# Patient Record
Sex: FEMALE | Race: WHITE | NOT HISPANIC OR LATINO
[De-identification: names, ages, dates, MRNs, and addresses within clinical notes are randomized per-mention and may not be internally consistent; named-entity substitution may affect disease eponyms.]

---

## 2017-01-19 ENCOUNTER — RECORD ABSTRACTING (OUTPATIENT)
Age: 72
End: 2017-01-19

## 2017-01-19 PROBLEM — Z00.00 ENCOUNTER FOR PREVENTIVE HEALTH EXAMINATION: Status: ACTIVE | Noted: 2017-01-19

## 2019-11-15 ENCOUNTER — APPOINTMENT (OUTPATIENT)
Dept: CARDIOLOGY | Facility: CLINIC | Age: 74
End: 2019-11-15
Payer: MEDICARE

## 2019-11-15 VITALS
DIASTOLIC BLOOD PRESSURE: 84 MMHG | HEART RATE: 90 BPM | WEIGHT: 175 LBS | HEIGHT: 66 IN | BODY MASS INDEX: 28.12 KG/M2 | SYSTOLIC BLOOD PRESSURE: 140 MMHG

## 2019-11-15 PROCEDURE — 99204 OFFICE O/P NEW MOD 45 MIN: CPT

## 2019-11-15 PROCEDURE — 93000 ELECTROCARDIOGRAM COMPLETE: CPT

## 2019-12-05 NOTE — HISTORY OF PRESENT ILLNESS
[FreeTextEntry1] : Patient with history of HTN, DM, s/p AF ablation 2015 and was ding great. Now patient c/o tirness and palpitations and increase heart rate. Patient came for further evaluation. No bleeding \par \par \par EKG AF 90 bpm

## 2019-12-05 NOTE — PHYSICAL EXAM
[Normal Oral Mucosa] : normal oral mucosa [No Oral Cyanosis] : no oral cyanosis [No Oral Pallor] : no oral pallor [Normal Jugular Venous A Waves Present] : normal jugular venous A waves present [Normal Jugular Venous V Waves Present] : normal jugular venous V waves present [No Jugular Venous Marcus A Waves] : no jugular venous marcus A waves [Heart Rate And Rhythm] : heart rate and rhythm were normal [Heart Sounds] : normal S1 and S2 [Murmurs] : no murmurs present [Exaggerated Use Of Accessory Muscles For Inspiration] : no accessory muscle use [Respiration, Rhythm And Depth] : normal respiratory rhythm and effort [Auscultation Breath Sounds / Voice Sounds] : lungs were clear to auscultation bilaterally [Abdomen Soft] : soft [Abdomen Tenderness] : non-tender [Abdomen Mass (___ Cm)] : no abdominal mass palpated [Nail Clubbing] : no clubbing of the fingernails [Cyanosis, Localized] : no localized cyanosis [Petechial Hemorrhages (___cm)] : no petechial hemorrhages [] : no ischemic changes

## 2019-12-11 ENCOUNTER — OUTPATIENT (OUTPATIENT)
Dept: OUTPATIENT SERVICES | Facility: HOSPITAL | Age: 74
LOS: 1 days | Discharge: HOME | End: 2019-12-11

## 2019-12-11 DIAGNOSIS — I48.91 UNSPECIFIED ATRIAL FIBRILLATION: ICD-10-CM

## 2019-12-11 DIAGNOSIS — Z95.5 PRESENCE OF CORONARY ANGIOPLASTY IMPLANT AND GRAFT: Chronic | ICD-10-CM

## 2019-12-11 DIAGNOSIS — Z98.890 OTHER SPECIFIED POSTPROCEDURAL STATES: Chronic | ICD-10-CM

## 2019-12-11 NOTE — CHART NOTE - NSCHARTNOTEFT_GEN_A_CORE
PACU ANESTHESIA ADMISSION NOTE      Procedure:   Post op diagnosis:      ____  Intubated  TV:______       Rate: ______      FiO2: ______    _x___  Patent Airway    __x__  Full return of protective reflexes    __x__  Full recovery from anesthesia / back to baseline     Vitals:   T:           R:14                  BP: 151/74                 Sat:96                   P: 91      Mental Status:  _x___ Awake x  _____ Alert   _____ Drowsy   _____ Sedated    Nausea/Vomiting:  ____ NO  ______Yes,   See Post - Op Orders          Pain Scale (0-10):  _____    Treatment: ____ None    ____ See Post - Op/PCA Orders    Post - Operative Fluids:   ____ Oral   ____ See Post - Op Orders    Plan: Discharge: x  ____Home       _____Floor     _____Critical Care    _____  Other:_________________    Comments:

## 2019-12-11 NOTE — CHART NOTE - NSCHARTNOTEFT_GEN_A_CORE
POST OPERATIVE PROCEDURAL DOCUMENTATION  PRE-OP DIAGNOSIS: Afib    POST-OP DIAGNOSIS: SR    PROCEDURE: Transesophageal echocardiogram and CV    Primary Physician: JASON Bhagat MD  Fellow: LAURA Merrill MD    ANESTHESIA TYPE  [ x ] Conscious Sedation  [ x ] Local/Regional    CONDITION  [ x ] Good    SPECIMENS REMOVED (IF APPLICABLE): NA    IMPLANTS (IF APPLICABLE): None    ESTIMATED BLOOD LOSS: None    COMPLICATIONS: None    TRANSESOPHAGEAL ECHOCARDIOGRAM     After risks and benefits of procedures were explained, informed consent was obtained and placed in chart.   The patient received topical anesthestic to the oropharynx with viscous lidocaine and benzocaine spray.  Refer to Anesthesia note for sedation details.  The INESSA probe was passed into the esophagus without difficulty.  Transesophageal and transgastric images were obtained.  The INESSA probe was removed without difficulty and examined.  There was no evidence for bleeding.  The patient tolerated the procedure well without any immediate INESSA-related complications.      Preliminary Findings:  LV normal systolic function  RV Normal  Mild MR  Mod TR  Mild AI  Dialted LA  Mildly dilated RA  No thrombus in SILVERIO    Patient succesfully converted to sinus rhythm with synchronized  200 J of direct current cardioversion.    Final report to follow.

## 2019-12-20 PROBLEM — I48.91 UNSPECIFIED ATRIAL FIBRILLATION: Chronic | Status: ACTIVE | Noted: 2019-12-11

## 2019-12-27 ENCOUNTER — APPOINTMENT (OUTPATIENT)
Dept: CARDIOLOGY | Facility: CLINIC | Age: 74
End: 2019-12-27
Payer: MEDICARE

## 2019-12-27 VITALS
WEIGHT: 184 LBS | SYSTOLIC BLOOD PRESSURE: 160 MMHG | DIASTOLIC BLOOD PRESSURE: 81 MMHG | HEART RATE: 69 BPM | BODY MASS INDEX: 30.66 KG/M2 | HEIGHT: 65 IN

## 2019-12-27 DIAGNOSIS — I10 ESSENTIAL (PRIMARY) HYPERTENSION: ICD-10-CM

## 2019-12-27 PROCEDURE — 93000 ELECTROCARDIOGRAM COMPLETE: CPT

## 2019-12-27 PROCEDURE — 99215 OFFICE O/P EST HI 40 MIN: CPT

## 2019-12-27 NOTE — ASSESSMENT
[FreeTextEntry1] : AF ablation\par \par I have discussed different treatment options with KENA LOZADA including anti-arrhythmic medications or ablation.  After a long discussion, the patient would like to proceed with an ablation.  I have explained the risks and benefits of the procedure to the patient.  There is approximately 1-2% chance of any major cardiovascular complication to occur. Complications include, but are not limited to infection, bleeding, damage to the vessels, hole in the heart, stroke, death and heart attack. There is also 1% risk of phrenic nerve injury.  The patient understands the risk and would like to proceed with the procedure. Patient had opportunity to ask questions. Patient indicated that all of his questions were answered to his satisfaction and verbalized understanding.\par \par

## 2019-12-27 NOTE — HISTORY OF PRESENT ILLNESS
[FreeTextEntry1] : Patient with history of HTN, DM, s/p AF ablation 2015 and was ding great. Now patient c/o tirness and palpitations and increase heart rate. Patient came for further evaluation. No bleeding \par \par Patient returns for further evaluation for AF. Pt fees much better once she is in NSR and see the difference\par \par \par EKG SR 60 bpm\par \par INESSA - Summary:\par  1. Left ventricular ejection fraction, by visual estimation, is 45 to 50%.\par  2. Mildly decreased global left ventricular systolic function.\par  3. Moderately enlarged right atrium.\par  4. Successful synchronised direct current cardioversion at 200 J.\par  5. No left atrial appendage thrombus and normal left atrial appendage velocities.\par

## 2019-12-27 NOTE — PHYSICAL EXAM
[Normal Oral Mucosa] : normal oral mucosa [No Oral Pallor] : no oral pallor [No Oral Cyanosis] : no oral cyanosis [Normal Jugular Venous V Waves Present] : normal jugular venous V waves present [Normal Jugular Venous A Waves Present] : normal jugular venous A waves present [No Jugular Venous Marcus A Waves] : no jugular venous marcus A waves [Respiration, Rhythm And Depth] : normal respiratory rhythm and effort [Exaggerated Use Of Accessory Muscles For Inspiration] : no accessory muscle use [Auscultation Breath Sounds / Voice Sounds] : lungs were clear to auscultation bilaterally [Heart Rate And Rhythm] : heart rate and rhythm were normal [Heart Sounds] : normal S1 and S2 [Murmurs] : no murmurs present [Abdomen Tenderness] : non-tender [Abdomen Soft] : soft [Abdomen Mass (___ Cm)] : no abdominal mass palpated [Nail Clubbing] : no clubbing of the fingernails [Cyanosis, Localized] : no localized cyanosis [Petechial Hemorrhages (___cm)] : no petechial hemorrhages [] : no ischemic changes

## 2020-02-10 ENCOUNTER — FORM ENCOUNTER (OUTPATIENT)
Age: 75
End: 2020-02-10

## 2020-02-11 ENCOUNTER — OUTPATIENT (OUTPATIENT)
Dept: OUTPATIENT SERVICES | Facility: HOSPITAL | Age: 75
LOS: 1 days | Discharge: HOME | End: 2020-02-11
Payer: MEDICARE

## 2020-02-11 VITALS
OXYGEN SATURATION: 98 % | HEIGHT: 65 IN | SYSTOLIC BLOOD PRESSURE: 146 MMHG | RESPIRATION RATE: 16 BRPM | DIASTOLIC BLOOD PRESSURE: 78 MMHG | HEART RATE: 68 BPM | TEMPERATURE: 98 F | WEIGHT: 174.17 LBS

## 2020-02-11 DIAGNOSIS — Z01.818 ENCOUNTER FOR OTHER PREPROCEDURAL EXAMINATION: ICD-10-CM

## 2020-02-11 DIAGNOSIS — I48.0 PAROXYSMAL ATRIAL FIBRILLATION: ICD-10-CM

## 2020-02-11 DIAGNOSIS — Z95.5 PRESENCE OF CORONARY ANGIOPLASTY IMPLANT AND GRAFT: Chronic | ICD-10-CM

## 2020-02-11 DIAGNOSIS — Z98.890 OTHER SPECIFIED POSTPROCEDURAL STATES: Chronic | ICD-10-CM

## 2020-02-11 LAB
ALBUMIN SERPL ELPH-MCNC: 5.1 G/DL — SIGNIFICANT CHANGE UP (ref 3.5–5.2)
ALP SERPL-CCNC: 76 U/L — SIGNIFICANT CHANGE UP (ref 30–115)
ALT FLD-CCNC: 23 U/L — SIGNIFICANT CHANGE UP (ref 0–41)
ANION GAP SERPL CALC-SCNC: 14 MMOL/L — SIGNIFICANT CHANGE UP (ref 7–14)
APTT BLD: 43.5 SEC — HIGH (ref 27–39.2)
AST SERPL-CCNC: 21 U/L — SIGNIFICANT CHANGE UP (ref 0–41)
BASOPHILS # BLD AUTO: 0.04 K/UL — SIGNIFICANT CHANGE UP (ref 0–0.2)
BASOPHILS NFR BLD AUTO: 0.6 % — SIGNIFICANT CHANGE UP (ref 0–1)
BILIRUB SERPL-MCNC: 0.2 MG/DL — SIGNIFICANT CHANGE UP (ref 0.2–1.2)
BUN SERPL-MCNC: 16 MG/DL — SIGNIFICANT CHANGE UP (ref 10–20)
CALCIUM SERPL-MCNC: 10.1 MG/DL — SIGNIFICANT CHANGE UP (ref 8.5–10.1)
CHLORIDE SERPL-SCNC: 93 MMOL/L — LOW (ref 98–110)
CO2 SERPL-SCNC: 24 MMOL/L — SIGNIFICANT CHANGE UP (ref 17–32)
CREAT SERPL-MCNC: 0.9 MG/DL — SIGNIFICANT CHANGE UP (ref 0.7–1.5)
EOSINOPHIL # BLD AUTO: 0.12 K/UL — SIGNIFICANT CHANGE UP (ref 0–0.7)
EOSINOPHIL NFR BLD AUTO: 1.9 % — SIGNIFICANT CHANGE UP (ref 0–8)
ESTIMATED AVERAGE GLUCOSE: 174 MG/DL — HIGH (ref 68–114)
GLUCOSE SERPL-MCNC: 182 MG/DL — HIGH (ref 70–99)
HBA1C BLD-MCNC: 7.7 % — HIGH (ref 4–5.6)
HCT VFR BLD CALC: 35.8 % — LOW (ref 37–47)
HGB BLD-MCNC: 12.3 G/DL — SIGNIFICANT CHANGE UP (ref 12–16)
IMM GRANULOCYTES NFR BLD AUTO: 0.2 % — SIGNIFICANT CHANGE UP (ref 0.1–0.3)
INR BLD: 1.34 RATIO — HIGH (ref 0.65–1.3)
LYMPHOCYTES # BLD AUTO: 1.77 K/UL — SIGNIFICANT CHANGE UP (ref 1.2–3.4)
LYMPHOCYTES # BLD AUTO: 27.7 % — SIGNIFICANT CHANGE UP (ref 20.5–51.1)
MCHC RBC-ENTMCNC: 30.8 PG — SIGNIFICANT CHANGE UP (ref 27–31)
MCHC RBC-ENTMCNC: 34.4 G/DL — SIGNIFICANT CHANGE UP (ref 32–37)
MCV RBC AUTO: 89.7 FL — SIGNIFICANT CHANGE UP (ref 81–99)
MONOCYTES # BLD AUTO: 0.71 K/UL — HIGH (ref 0.1–0.6)
MONOCYTES NFR BLD AUTO: 11.1 % — HIGH (ref 1.7–9.3)
NEUTROPHILS # BLD AUTO: 3.74 K/UL — SIGNIFICANT CHANGE UP (ref 1.4–6.5)
NEUTROPHILS NFR BLD AUTO: 58.5 % — SIGNIFICANT CHANGE UP (ref 42.2–75.2)
NRBC # BLD: 0 /100 WBCS — SIGNIFICANT CHANGE UP (ref 0–0)
PLATELET # BLD AUTO: 338 K/UL — SIGNIFICANT CHANGE UP (ref 130–400)
POTASSIUM SERPL-MCNC: 5.5 MMOL/L — HIGH (ref 3.5–5)
POTASSIUM SERPL-SCNC: 5.5 MMOL/L — HIGH (ref 3.5–5)
PROT SERPL-MCNC: 7.5 G/DL — SIGNIFICANT CHANGE UP (ref 6–8)
PROTHROM AB SERPL-ACNC: 15.4 SEC — HIGH (ref 9.95–12.87)
RBC # BLD: 3.99 M/UL — LOW (ref 4.2–5.4)
RBC # FLD: 13.4 % — SIGNIFICANT CHANGE UP (ref 11.5–14.5)
SODIUM SERPL-SCNC: 131 MMOL/L — LOW (ref 135–146)
WBC # BLD: 6.39 K/UL — SIGNIFICANT CHANGE UP (ref 4.8–10.8)
WBC # FLD AUTO: 6.39 K/UL — SIGNIFICANT CHANGE UP (ref 4.8–10.8)

## 2020-02-11 PROCEDURE — 93010 ELECTROCARDIOGRAM REPORT: CPT

## 2020-02-11 PROCEDURE — 71046 X-RAY EXAM CHEST 2 VIEWS: CPT | Mod: 26

## 2020-02-11 NOTE — H&P PST ADULT - REASON FOR ADMISSION
74 year old Nauruan speaking female presenting for preop testing for A-fib ablation, transesophageal, & mapping. 2/25/2020. Pt c/o of slight MONTES when walking up flight of steps. Denies any current SOB or chest pain. No recent sickness. Able to go up one flight of steps with some SOB but no chest pain. YAHAIRA reviewed with patient.

## 2020-02-11 NOTE — H&P PST ADULT - NSANTHOSAYNRD_GEN_A_CORE
No. YAHAIRA screening performed.  STOP BANG Legend: 0-2 = LOW Risk; 3-4 = INTERMEDIATE Risk; 5-8 = HIGH Risk

## 2020-02-11 NOTE — H&P PST ADULT - NSICDXPASTMEDICALHX_GEN_ALL_CORE_FT
PAST MEDICAL HISTORY:  Atrial fibrillation, unspecified type     High cholesterol     HTN (hypertension)     Type 1 diabetes mellitus with hyperglycemia

## 2020-02-12 ENCOUNTER — OUTPATIENT (OUTPATIENT)
Dept: OUTPATIENT SERVICES | Facility: HOSPITAL | Age: 75
LOS: 1 days | Discharge: HOME | End: 2020-02-12

## 2020-02-12 DIAGNOSIS — Z98.890 OTHER SPECIFIED POSTPROCEDURAL STATES: Chronic | ICD-10-CM

## 2020-02-12 DIAGNOSIS — Z95.5 PRESENCE OF CORONARY ANGIOPLASTY IMPLANT AND GRAFT: Chronic | ICD-10-CM

## 2020-02-12 PROBLEM — E78.00 PURE HYPERCHOLESTEROLEMIA, UNSPECIFIED: Chronic | Status: ACTIVE | Noted: 2020-02-11

## 2020-02-12 PROBLEM — I10 ESSENTIAL (PRIMARY) HYPERTENSION: Chronic | Status: ACTIVE | Noted: 2020-02-11

## 2020-02-12 LAB
ANION GAP SERPL CALC-SCNC: 12 MMOL/L — SIGNIFICANT CHANGE UP (ref 7–14)
BUN SERPL-MCNC: 14 MG/DL — SIGNIFICANT CHANGE UP (ref 10–20)
CALCIUM SERPL-MCNC: 9.8 MG/DL — SIGNIFICANT CHANGE UP (ref 8.5–10.1)
CHLORIDE SERPL-SCNC: 94 MMOL/L — LOW (ref 98–110)
CO2 SERPL-SCNC: 23 MMOL/L — SIGNIFICANT CHANGE UP (ref 17–32)
CREAT SERPL-MCNC: 0.9 MG/DL — SIGNIFICANT CHANGE UP (ref 0.7–1.5)
GLUCOSE SERPL-MCNC: 194 MG/DL — HIGH (ref 70–99)
POTASSIUM SERPL-MCNC: 5.6 MMOL/L — HIGH (ref 3.5–5)
POTASSIUM SERPL-SCNC: 5.6 MMOL/L — HIGH (ref 3.5–5)
SODIUM SERPL-SCNC: 129 MMOL/L — LOW (ref 135–146)

## 2020-02-25 ENCOUNTER — INPATIENT (INPATIENT)
Facility: HOSPITAL | Age: 75
LOS: 0 days | Discharge: HOME | End: 2020-02-26
Attending: INTERNAL MEDICINE | Admitting: INTERNAL MEDICINE
Payer: MEDICARE

## 2020-02-25 ENCOUNTER — OUTPATIENT (OUTPATIENT)
Dept: OUTPATIENT SERVICES | Facility: HOSPITAL | Age: 75
LOS: 1 days | Discharge: HOME | End: 2020-02-25

## 2020-02-25 VITALS
SYSTOLIC BLOOD PRESSURE: 180 MMHG | HEART RATE: 79 BPM | RESPIRATION RATE: 18 BRPM | TEMPERATURE: 96 F | DIASTOLIC BLOOD PRESSURE: 79 MMHG

## 2020-02-25 DIAGNOSIS — Z95.5 PRESENCE OF CORONARY ANGIOPLASTY IMPLANT AND GRAFT: Chronic | ICD-10-CM

## 2020-02-25 DIAGNOSIS — Z82.49 FAMILY HISTORY OF ISCHEMIC HEART DISEASE AND OTHER DISEASES OF THE CIRCULATORY SYSTEM: ICD-10-CM

## 2020-02-25 DIAGNOSIS — Z79.4 LONG TERM (CURRENT) USE OF INSULIN: ICD-10-CM

## 2020-02-25 DIAGNOSIS — E11.65 TYPE 2 DIABETES MELLITUS WITH HYPERGLYCEMIA: ICD-10-CM

## 2020-02-25 DIAGNOSIS — Z91.040 LATEX ALLERGY STATUS: ICD-10-CM

## 2020-02-25 DIAGNOSIS — I25.10 ATHEROSCLEROTIC HEART DISEASE OF NATIVE CORONARY ARTERY WITHOUT ANGINA PECTORIS: ICD-10-CM

## 2020-02-25 DIAGNOSIS — Z98.890 OTHER SPECIFIED POSTPROCEDURAL STATES: Chronic | ICD-10-CM

## 2020-02-25 DIAGNOSIS — Z83.3 FAMILY HISTORY OF DIABETES MELLITUS: ICD-10-CM

## 2020-02-25 DIAGNOSIS — Z79.01 LONG TERM (CURRENT) USE OF ANTICOAGULANTS: ICD-10-CM

## 2020-02-25 DIAGNOSIS — Z95.5 PRESENCE OF CORONARY ANGIOPLASTY IMPLANT AND GRAFT: ICD-10-CM

## 2020-02-25 DIAGNOSIS — I48.91 UNSPECIFIED ATRIAL FIBRILLATION: ICD-10-CM

## 2020-02-25 DIAGNOSIS — E78.00 PURE HYPERCHOLESTEROLEMIA, UNSPECIFIED: ICD-10-CM

## 2020-02-25 DIAGNOSIS — I48.0 PAROXYSMAL ATRIAL FIBRILLATION: ICD-10-CM

## 2020-02-25 DIAGNOSIS — Z91.011 ALLERGY TO MILK PRODUCTS: ICD-10-CM

## 2020-02-25 DIAGNOSIS — Z80.1 FAMILY HISTORY OF MALIGNANT NEOPLASM OF TRACHEA, BRONCHUS AND LUNG: ICD-10-CM

## 2020-02-25 LAB
GLUCOSE BLDC GLUCOMTR-MCNC: 145 MG/DL — HIGH (ref 70–99)
GLUCOSE BLDC GLUCOMTR-MCNC: 194 MG/DL — HIGH (ref 70–99)
GLUCOSE BLDC GLUCOMTR-MCNC: 257 MG/DL — HIGH (ref 70–99)

## 2020-02-25 PROCEDURE — 93312 ECHO TRANSESOPHAGEAL: CPT | Mod: 26

## 2020-02-25 PROCEDURE — 93657 TX L/R ATRIAL FIB ADDL: CPT

## 2020-02-25 PROCEDURE — 93325 DOPPLER ECHO COLOR FLOW MAPG: CPT | Mod: 26

## 2020-02-25 PROCEDURE — 93320 DOPPLER ECHO COMPLETE: CPT | Mod: 26

## 2020-02-25 PROCEDURE — 93623 PRGRMD STIMJ&PACG IV RX NFS: CPT | Mod: 26

## 2020-02-25 PROCEDURE — 93662 INTRACARDIAC ECG (ICE): CPT | Mod: 26

## 2020-02-25 PROCEDURE — 93656 COMPRE EP EVAL ABLTJ ATR FIB: CPT

## 2020-02-25 PROCEDURE — 93318 ECHO TRANSESOPHAGEAL INTRAOP: CPT | Mod: 26,XU

## 2020-02-25 PROCEDURE — 93613 INTRACARDIAC EPHYS 3D MAPG: CPT | Mod: 26

## 2020-02-25 RX ORDER — SODIUM CHLORIDE 9 MG/ML
1000 INJECTION, SOLUTION INTRAVENOUS
Refills: 0 | Status: DISCONTINUED | OUTPATIENT
Start: 2020-02-25 | End: 2020-02-26

## 2020-02-25 RX ORDER — ATORVASTATIN CALCIUM 80 MG/1
40 TABLET, FILM COATED ORAL AT BEDTIME
Refills: 0 | Status: DISCONTINUED | OUTPATIENT
Start: 2020-02-25 | End: 2020-02-26

## 2020-02-25 RX ORDER — PANTOPRAZOLE SODIUM 20 MG/1
40 TABLET, DELAYED RELEASE ORAL ONCE
Refills: 0 | Status: COMPLETED | OUTPATIENT
Start: 2020-02-25 | End: 2020-02-25

## 2020-02-25 RX ORDER — DEXTROSE 50 % IN WATER 50 %
25 SYRINGE (ML) INTRAVENOUS ONCE
Refills: 0 | Status: DISCONTINUED | OUTPATIENT
Start: 2020-02-25 | End: 2020-02-26

## 2020-02-25 RX ORDER — PANTOPRAZOLE SODIUM 20 MG/1
40 TABLET, DELAYED RELEASE ORAL
Refills: 0 | Status: DISCONTINUED | OUTPATIENT
Start: 2020-02-26 | End: 2020-02-26

## 2020-02-25 RX ORDER — HYDROCORTISONE 1 %
1 OINTMENT (GRAM) TOPICAL THREE TIMES A DAY
Refills: 0 | Status: DISCONTINUED | OUTPATIENT
Start: 2020-02-25 | End: 2020-02-26

## 2020-02-25 RX ORDER — RIVAROXABAN 15 MG-20MG
20 KIT ORAL
Refills: 0 | Status: DISCONTINUED | OUTPATIENT
Start: 2020-02-25 | End: 2020-02-26

## 2020-02-25 RX ORDER — DEXTROSE 50 % IN WATER 50 %
15 SYRINGE (ML) INTRAVENOUS ONCE
Refills: 0 | Status: DISCONTINUED | OUTPATIENT
Start: 2020-02-25 | End: 2020-02-26

## 2020-02-25 RX ORDER — GLUCAGON INJECTION, SOLUTION 0.5 MG/.1ML
1 INJECTION, SOLUTION SUBCUTANEOUS ONCE
Refills: 0 | Status: DISCONTINUED | OUTPATIENT
Start: 2020-02-25 | End: 2020-02-26

## 2020-02-25 RX ORDER — AMIODARONE HYDROCHLORIDE 400 MG/1
200 TABLET ORAL DAILY
Refills: 0 | Status: DISCONTINUED | OUTPATIENT
Start: 2020-02-25 | End: 2020-02-26

## 2020-02-25 RX ORDER — HYDROMORPHONE HYDROCHLORIDE 2 MG/ML
0.5 INJECTION INTRAMUSCULAR; INTRAVENOUS; SUBCUTANEOUS
Refills: 0 | Status: DISCONTINUED | OUTPATIENT
Start: 2020-02-25 | End: 2020-02-26

## 2020-02-25 RX ORDER — HYDROMORPHONE HYDROCHLORIDE 2 MG/ML
1 INJECTION INTRAMUSCULAR; INTRAVENOUS; SUBCUTANEOUS
Refills: 0 | Status: DISCONTINUED | OUTPATIENT
Start: 2020-02-25 | End: 2020-02-26

## 2020-02-25 RX ORDER — ACETAMINOPHEN 500 MG
650 TABLET ORAL ONCE
Refills: 0 | Status: COMPLETED | OUTPATIENT
Start: 2020-02-25 | End: 2020-02-25

## 2020-02-25 RX ORDER — DEXTROSE 50 % IN WATER 50 %
12.5 SYRINGE (ML) INTRAVENOUS ONCE
Refills: 0 | Status: DISCONTINUED | OUTPATIENT
Start: 2020-02-25 | End: 2020-02-26

## 2020-02-25 RX ORDER — LISINOPRIL 2.5 MG/1
40 TABLET ORAL DAILY
Refills: 0 | Status: DISCONTINUED | OUTPATIENT
Start: 2020-02-25 | End: 2020-02-26

## 2020-02-25 RX ORDER — INSULIN LISPRO 100/ML
VIAL (ML) SUBCUTANEOUS
Refills: 0 | Status: DISCONTINUED | OUTPATIENT
Start: 2020-02-25 | End: 2020-02-26

## 2020-02-25 RX ORDER — INSULIN GLARGINE 100 [IU]/ML
20 INJECTION, SOLUTION SUBCUTANEOUS AT BEDTIME
Refills: 0 | Status: DISCONTINUED | OUTPATIENT
Start: 2020-02-25 | End: 2020-02-26

## 2020-02-25 RX ORDER — METOPROLOL TARTRATE 50 MG
50 TABLET ORAL DAILY
Refills: 0 | Status: DISCONTINUED | OUTPATIENT
Start: 2020-02-25 | End: 2020-02-26

## 2020-02-25 RX ORDER — AMLODIPINE BESYLATE 2.5 MG/1
5 TABLET ORAL DAILY
Refills: 0 | Status: DISCONTINUED | OUTPATIENT
Start: 2020-02-25 | End: 2020-02-26

## 2020-02-25 RX ORDER — METOCLOPRAMIDE HCL 10 MG
10 TABLET ORAL THREE TIMES A DAY
Refills: 0 | Status: DISCONTINUED | OUTPATIENT
Start: 2020-02-25 | End: 2020-02-26

## 2020-02-25 RX ADMIN — PANTOPRAZOLE SODIUM 40 MILLIGRAM(S): 20 TABLET, DELAYED RELEASE ORAL at 17:51

## 2020-02-25 RX ADMIN — HYDROMORPHONE HYDROCHLORIDE 1 MILLIGRAM(S): 2 INJECTION INTRAMUSCULAR; INTRAVENOUS; SUBCUTANEOUS at 12:10

## 2020-02-25 RX ADMIN — RIVAROXABAN 20 MILLIGRAM(S): KIT at 17:51

## 2020-02-25 RX ADMIN — Medication 1 APPLICATION(S): at 13:46

## 2020-02-25 RX ADMIN — Medication 10 MILLIGRAM(S): at 13:45

## 2020-02-25 RX ADMIN — HYDROMORPHONE HYDROCHLORIDE 1 MILLIGRAM(S): 2 INJECTION INTRAMUSCULAR; INTRAVENOUS; SUBCUTANEOUS at 12:41

## 2020-02-25 RX ADMIN — AMIODARONE HYDROCHLORIDE 200 MILLIGRAM(S): 400 TABLET ORAL at 13:42

## 2020-02-25 RX ADMIN — ATORVASTATIN CALCIUM 40 MILLIGRAM(S): 80 TABLET, FILM COATED ORAL at 22:05

## 2020-02-25 RX ADMIN — Medication 10 MILLIGRAM(S): at 22:05

## 2020-02-25 RX ADMIN — HYDROMORPHONE HYDROCHLORIDE 1 MILLIGRAM(S): 2 INJECTION INTRAMUSCULAR; INTRAVENOUS; SUBCUTANEOUS at 12:25

## 2020-02-25 RX ADMIN — LISINOPRIL 40 MILLIGRAM(S): 2.5 TABLET ORAL at 13:44

## 2020-02-25 RX ADMIN — Medication 6: at 17:51

## 2020-02-25 RX ADMIN — HYDROMORPHONE HYDROCHLORIDE 1 MILLIGRAM(S): 2 INJECTION INTRAMUSCULAR; INTRAVENOUS; SUBCUTANEOUS at 12:51

## 2020-02-25 RX ADMIN — Medication 1 APPLICATION(S): at 22:29

## 2020-02-25 RX ADMIN — AMLODIPINE BESYLATE 5 MILLIGRAM(S): 2.5 TABLET ORAL at 13:43

## 2020-02-25 RX ADMIN — Medication 50 MILLIGRAM(S): at 13:45

## 2020-02-25 RX ADMIN — INSULIN GLARGINE 20 UNIT(S): 100 INJECTION, SOLUTION SUBCUTANEOUS at 22:04

## 2020-02-25 NOTE — CHART NOTE - NSCHARTNOTEFT_GEN_A_CORE
Cardiac Electrophysiology Procedure Note      PROCEDURE:  Electrophysiological Study  Administration of Medicines  Intracardiac Ultrasound  Right and left atrial catheterization with dual transeptal approach  Radiofrequency ablation of pulmonary veins to treat Atrial Fibrillation  Additional liter intracardiac catheter ablation of left atrium after completion of pulmonary vein isolation  Atypical atrial flutter ablation  CS Cannulation  Computerized 3D mapping  Fluoroscopy      INDICATION: symptomatic AF      OPERATORS:    Attending	 Elias Carter MD    MATERIALS    Sheath	Insertion Site	Catheter	Location  8 Fr and SR-0	RFV	Daig 5 F quad and ablation	LA and RA  6 Fr	LFV	CDR-2 Quad	His  11 Fr	LFV	ICE	RA      ABLATION CATHETERS    Diameter	Size	Type	System  7 Fr	4 mm	Biosense Zabala Johan-Star	RFA      BASELINE FINDINGS    Rhythm	CL / Rate	WI	QRS	QT	AH	HV  SR	750	190	110	390	120	56      DESCRIPTION OF PROCEDURE    The patient was brought to the Procedure Room in a nonsedated and fasting state. Informed, written consent was obtained prior to the procedure. Anesthesia maintain comfort and analgesia throughout the procedure. Blood pressure, oxygenation and level of comfort were stable throughout. The right and left groin and right neck regions were cleaned and prepped with the applications of Chloraprep. Patient was then covered from head to toe with sterile drapes in the usual manner.     The left right inguinal areas and arterial pulse were defined; 30 cc of lidocaine solution were infiltrated into the skin overlying the area.     Next, using needle and guidewire, the right femoral vein was accessed once times using modified Seldinger technique. The guidewires were followed up the IVC, right of the spine, to confirm venous access. One introducer sheath was placed into the femoral vein. The dilators and guidewires were removed. Then, using needle and guidewire, the left femoral vein was accessed twice times using modified Seldinger technique. The guidewires were followed up the IVC, right of the spine, to confirm venous access. Two introducer sheaths were placed into the femoral vein. The dilators and guidewires were removed. Esophageal temperature was continuously monitored by esophageal temperature probe. Ablation was immediately terminated if the temperature exceeded 40°C.    Catheters were placed in the coronary sinus, at the high right atrial position, over the septal tricuspid valve area to obtain and confirm a proximal His signal, and at the RV apex. Diastolic thresholds were found to be adequate.  	  Baseline parameters were obtained and intervals were measured at these sites. Pacing at the HRA was performed to evaluate, AVN function and possible arrhythmia induction. Pacing performed at the RV apex to evaluate retrograde AVN function and evaluate for possible accessory pathways by evaluating retrograde atrial activation pattern and VH to A relationship.    A 10F AcuNav ICE ultrasound catheter was placed through the 11F sheath and positioned into the right atrium to allow visualization of the intra-atrial septum. one SL-1 sheath was exchanged for the three 8F introducer sheath previously inserted in the right femoral vein. Single transeptal puncture was performed with BRK1 needle and [place] 8 F and 11F 55 degree SL-1 sheaths via the right femoral vein under direct visualization with intracardiac echocardiography and  fluoroscopy guidance. In both instances, a J wire was followed to the left subclavian vein and the sheath and dilator combo inserted to that level. The wire was exchanged for the BRK1 needle and pulled back under fluoroscopic visualization until the interatrial septum was reached. On all occasions, the sheath needle combo was placed over the septum and into the left atrium with needle advancement. Left atrial location was confirmed for each transeptal puncture with pressure monitoring, micro-bubble confirmation on ICE, after withdrawal of bright red blood from the catheter and with advancement of a guide wire into the left pulmonary vein. Left atrial pressure was measured at 12 mmHg. Intravenous heparin was given and ACTs followed during the rest of the procedure to ensure an ACT greater than 300 secs.     Left atrium was mapped using HD grid catheter. All pulmonary veins were electrically isolated except for 10% of left inferior pulmonary vein.      Left inferior Pulmonary Vein - While in AFL, PV potentials were present in approximately 10%  of the vein. Multiple lesions were given in a WACA pattern around the PV os 50 W and temperature not exceeding 40 C for 7 sec. All PV potentials were eradicated.     Exit block was documented by pacing from the each pole of the lasso catheter and Entrance block was evident when pacing form the distal CS. The atrium was remapped and scar around each veins was present.     A roof line and floor line were performed by applying multiple RF lesions extending from the LSPV to the RSPV amd LIPV to the RIPV. The posterior wall was electrically isolated.     Ventricular pacing showed VA conduction at <300 ms CL. Atrial pacing showed AVN WBCL was 290 msec. The PV velocity by ICE Doppler was 0.5 m/sec for the LSPV and 0.5 m/sec for the LIPV at the end of the procedure. A limited transthorasic echocardiogram was performed and no evidence of significant pericardial effusion was seen.    The catheters were removed and the sheaths pulled after a figure-8 stitch was placed. A dry, sterile dressing was placed over this. The patient was returned to a hospital room in stable condition. Gauze and needle count were performed and found to be consistent at the end of the procedure      COMPLICATIONS:    The patient tolerated the procedure well. There were no immediate complications. No evidence of pulmonary vein stenosis.      CONCLUSIONS:    Successful ablation of the left inferior vein with ablation of PV potentials and entrance / exit block as endpoints.           _______________________________  Elias Carter MD  Cardiac Electrophysiology

## 2020-02-25 NOTE — PRE-ANESTHESIA EVALUATION ADULT - NSRADCARDRESULTSFT_GEN_ALL_CORE
INESSA  Summary:   1. Left ventricular ejection fraction, by visual estimation, is 45 to 50%.   2. Mildly decreased global left ventricular systolic function.   3. Moderately enlarged right atrium.   4. Successful synchronised direct current cardioversion at 200 J.   5. No left atrial appendage thrombus and normal left atrial appendage velocities.

## 2020-02-25 NOTE — PROGRESS NOTE ADULT - SUBJECTIVE AND OBJECTIVE BOX
Electrophysiology Brief Post-Op Note      I have personally seen and examined the patient.  I agree with the history and physical which I have reviewed and noted any changes below.  02-25-20 @ 07:08    PRE-OP DIAGNOSIS: AF    POST-OP DIAGNOSIS: AF    PROCEDURE: ablation    Physician: Raul  Assistant: None    ANESTHESIA TYPE:  [X  ]General Anesthesia  [  ] Sedation  [  ] Local/Regional    ESTIMATED BLOOD LOSS:  50     mL    CONDITION  [  ] Critical  [  ] Serious  [  ]Fair  [  X]Good      SPECIMENS REMOVED (IF APPLICABLE):  none    IMPLANTS (IF APPLICABLE)  none    FINDINGS  PLAN OF CARE  -	Start Xarelto 20 mg qd tonight at 10 pm  -	Start protonix 40 mg daily tonight  -	Bed rest till am  -	Admit to telemetry

## 2020-02-26 ENCOUNTER — TRANSCRIPTION ENCOUNTER (OUTPATIENT)
Age: 75
End: 2020-02-26

## 2020-02-26 VITALS
RESPIRATION RATE: 18 BRPM | HEART RATE: 77 BPM | SYSTOLIC BLOOD PRESSURE: 183 MMHG | WEIGHT: 171.96 LBS | TEMPERATURE: 98 F | DIASTOLIC BLOOD PRESSURE: 75 MMHG

## 2020-02-26 LAB
GLUCOSE BLDC GLUCOMTR-MCNC: 130 MG/DL — HIGH (ref 70–99)
GLUCOSE BLDC GLUCOMTR-MCNC: 139 MG/DL — HIGH (ref 70–99)
GLUCOSE BLDC GLUCOMTR-MCNC: 254 MG/DL — HIGH (ref 70–99)

## 2020-02-26 PROCEDURE — 93010 ELECTROCARDIOGRAM REPORT: CPT

## 2020-02-26 RX ORDER — PANTOPRAZOLE SODIUM 20 MG/1
1 TABLET, DELAYED RELEASE ORAL
Qty: 30 | Refills: 0
Start: 2020-02-26 | End: 2020-03-26

## 2020-02-26 RX ADMIN — LISINOPRIL 40 MILLIGRAM(S): 2.5 TABLET ORAL at 05:22

## 2020-02-26 RX ADMIN — AMIODARONE HYDROCHLORIDE 200 MILLIGRAM(S): 400 TABLET ORAL at 05:21

## 2020-02-26 RX ADMIN — Medication 10 MILLIGRAM(S): at 05:22

## 2020-02-26 RX ADMIN — Medication 6: at 11:23

## 2020-02-26 RX ADMIN — AMLODIPINE BESYLATE 5 MILLIGRAM(S): 2.5 TABLET ORAL at 05:20

## 2020-02-26 RX ADMIN — PANTOPRAZOLE SODIUM 40 MILLIGRAM(S): 20 TABLET, DELAYED RELEASE ORAL at 05:21

## 2020-02-26 RX ADMIN — Medication 1 APPLICATION(S): at 05:26

## 2020-02-26 RX ADMIN — Medication 50 MILLIGRAM(S): at 05:22

## 2020-02-26 NOTE — PROGRESS NOTE ADULT - SUBJECTIVE AND OBJECTIVE BOX
INTERVAL HPI/OVERNIGHT EVENTS: No acute events overnight    MEDICATIONS  (STANDING):  aMIOdarone    Tablet 200 milliGRAM(s) Oral daily  amLODIPine   Tablet 5 milliGRAM(s) Oral daily  atorvastatin 40 milliGRAM(s) Oral at bedtime  dextrose 5%. 1000 milliLiter(s) (50 mL/Hr) IV Continuous <Continuous>  dextrose 50% Injectable 12.5 Gram(s) IV Push once  dextrose 50% Injectable 25 Gram(s) IV Push once  dextrose 50% Injectable 25 Gram(s) IV Push once  hydrocortisone 1% Topical Cream - Peds 1 Application(s) Topical three times a day  insulin glargine Injectable (LANTUS) 20 Unit(s) SubCutaneous at bedtime  insulin lispro (HumaLOG) corrective regimen sliding scale   SubCutaneous three times a day before meals  lisinopril 40 milliGRAM(s) Oral daily  metoclopramide 10 milliGRAM(s) Oral three times a day  metoprolol succinate ER 50 milliGRAM(s) Oral daily  pantoprazole    Tablet 40 milliGRAM(s) Oral before breakfast  rivaroxaban 20 milliGRAM(s) Oral with dinner    MEDICATIONS  (PRN):  dextrose 40% Gel 15 Gram(s) Oral once PRN Blood Glucose LESS THAN 70 milliGRAM(s)/deciliter  glucagon  Injectable 1 milliGRAM(s) IntraMuscular once PRN Glucose LESS THAN 70 milligrams/deciliter  HYDROmorphone  Injectable 0.5 milliGRAM(s) IV Push every 10 minutes PRN Moderate Pain (4 - 6)  HYDROmorphone  Injectable 1 milliGRAM(s) IV Push every 10 minutes PRN Severe Pain (7 - 10)      Allergies  latex (Rash)  Milk (Diarrhea)  No Known Drug Allergies    REVIEW OF SYSTEMS: Denies CP, palpitations, dizziness or SOB    Vital Signs Last 24 Hrs  T(C): 36.5 (26 Feb 2020 04:38), Max: 36.5 (26 Feb 2020 04:38)  T(F): 97.7 (26 Feb 2020 04:38), Max: 97.7 (26 Feb 2020 04:38)  HR: 77 (26 Feb 2020 04:38) (72 - 79)  BP: 183/75 (26 Feb 2020 04:38) (138/66 - 183/75)  BP(mean): --  RR: 18 (26 Feb 2020 04:38) (18 - 18)  SpO2: 96% (25 Feb 2020 20:17) (96% - 96%)    02-25-20 @ 07:01  -  02-26-20 @ 07:00  --------------------------------------------------------  IN: 420 mL / OUT: 750 mL / NET: -330 mL        Physical Exam  GENERAL: In no apparent distress, well nourished, and hydrated.  HEAD:  Atraumatic, Normocephalic  EYES: EOMI, PERRLA, conjunctiva and sclera clear  NECK: Supple and normal thyroid.  No JVD  HEART: Regular rate and rhythm; No murmurs, rubs, or gallops.  PULMONARY: Clear to auscultation and perfusion.  No rales, wheezing, or rhonchi bilaterally.  ABDOMEN: Soft, Nontender, Nondistended; Bowel sounds present  EXTREMITIES:  2+ Peripheral Pulses, No clubbing, cyanosis, or edema  SKIN: Sutures removed from BL groins, no hematoma or bleeding.  NEUROLOGICAL: Grossly nonfocal    LABS:    TELE: NSR 74 bpm    RADIOLOGY & ADDITIONAL TESTS:   12 Lead ECG (02.11.20 @ 10:16)    Ventricular Rate 68 BPM    Atrial Rate 68 BPM    P-R Interval 150 ms    QRS Duration 72 ms    Q-T Interval 414 ms    QTC Calculation(Bezet) 440 ms    P Axis 77 degrees    R Axis 82 degrees    T Axis 70 degrees    Diagnosis Line Normal sinus rhythm  Normal ECG    Confirmed by Cal Cordova ([id number]) on 2/11/2020 6:53:51 PM

## 2020-02-26 NOTE — DISCHARGE NOTE NURSING/CASE MANAGEMENT/SOCIAL WORK - PATIENT PORTAL LINK FT
You can access the FollowMyHealth Patient Portal offered by [facility] by registering at the following website: [email or web address]. By joining [employer]’s FollowMyHealth portal, you will also be able to view your health information using other applications (apps) compatible with our system.

## 2020-02-26 NOTE — DISCHARGE NOTE PROVIDER - CARE PROVIDER_API CALL
Elias Carter; MEDARDO)  Cardiac Electrophysiology  [address] [place] [address]  Phone: [phone]  Fax: [phone]  Follow Up Time: 1 month

## 2020-02-26 NOTE — PROGRESS NOTE ADULT - ASSESSMENT
Assessment: 73 yo F with AF sp ablation. Procedure without complications and patient feeling well.    Plan:  AF sp Ablation  - Cont Xarelto 20 mg QD  - Protonix 40 mg daily x 1 month  - No heavy lifting or squatting x 1-2 weeks  - Cont all other meds  - Follow up with Dr Carter in 3-4 weeks

## 2020-02-26 NOTE — DISCHARGE NOTE PROVIDER - NSDCFUADDINST_GEN_ALL_CORE_FT
- Continue Xarelto 20 mg daily  - Take Protonix 40 mg daily for 1 month  - Continue all other meds  - No heavy lifting or straining for 1-2 weeks  - Follow up with Dr Carter in 3-4 weeks

## 2020-03-02 DIAGNOSIS — Z02.9 ENCOUNTER FOR ADMINISTRATIVE EXAMINATIONS, UNSPECIFIED: ICD-10-CM

## 2020-03-27 DIAGNOSIS — I10 ESSENTIAL (PRIMARY) HYPERTENSION: ICD-10-CM

## 2020-03-27 DIAGNOSIS — I48.91 UNSPECIFIED ATRIAL FIBRILLATION: ICD-10-CM

## 2020-03-27 DIAGNOSIS — I25.10 ATHEROSCLEROTIC HEART DISEASE OF NATIVE CORONARY ARTERY WITHOUT ANGINA PECTORIS: ICD-10-CM

## 2020-03-27 DIAGNOSIS — Z79.84 LONG TERM (CURRENT) USE OF ORAL HYPOGLYCEMIC DRUGS: ICD-10-CM

## 2020-03-27 DIAGNOSIS — E78.00 PURE HYPERCHOLESTEROLEMIA, UNSPECIFIED: ICD-10-CM

## 2020-03-27 DIAGNOSIS — Z91.040 LATEX ALLERGY STATUS: ICD-10-CM

## 2020-03-27 DIAGNOSIS — E11.9 TYPE 2 DIABETES MELLITUS WITHOUT COMPLICATIONS: ICD-10-CM

## 2020-03-27 DIAGNOSIS — Z95.5 PRESENCE OF CORONARY ANGIOPLASTY IMPLANT AND GRAFT: ICD-10-CM

## 2020-04-10 ENCOUNTER — APPOINTMENT (OUTPATIENT)
Dept: CARDIOLOGY | Facility: CLINIC | Age: 75
End: 2020-04-10

## 2020-06-11 ENCOUNTER — APPOINTMENT (OUTPATIENT)
Dept: CARDIOLOGY | Facility: CLINIC | Age: 75
End: 2020-06-11
Payer: MEDICARE

## 2020-06-11 VITALS
HEIGHT: 65 IN | BODY MASS INDEX: 30.16 KG/M2 | HEART RATE: 85 BPM | DIASTOLIC BLOOD PRESSURE: 80 MMHG | WEIGHT: 181 LBS | TEMPERATURE: 97.9 F | SYSTOLIC BLOOD PRESSURE: 153 MMHG

## 2020-06-11 PROCEDURE — 93000 ELECTROCARDIOGRAM COMPLETE: CPT

## 2020-06-11 PROCEDURE — 99214 OFFICE O/P EST MOD 30 MIN: CPT

## 2020-06-11 NOTE — PHYSICAL EXAM
[General Appearance - Well Developed] : well developed [Normal Appearance] : normal appearance [No Deformities] : no deformities [General Appearance - Well Nourished] : well nourished [Normal Conjunctiva] : the conjunctiva exhibited no abnormalities [JVD Elevated _____cm] : JVD elevated [unfilled] ~U cm above clavicle [Respiration, Rhythm And Depth] : normal respiratory rhythm and effort [Normal Jugular Venous V Waves Present] : normal jugular venous V waves present [Bowel Sounds] : normal bowel sounds [Auscultation Breath Sounds / Voice Sounds] : lungs were clear to auscultation bilaterally [Heart Sounds] : normal S1 and S2 [Abnormal Walk] : normal gait [Normal Oral Mucosa] : normal oral mucosa [No Oral Cyanosis] : no oral cyanosis [No Oral Pallor] : no oral pallor [No Jugular Venous Marcus A Waves] : no jugular venous marcus A waves [Exaggerated Use Of Accessory Muscles For Inspiration] : no accessory muscle use [Normal Jugular Venous A Waves Present] : normal jugular venous A waves present [Murmurs] : no murmurs present [Abdomen Tenderness] : non-tender [Abdomen Mass (___ Cm)] : no abdominal mass palpated [Abdomen Soft] : soft [Nail Clubbing] : no clubbing of the fingernails [Cyanosis, Localized] : no localized cyanosis [Skin Turgor] : normal skin turgor [Skin Color & Pigmentation] : normal skin color and pigmentation [Petechial Hemorrhages (___cm)] : no petechial hemorrhages [Oriented To Time, Place, And Person] : oriented to person, place, and time [] : no rash [Impaired Insight] : insight and judgment were intact [No Anxiety] : not feeling anxious

## 2020-06-11 NOTE — HISTORY OF PRESENT ILLNESS
[FreeTextEntry1] : 75 y/o female with history of HTN, DM, Symptomatic Afib, s/p AF ablation 2015 , had no recurrence till 11/2019 , s/p INESSA guided CV on 12/11/2019 . She underwent a repeat Afib ablation on 2/25/2020 \par Returns for follow up today : \par \par She speaks Slovenian , daughter translated . Reports feeling tired, weak and SOB with minimal exertions shortly after Afib Ablation . \par \par She denies CP/SOB. Reports dizziness and questionable  syncope a month ago. Daughter states, her mother was standing in the kitchen when suddenly she fell on the floor, did not sustain any injuries. \par She reports c/w meds . on Xarelto for stroke prevention.     \par \par ECG ( 6/11/2020) : Regular rhythm , no discrete p waves seen , except for lead V1 and V2 , small voltage p waves, appears to be Slow Atrial Flutter or AT , HR is 85 bpm, no AT/T wave changes.  \par \par prior cardiac tests: \par INESSA (2/25/2020) -  EF 55-60% , mild LV hypertrophy . \par

## 2020-06-11 NOTE — ASSESSMENT
[FreeTextEntry1] : Symptomatic  Afib AF ablation x 2 (2015 and 2/2020)  \par \par Patient reports not feeling well for several weeks now , low activity tolerance with MONTES with minimal exertion and fatigue. She had the same symptoms in the past ,  every time she was in Afib. \par ECG today : Highly suggestive of AFL or AT , no clear p waves seen except for leads V1-V2 .  \par Reports c/w meds , including Amiodarone 200 mg daily . \par \par Plan \par -Findings discussed with her cardiologist Dr. Bhagat \par -Will be scheduled for INESSA/ CV . \par -Continue current medication regimen \par -Obtain lab work today: CBC, CMP , TSH/T4 \par \par \par Patient and daughter verbalized full understanding and agreed with the plan  of care. \par

## 2020-06-18 ENCOUNTER — FORM ENCOUNTER (OUTPATIENT)
Age: 75
End: 2020-06-18

## 2020-06-19 ENCOUNTER — OUTPATIENT (OUTPATIENT)
Dept: OUTPATIENT SERVICES | Facility: HOSPITAL | Age: 75
LOS: 1 days | Discharge: HOME | End: 2020-06-19
Payer: MEDICARE

## 2020-06-19 DIAGNOSIS — Z95.5 PRESENCE OF CORONARY ANGIOPLASTY IMPLANT AND GRAFT: Chronic | ICD-10-CM

## 2020-06-19 DIAGNOSIS — I48.0 PAROXYSMAL ATRIAL FIBRILLATION: ICD-10-CM

## 2020-06-19 DIAGNOSIS — Z98.890 OTHER SPECIFIED POSTPROCEDURAL STATES: Chronic | ICD-10-CM

## 2020-06-19 LAB — GLUCOSE BLDC GLUCOMTR-MCNC: 123 MG/DL — HIGH (ref 70–99)

## 2020-06-19 PROCEDURE — 93010 ELECTROCARDIOGRAM REPORT: CPT

## 2020-06-19 NOTE — ASU PATIENT PROFILE, ADULT - PMH
Atrial fibrillation, unspecified type    High cholesterol    HTN (hypertension)    Type 1 diabetes mellitus with hyperglycemia

## 2020-06-19 NOTE — H&P CARDIOLOGY - NEGATIVE CARDIOVASCULAR SYMPTOMS
no paroxysmal nocturnal dyspnea/no orthopnea/no claudication/no peripheral edema/no chest pain/no dyspnea on exertion

## 2020-06-19 NOTE — H&P CARDIOLOGY - HISTORY OF PRESENT ILLNESS
75 yo F with PMH of  Afib on xarelto, DM, HTN, DLD presented for elective INESSA and cardioversion. Pt states compliance to xarelto.

## 2020-06-19 NOTE — CHART NOTE - NSCHARTNOTEFT_GEN_A_CORE
POST OPERATIVE PROCEDURAL DOCUMENTATION  PRE-OP DIAGNOSIS: Atrial fibrillation    POST-OP DIAGNOSIS: Atrial fibrillation with successful cardioversion to normal sinus rhythm    PROCEDURE: Transesophageal echocardiogram with Cardioversion    Primary Physician: Dr. Bhagat  Assistant: Mary Beth    ANESTHESIA TYPE  [  ] General Anesthesia  [ x ] Conscious Sedation  [  ] Local/Regional    CONDITION  [  ] Critical  [  ] Serious  [x] Fair  [  ] Good    SPECIMENS REMOVED (IF APPLICABLE): N/A    IMPLANTS (IF APPLICABLE): None    ESTIMATED BLOOD LOSS: None    COMPLICATIONS: None      FINDINGS:    After risks and benefits of procedures were explained, informed consent was obtained and placed in chart.   The patient received topical anesthestic to the oropharynx with viscous lidocaine and benzocaine spray.  Refer to Anesthesia note for sedation details.  The INESSA probe was passed into the esophagus without difficulty.  Transesophageal and transgastric images were obtained.  The INESSA probe was removed without difficulty and examined.  There was no evidence for bleeding.  The patient tolerated the procedure well without any immediate INESSA-related complications.      Preliminary Findings:  SILVERIO: Left atrial appendage was clear of clot and spontaneous echo contrast.  LV: LVEF was estimated at 55-60%  MV: Trace for MR, No evidence for MS. Spontaneous echo contrast seen in left atrium.   AV: No evidence for AI, no evidence for AS.   TV: Mild TR.   IAS: no PFO. NO R-> L shunt.   There was mild, non-mobile atheroma seen in the thoracic aorta.     Patient was successfully converted to sinus rhythm with synchronized  200 J of direct current cardioversion via AP pads.    DIAGNOSIS/IMPRESSION: Atrial fibrillation with successful cardioversion to normal sinus rhythm with     PLAN OF CARE:  [x] Discharge home when awake and stable.  [x] Continue with anticoagulation xarelto.    Results of procedure/ plan of care discussed with patient/  in detail. POST OPERATIVE PROCEDURAL DOCUMENTATION  PRE-OP DIAGNOSIS: Atrial fibrillation    POST-OP DIAGNOSIS: Atrial fibrillation with successful cardioversion to normal sinus rhythm    PROCEDURE: Transesophageal echocardiogram with Cardioversion    Primary Physician: Dr. Bhagat  Assistant: Mary Beth    ANESTHESIA TYPE  [  ] General Anesthesia  [ x ] Conscious Sedation  [  ] Local/Regional    CONDITION  [  ] Critical  [  ] Serious  [x] Fair  [  ] Good    SPECIMENS REMOVED (IF APPLICABLE): N/A    IMPLANTS (IF APPLICABLE): None    ESTIMATED BLOOD LOSS: None    COMPLICATIONS: None      FINDINGS:    After risks and benefits of procedures were explained, informed consent was obtained and placed in chart.   The patient received topical anesthestic to the oropharynx with viscous lidocaine and benzocaine spray.  Refer to Anesthesia note for sedation details.  The INESSA probe was passed into the esophagus without difficulty.  Transesophageal and transgastric images were obtained.  The INESSA probe was removed without difficulty and examined.  There was no evidence for bleeding.  The patient tolerated the procedure well without any immediate INESSA-related complications.      Preliminary Findings:  SILVERIO: Left atrial appendage was clear of clot and spontaneous echo contrast.  LV: LVEF was estimated at 50-55%  MV: Trace for MR, No evidence for MS. Spontaneous echo contrast seen in left atrium.   AV: No evidence for AI, no evidence for AS.   TV: Mild TR.   IAS: no PFO. NO R-> L shunt.   There was mild, non-mobile atheroma seen in the thoracic aorta.     Patient was successfully converted to sinus rhythm with synchronized  200 J of direct current cardioversion via AP pads.    DIAGNOSIS/IMPRESSION: Atrial fibrillation with successful cardioversion to normal sinus rhythm with     PLAN OF CARE:  [x] Discharge home when awake and stable.  [x] Continue with anticoagulation xarelto.    Results of procedure/ plan of care discussed with patient/  in detail. POST OPERATIVE PROCEDURAL DOCUMENTATION  PRE-OP DIAGNOSIS: Atrial fibrillation    POST-OP DIAGNOSIS: Atrial fibrillation with successful cardioversion to normal sinus rhythm    PROCEDURE: Transesophageal echocardiogram with Cardioversion    Primary Physician: Dr. Bhagat  Assistant: Mary Beth    ANESTHESIA TYPE  [  ] General Anesthesia  [ x ] Conscious Sedation  [  ] Local/Regional    CONDITION  [  ] Critical  [  ] Serious  [x] Fair  [  ] Good    SPECIMENS REMOVED (IF APPLICABLE): N/A    IMPLANTS (IF APPLICABLE): None    ESTIMATED BLOOD LOSS: None    COMPLICATIONS: None      FINDINGS:    After risks and benefits of procedures were explained, informed consent was obtained and placed in chart.   The patient received topical anesthestic to the oropharynx with viscous lidocaine and benzocaine spray.  Refer to Anesthesia note for sedation details.  The INESSA probe was passed into the esophagus without difficulty.  Transesophageal and transgastric images were obtained.  The INESSA probe was removed without difficulty and examined.  There was no evidence for bleeding.  The patient tolerated the procedure well without any immediate INESSA-related complications.      Preliminary Findings:  SILVERIO: Left atrial appendage was clear of clot and spontaneous echo contrast. Good velocities across SILVERIO.  LV: LVEF was estimated at 50-55%  MV: Trace for MR, No evidence for MS. Spontaneous echo contrast seen in left atrium.   AV: No evidence for AI, no evidence for AS.   TV: Mild TR.   IAS: no PFO. NO R-> L shunt.   There was mild, non-mobile atheroma seen in the thoracic aorta.     Patient was successfully converted to sinus rhythm with synchronized  200 J of direct current cardioversion via AP pads.    DIAGNOSIS/IMPRESSION: Atrial fibrillation with successful cardioversion to normal sinus rhythm with     PLAN OF CARE:  [x] Discharge home when awake and stable.  [x] Continue with anticoagulation xarelto.    Results of procedure/ plan of care discussed with patient/  in detail. POST OPERATIVE PROCEDURAL DOCUMENTATION  PRE-OP DIAGNOSIS: Atrial fibrillation    POST-OP DIAGNOSIS: Atrial fibrillation with successful cardioversion to normal sinus rhythm    PROCEDURE: Transesophageal echocardiogram with Cardioversion    Primary Physician: Dr. Bhagat  Assistant: Mary Beth    ANESTHESIA TYPE  [  ] General Anesthesia  [ x ] Conscious Sedation  [  ] Local/Regional    CONDITION  [  ] Critical  [  ] Serious  [x] Fair  [  ] Good    SPECIMENS REMOVED (IF APPLICABLE): N/A    IMPLANTS (IF APPLICABLE): None    ESTIMATED BLOOD LOSS: None    COMPLICATIONS: None      FINDINGS:    After risks and benefits of procedures were explained, informed consent was obtained and placed in chart.   The patient received topical anesthestic to the oropharynx with viscous lidocaine and benzocaine spray.  Refer to Anesthesia note for sedation details.  The INESSA probe was passed into the esophagus without difficulty.  Transesophageal and transgastric images were obtained.  The INESSA probe was removed without difficulty and examined.  There was no evidence for bleeding.  The patient tolerated the procedure well without any immediate INESSA-related complications.      Preliminary Findings:  SILVERIO: Left atrial appendage was clear of clot and spontaneous echo contrast. Good velocities across SILVERIO.  LA and RA: Enlarged. Spontaneous echo contrast seen in left atrium.   LV: LVEF was estimated at 50-55%  MV: Mild MR, No evidence for MS.   AV: No evidence for AI, no evidence for AS.   TV: Mild TR.   IAS: no PFO. NO R-> L shunt by color doppler.  There was mild, non-mobile atheroma seen in the thoracic aorta.     Patient was successfully converted to sinus rhythm with synchronized  200 J of direct current cardioversion via AP pads.    DIAGNOSIS/IMPRESSION: Atrial fibrillation with successful cardioversion to normal sinus rhythm with     PLAN OF CARE:  [x] Discharge home when awake and stable.  [x] Continue with anticoagulation xarelto.    Results of procedure/ plan of care discussed with patient/  in detail.

## 2020-06-19 NOTE — H&P CARDIOLOGY - FAMILY HISTORY
FH: hypertension     FH: lung cancer     Mother  Still living? Unknown  FH: diabetes mellitus, Age at diagnosis: Age Unknown

## 2020-07-30 ENCOUNTER — APPOINTMENT (OUTPATIENT)
Dept: CARDIOLOGY | Facility: CLINIC | Age: 75
End: 2020-07-30

## 2020-08-04 LAB
ALBUMIN SERPL ELPH-MCNC: 4.5 G/DL
ALP BLD-CCNC: 69 U/L
ALT SERPL-CCNC: 27 U/L
ANION GAP SERPL CALC-SCNC: 17 MMOL/L
AST SERPL-CCNC: 27 U/L
BASOPHILS # BLD AUTO: 0.04 K/UL
BASOPHILS NFR BLD AUTO: 0.6 %
BILIRUB SERPL-MCNC: 0.2 MG/DL
BUN SERPL-MCNC: 13 MG/DL
CALCIUM SERPL-MCNC: 9.6 MG/DL
CHLORIDE SERPL-SCNC: 92 MMOL/L
CO2 SERPL-SCNC: 21 MMOL/L
CREAT SERPL-MCNC: 1 MG/DL
EOSINOPHIL # BLD AUTO: 0.07 K/UL
EOSINOPHIL NFR BLD AUTO: 1.1 %
GLUCOSE SERPL-MCNC: 154 MG/DL
HCT VFR BLD CALC: 34.6 %
HGB BLD-MCNC: 11.4 G/DL
IMM GRANULOCYTES NFR BLD AUTO: 0.2 %
LYMPHOCYTES # BLD AUTO: 2.35 K/UL
LYMPHOCYTES NFR BLD AUTO: 36.3 %
MAN DIFF?: NORMAL
MCHC RBC-ENTMCNC: 29.8 PG
MCHC RBC-ENTMCNC: 32.9 G/DL
MCV RBC AUTO: 90.3 FL
MONOCYTES # BLD AUTO: 0.78 K/UL
MONOCYTES NFR BLD AUTO: 12.1 %
NEUTROPHILS # BLD AUTO: 3.22 K/UL
NEUTROPHILS NFR BLD AUTO: 49.7 %
PLATELET # BLD AUTO: 349 K/UL
POTASSIUM SERPL-SCNC: 5 MMOL/L
PROT SERPL-MCNC: 7.5 G/DL
RBC # BLD: 3.83 M/UL
RBC # FLD: 14 %
SODIUM SERPL-SCNC: 130 MMOL/L
T4 FREE SERPL-MCNC: 1.7 NG/DL
TSH SERPL-ACNC: 2.02 UIU/ML
WBC # FLD AUTO: 6.47 K/UL

## 2020-08-25 ENCOUNTER — APPOINTMENT (OUTPATIENT)
Dept: HEART AND VASCULAR | Facility: CLINIC | Age: 75
End: 2020-08-25
Payer: MEDICARE

## 2020-08-25 PROCEDURE — 99214 OFFICE O/P EST MOD 30 MIN: CPT

## 2020-08-25 PROCEDURE — 93000 ELECTROCARDIOGRAM COMPLETE: CPT

## 2020-09-02 NOTE — ASU PATIENT PROFILE, ADULT - PAIN, CHRONIC: FACTORS THAT AGGRAVATE, PROFILE
activity Modified Advancement Flap Text: The defect edges were debeveled with a #15 scalpel blade.  Given the location of the defect, shape of the defect and the proximity to free margins a modified advancement flap was deemed most appropriate.  Using a sterile surgical marker, an appropriate advancement flap was drawn incorporating the defect and placing the expected incisions within the relaxed skin tension lines where possible.    The area thus outlined was incised deep to adipose tissue with a #15 scalpel blade.  The skin margins were undermined to an appropriate distance in all directions utilizing iris scissors.

## 2020-09-09 ENCOUNTER — INPATIENT (INPATIENT)
Facility: HOSPITAL | Age: 75
LOS: 1 days | Discharge: HOME HEALTH | End: 2020-09-11
Attending: INTERNAL MEDICINE | Admitting: INTERNAL MEDICINE
Payer: MEDICARE

## 2020-09-09 VITALS
HEART RATE: 77 BPM | HEIGHT: 65 IN | SYSTOLIC BLOOD PRESSURE: 159 MMHG | RESPIRATION RATE: 18 BRPM | WEIGHT: 177.69 LBS | DIASTOLIC BLOOD PRESSURE: 70 MMHG | OXYGEN SATURATION: 99 % | TEMPERATURE: 97 F

## 2020-09-09 DIAGNOSIS — Z98.890 OTHER SPECIFIED POSTPROCEDURAL STATES: Chronic | ICD-10-CM

## 2020-09-09 DIAGNOSIS — Z95.5 PRESENCE OF CORONARY ANGIOPLASTY IMPLANT AND GRAFT: Chronic | ICD-10-CM

## 2020-09-09 LAB
ALBUMIN SERPL ELPH-MCNC: 3.8 G/DL — SIGNIFICANT CHANGE UP (ref 3.5–5.2)
ALP SERPL-CCNC: 58 U/L — SIGNIFICANT CHANGE UP (ref 30–115)
ALT FLD-CCNC: 109 U/L — HIGH (ref 0–41)
ANION GAP SERPL CALC-SCNC: 13 MMOL/L — SIGNIFICANT CHANGE UP (ref 7–14)
AST SERPL-CCNC: 103 U/L — HIGH (ref 0–41)
BASOPHILS # BLD AUTO: 0.01 K/UL — SIGNIFICANT CHANGE UP (ref 0–0.2)
BASOPHILS NFR BLD AUTO: 0.1 % — SIGNIFICANT CHANGE UP (ref 0–1)
BILIRUB SERPL-MCNC: 0.5 MG/DL — SIGNIFICANT CHANGE UP (ref 0.2–1.2)
BUN SERPL-MCNC: 17 MG/DL — SIGNIFICANT CHANGE UP (ref 10–20)
CALCIUM SERPL-MCNC: 9.5 MG/DL — SIGNIFICANT CHANGE UP (ref 8.5–10.1)
CHLORIDE SERPL-SCNC: 99 MMOL/L — SIGNIFICANT CHANGE UP (ref 98–110)
CO2 SERPL-SCNC: 20 MMOL/L — SIGNIFICANT CHANGE UP (ref 17–32)
CREAT SERPL-MCNC: 0.8 MG/DL — SIGNIFICANT CHANGE UP (ref 0.7–1.5)
EOSINOPHIL # BLD AUTO: 0.01 K/UL — SIGNIFICANT CHANGE UP (ref 0–0.7)
EOSINOPHIL NFR BLD AUTO: 0.1 % — SIGNIFICANT CHANGE UP (ref 0–8)
GLUCOSE BLDC GLUCOMTR-MCNC: 154 MG/DL — HIGH (ref 70–99)
GLUCOSE BLDC GLUCOMTR-MCNC: 174 MG/DL — HIGH (ref 70–99)
GLUCOSE BLDC GLUCOMTR-MCNC: 182 MG/DL — HIGH (ref 70–99)
GLUCOSE BLDC GLUCOMTR-MCNC: 264 MG/DL — HIGH (ref 70–99)
GLUCOSE BLDC GLUCOMTR-MCNC: 266 MG/DL — HIGH (ref 70–99)
GLUCOSE SERPL-MCNC: 164 MG/DL — HIGH (ref 70–99)
HCT VFR BLD CALC: 33.3 % — LOW (ref 37–47)
HGB BLD-MCNC: 11.1 G/DL — LOW (ref 12–16)
IMM GRANULOCYTES NFR BLD AUTO: 0.3 % — SIGNIFICANT CHANGE UP (ref 0.1–0.3)
LYMPHOCYTES # BLD AUTO: 1.18 K/UL — LOW (ref 1.2–3.4)
LYMPHOCYTES # BLD AUTO: 9.9 % — LOW (ref 20.5–51.1)
MAGNESIUM SERPL-MCNC: 1.6 MG/DL — LOW (ref 1.8–2.4)
MCHC RBC-ENTMCNC: 28.8 PG — SIGNIFICANT CHANGE UP (ref 27–31)
MCHC RBC-ENTMCNC: 33.3 G/DL — SIGNIFICANT CHANGE UP (ref 32–37)
MCV RBC AUTO: 86.5 FL — SIGNIFICANT CHANGE UP (ref 81–99)
MONOCYTES # BLD AUTO: 1.15 K/UL — HIGH (ref 0.1–0.6)
MONOCYTES NFR BLD AUTO: 9.7 % — HIGH (ref 1.7–9.3)
NEUTROPHILS # BLD AUTO: 9.49 K/UL — HIGH (ref 1.4–6.5)
NEUTROPHILS NFR BLD AUTO: 79.9 % — HIGH (ref 42.2–75.2)
NRBC # BLD: 0 /100 WBCS — SIGNIFICANT CHANGE UP (ref 0–0)
PLATELET # BLD AUTO: 304 K/UL — SIGNIFICANT CHANGE UP (ref 130–400)
POTASSIUM SERPL-MCNC: 4.2 MMOL/L — SIGNIFICANT CHANGE UP (ref 3.5–5)
POTASSIUM SERPL-SCNC: 4.2 MMOL/L — SIGNIFICANT CHANGE UP (ref 3.5–5)
PROT SERPL-MCNC: 6.6 G/DL — SIGNIFICANT CHANGE UP (ref 6–8)
RBC # BLD: 3.85 M/UL — LOW (ref 4.2–5.4)
RBC # FLD: 13.1 % — SIGNIFICANT CHANGE UP (ref 11.5–14.5)
SODIUM SERPL-SCNC: 132 MMOL/L — LOW (ref 135–146)
TROPONIN T SERPL-MCNC: <0.01 NG/ML — SIGNIFICANT CHANGE UP
WBC # BLD: 11.88 K/UL — HIGH (ref 4.8–10.8)
WBC # FLD AUTO: 11.88 K/UL — HIGH (ref 4.8–10.8)

## 2020-09-09 PROCEDURE — 93010 ELECTROCARDIOGRAM REPORT: CPT

## 2020-09-09 PROCEDURE — 71045 X-RAY EXAM CHEST 1 VIEW: CPT | Mod: 26

## 2020-09-09 RX ORDER — DEXTROSE 50 % IN WATER 50 %
25 SYRINGE (ML) INTRAVENOUS ONCE
Refills: 0 | Status: DISCONTINUED | OUTPATIENT
Start: 2020-09-09 | End: 2020-09-11

## 2020-09-09 RX ORDER — DEXTROSE 50 % IN WATER 50 %
12.5 SYRINGE (ML) INTRAVENOUS ONCE
Refills: 0 | Status: DISCONTINUED | OUTPATIENT
Start: 2020-09-09 | End: 2020-09-11

## 2020-09-09 RX ORDER — CEFTRIAXONE 500 MG/1
1000 INJECTION, POWDER, FOR SOLUTION INTRAMUSCULAR; INTRAVENOUS EVERY 24 HOURS
Refills: 0 | Status: DISCONTINUED | OUTPATIENT
Start: 2020-09-09 | End: 2020-09-11

## 2020-09-09 RX ORDER — GLUCAGON INJECTION, SOLUTION 0.5 MG/.1ML
1 INJECTION, SOLUTION SUBCUTANEOUS ONCE
Refills: 0 | Status: DISCONTINUED | OUTPATIENT
Start: 2020-09-09 | End: 2020-09-11

## 2020-09-09 RX ORDER — ATORVASTATIN CALCIUM 80 MG/1
40 TABLET, FILM COATED ORAL AT BEDTIME
Refills: 0 | Status: DISCONTINUED | OUTPATIENT
Start: 2020-09-09 | End: 2020-09-11

## 2020-09-09 RX ORDER — INSULIN GLARGINE 100 [IU]/ML
40 INJECTION, SOLUTION SUBCUTANEOUS AT BEDTIME
Refills: 0 | Status: DISCONTINUED | OUTPATIENT
Start: 2020-09-09 | End: 2020-09-09

## 2020-09-09 RX ORDER — SODIUM CHLORIDE 9 MG/ML
1000 INJECTION, SOLUTION INTRAVENOUS
Refills: 0 | Status: DISCONTINUED | OUTPATIENT
Start: 2020-09-09 | End: 2020-09-11

## 2020-09-09 RX ORDER — INSULIN LISPRO 100/ML
5 VIAL (ML) SUBCUTANEOUS
Refills: 0 | Status: DISCONTINUED | OUTPATIENT
Start: 2020-09-09 | End: 2020-09-11

## 2020-09-09 RX ORDER — DEXTROSE 50 % IN WATER 50 %
15 SYRINGE (ML) INTRAVENOUS ONCE
Refills: 0 | Status: DISCONTINUED | OUTPATIENT
Start: 2020-09-09 | End: 2020-09-11

## 2020-09-09 RX ORDER — MAGNESIUM SULFATE 500 MG/ML
2 VIAL (ML) INJECTION ONCE
Refills: 0 | Status: COMPLETED | OUTPATIENT
Start: 2020-09-09 | End: 2020-09-09

## 2020-09-09 RX ORDER — INSULIN GLARGINE 100 [IU]/ML
15 INJECTION, SOLUTION SUBCUTANEOUS AT BEDTIME
Refills: 0 | Status: DISCONTINUED | OUTPATIENT
Start: 2020-09-09 | End: 2020-09-11

## 2020-09-09 RX ORDER — ERGOCALCIFEROL 1.25 MG/1
50000 CAPSULE ORAL
Refills: 0 | Status: DISCONTINUED | OUTPATIENT
Start: 2020-09-09 | End: 2020-09-11

## 2020-09-09 RX ORDER — ACETAMINOPHEN 500 MG
650 TABLET ORAL EVERY 6 HOURS
Refills: 0 | Status: DISCONTINUED | OUTPATIENT
Start: 2020-09-09 | End: 2020-09-11

## 2020-09-09 RX ORDER — INSULIN LISPRO 100/ML
VIAL (ML) SUBCUTANEOUS
Refills: 0 | Status: DISCONTINUED | OUTPATIENT
Start: 2020-09-09 | End: 2020-09-11

## 2020-09-09 RX ORDER — METOPROLOL TARTRATE 50 MG
100 TABLET ORAL DAILY
Refills: 0 | Status: DISCONTINUED | OUTPATIENT
Start: 2020-09-09 | End: 2020-09-11

## 2020-09-09 RX ORDER — RIVAROXABAN 15 MG-20MG
20 KIT ORAL DAILY
Refills: 0 | Status: DISCONTINUED | OUTPATIENT
Start: 2020-09-09 | End: 2020-09-11

## 2020-09-09 RX ORDER — LISINOPRIL 2.5 MG/1
40 TABLET ORAL DAILY
Refills: 0 | Status: DISCONTINUED | OUTPATIENT
Start: 2020-09-09 | End: 2020-09-11

## 2020-09-09 RX ORDER — METOCLOPRAMIDE HCL 10 MG
10 TABLET ORAL
Refills: 0 | Status: DISCONTINUED | OUTPATIENT
Start: 2020-09-09 | End: 2020-09-11

## 2020-09-09 RX ORDER — AMIODARONE HYDROCHLORIDE 400 MG/1
1 TABLET ORAL
Qty: 0 | Refills: 0 | DISCHARGE

## 2020-09-09 RX ORDER — AMLODIPINE BESYLATE 2.5 MG/1
5 TABLET ORAL DAILY
Refills: 0 | Status: DISCONTINUED | OUTPATIENT
Start: 2020-09-09 | End: 2020-09-11

## 2020-09-09 RX ORDER — AZITHROMYCIN 500 MG/1
250 TABLET, FILM COATED ORAL DAILY
Refills: 0 | Status: DISCONTINUED | OUTPATIENT
Start: 2020-09-09 | End: 2020-09-11

## 2020-09-09 RX ORDER — AZITHROMYCIN 500 MG/1
500 TABLET, FILM COATED ORAL ONCE
Refills: 0 | Status: COMPLETED | OUTPATIENT
Start: 2020-09-09 | End: 2020-09-09

## 2020-09-09 RX ORDER — PANTOPRAZOLE SODIUM 20 MG/1
40 TABLET, DELAYED RELEASE ORAL
Refills: 0 | Status: DISCONTINUED | OUTPATIENT
Start: 2020-09-09 | End: 2020-09-11

## 2020-09-09 RX ORDER — INFLUENZA VIRUS VACCINE 15; 15; 15; 15 UG/.5ML; UG/.5ML; UG/.5ML; UG/.5ML
0.5 SUSPENSION INTRAMUSCULAR ONCE
Refills: 0 | Status: DISCONTINUED | OUTPATIENT
Start: 2020-09-09 | End: 2020-09-11

## 2020-09-09 RX ORDER — ACETAMINOPHEN 500 MG
2 TABLET ORAL
Qty: 240 | Refills: 0
Start: 2020-09-09 | End: 2020-10-08

## 2020-09-09 RX ORDER — METOPROLOL TARTRATE 50 MG
50 TABLET ORAL DAILY
Refills: 0 | Status: DISCONTINUED | OUTPATIENT
Start: 2020-09-09 | End: 2020-09-09

## 2020-09-09 RX ADMIN — INSULIN GLARGINE 15 UNIT(S): 100 INJECTION, SOLUTION SUBCUTANEOUS at 21:34

## 2020-09-09 RX ADMIN — LISINOPRIL 40 MILLIGRAM(S): 2.5 TABLET ORAL at 05:35

## 2020-09-09 RX ADMIN — Medication 3: at 17:06

## 2020-09-09 RX ADMIN — RIVAROXABAN 20 MILLIGRAM(S): KIT at 12:27

## 2020-09-09 RX ADMIN — AZITHROMYCIN 500 MILLIGRAM(S): 500 TABLET, FILM COATED ORAL at 05:35

## 2020-09-09 RX ADMIN — Medication 50 GRAM(S): at 12:29

## 2020-09-09 RX ADMIN — Medication 5 UNIT(S): at 17:06

## 2020-09-09 RX ADMIN — ERGOCALCIFEROL 50000 UNIT(S): 1.25 CAPSULE ORAL at 12:27

## 2020-09-09 RX ADMIN — ATORVASTATIN CALCIUM 40 MILLIGRAM(S): 80 TABLET, FILM COATED ORAL at 21:34

## 2020-09-09 RX ADMIN — CEFTRIAXONE 100 MILLIGRAM(S): 500 INJECTION, POWDER, FOR SOLUTION INTRAMUSCULAR; INTRAVENOUS at 05:36

## 2020-09-09 RX ADMIN — AZITHROMYCIN 250 MILLIGRAM(S): 500 TABLET, FILM COATED ORAL at 12:27

## 2020-09-09 RX ADMIN — Medication 650 MILLIGRAM(S): at 05:40

## 2020-09-09 RX ADMIN — PANTOPRAZOLE SODIUM 40 MILLIGRAM(S): 20 TABLET, DELAYED RELEASE ORAL at 05:35

## 2020-09-09 RX ADMIN — AMLODIPINE BESYLATE 5 MILLIGRAM(S): 2.5 TABLET ORAL at 05:35

## 2020-09-09 RX ADMIN — Medication 100 MILLIGRAM(S): at 05:35

## 2020-09-09 RX ADMIN — Medication 650 MILLIGRAM(S): at 04:44

## 2020-09-09 NOTE — H&P ADULT - NSHPPHYSICALEXAM_GEN_ALL_CORE
General: Not in distress; Pallor (-), Icterus (-), Cyanosis (-), Clubbing (-)    HEENT: Pupils equal, round and reactive to light symmetrically, EOM - Normal bilaterally; Hearing - b/l normal; No external discharge noted, JVP - Not elevated, Lymphadenopathy(-)    PULM: Bilaterally no wheeze, rubs or crackles.     CVS: Normal S1 and S2, no murmurs, rubs, or gallops.     GI: Soft, nondistended, nontender, no masses.    MSK: Edema (-), no joint or muscle tenderness    SKIN: Warm and well perfused, no rashes noted    NEURO:  Alert, Oriented to time, place, and person; Memory, attention and concentration - Normal; Cranial Nerves - Grossly normal; Normal strength and sensation in all four extremities. General: Not in distress; Pallor (-), Icterus (-), Cyanosis (-), Clubbing (-)    HEENT: Pupils equal, round and reactive to light symmetrically, EOM - Normal bilaterally; Hearing - b/l normal; No external discharge noted, JVP - Not elevated, Lymphadenopathy(-)    PULM: Bilaterally equal and clear breath sounds, no wheeze, rubs or crackles.     CVS: Normal S1 and S2, Irregularly irregular rhythm, no murmurs, rubs, or gallops     GI: Soft, nondistended, nontender, no masses.    MSK: Edema (-), swelling of distal interphalangeal joints (+) bilaterally     SKIN: Warm and well perfused, redness at the site of attachment of telemetry leads    NEURO:  Alert, Oriented to time, place, and person; Memory, attention and concentration - Normal; Cranial Nerves - Grossly normal; Normal strength and sensation in all four extremities.

## 2020-09-09 NOTE — PATIENT PROFILE ADULT - LANGUAGE ASSISTANCE NEEDED
No-Patient/Caregiver offered and refused free interpretation services./Daughter-in-law at bedside accompanied pt on admission

## 2020-09-09 NOTE — CONSULT NOTE ADULT - ASSESSMENT
Patient with Afib, rate controlled   post ablation and multiple cardioversion  recently off amiodarone  need DOACs  PNA on IV ATB  depression start sertraline 50 mg

## 2020-09-09 NOTE — H&P ADULT - HISTORY OF PRESENT ILLNESS
74 y/o F with PMH of Afib s/p Ablation x2 (latest in Feb 2020) and DCCV (latest in June 2020); CAD s/p stent x4; HTN; HLD; DM2 was transferred from [facility] where she was admitted on 09/07/2020 with a diagnosis of Pneumonia. According to the daughter in law the patient started feeling weakness and dizziness 2 weeks ago, the symptoms gradually worsened until 3 days ago when she became lethargic and was taken to [facility] for treatment. In [facility] she was found to have fever, rapid heart rate and high BP. She had chest X-ray and CT chest done and was treated for pneumonia. According to the daughter in law she had occasional shortness of breath on exertion. She also had chest pain on the left side which started about 2 weeks ago, it was intermittent, on the front and back of the left side of chest, not radiating, exacerbated by deep breath, non radiating, 5/10 in severity, which has worsened to 7/10 since yesterday. 76 y/o F with PMH of Afib s/p Ablation x2 (latest in Feb 2020) and DCCV (latest in June 2020); CAD s/p stent x4; HTN; HLD; DM2 was transferred from [facility] where she was admitted on 09/07/2020 with a diagnosis of Pneumonia. According to the daughter in law the patient started feeling weakness and dizziness 2 weeks ago, the symptoms gradually worsened until 3 days ago when she became lethargic and was taken to [facility] for treatment. In [facility] she was found to have fever, rapid heart rate and high BP. She had chest X-ray and CT chest done and was treated for pneumonia. According to the daughter in law she had occasional shortness of breath on exertion. She also had chest pain on the left side which started about 2 weeks ago, it was intermittent, on the front and back of the left side of chest, not radiating, exacerbated by deep breath, non radiating, 5/10 in severity, which has worsened to 7/10 since yesterday. According to the daughter in law the patient was on amiodarone for atrial fibrillation which was discontinued 2 weeks ago by her physician, whom she saw after developing the dizziness and weakness. 74 y/o F with PMH of Afib s/p Ablation x2 (latest in Feb 2020) and DCCV (latest in June 2020); CAD s/p stent x4; HTN; HLD; DM2 was transferred from [facility] where she was admitted on 09/07/2020 with a diagnosis of Pneumonia. According to the daughter in law the patient started feeling tiredness and dizziness 2 weeks ago, the symptoms gradually worsened until 3 days ago when she became lethargic and was taken to [facility] for treatment. In [facility] she was found to have fever, rapid heart rate and high BP. She had chest X-ray and CT chest done and was treated for pneumonia. According to the daughter in law she had occasional shortness of breath on exertion. She also had chest pain on the left side which started about 2 weeks ago, it was intermittent, on the front and back of the left side of chest, not radiating, exacerbated by deep breath, non radiating, 5/10 in severity, which has worsened to 7/10 since yesterday. According to the daughter in law the patient was on amiodarone for atrial fibrillation which was discontinued 2 weeks ago by her physician, whom she saw after developing the dizziness and weakness. 76 y/o F with PMH of Afib s/p Ablation x2 (latest in Feb 2020) and DCCV (latest in June 2020); CAD s/p stent x4; HTN; HLD; DM2 was transferred from [facility] where she was admitted on 09/07/2020 with a diagnosis of Pneumonia. According to the daughter in law the patient started feeling tiredness and dizziness 2 weeks ago, the symptoms gradually worsened until 3 days ago when she became lethargic and was taken to [facility] for treatment. In [facility] she was found to have fever, rapid heart rate and high BP. She had chest X-ray and CT chest done and was treated for pneumonia. According to the daughter in law she had occasional shortness of breath on exertion. She also had chest pain on the left side which started about 2 weeks ago, it was intermittent, on the front and back of the left side of chest, not radiating, exacerbated by deep breath, non radiating, 5/10 in severity, which has worsened to 7/10 since yesterday. According to the daughter in law the patient was on amiodarone for atrial fibrillation which was discontinued 2 weeks ago by her electrophysiologist, whom she saw after developing the dizziness and weakness.

## 2020-09-09 NOTE — CONSULT NOTE ADULT - SUBJECTIVE AND OBJECTIVE BOX
Date of Admission:    CHIEF COMPLAINT: Short of breath    HISTORY OF PRESENT ILLNESS: 75yFemale with PMH below presented to the hospital for PNA and afib    PAST MEDICAL & SURGICAL HISTORY:  S/P ablation of atrial fibrillation  Diabetes mellitus  High cholesterol  HTN (hypertension)  Atrial fibrillation, unspecified type  Coronary stent patent  History of back surgery    HEALTH ISSUES - PROBLEM Dx:        FAMILY HISTORY:  FH: coronary artery disease (Sibling)    Allergies    adhesives (Pruritus; Rash)  doxycycline (Other)  latex (Rash)  Milk (Diarrhea)    Intolerances    	  Home Medications:  amLODIPine 5 mg oral tablet: 1 tab(s) orally once a day (2020 08:34)  atorvastatin 40 mg oral tablet: 1 tab(s) orally once a day (2020 08:34)  ergocalciferol 50,000 intl units (1.25 mg) oral capsule: 1 cap(s) orally once a week (09 Sep 2020 03:40)  Lantus 100 units/mL subcutaneous solution: 20 unit(s) subcutaneous once a day (at bedtime) (2020 08:34)  lisinopril 40 mg oral tablet: 1 tab(s) orally once a day (:34)  metFORMIN 1000 mg oral tablet: 1 tab(s) orally 2 times a day (2020 08:34)  metoclopramide 10 mg oral tablet: 1 tab(s) orally 3 times (:34)  metoprolol succinate 50 mg oral tablet, extended release: 1 tab(s) orally 2 times a day (2020 08:34)  NexIUM 40 mg oral delayed release capsule: 1 cap(s) orally once a day (09 Sep 2020 03:41)  Ozempic (0.25 mg or 0.5 mg dose) 2 mg/1.5 mL subcutaneous solution: once a week on  (2020 08:34)  Xarelto 20 mg oral tablet: 1 tab(s) orally once a day (in the evening) (2020 08:34)    MEDICATIONS  (STANDING):  amLODIPine   Tablet 5 milliGRAM(s) Oral daily  atorvastatin 40 milliGRAM(s) Oral at bedtime  azithromycin   Tablet 250 milliGRAM(s) Oral daily  cefTRIAXone   IVPB 1000 milliGRAM(s) IV Intermittent every 24 hours  dextrose 5%. 1000 milliLiter(s) (50 mL/Hr) IV Continuous <Continuous>  dextrose 50% Injectable 12.5 Gram(s) IV Push once  dextrose 50% Injectable 25 Gram(s) IV Push once  dextrose 50% Injectable 25 Gram(s) IV Push once  ergocalciferol 01928 Unit(s) Oral every week  influenza   Vaccine 0.5 milliLiter(s) IntraMuscular once  insulin glargine Injectable (LANTUS) 15 Unit(s) SubCutaneous at bedtime  insulin lispro (HumaLOG) corrective regimen sliding scale   SubCutaneous three times a day before meals  insulin lispro Injectable (HumaLOG) 5 Unit(s) SubCutaneous three times a day before meals  lisinopril 40 milliGRAM(s) Oral daily  metoprolol succinate  milliGRAM(s) Oral daily  pantoprazole    Tablet 40 milliGRAM(s) Oral before breakfast  rivaroxaban 20 milliGRAM(s) Oral daily    MEDICATIONS  (PRN):  acetaminophen   Tablet .. 650 milliGRAM(s) Oral every 6 hours PRN Temp greater or equal to 38.5C (101.3F), Severe Pain (7 - 10)  dextrose 40% Gel 15 Gram(s) Oral once PRN Blood Glucose LESS THAN 70 milliGRAM(s)/deciliter  glucagon  Injectable 1 milliGRAM(s) IntraMuscular once PRN Glucose LESS THAN 70 milligrams/deciliter  metoclopramide 10 milliGRAM(s) Oral two times a day before meals PRN Nausea, vomiting              SOCIAL HISTORY:    [x ] Non-smoker  [ ] Smoker  [ ] Alcohol      REVIEW OF SYSTEMS:  CONSTITUTIONAL: No fever, weight loss, or fatigue  CARDIOLOGY: PAtient denies chest pain, shortness of breath or syncopal episodes.   RESPIRATORY: denies shortness of breath, wheezeing.   NEUROLOGICAL: NO weakness, no focal deficits to report.  ENDOCRINOLOGICAL: no recent change in diabetic medications.   GI: no BRBPR, no N,V,diarrhea.    PSYCHIATRY: normal mood and affect  HEENT: no nasal discharge, no ecchymosis  SKIN: no ecchymosis, no breakdown  MUSCULOSKELETAL: Full range of motion x4.      PHYSICAL EXAM:  T(C): 36.6 (20 @ 13:11), Max: 37.2 (20 @ 05:20)  HR: 97 (20 @ 13:11) (77 - 97)  BP: 128/69 (20 @ 13:11) (128/69 - 159/70)  RR: 18 (20 @ 13:11) (16 - 20)  SpO2: 97% (20 @ 10:54) (97% - 99%)  Wt(kg): --  I&O's Summary    08 Sep 2020 07:01  -  09 Sep 2020 07:00  --------------------------------------------------------  IN: 170 mL / OUT: 0 mL / NET: 170 mL      Daily Height in cm: 165.1 (09 Sep 2020 00:50)    Daily Weight in k.4 (09 Sep 2020 05:20)    General Appearance: Normal	  Cardiovascular: Normal S1 S2, No JVD, No murmurs, No edema  Respiratory: Lungs clear to auscultation	  Psychiatry: A & O x 3, Mood & affect appropriate  Gastrointestinal:  Soft, Non-tender  Skin: No rashes, No ecchymoses, No cyanosis	  Neurologic: Non-focal  Extremities: Normal range of motion, No clubbing, cyanosis or edema  Vascular: Peripheral pulses palpable 2+ bilaterally        LABS:	 	                          11.1   11.88 )-----------( 304      ( 09 Sep 2020 05:25 )             33.3         132<L>  |  99  |  17  ----------------------------<  164<H>  4.2   |  20  |  0.8    Ca    9.5      09 Sep 2020 05:25  Mg     1.6         TPro  6.6  /  Alb  3.8  /  TBili  0.5  /  DBili  x   /  AST  103<H>  /  ALT  109<H>  /  AlkPhos  58              proBNP:   Lipid Profile:   HgA1c:   TSH:       CARDIAC MARKERS:            TELEMETRY EVENTS: 	    ECG:  Afib	  RADIOLOGY:  OTHER: 	    PREVIOUS DIAGNOSTIC TESTING:    [ ] Echocardiogram:  [ ]  Catheterization:  [ ] Stress Test:  	  	  ASSESSMENT/PLAN:

## 2020-09-09 NOTE — CONSULT NOTE ADULT - REASON FOR ADMISSION
Transfer from [facility] - Pneumonia  Dizziness  Chest pain
Transfer from [facility] - Pneumonia  Dizziness  Chest pain
Transfer from [facility] - Pneumonia  Dizziness  Chest pain
Transfer from [facility], where she was being treated for Pneumonia + Left sided shoulder pain (worse with movement + tender to palpation)

## 2020-09-09 NOTE — PHYSICAL THERAPY INITIAL EVALUATION ADULT - GENERAL OBSERVATIONS, REHAB EVAL
9:00-9:30. Pt encountered semifowlers in bed in NAD. +Tele. Pt is Armenian speaking,  used. Pt agreeable to PT tx. PT IE Complete. Pt educated on seated/supine therex. Pt educated to continue ambulation with NSG staff. PT to f/u.

## 2020-09-09 NOTE — CONSULT NOTE ADULT - SUBJECTIVE AND OBJECTIVE BOX
HPI:  76 y/o F with PMH of Afib s/p Ablation x2 (latest in Feb 2020) and DCCV (latest in June 2020); CAD s/p stent x4; HTN; HLD; DM2 was transferred from [facility] where she was admitted on 09/07/2020 with a diagnosis of Pneumonia. According to the daughter in law the patient started feeling tiredness and dizziness 2 weeks ago, the symptoms gradually worsened until 3 days ago when she became lethargic and was taken to [facility] for treatment. In [facility] she was found to have fever, rapid heart rate and high BP. She had chest X-ray and CT chest done and was treated for pneumonia. According to the daughter in law she had occasional shortness of breath on exertion. She also had chest pain on the left side which started about 2 weeks ago, it was intermittent, on the front and back of the left side of chest, not radiating, exacerbated by deep breath, non radiating, 5/10 in severity, which has worsened to 7/10 since yesterday. According to the daughter in law the patient was on amiodarone for atrial fibrillation which was discontinued 2 weeks ago by her electrophysiologist, whom she saw after developing the dizziness and weakness. (09 Sep 2020 02:40)      PAST MEDICAL & SURGICAL HISTORY:  S/P ablation of atrial fibrillation  Diabetes mellitus  High cholesterol  HTN (hypertension)  Atrial fibrillation, unspecified type  Coronary stent patent  History of back surgery      Hospital Course:    TODAY'S SUBJECTIVE & REVIEW OF SYMPTOMS:     Constitutional WNL   Cardio WNL   Resp WNL   GI WNL  Heme WNL  Endo WNL  Skin WNL  MSK WNL  Neuro WNL  Cognitive WNL  Psych WNL      MEDICATIONS  (STANDING):  amLODIPine   Tablet 5 milliGRAM(s) Oral daily  atorvastatin 40 milliGRAM(s) Oral at bedtime  azithromycin   Tablet 250 milliGRAM(s) Oral daily  cefTRIAXone   IVPB 1000 milliGRAM(s) IV Intermittent every 24 hours  ergocalciferol 29400 Unit(s) Oral every week  influenza   Vaccine 0.5 milliLiter(s) IntraMuscular once  insulin glargine Injectable (LANTUS) 40 Unit(s) SubCutaneous at bedtime  lisinopril 40 milliGRAM(s) Oral daily  magnesium sulfate  IVPB 2 Gram(s) IV Intermittent once  metoprolol succinate  milliGRAM(s) Oral daily  pantoprazole    Tablet 40 milliGRAM(s) Oral before breakfast  rivaroxaban 20 milliGRAM(s) Oral daily    MEDICATIONS  (PRN):  acetaminophen   Tablet .. 650 milliGRAM(s) Oral every 6 hours PRN Temp greater or equal to 38.5C (101.3F), Severe Pain (7 - 10)  metoclopramide 10 milliGRAM(s) Oral two times a day before meals PRN Nausea, vomiting      FAMILY HISTORY:  FH: coronary artery disease (Sibling)      Allergies    adhesives (Pruritus; Rash)  doxycycline (Other)  latex (Rash)  Milk (Diarrhea)    Intolerances        SOCIAL HISTORY:    [  ] Etoh  [  ] Smoking  [  ] Substance abuse     Home Environment:  [  ] Home Alone  [x  ] Lives with Family  [  ] Home Health Aid    Dwelling:  [  ] Apartment  [ x ] Private House  [  ] Adult Home  [  ] Skilled Nursing Facility      [  ] Short Term  [  ] Long Term  [  ] Stairs       Elevator [  ]    FUNCTIONAL STATUS PTA: (Check all that apply)  Ambulation: [ x  ]Independent    [  ] Dependent     [  ] Non-Ambulatory  Assistive Device: [  x] SA Cane  [  ]  Q Cane  [  ] Walker  [  ]  Wheelchair  ADL : [x  ] Independent  [  ]  Dependent       Vital Signs Last 24 Hrs  T(C): 37.2 (09 Sep 2020 05:20), Max: 37.2 (09 Sep 2020 05:20)  T(F): 98.9 (09 Sep 2020 05:20), Max: 98.9 (09 Sep 2020 05:20)  HR: 84 (09 Sep 2020 05:20) (77 - 89)  BP: 156/74 (09 Sep 2020 05:20) (133/85 - 159/70)  BP(mean): --  RR: 16 (09 Sep 2020 05:20) (16 - 18)  SpO2: 98% (09 Sep 2020 06:25) (98% - 99%)      PHYSICAL EXAM: Awake & Alert  GENERAL: NAD  HEAD:  Normocephalic  CHEST/LUNG: Clear   HEART: S1S2+  ABDOMEN: Soft, Nontender  EXTREMITIES:  no calf tenderness    NERVOUS SYSTEM:  Cranial Nerves 2-12 intact [  ] Abnormal  [  ]  ROM: WFL all extremities [ x ]  Abnormal [  ]  Motor Strength: WFL all extremities  [ x ]  Abnormal [  ]  Sensation: intact to light touch [ x ] Abnormal [  ]    FUNCTIONAL STATUS:  Bed Mobility: Independent [  ]  Supervision [  ]  Needs Assistance [x  ]  N/A [  ]  Transfers: Independent [  ]  Supervision [  ]  Needs Assistance [ x ]  N/A [  ]   Ambulation: Independent [  ]  Supervision [  ]  Needs Assistance [ x ]  N/A [  ]  ADL: Independent [  ] Requires Assistance [  ] N/A [  ]      LABS:                        11.1   11.88 )-----------( 304      ( 09 Sep 2020 05:25 )             33.3     09-09    132<L>  |  99  |  17  ----------------------------<  164<H>  4.2   |  20  |  0.8    Ca    9.5      09 Sep 2020 05:25  Mg     1.6     09-09    TPro  6.6  /  Alb  3.8  /  TBili  0.5  /  DBili  x   /  AST  103<H>  /  ALT  109<H>  /  AlkPhos  58  09-09          RADIOLOGY & ADDITIONAL STUDIES:

## 2020-09-09 NOTE — PROGRESS NOTE ADULT - SUBJECTIVE AND OBJECTIVE BOX
HPI:  Pt is a 76 y/o F with PMHx of Afib s/p Ablation x2 (latest in Feb 2020) and DCCV (latest in June 2020); CAD s/p stent x4; HTN; HLD; DM2 was transferred from [facility] where she was admitted on 09/07/2020 with a diagnosis of Pneumonia. According to the daughter in law, the patient felt tired and dizzy for 2 weeks; Symptoms worsened 3 days prior to her admission at [facility] (9/7) where she was found to be febrile, tachycardic, and hypertensive. She had CXR and CT chest done and was treated for pneumonia. According to the daughter in law she had occasional shortness of breath on exertion. She also had chest pain on the left side which started about 2 weeks ago; it was intermittent, on the front and back of the left side of chest, not radiating, exacerbated by deep breath, non radiating, 5/10 in severity, which has worsened to 7/10 since yesterday. According to the daughter in law the patient was on amiodarone for atrial fibrillation which was discontinued 2 weeks ago by her electrophysiologist, whom she saw after developing the dizziness and weakness.    Currently admitted to medicine with the primary diagnosis of pneumonia.  Pt speaks Setsw[place].    INTERVAL HPI / OVERNIGHT EVENTS:  Records of her recent hospital stay at [facility] were obtained on 9/9/20 AM.       ROS: Otherwise unremarkable       MEDICATIONS  STANDING MEDICATIONS  amLODIPine   Tablet 5 milliGRAM(s) Oral daily  atorvastatin 40 milliGRAM(s) Oral at bedtime  azithromycin   Tablet 250 milliGRAM(s) Oral daily  cefTRIAXone   IVPB 1000 milliGRAM(s) IV Intermittent every 24 hours  dextrose 5%. 1000 milliLiter(s) IV Continuous <Continuous>  dextrose 50% Injectable 12.5 Gram(s) IV Push once  dextrose 50% Injectable 25 Gram(s) IV Push once  dextrose 50% Injectable 25 Gram(s) IV Push once  ergocalciferol 98114 Unit(s) Oral every week  influenza   Vaccine 0.5 milliLiter(s) IntraMuscular once  insulin glargine Injectable (LANTUS) 15 Unit(s) SubCutaneous at bedtime  insulin lispro (HumaLOG) corrective regimen sliding scale   SubCutaneous three times a day before meals  insulin lispro Injectable (HumaLOG) 5 Unit(s) SubCutaneous three times a day before meals  lisinopril 40 milliGRAM(s) Oral daily  metoprolol succinate  milliGRAM(s) Oral daily  pantoprazole    Tablet 40 milliGRAM(s) Oral before breakfast  rivaroxaban 20 milliGRAM(s) Oral daily    VITALS:  Vital Signs Last 24 Hrs  T(C): 37.2 (09 Sep 2020 05:20), Max: 37.2 (09 Sep 2020 05:20)  T(F): 98.9 (09 Sep 2020 05:20), Max: 98.9 (09 Sep 2020 05:20)  HR: 84 (09 Sep 2020 05:20) (77 - 89)  BP: 156/74 (09 Sep 2020 05:20) (133/85 - 159/70)  BP(mean): --  RR: 20 (09 Sep 2020 10:54) (16 - 20)  SpO2: 97% (09 Sep 2020 10:54) (97% - 99%)    PHYSICAL EXAM  GEN: NAD, Resting comfortably in bed  PULM: Clear to auscultation bilaterally, No wheezes  CVS: Regular rate and rhythm, S1-S2, no murmurs  ABD: Soft, non-tender, non-distended, no guarding  EXT: No edema  NEURO: A&Ox3, no focal deficits    LABS                        11.1   11.88 )-----------( 304      ( 09 Sep 2020 05:25 )             33.3     09-09    132<L>  |  99  |  17  ----------------------------<  164<H>  4.2   |  20  |  0.8    Ca    9.5      09 Sep 2020 05:25  Mg     1.6     09-09    TPro  6.6  /  Alb  3.8  /  TBili  0.5  /  DBili  x   /  AST  103<H>  /  ALT  109<H>  /  AlkPhos  58  09-09      RADIOLOGY    < from: Xray Chest 1 View- PORTABLE-Urgent (09.09.20 @ 06:16) >  IMPRESSION:    Right upper lung patchy opacities may reflect pneumonia in the appropriate clinical setting.        < end of copied text > HPI:  Pt is a 74 y/o F with PMHx of Afib on Xarelto s/p Ablation x2 (latest in Feb 2020) and DCCV (latest in June 2020); CAD s/p stent x4; HTN; HLD; DM2 was transferred from [facility] where she was admitted on 09/07/2020 with a diagnosis of Pneumonia. According to the daughter in law, the patient felt tired and dizzy for 2 weeks; Symptoms worsened 3 days prior to her admission at [facility] (9/7) where she was found to be febrile, tachycardic, and hypertensive. She had CXR and CT chest done and was treated for pneumonia. According to the daughter in law she had occasional shortness of breath on exertion. She also had chest pain on the left side which started about 2 weeks ago; it was intermittent, on the front and back of the left side of chest, not radiating, exacerbated by deep breath, non radiating, 5/10 in severity, which has worsened to 7/10 since yesterday. According to the daughter in law the patient was on amiodarone for atrial fibrillation which was discontinued 2 weeks ago by her electrophysiologist, whom she saw after developing the dizziness and weakness.    Currently admitted to medicine with the primary diagnosis of pneumonia.  Pt speaks [place] citizen.    INTERVAL HPI / OVERNIGHT EVENTS:  Records of her recent hospital stay at [facility] were obtained on 9/9/20 AM.   CT C w/out contrast at [facility] 9/6: RUL subsegmental airspace consolidation likely representing pneumonia   Was given Levofloxacin (1 dose), then Doxycycline + Rocephin stated on 9/6 @ [facility], Pip/Tazo from 9/7-9/8 @ [facility]  On 9/7 @ [facility] pt was 77% on NC; hyponatremic w/ concern for Legionella       ROS: Otherwise unremarkable       MEDICATIONS  STANDING MEDICATIONS  amLODIPine   Tablet 5 milliGRAM(s) Oral daily  atorvastatin 40 milliGRAM(s) Oral at bedtime  azithromycin   Tablet 250 milliGRAM(s) Oral daily  cefTRIAXone   IVPB 1000 milliGRAM(s) IV Intermittent every 24 hours  dextrose 5%. 1000 milliLiter(s) IV Continuous <Continuous>  dextrose 50% Injectable 12.5 Gram(s) IV Push once  dextrose 50% Injectable 25 Gram(s) IV Push once  dextrose 50% Injectable 25 Gram(s) IV Push once  ergocalciferol 71706 Unit(s) Oral every week  influenza   Vaccine 0.5 milliLiter(s) IntraMuscular once  insulin glargine Injectable (LANTUS) 15 Unit(s) SubCutaneous at bedtime  insulin lispro (HumaLOG) corrective regimen sliding scale   SubCutaneous three times a day before meals  insulin lispro Injectable (HumaLOG) 5 Unit(s) SubCutaneous three times a day before meals  lisinopril 40 milliGRAM(s) Oral daily  metoprolol succinate  milliGRAM(s) Oral daily  pantoprazole    Tablet 40 milliGRAM(s) Oral before breakfast  rivaroxaban 20 milliGRAM(s) Oral daily    VITALS:  Vital Signs Last 24 Hrs  T(C): 37.2 (09 Sep 2020 05:20), Max: 37.2 (09 Sep 2020 05:20)  T(F): 98.9 (09 Sep 2020 05:20), Max: 98.9 (09 Sep 2020 05:20)  HR: 84 (09 Sep 2020 05:20) (77 - 89)  BP: 156/74 (09 Sep 2020 05:20) (133/85 - 159/70)  BP(mean): --  RR: 20 (09 Sep 2020 10:54) (16 - 20)  SpO2: 97% (09 Sep 2020 10:54) (97% - 99%)    PHYSICAL EXAM  GEN: NAD, Resting comfortably in bed  PULM: Clear to auscultation bilaterally, No wheezes  CVS: Regular rate and rhythm, S1-S2, no murmurs  ABD: Soft, non-tender, non-distended, no guarding  EXT: No edema  NEURO: A&Ox3, no focal deficits    LABS                        11.1   11.88 )-----------( 304      ( 09 Sep 2020 05:25 )             33.3     09-09    132<L>  |  99  |  17  ----------------------------<  164<H>  4.2   |  20  |  0.8    Ca    9.5      09 Sep 2020 05:25  Mg     1.6     09-09    TPro  6.6  /  Alb  3.8  /  TBili  0.5  /  DBili  x   /  AST  103<H>  /  ALT  109<H>  /  AlkPhos  58  09-09      RADIOLOGY    < from: Xray Chest 1 View- PORTABLE-Urgent (09.09.20 @ 06:16) >  IMPRESSION:    Right upper lung patchy opacities may reflect pneumonia in the appropriate clinical setting.        < end of copied text > HPI:  Pt is a 76 y/o F with PMHx of Afib on Xarelto s/p Ablation x2 (latest in Feb 2020) and DCCV (latest in June 2020); CAD s/p stent x4; HTN; HLD; DM2 was transferred from [facility] where she was admitted on 09/07/2020 with a diagnosis of Pneumonia. According to the daughter in law, the patient felt tired and dizzy for 2 weeks; Symptoms worsened 3 days prior to her admission at [facility] (9/7) where she was found to be febrile, tachycardic, and hypertensive. She had CXR and CT chest done and was treated for pneumonia. According to the daughter in law she had occasional shortness of breath on exertion. She also had chest pain on the left side which started about 2 weeks ago; it was intermittent, on the front and back of the left side of chest, not radiating, exacerbated by deep breath, non radiating, 5/10 in severity, which has worsened to 7/10 since yesterday. According to the daughter in law the patient was on amiodarone for atrial fibrillation which was discontinued 2 weeks ago by her electrophysiologist, whom she saw after developing the dizziness and weakness.    Currently admitted to medicine with the primary diagnosis of pneumonia.  Pt speaks [place].    INTERVAL HPI / OVERNIGHT EVENTS:  Records of her recent hospital stay at [facility] were obtained on 9/9/20 AM.   CT C w/out contrast at [facility] 9/6: RUL subsegmental airspace consolidation likely representing pneumonia   Was given Levofloxacin (1 dose), then Doxycycline + Rocephin stated on 9/6 @ [facility], Pip/Tazo from 9/7-9/8 @ [facility]  On 9/7 @ [facility] pt was 77% on NC; hyponatremic w/ concern for Legionella       ROS: Otherwise unremarkable       MEDICATIONS  STANDING MEDICATIONS  amLODIPine   Tablet 5 milliGRAM(s) Oral daily  atorvastatin 40 milliGRAM(s) Oral at bedtime  azithromycin   Tablet 250 milliGRAM(s) Oral daily  cefTRIAXone   IVPB 1000 milliGRAM(s) IV Intermittent every 24 hours  dextrose 5%. 1000 milliLiter(s) IV Continuous <Continuous>  dextrose 50% Injectable 12.5 Gram(s) IV Push once  dextrose 50% Injectable 25 Gram(s) IV Push once  dextrose 50% Injectable 25 Gram(s) IV Push once  ergocalciferol 35815 Unit(s) Oral every week  influenza   Vaccine 0.5 milliLiter(s) IntraMuscular once  insulin glargine Injectable (LANTUS) 15 Unit(s) SubCutaneous at bedtime  insulin lispro (HumaLOG) corrective regimen sliding scale   SubCutaneous three times a day before meals  insulin lispro Injectable (HumaLOG) 5 Unit(s) SubCutaneous three times a day before meals  lisinopril 40 milliGRAM(s) Oral daily  metoprolol succinate  milliGRAM(s) Oral daily  pantoprazole    Tablet 40 milliGRAM(s) Oral before breakfast  rivaroxaban 20 milliGRAM(s) Oral daily    VITALS:  Vital Signs Last 24 Hrs  T(C): 37.2 (09 Sep 2020 05:20), Max: 37.2 (09 Sep 2020 05:20)  T(F): 98.9 (09 Sep 2020 05:20), Max: 98.9 (09 Sep 2020 05:20)  HR: 84 (09 Sep 2020 05:20) (77 - 89)  BP: 156/74 (09 Sep 2020 05:20) (133/85 - 159/70)  BP(mean): --  RR: 20 (09 Sep 2020 10:54) (16 - 20)  SpO2: 97% (09 Sep 2020 10:54) (97% - 99%)    PHYSICAL EXAM  GEN: NAD, Resting comfortably in bed  PULM: Clear to auscultation bilaterally, No wheezes  CVS: Regular rate and rhythm, S1-S2, no murmurs  ABD: Soft, non-tender, non-distended, no guarding  EXT: No edema  NEURO: non focal     LABS                        11.1   11.88 )-----------( 304      ( 09 Sep 2020 05:25 )             33.3     09-09    132<L>  |  99  |  17  ----------------------------<  164<H>  4.2   |  20  |  0.8    Ca    9.5      09 Sep 2020 05:25  Mg     1.6     09-09    TPro  6.6  /  Alb  3.8  /  TBili  0.5  /  DBili  x   /  AST  103<H>  /  ALT  109<H>  /  AlkPhos  58  09-09      RADIOLOGY    < from: Xray Chest 1 View- PORTABLE-Urgent (09.09.20 @ 06:16) >  IMPRESSION:    Right upper lung patchy opacities may reflect pneumonia in the appropriate clinical setting.        < end of copied text >

## 2020-09-09 NOTE — PROGRESS NOTE ADULT - SUBJECTIVE AND OBJECTIVE BOX
74 y/o F with PMH of Afib s/p Ablation x2 (latest in Feb 2020) and DCCV (latest in June 2020); CAD s/p stent x4; HTN; HLD; DM2 was transferred from [facility] where she was admitted on 09/07/2020 with a diagnosis of Pneumonia.    PAST MEDICAL & SURGICAL HISTORY:  S/P ablation of atrial fibrillation  Diabetes mellitus  High cholesterol  HTN (hypertension)  Atrial fibrillation, unspecified type  Coronary stent patent  History of back surgery    MEDICATIONS  (STANDING):  amLODIPine   Tablet 5 milliGRAM(s) Oral daily  atorvastatin 40 milliGRAM(s) Oral at bedtime  azithromycin   Tablet 250 milliGRAM(s) Oral daily  cefTRIAXone   IVPB 1000 milliGRAM(s) IV Intermittent every 24 hours  ergocalciferol 14249 Unit(s) Oral every week  influenza   Vaccine 0.5 milliLiter(s) IntraMuscular once  insulin glargine Injectable (LANTUS) 40 Unit(s) SubCutaneous at bedtime  lisinopril 40 milliGRAM(s) Oral daily  metoprolol succinate  milliGRAM(s) Oral daily  pantoprazole    Tablet 40 milliGRAM(s) Oral before breakfast  rivaroxaban 20 milliGRAM(s) Oral daily    MEDICATIONS  (PRN):  acetaminophen   Tablet .. 650 milliGRAM(s) Oral every 6 hours PRN Temp greater or equal to 38.5C (101.3F), Severe Pain (7 - 10)  metoclopramide 10 milliGRAM(s) Oral two times a day before meals PRN Nausea, vomiting    Vital Signs Last 24 Hrs  T(C): 37.2 (09 Sep 2020 05:20), Max: 37.2 (09 Sep 2020 05:20)  T(F): 98.9 (09 Sep 2020 05:20), Max: 98.9 (09 Sep 2020 05:20)  HR: 84 (09 Sep 2020 05:20) (77 - 89)  BP: 156/74 (09 Sep 2020 05:20) (133/85 - 159/70)  BP(mean): --  RR: 16 (09 Sep 2020 05:20) (16 - 18)  SpO2: 98% (09 Sep 2020 06:25) (98% - 99%)    CAPILLARY BLOOD GLUCOSE      POCT Blood Glucose.: 182 mg/dL (09 Sep 2020 04:43)      PHYSICAL EXAM     General:  NAD  HEENT: PERRLA EOMI  NECK : - JVD  CHEST: symmetrical  LUNGS: Bilateral air entry  CARD: S1 S2 irreg irreg  ABD:  Soft, nondistended, nontender, no masses.  EXT : Edema (-), swelling of distal interphalangeal joints (+) bilaterally   SKIN: Warm and well perfused,   NEURO:  non focal    LABS PENDING   X-RAY PENDIN

## 2020-09-09 NOTE — CONSULT NOTE ADULT - ATTENDING COMMENTS
Seen and examined with the pulmonary fellows and resident at the bed side.  Impression and plan as outlined above.

## 2020-09-09 NOTE — PHYSICAL THERAPY INITIAL EVALUATION ADULT - STANDING BALANCE: DYNAMIC, REHAB EVAL
Chief Complaint:  Patient is a 40y old  Male who presents with a chief complaint of UC (14 Feb 2019 18:02)      Interval Events:   Patient with continues improvement in BM's.      Allergies:  No Known Allergies      Hospital Medications:  benzonatate 100 milliGRAM(s) Oral three times a day PRN  calcium carbonate 1250 mG  + Vitamin D (OsCal 500 + D) 1 Tablet(s) Oral daily  clotrimazole 1% Cream 1 Application(s) Topical two times a day  ferrous    sulfate 325 milliGRAM(s) Oral daily  fluticasone propionate 50 MICROgram(s)/spray Nasal Spray 1 Spray(s) Both Nostrils two times a day  heparin  Injectable 5000 Unit(s) SubCutaneous every 8 hours  hydrocortisone 2.5% Ointment 1 Application(s) Topical two times a day  influenza   Vaccine 0.5 milliLiter(s) IntraMuscular once  ketoconazole 2% Cream 1 Application(s) Topical two times a day  ketoconazole 2% Shampoo 1 Application(s) Topical <User Schedule>  loperamide 2 milliGRAM(s) Oral four times a day PRN  methylPREDNISolone sodium succinate Injectable 20 milliGRAM(s) IV Push every 8 hours  pantoprazole    Tablet 40 milliGRAM(s) Oral before breakfast  sodium chloride 0.65% Nasal 1 Spray(s) Both Nostrils two times a day      PMHX/PSHX:  Ulcerative colitis without complications, unspecified location  No significant past surgical history      Family history:  No pertinent family history in first degree relatives          PHYSICAL EXAM:     GENERAL:  Appears stated age, well-groomed, well-nourished, no distress  HEENT:  NC/AT,  conjunctivae clear, sclera -anicteric  CHEST:  Full & symmetric excursion, no increased  HEART:  Regular rhythm  ABDOMEN:  Soft, non-tender, non-distended, normoactive bowel sounds,  no masses ,no hepato-splenomegaly,   EXTREMITIES:  no cyanosis,clubbing or edema  SKIN:  No rash/erythema/ecchymoses/petechiae/wounds/abscess/warm/dry  NEURO:  Alert, oriented    Vital Signs:  Vital Signs Last 24 Hrs  T(C): 36.7 (15 Feb 2019 04:24), Max: 36.7 (14 Feb 2019 20:37)  T(F): 98 (15 Feb 2019 04:24), Max: 98 (14 Feb 2019 20:37)  HR: 62 (15 Feb 2019 04:24) (62 - 97)  BP: 109/69 (15 Feb 2019 04:24) (101/73 - 124/85)  BP(mean): --  RR: 17 (15 Feb 2019 04:24) (17 - 18)  SpO2: 100% (15 Feb 2019 04:24) (100% - 100%)  Daily     Daily     LABS:                        7.8    16.84 )-----------( 509      ( 14 Feb 2019 07:58 )             26.8                     Imaging: Chief Complaint:  Patient is a 40y old  Male who presents with a chief complaint of UC (14 Feb 2019 18:02)      Interval Events:   Patient with some improvement in BM's. Still 4x in the last 24 hours, but minimal blood now. This is how his bowel movements usually are at home.    Allergies:  No Known Allergies      Hospital Medications:  benzonatate 100 milliGRAM(s) Oral three times a day PRN  calcium carbonate 1250 mG  + Vitamin D (OsCal 500 + D) 1 Tablet(s) Oral daily  clotrimazole 1% Cream 1 Application(s) Topical two times a day  ferrous    sulfate 325 milliGRAM(s) Oral daily  fluticasone propionate 50 MICROgram(s)/spray Nasal Spray 1 Spray(s) Both Nostrils two times a day  heparin  Injectable 5000 Unit(s) SubCutaneous every 8 hours  hydrocortisone 2.5% Ointment 1 Application(s) Topical two times a day  influenza   Vaccine 0.5 milliLiter(s) IntraMuscular once  ketoconazole 2% Cream 1 Application(s) Topical two times a day  ketoconazole 2% Shampoo 1 Application(s) Topical <User Schedule>  loperamide 2 milliGRAM(s) Oral four times a day PRN  methylPREDNISolone sodium succinate Injectable 20 milliGRAM(s) IV Push every 8 hours  pantoprazole    Tablet 40 milliGRAM(s) Oral before breakfast  sodium chloride 0.65% Nasal 1 Spray(s) Both Nostrils two times a day      PMHX/PSHX:  Ulcerative colitis without complications, unspecified location  No significant past surgical history      Family history:  No pertinent family history in first degree relatives          PHYSICAL EXAM:     GENERAL:  Appears stated age, well-groomed, well-nourished, no distress  HEENT:  NC/AT,  conjunctivae clear, sclera -anicteric  CHEST:  Full & symmetric excursion, no increased  HEART:  Regular rhythm  ABDOMEN:  Soft, non-tender, non-distended, normoactive bowel sounds,  no masses ,no hepato-splenomegaly,   EXTREMITIES:  no cyanosis,clubbing or edema  SKIN:  No rash/erythema/ecchymoses/petechiae/wounds/abscess/warm/dry  NEURO:  Alert, oriented    Vital Signs:  Vital Signs Last 24 Hrs  T(C): 36.7 (15 Feb 2019 04:24), Max: 36.7 (14 Feb 2019 20:37)  T(F): 98 (15 Feb 2019 04:24), Max: 98 (14 Feb 2019 20:37)  HR: 62 (15 Feb 2019 04:24) (62 - 97)  BP: 109/69 (15 Feb 2019 04:24) (101/73 - 124/85)  BP(mean): --  RR: 17 (15 Feb 2019 04:24) (17 - 18)  SpO2: 100% (15 Feb 2019 04:24) (100% - 100%)  Daily     Daily     LABS:                        7.8    16.84 )-----------( 509      ( 14 Feb 2019 07:58 )             26.8                     Imaging: good balance

## 2020-09-09 NOTE — CONSULT NOTE ADULT - ASSESSMENT
IMPRESSION: Rehab of gait dysfunction     PRECAUTIONS: [  ] Cardiac  [  ] Respiratory  [  ] Seizures [  ] Contact Isolation  [  ] Droplet Isolation  [  ] Other    Weight Bearing Status:     RECOMMENDATION:    Out of Bed to Chair     DVT/Decubiti Prophylaxis    REHAB PLAN:     [ x  ] Bedside P/T 3-5 times a week   [   ]   Bedside O/T  2-3 times a week             [   ] No Rehab Therapy Indicated                   [   ]  Speech Therapy   Conditioning/ROM                                    ADL  Bed Mobility                                               Conditioning/ROM  Transfers                                                     Bed Mobility  Sitting /Standing Balance                         Transfers                                        Gait Training                                               Sitting/Standing Balance  Stair Training [   ]Applicable                    Home equipment Eval                                                                        Splinting  [   ] Only      GOALS:   ADL   [   ]   Independent                    Transfers  [ x  ] Independent                          Ambulation  [ x  ] Independent     [   x ] With device                            [   ]  CG                                                         [   ]  CG                                                                  [   ] CG                            [    ] Min A                                                   [   ] Min A                                                              [   ] Min  A          DISCHARGE PLAN:   [   ]  Good candidate for Intensive Rehabilitation/Hospital based                                             Will tolerate 3hrs Intensive Rehab Daily                                       [  x  ]  Short Term Rehab in Skilled Nursing Facility                             vs          [ xx   ]  Home with Outpatient or VN services                                         [    ]  Possible Candidate for Intensive Hospital based Rehab

## 2020-09-09 NOTE — PROGRESS NOTE ADULT - ASSESSMENT
74 y/o F with PMH of Afib s/p Ablation x2 (latest in Feb 2020) and DCCV (latest in June 2020); CAD s/p stent x4; HTN; HLD; DM2 was transferred from [facility] where she was admitted on 09/07/2020 with a diagnosis of Pneumonia.      # Pneumonia  - Was admitted at [facility] for pneumonia  - Will get CXR and chest CT reports from [facility]  - Will get details from [facility] in the AM  - Will get a CXR  - will start azithromycin and ceftriaxone    # Dizziness and Fatigue  -   - will monitor blood glucose  - orthostatic vitals  - may consider TFT as patient was on amiodarone which was recently discontinued - will get more details from the patient's PCP.    # Atrial Fibrillation rate controlled  - continue with Xarelto 20mg OD  - continue with metoprolol  - h/o multiple ablations and DCCV  - Currently rate controlled    # HTN  - continue with home medications  - Monitor BP    # HLD  - continue Lipitor    # DM-II  - will continue Lantus 40U at bedtime  - monitor blood glucose  - will start short acting insulin based on blood glucose readings  - measure HbA1c    # Chest pain  - EKG: atrial fibrillation - rate controlled; no ST, T wave changes  - could be pleuritic pain due to pneumonia  - f/u Chest X-ray  - Acetaminophen for pain control      # Activity - Increase as tolerated  # Dispo - From home  # Diet - DASH  # Code Status - DNI 76 y/o F with PMH of Afib on Xarelto s/p Ablation x2 (latest in Feb 2020) and DCCV (latest in June 2020); CAD s/p stent x4; HTN; HLD; DM2 was transferred from [facility] where she was admitted on 09/07/2020 with a diagnosis of Pneumonia.    # Pneumonia  - Pt admitted to [facility] on 9/6 for pneumonia; transferred to [facility] on 9/9; Record obtained from [facility] 9/9 AM; placed in pt's chart  - CT C w/out contrast at [facility] 9/6: RUL subsegmental airspace consolidation likely representing pneumonia   - Was given Levofloxacin (1 dose), then Doxycycline + Rocephin, and Zosyn at [facility]    - CXR 9/9 at [facility]: Right upper lung patchy opacities may reflect pneumonia in the appropriate clinical setting.  - Pulm consult recommendations: finish antibiotics course, and f/u with pulm outpatient; out of bed to chair + PT  - Physiatry consult recommendations: out of bed to chair  - ID recommendations: f/u procal, Legionella antigen screen, blood Cx, UA  - c/w azithromycin and ceftriaxone, finish course; Pt allergic to doxycycline    # Dizziness and Fatigue, r/o thyroid dysfunction d/t amiodarone use   - monitor blood glucose  - f/u orthostatic vitals  - consider TFT as patient was on amiodarone which was recently discontinued - will get more details from the patient's PCP.    # Atrial Fibrillation rate controlled  - h/o multiple ablations and DCCV  - Pt currently rate controlled; HR mid 80's to upper 90's overnight (9/9)  - c/w Xarelto 20mg OD  - c/w metoprolol    # HTN  - continue with home medications  - Monitor BP    # HLD  - continue Lipitor    # DM-II  - will continue Lantus 40U at bedtime  - monitor blood glucose  - will start short acting insulin based on blood glucose readings  - measure HbA1c    # Chest pain  - EKG 9/9: atrial fibrillation - rate controlled; no ST, T wave changes  - consider pleuritic pain d/t pneumonia  - Acetaminophen for pain control      Code Status: DNI   Diet: DASH   Activity: Increase as tolerated   Dispo: From home  DVT ppx: Xarelto   GI ppx: 76 y/o F with PMH of Afib on Xarelto s/p Ablation x2 (latest in Feb 2020) and DCCV (latest in June 2020); CAD s/p stent x4; HTN; HLD; DM2 was transferred from [facility] where she was admitted on 09/07/2020 with a diagnosis of Pneumonia.    # Pneumonia  - Pt admitted to [facility] on 9/6 for pneumonia; transferred to [facility] on 9/9; Record obtained from [facility] 9/9 AM  - CT C w/out contrast at [facility] 9/6: RUL subsegmental airspace consolidation likely representing pneumonia   - CXR 9/9 at [facility]: Right upper lung patchy opacities may reflect pneumonia in the appropriate clinical setting.  - Was given Levofloxacin (1 dose), then Doxycycline + Rocephin, and Zosyn at [facility]; today (9/9) is Day 5 on antibiotics  - Pulm consult : finish antibiotics course, and f/u with pulm outpatient w/ possible repeat CT; out of bed to chair + PT  - Physiatry consult recommendations: out of bed to chair  - ID recommendations: f/u procal, Legionella screen, blood Cx, UA  - c/w azithromycin and ceftriaxone, finish course; Pt allergic to doxycycline    # Dizziness and Fatigue, r/o thyroid dysfunction d/t amiodarone use   - monitor blood glucose  - f/u orthostatic vitals  - consider TFT as patient was on amiodarone which was recently discontinued - will get more details from the patient's PCP.    # Atrial Fibrillation rate controlled  - h/o multiple ablations and DCCV  - Pt currently rate controlled; HR mid 80's to upper 90's overnight (9/9)  - c/w Xarelto 20mg OD  - c/w metoprolol    # Hyponatremia - resolving  - Na was 124 on 9/7 @ [facility] --> Na 132 on 9/9   - likely d/t dehydration  - f/u with Legionella Urine tox as per ID recs    # HTN  - continue with home medications  - Monitor BP    # HLD  - continue Lipitor    # DM-II  - will continue Lantus 40U at bedtime  - monitor blood glucose  - will start short acting insulin based on blood glucose readings  - measure HbA1c    # Chest pain  - EKG 9/9: atrial fibrillation - rate controlled; no ST, T wave changes  - consider pleuritic pain d/t pneumonia  - Acetaminophen for pain control      Code Status: DNI   Diet: DASH   Activity: Increase as tolerated   Dispo: From home  DVT ppx: Xarelto   GI ppx: 76 y/o F with PMHx of Afib on Xarelto s/p Ablation x2 (latest in Feb 2020) and DCCV (latest in June 2020); CAD s/p stent x4, HTN, HLD and DMII who was transferred from [facility] where she was admitted on 09/07/2020 with a diagnosis of Pneumonia.    # Pneumonia  - Pt admitted to [facility] on 9/6 for PNA, transferred to [facility] on 9/9; Records obtained from [facility]   - CT Chest at [facility] 9/6 with RUL subsegmental airspace consolidation likely representing pneumonia   - CXR 9/9 at [facility]: Right upper lung patchy opacities may reflect pneumonia in the appropriate clinical setting  - Was given Levofloxacin (1 dose), then Doxycycline + Rocephin, and Zosyn at [facility]; today (9/9) is Day 5 on antibiotics  - Pulm recs finishing abx course and f/u outpatient w/ possible repeat CT  - f/u ID recs   - c/w azithromycin and ceftriaxone, finish course; Pt allergic to doxycycline    # Dizziness and Fatigue, r/o thyroid dysfunction d/t amiodarone use   - monitor blood glucose  - f/u orthostatics   - consider TFT as patient was on amiodarone which was recently discontinued     # Atrial fibrillation- rate controlled  - h/o multiple ablations and DCCV  - Pt currently rate controlled; HR mid 80's to upper 90's overnight (9/9)  - c/w Xarelto 20mg OD  - c/w metoprolol    # Hyponatremia - resolving  - Na was 124 on 9/7 @ [facility] --> Na 132 on 9/9   - f/u with urine Legionella?     # HTN: Continue home meds    # HLD: continue home Lipitor    # DM-II  -FS qac and qhs  - Insulin basal/bolus regimen     # Chest pain  - EKG 9/9: atrial fibrillation - rate controlled; no ST, T wave changes  - consider pleuritic pain d/t pneumonia  - f/u trops  - Acetaminophen for pain control      Code Status: DNR/DNI   Diet: DASH   Activity: Increase as tolerated   Dispo: From home  DVT ppx: Xarelto

## 2020-09-09 NOTE — PROGRESS NOTE ADULT - ASSESSMENT
74 y/o F with PMH of Afib s/p Ablation x2 (latest in Feb 2020) and DCCV (latest in June 2020); CAD s/p stent x4; HTN; HLD; DM2 was transferred from [facility] where she was admitted on 09/07/2020 with a diagnosis of Pneumonia    # Pneumonia Community Acquired   - Was admitted at [facility] for pneumonia  - Will get CXR and chest CT reports from [facility]  - Will get details from [facility] in the AM  - Will get a CXR  - will start azithromycin and ceftriaxone  - pulmonary and ID evaluation     # Atrial Fibrillation rate controlled  - continue with Xarelto 20mg OD  - continue with metoprolol  - h/o multiple ablations and DCCV  - Currently rate controlled follow up cardiology     # Hypertensive Heart Disease without heart failure   - continue with home medications  - Monitor BP    # Hyperlipidemia with type DM   - continue Lipitor    # Type 2 Diabetes Mellitus insulin requiring   - will continue Lantus 40U at bedtime  - monitor blood glucose  - will start short acting insulin based on blood glucose readings  - measure HbA1c         # Activity - Increase as tolerated  # Dispo - From home  # Diet - DASH  # Code Status - DNI    contact [phone]

## 2020-09-09 NOTE — H&P ADULT - NSICDXPASTMEDICALHX_GEN_ALL_CORE_FT
PAST MEDICAL HISTORY:  Atrial fibrillation, unspecified type     Diabetes mellitus     High cholesterol     HTN (hypertension)     S/P ablation of atrial fibrillation

## 2020-09-09 NOTE — GOALS OF CARE CONVERSATION - ADVANCED CARE PLANNING - CONVERSATION DETAILS
Goals of care was had with patient via [place]- this morning (ID # [id number], Xavi). Initially, patient said that she would like to be DNI, but would like chest compressions if her heart were to stop. I explained to the patient that the heart and lungs are connected and if her heart were to stop, we would have to place a breathing tube after or during the chest compressions. She said under no circumstances does she want a breathing tube or any "artificial life." After further discussion, she decided she would like to be DNR/DNI. This was confirmed and MOLST form was completed and order entered.

## 2020-09-09 NOTE — H&P ADULT - NSHPREVIEWOFSYSTEMS_GEN_ALL_CORE
CONSTITUTIONAL: fever (-), chills (-), weight loss (-), or fatigue (+)  HEENT: blurring of vision (+), eye pain (-), hearing loss (-), tinnitus (-), vertigo (-), dizziness (+), discharge from orifices (-)  RESPIRATORY: Cough (-), wheezing (-), shortness of breath (+) - exertional  CARDIOVASCULAR: Chest pain (+), palpitations (-), dizziness (-), leg swelling (-)  GASTROINTESTINAL: Abdominal pain (-), nausea (-), vomiting (-), diarrhea  (-), constipation (-), melena (-), hematochezia (-).  GENITOURINARY: Dysuria (-), frequency (-), hematuria (-), or incontinence (-)  NEUROLOGICAL: Headaches (-), memory loss (-), behavioral alterations (-), weakness (-), numbness (-), tremors (+), or falls (-)  SKIN: Itching (-), burning (-), or rashes (-)  LYMPH NODES: Enlarged glands (-)  ENDOCRINE: Heat Intolerance (-), Cold intolerance (-), hair loss (-)  MUSCULOSKELETAL: Joint pain (+), swelling (-), muscle (-), back (+), extremity pain (-)  PSYCHIATRIC: Depression (-), anxiety (-), mood swings (-), difficulty sleeping (-)  HEME/LYMPH: Easy bruising (-), bleeding gums (-)  ALLERY AND IMMUNOLOGIC: Hives (-), eczema (-), lips (-), tongue swellings (-)

## 2020-09-09 NOTE — H&P ADULT - NSHPSOCIALHISTORY_GEN_ALL_CORE
Marital Status:  (   )    ( ) Single    (   )    (  )   Lives with: (  ) alone  (  ) children   (  ) spouse   (  ) parents  (  ) other  Recent Travel: No recent travel  Occupation:    Substance Use (street drugs): ( x ) never used  (  ) other:  Tobacco Usage:  ( x  ) never smoked   (   ) former smoker   (   ) current smoker  (     ) pack year  Alcohol Usage: None

## 2020-09-09 NOTE — PHYSICAL THERAPY INITIAL EVALUATION ADULT - PERTINENT HX OF CURRENT PROBLEM, REHAB EVAL
Pt is a 74yo F admitted for chest pain. PMHx: Afib s/p Ablation x2 (latest in Feb 2020) and DCCV (latest in June 2020); CAD s/p stent x4; HTN; HLD; DM2 was transferred from [facility]

## 2020-09-09 NOTE — H&P ADULT - ASSESSMENT
74 y/o F with PMH of Afib s/p Ablation x2 (latest in Feb 2020) and DCCV (latest in June 2020); CAD s/p stent x4; HTN; HLD; DM2 was transferred from [facility] where she was admitted on 09/07/2020 with a diagnosis of Pneumonia, after developing 2 weeks of gradual dizziness and weakness; chest pain on the left side.    Plan:    # Pneumonia  - Was admitted at [facility] for pneumonia  - Will get details from [facility] in the AM  - Will get a CXR  - will start azithromycin and ceftriaxone    # Dizziness and Weakness  - will monitor blood glucose  - orthostatic vitals  - may consider TFT as patient was on amiodarone which was recently discontinued - will get more details from the patient's PCP.    # HTN  - continue with home medications  - Monitor BP    # HLD  - continue Lipitor    # DM-II  - will continue Lantus 40U at bedtime  - monitor blood glucose  - will start short acting insulin based on blood glucose readings  - measure HbA1c    # Chest pain  - EKG: atrial fibrillation - rate controlled; no ST, T wave changes  - could be pleuritic pain due to pneumonia  - f/u Chest X-ray  - Acetaminophen for pain control      # Activity - Increase as tolerated  # Dispo - From home  # Diet - DASH  # Code Status - DNI Assessment and plan:  74 y/o F with PMH of Afib s/p Ablation x2 (latest in Feb 2020) and DCCV (latest in June 2020); CAD s/p stent x4; HTN; HLD; DM2 was transferred from [facility] where she was admitted on 09/07/2020 with a diagnosis of Pneumonia, after developing 2 weeks of gradual dizziness and fatigue; left sided chest pain.      # Pneumonia  - Was admitted at [facility] for pneumonia  - Will get details from [facility] in the AM  - Will get a CXR  - will start azithromycin and ceftriaxone    # Dizziness and Fatigue  -   - will monitor blood glucose  - orthostatic vitals  - may consider TFT as patient was on amiodarone which was recently discontinued - will get more details from the patient's PCP.    # Atrial Fibrillation rate controlled  - continue with Xarelto 20mg OD  - continue with metoprolol  - h/o multiple ablations and DCCV  - Currently rate controlled    # HTN  - continue with home medications  - Monitor BP    # HLD  - continue Lipitor    # DM-II  - will continue Lantus 40U at bedtime  - monitor blood glucose  - will start short acting insulin based on blood glucose readings  - measure HbA1c    # Chest pain  - EKG: atrial fibrillation - rate controlled; no ST, T wave changes  - could be pleuritic pain due to pneumonia  - f/u Chest X-ray  - Acetaminophen for pain control      # Activity - Increase as tolerated  # Dispo - From home  # Diet - DASH  # Code Status - DNI Assessment and plan:  76 y/o F with PMH of Afib s/p Ablation x2 (latest in Feb 2020) and DCCV (latest in June 2020); CAD s/p stent x4; HTN; HLD; DM2 was transferred from [facility] where she was admitted on 09/07/2020 with a diagnosis of Pneumonia, after developing 2 weeks of gradual dizziness and fatigue; left sided chest pain.      # Pneumonia  - Was admitted at [facility] for pneumonia  - Will get CXR and chest CT reports from [facility]  - Will get details from [facility] in the AM  - Will get a CXR  - will start azithromycin and ceftriaxone    # Dizziness and Fatigue  -   - will monitor blood glucose  - orthostatic vitals  - may consider TFT as patient was on amiodarone which was recently discontinued - will get more details from the patient's PCP.    # Atrial Fibrillation rate controlled  - continue with Xarelto 20mg OD  - continue with metoprolol  - h/o multiple ablations and DCCV  - Currently rate controlled    # HTN  - continue with home medications  - Monitor BP    # HLD  - continue Lipitor    # DM-II  - will continue Lantus 40U at bedtime  - monitor blood glucose  - will start short acting insulin based on blood glucose readings  - measure HbA1c    # Chest pain  - EKG: atrial fibrillation - rate controlled; no ST, T wave changes  - could be pleuritic pain due to pneumonia  - f/u Chest X-ray  - Acetaminophen for pain control      # Activity - Increase as tolerated  # Dispo - From home  # Diet - DASH  # Code Status - DNI

## 2020-09-09 NOTE — CONSULT NOTE ADULT - SUBJECTIVE AND OBJECTIVE BOX
Patient is a 76 y/o Sierra Leonean-speaking Female who presents with a chief complaint of: Transfer from [facility] - Pneumonia, + 2 weeks fatigue + 2 weeks left sided-shoulder pain (worse with movement + tender to palpation).    HPI:  76 y/o F with PMH of Afib s/p Ablation x2 (latest in Feb 2020) and DCCV (latest in June 2020); CAD s/p stent x4; HTN; HLD; DM2 was transferred from [facility] where she was admitted on 09/07/2020 with a diagnosis of Pneumonia. According to the daughter in law the patient started feeling tiredness and dizziness 2 weeks ago, the symptoms gradually worsened until 3 days ago when she became lethargic and was taken to [facility] for treatment. In [facility] she was found to have fever, rapid heart rate and high BP. She had chest X-ray and CT chest done and was treated for pneumonia. According to the daughter in law she had occasional shortness of breath on exertion. She also had chest pain on the left side which started about 2 weeks ago, it was intermittent, on the front and back of the left side of chest, not radiating, exacerbated by deep breath, non radiating, 5/10 in severity, which has worsened to 7/10 since yesterday. According to the daughter in law the patient was on amiodarone for atrial fibrillation which was discontinued 2 weeks ago by her electrophysiologist, whom she saw after developing the dizziness and weakness. (09 Sep 2020 02:40)    [place] : Xavi [id number].    Pulmonary History:  76 y/o Sierra Leonean-speaking F with PMH of Afib s/p Ablation x2 (latest in Feb 2020) and DCCV (latest in June 2020); CAD s/p stent x4; HTN; HLD; DM2. She is a never smoker. No pulmonary history, denies asthma/COPD, never seen a pulmonologist before. She was transferred from [facility], where she was being treated for Community Acquired Pneumonia. She has been complaining of worsening fatigue for past 2 weeks, associated with left-sided shoulder pain that is worse with movement. She denies falls or heavy lifting. She ambulates with a cane or independently at baseline. Endorses urinary frequency. She denies headache, SOB, cough, palpitations, abdominal pain, N/V/D/constipation, dysuria, or lower extremity swelling. Patient was seen ambulating in hallway with PT with walker. Patient was saturating 98-99% on RA at this time, but felt fatigued during the session.     PAST MEDICAL & SURGICAL HISTORY:  S/P ablation of atrial fibrillation  Diabetes mellitus  High cholesterol  HTN (hypertension)  Atrial fibrillation, unspecified type  Coronary stent patent  History of back surgery      SOCIAL HX:   Never smoker.       Denies drinking alcohol or using illicit drugs.     FAMILY HISTORY:  FH: coronary artery disease (Sibling)  .  No cardiovascular or pulmonary family history     REVIEW OF SYSTEMS:    All ROS are negative except per HPI     Allergies    adhesives (Pruritus; Rash)  doxycycline (Other)  latex (Rash)  Milk (Diarrhea)    Intolerances          PHYSICAL EXAM  Vital Signs Last 24 Hrs  T(C): 37.2 (09 Sep 2020 05:20), Max: 37.2 (09 Sep 2020 05:20)  T(F): 98.9 (09 Sep 2020 05:20), Max: 98.9 (09 Sep 2020 05:20)  HR: 84 (09 Sep 2020 05:20) (77 - 89)  BP: 156/74 (09 Sep 2020 05:20) (133/85 - 159/70)  BP(mean): --  RR: 16 (09 Sep 2020 05:20) (16 - 18)  SpO2: 98% (09 Sep 2020 06:25) (98% - 99%)    CONSTITUTIONAL:  Well nourished.  NAD    ENT:   Airway patent,   No thrush    EYES:   Clear bilaterally,   pupils equal,   round and reactive to light.    CARDIAC:   Normal rate,   regular rhythm.    no edema      CAROTID:   normal systolic impulse  no bruits    RESPIRATORY:   No wheezing  Nnormal chest expansion  Not tachypneic,  No use of accessory muscles    GASTROINTESTINAL:  Abdomen soft,   non-tender,   no guarding,   + BS    GENITOURINARY  normal genitalia for sex  no edema    MUSCULOSKELETAL:   range of motion is not limited,  no clubbing, cyanosis    NEUROLOGICAL:   Alert and oriented   no motor deficits.    SKIN:   Skin normal color for race,   No evidence of rash.    PSYCHIATRIC:   normal mood and affect.   no apparent risk to self or others.    HEME LYMPH:   No cervical  lymphadenopathy.  no inguinal lymphadenopathy          LABS:                          11.1   11.88 )-----------( 304      ( 09 Sep 2020 05:25 )             33.3                                               09-09    132<L>  |  99  |  17  ----------------------------<  164<H>  4.2   |  20  |  0.8    Ca    9.5      09 Sep 2020 05:25  Mg     1.6     09-09    TPro  6.6  /  Alb  3.8  /  TBili  0.5  /  DBili  x   /  AST  103<H>  /  ALT  109<H>  /  AlkPhos  58  09-09                                                                                           LIVER FUNCTIONS - ( 09 Sep 2020 05:25 )  Alb: 3.8 g/dL / Pro: 6.6 g/dL / ALK PHOS: 58 U/L / ALT: 109 U/L / AST: 103 U/L / GGT: x                                                                                                MEDICATIONS  (STANDING):  amLODIPine   Tablet 5 milliGRAM(s) Oral daily  atorvastatin 40 milliGRAM(s) Oral at bedtime  azithromycin   Tablet 250 milliGRAM(s) Oral daily  cefTRIAXone   IVPB 1000 milliGRAM(s) IV Intermittent every 24 hours  ergocalciferol 59347 Unit(s) Oral every week  influenza   Vaccine 0.5 milliLiter(s) IntraMuscular once  insulin glargine Injectable (LANTUS) 40 Unit(s) SubCutaneous at bedtime  lisinopril 40 milliGRAM(s) Oral daily  magnesium sulfate  IVPB 2 Gram(s) IV Intermittent once  metoprolol succinate  milliGRAM(s) Oral daily  pantoprazole    Tablet 40 milliGRAM(s) Oral before breakfast  rivaroxaban 20 milliGRAM(s) Oral daily    MEDICATIONS  (PRN):  acetaminophen   Tablet .. 650 milliGRAM(s) Oral every 6 hours PRN Temp greater or equal to 38.5C (101.3F), Severe Pain (7 - 10)  metoclopramide 10 milliGRAM(s) Oral two times a day before meals PRN Nausea, vomiting      X-Rays reviewed:    CXR interpreted by me: Patient is a 74 y/o Tajik-speaking Female who presents with a chief complaint of: Transfer from [facility] - Pneumonia, + 2 weeks fatigue + 2 weeks left sided-shoulder pain (worse with movement + tender to palpation).    HPI:  74 y/o F with PMH of Afib s/p Ablation x2 (latest in Feb 2020) and DCCV (latest in June 2020); CAD s/p stent x4; HTN; HLD; DM2 was transferred from [facility] where she was admitted on 09/07/2020 with a diagnosis of Pneumonia. According to the daughter in law the patient started feeling tiredness and dizziness 2 weeks ago, the symptoms gradually worsened until 3 days ago when she became lethargic and was taken to [facility] for treatment. In [facility] she was found to have fever, rapid heart rate and high BP. She had chest X-ray and CT chest done and was treated for pneumonia. According to the daughter in law she had occasional shortness of breath on exertion. She also had chest pain on the left side which started about 2 weeks ago, it was intermittent, on the front and back of the left side of chest, not radiating, exacerbated by deep breath, non radiating, 5/10 in severity, which has worsened to 7/10 since yesterday. According to the daughter in law the patient was on amiodarone for atrial fibrillation which was discontinued 2 weeks ago by her electrophysiologist, whom she saw after developing the dizziness and weakness. (09 Sep 2020 02:40)    [place] : Xavi [id number].    Pulmonary History:  74 y/o Tajik-speaking F with PMH of Afib s/p Ablation x2 (latest in Feb 2020) and DCCV (latest in June 2020); CAD s/p stent x4; HTN; HLD; DM2. She is a never smoker. No pulmonary history, denies asthma/COPD, never seen a pulmonologist before. She was transferred from [facility], where she was being treated for Community Acquired Pneumonia. She has been complaining of worsening fatigue for past 2 weeks, associated with left-sided shoulder pain that is worse with movement. She denies falls or heavy lifting. She ambulates with a cane or independently at baseline. Endorses urinary frequency. She denies headache, SOB, cough, palpitations, abdominal pain, N/V/D/constipation, dysuria, or lower extremity swelling. Patient was seen ambulating in hallway with PT with walker. Patient was saturating 98-99% on RA at this time, but felt fatigued during the session.     PAST MEDICAL & SURGICAL HISTORY:  S/P ablation of atrial fibrillation  Diabetes mellitus  High cholesterol  HTN (hypertension)  Atrial fibrillation, unspecified type  Coronary stent patent  History of back surgery      SOCIAL HX:   Never smoker.       Denies drinking alcohol or using illicit drugs.     FAMILY HISTORY:  FH: coronary artery disease (Sibling)  .  No cardiovascular or pulmonary family history     REVIEW OF SYSTEMS:    All ROS are negative except per HPI     Allergies    adhesives (Pruritus; Rash)  doxycycline (Other)  latex (Rash)  Milk (Diarrhea)    Intolerances          PHYSICAL EXAM  Vital Signs Last 24 Hrs  T(C): 37.2 (09 Sep 2020 05:20), Max: 37.2 (09 Sep 2020 05:20)  T(F): 98.9 (09 Sep 2020 05:20), Max: 98.9 (09 Sep 2020 05:20)  HR: 84 (09 Sep 2020 05:20) (77 - 89)  BP: 156/74 (09 Sep 2020 05:20) (133/85 - 159/70)  BP(mean): --  RR: 16 (09 Sep 2020 05:20) (16 - 18)  SpO2: 98% (09 Sep 2020 06:25) (98% - 99%)    CONSTITUTIONAL:  Well nourished.  NAD    ENT:   Airway patent,   No thrush    EYES:   Clear bilaterally,   pupils equal,   round and reactive to light.    CARDIAC:   Normal rate,   regular rhythm.    no edema      CAROTID:   normal systolic impulse  no bruits    RESPIRATORY:   No wheezing  Nnormal chest expansion  Not tachypneic,  No use of accessory muscles    GASTROINTESTINAL:  Abdomen soft,   non-tender,   no guarding,   + BS    GENITOURINARY  normal genitalia for sex  no edema    MUSCULOSKELETAL:   range of motion is not limited,  no clubbing, cyanosis    NEUROLOGICAL:   Alert and oriented   no motor deficits.    SKIN:   Skin normal color for race,   No evidence of rash.    PSYCHIATRIC:   normal mood and affect.   no apparent risk to self or others.    HEME LYMPH:   No cervical  lymphadenopathy.  no inguinal lymphadenopathy          LABS:                          11.1   11.88 )-----------( 304      ( 09 Sep 2020 05:25 )             33.3                                               09-09    132<L>  |  99  |  17  ----------------------------<  164<H>  4.2   |  20  |  0.8    Ca    9.5      09 Sep 2020 05:25  Mg     1.6     09-09    TPro  6.6  /  Alb  3.8  /  TBili  0.5  /  DBili  x   /  AST  103<H>  /  ALT  109<H>  /  AlkPhos  58  09-09                                                                                           LIVER FUNCTIONS - ( 09 Sep 2020 05:25 )  Alb: 3.8 g/dL / Pro: 6.6 g/dL / ALK PHOS: 58 U/L / ALT: 109 U/L / AST: 103 U/L / GGT: x                                                                                                MEDICATIONS  (STANDING):  amLODIPine   Tablet 5 milliGRAM(s) Oral daily  atorvastatin 40 milliGRAM(s) Oral at bedtime  azithromycin   Tablet 250 milliGRAM(s) Oral daily  cefTRIAXone   IVPB 1000 milliGRAM(s) IV Intermittent every 24 hours  ergocalciferol 86961 Unit(s) Oral every week  influenza   Vaccine 0.5 milliLiter(s) IntraMuscular once  insulin glargine Injectable (LANTUS) 40 Unit(s) SubCutaneous at bedtime  lisinopril 40 milliGRAM(s) Oral daily  magnesium sulfate  IVPB 2 Gram(s) IV Intermittent once  metoprolol succinate  milliGRAM(s) Oral daily  pantoprazole    Tablet 40 milliGRAM(s) Oral before breakfast  rivaroxaban 20 milliGRAM(s) Oral daily    MEDICATIONS  (PRN):  acetaminophen   Tablet .. 650 milliGRAM(s) Oral every 6 hours PRN Temp greater or equal to 38.5C (101.3F), Severe Pain (7 - 10)  metoclopramide 10 milliGRAM(s) Oral two times a day before meals PRN Nausea, vomiting      X-Rays reviewed:    CXR interpreted by me:  CODY densities

## 2020-09-09 NOTE — H&P ADULT - NSICDXFAMILYHX_GEN_ALL_CORE_FT
FAMILY HISTORY:  Sibling  Still living? Unknown  FH: coronary artery disease, Age at diagnosis: Age Unknown

## 2020-09-09 NOTE — CONSULT NOTE ADULT - ASSESSMENT
ASSESSMENT  75yFemale with a PMH of A. fib s/p ablation x2 (latest in Feb 2020) and DCCV (latest in June 2020), CAD s/p stents x4, HTN, HLD, and DM2 was transferred from [facility] where she was admitted on 09/07/2020 with a diagnosis of Pneumonia.    CT Chest at [facility] 9/6 with RUL subsegmental airspace consolidation likely representing pneumonia     IMPRESSION  # Possible Pneumonia? sepsis ruled out on admission.     RECOMMENDATIONS  - Follow up CXR CT chest, and management details from [facility]   - recommend sending blood cultures, sputum cultures, quantiferon gold, procalcitonin  - continue with azithromycin 250mg PO and Rocephin 1g q24h to finish abx course for now. Follow up [facility] CT scan, need images.

## 2020-09-09 NOTE — CONSULT NOTE ADULT - ASSESSMENT
74 y/o Nauruan-speaking F with PMH of Afib s/p Ablation x2 (latest in Feb 2020) and DCCV (latest in June 2020); CAD s/p stent x4; HTN; HLD; DM2. She is a never smoker. No pulmonary history, denies asthma/COPD, never seen a pulmonologist before. She was transferred from [facility], where she was being treated for Community Acquired Pneumonia. She has been complaining of worsening fatigue for past 2 weeks, associated with left-sided shoulder pain that is worse with movement. She denies falls or heavy lifting. She ambulates with a cane or independently at baseline. Endorses urinary frequency. She denies headache, SOB, cough, palpitations, abdominal pain, N/V/D/constipation, dysuria, or lower extremity swelling. Patient was seen ambulating in hallway with PT with walker. Patient was saturating 98-99% on RA at this time, but felt fatigued during the session.     # Worsening fatigue X 2 weeks  - WBC 11.88  - CXR 9/9/20: Right upper lung patchy opacities may reflect pneumonia in the appropriate clinical setting.  - Patient denies cough, SOB, or fever. Her left shoulder pain is made worse with movement (and is tender to palpation, likely musculoskeletal).   - Patient was seen ambulating in hallway with PT with walker on 9/9/20. Patient was saturating 98-99% on RA.  - Send procalcitonin. Obtain CT chest from [facility]  - Recommend sending UA, as patient has been complaining of worsening urinary frequency.   - D/C antibiotics if UA negative.     Goals of care conversation with patient on 9/9/20: DNR/DNI. MOLST form updated.     *** Attending recommendations to follow *** 74 y/o Cape Verdean-speaking F with PMH of Afib s/p Ablation x2 (latest in Feb 2020) and DCCV (latest in June 2020); CAD s/p stent x4; HTN; HLD; DM2. She is a never smoker. No pulmonary history, denies asthma/COPD, never seen a pulmonologist before. She was transferred from [facility], where she was being treated for Community Acquired Pneumonia. She has been complaining of worsening fatigue for past 2 weeks, associated with left-sided shoulder pain that is worse with movement. She denies falls or heavy lifting. She ambulates with a cane or independently at baseline. Endorses urinary frequency. She denies headache, SOB, cough, palpitations, abdominal pain, N/V/D/constipation, dysuria, or lower extremity swelling. Patient was seen ambulating in hallway with PT with walker. Patient was saturating 98-99% on RA at this time, but felt fatigued during the session.     # Worsening fatigue X 2 weeks  - WBC 11.88  - CXR 9/9/20: Right upper lung patchy opacities may reflect pneumonia in the appropriate clinical setting.  - Patient denies cough, SOB, or fever. Her left shoulder pain is made worse with movement (and is tender to palpation, likely musculoskeletal).   - Patient was seen ambulating in hallway with PT with walker on 9/9/20. Patient was saturating 98-99% on RA.  - Send procalcitonin. Obtain CT chest from [facility]  - Recommend sending UA, as patient has been complaining of worsening urinary frequency.   - D/C antibiotics.     Goals of care conversation with patient on 9/9/20: DNR/DNI. MOLST form updated.     *** Attending recommendations to follow *** 74 y/o Tunisian-speaking F with PMH of Afib s/p Ablation x2 (latest in Feb 2020) and DCCV (latest in June 2020); CAD s/p stent x4; HTN; HLD; DM2. She is a never smoker. No pulmonary history, denies asthma/COPD, never seen a pulmonologist before. She was transferred from [email or web address] where she was being treated for Community Acquired Pneumonia. She has been complaining of worsening fatigue for past 2 weeks, associated with left-sided shoulder pain that is worse with movement. She denies falls or heavy lifting. She ambulates with a cane or independently at baseline. Endorses urinary frequency. She denies headache, SOB, cough, palpitations, abdominal pain, N/V/D/constipation, dysuria, or lower extremity swelling. Patient was seen ambulating in hallway with PT with walker. Patient was saturating 98-99% on RA at this time, but felt fatigued during the session.     # Worsening fatigue X 2 weeks - doubt Community Acquired Pneumonia (Patient denies cough, SOB, or fever. Her left shoulder pain is made worse with movement (and is tender to palpation, likely musculoskeletal)). CURB-65 = 1 (for age > 65).   - WBC 11.88  - CXR 9/9/20: Right upper lung patchy opacities may reflect pneumonia in the appropriate clinical setting.  - Patient was seen ambulating in hallway with PT with walker on 9/9/20. Patient was saturating 98-99% on RA.  - Send procalcitonin. Obtain CT chest from [facility] [[email or web address]]  - Recommend sending UA, as patient has been complaining of worsening urinary frequency.   - Monitor off antibiotics.     Goals of care conversation with patient on 9/9/20: DNR/DNI. MOLST form updated.     *** Attending recommendations to follow *** 74 y/o Senegalese-speaking F with PMH of Afib s/p Ablation x2 (latest in Feb 2020) and DCCV (latest in June 2020); CAD s/p stent x4; HTN; HLD; DM2. She is a never smoker. No pulmonary history, denies asthma/COPD, never seen a pulmonologist before. She was transferred from [facility], where she was being treated for Community Acquired Pneumonia. She has been complaining of worsening fatigue for past 2 weeks, associated with left-sided shoulder pain that is worse with movement. Endorses urinary frequency. She denies headache, SOB, cough, palpitations, abdominal pain, N/V/D/constipation, dysuria, or lower extremity swelling.      # Worsening fatigue X 2 weeks - doubt Community Acquired Pneumonia (Patient denies cough, SOB, or fever. Her left shoulder pain is made worse with movement (and is tender to palpation, likely musculoskeletal)). CURB-65 = 1 (for age > 65).   - WBC 11.88  - CXR 9/9/20: Right upper lung patchy opacities may reflect pneumonia in the appropriate clinical setting.  - Patient was seen ambulating in hallway with PT with walker on 9/9/20. Patient was saturating 98-99% on RA.  - Send procalcitonin. Obtain CT chest from [facility]. Recommend sending UA, as patient has been complaining of worsening urinary frequency.   - Monitor off antibiotics.     Goals of care conversation with patient on 9/9/20: DNR/DNI. MOLST form updated.     *** Attending recommendations to follow *** Impression:    Possible CAP on therapy     Recommendations:    Finish ABX course  OP pulm FU for possible repeat CT chest NC   OOB to chair   Continue PT  Upload images on PACS

## 2020-09-09 NOTE — CONSULT NOTE ADULT - ATTENDING COMMENTS
75y Kazakh Female with a PMH of A. fib s/p ablation x2 (latest in Feb 2020) and DCCV (latest in June 2020), CAD s/p stents x4, HTN, HLD, and DM2 was transferred from [facility] where she was admitted on 09/07/2020 with a diagnosis of Pneumonia, was treated with levaquin. CT with RUL findings. Patient denies productive cough, fever, chills, night sweats, wt loss.     IMPRESSION  #Possible CAP, per patient does not have any symptoms of PNA- only complaint is L-sided reproducible CP    CXR and per CT report RUL findings    No symptoms to suggest TB  #Hyponatremia  #Transaminitis  #Sepsis ruled out on admission     RECOMMENDATIONS  - Upload CT chest from [facility]  - complete 5 day course for CAP  - send procalcitonin   - send quantiferon gold  - Send urine Ag for Strep and Legionella   - Appreciate Pulm consult    Please call with any questions or send a message on [employer] Teams  Spectra [phone]

## 2020-09-09 NOTE — CONSULT NOTE ADULT - SUBJECTIVE AND OBJECTIVE BOX
KENA LOZADA  75y, Female  Allergy: adhesives (Pruritus; Rash)  doxycycline (Other)  latex (Rash)  Milk (Diarrhea)      CHIEF COMPLAINT: Transfer from [facility] - Pneumonia  Dizziness  Chest pain (09 Sep 2020 07:10)      HPI:  75yFemale with a past medical history of A. fib s/p ablation x2 (latest in Feb 2020) and DCCV (latest in June 2020), CAD s/p stents x4, HTN, HLD, and DM2 was transferred from [facility] where she was admitted on 09/07/2020 with a diagnosis of Pneumonia. According to the daughter in law the patient started feeling tiredness and dizziness 2 weeks ago, the symptoms gradually worsened until 3 days ago when she became lethargic and was taken to [facility] for treatment. In [facility] she was found to have fever, rapid heart rate and high BP. She had chest X-ray and CT chest done and was treated for pneumonia. According to the daughter in law she had occasional shortness of breath on exertion. She also had chest pain on the left side which started about 2 weeks ago, it was intermittent, on the front and back of the left side of chest, not radiating, exacerbated by deep breath, non radiating, 5/10 in severity, which has worsened to 7/10 since yesterday. According to the daughter in law the patient was on amiodarone for atrial fibrillation which was discontinued 2 weeks ago by her electrophysiologist, whom she saw after developing the dizziness and weakness.      Infectious Diseases History: Working diagnosis of Pneumonia admitted on [facility] (09/07/2020)  Old Micro Data/Cultures:     FAMILY HISTORY:  FH: coronary artery disease (Sibling)    PAST MEDICAL & SURGICAL HISTORY:  S/P ablation of atrial fibrillation  Diabetes mellitus  High cholesterol  HTN (hypertension)  Atrial fibrillation, unspecified type  Coronary stent patent  History of back surgery      SOCIAL HISTORY  Social History:  Marital Status:  (   )    ( ) Single    (   )    (  )   Lives with: (  ) alone  (  ) children   (  ) spouse   (  ) parents  (  ) other  Recent Travel: No recent travel  Occupation:    Substance Use (street drugs): ( x ) never used  (  ) other:  Tobacco Usage:  ( x  ) never smoked   (   ) former smoker   (   ) current smoker  (     ) pack year  Alcohol Usage: None (09 Sep 2020 02:40)      Recent Travel:  Other Exposures:     ROS  General: Denies rigors, nightsweats  HEENT: Denies headache, rhinorrhea, sore throat, eye pain  CV: Denies CP, palpitations  PULM: Denies wheezing, hemoptysis  GI: Denies hematemesis, hematochezia, melena  : Denies discharge, hematuria  MSK: Denies arthralgias, myalgias  SKIN: Denies rash, lesions  NEURO: Denies paresthesias, weakness  PSYCH: Denies depression, anxiety    VITALS:  T(F): 98.9, Max: 98.9 (09-09-20 @ 05:20)  HR: 84  BP: 156/74  RR: 16Vital Signs Last 24 Hrs  T(C): 37.2 (09 Sep 2020 05:20), Max: 37.2 (09 Sep 2020 05:20)  T(F): 98.9 (09 Sep 2020 05:20), Max: 98.9 (09 Sep 2020 05:20)  HR: 84 (09 Sep 2020 05:20) (77 - 89)  BP: 156/74 (09 Sep 2020 05:20) (133/85 - 159/70)  BP(mean): --  RR: 16 (09 Sep 2020 05:20) (16 - 18)  SpO2: 98% (09 Sep 2020 06:25) (98% - 99%)    PHYSICAL EXAM:  Gen: NAD, resting in bed  HEENT: Normocephalic, atraumatic  Neck: supple, no lymphadenopathy  CV: Regular rate & regular rhythm  Lungs: CTAB  Abdomen: Soft, BS present  Ext: Warm, well perfused  Neuro: non focal, awake  Skin: no rash, no lesions  Lines: no phlebitis    TESTS & MEASUREMENTS:                        11.1   11.88 )-----------( 304      ( 09 Sep 2020 05:25 )             33.3     09-09    132<L>  |  99  |  17  ----------------------------<  164<H>  4.2   |  20  |  0.8    Ca    9.5      09 Sep 2020 05:25  Mg     1.6     09-09    TPro  6.6  /  Alb  3.8  /  TBili  0.5  /  DBili  x   /  AST  103<H>  /  ALT  109<H>  /  AlkPhos  58  09-09    eGFR if Non African American: 73 mL/min/1.73M2 (09-09-20 @ 05:25)  eGFR if African American: 84 mL/min/1.73M2 (09-09-20 @ 05:25)    LIVER FUNCTIONS - ( 09 Sep 2020 05:25 )  Alb: 3.8 g/dL / Pro: 6.6 g/dL / ALK PHOS: 58 U/L / ALT: 109 U/L / AST: 103 U/L / GGT: x                     INFECTIOUS DISEASES TESTING      RADIOLOGY & ADDITIONAL TESTS:  I have personally reviewed the last available Chest xray  CXR  Xray Chest 1 View- PORTABLE-Urgent:   ******PRELIMINARY REPORT******    ******PRELIMINARY REPORT******          EXAM:  XR CHEST PORTABLE URGENT 1V            PROCEDURE DATE:  09/09/2020          ******PRELIMINARY REPORT******    ******PRELIMINARY REPORT******          INTERPRETATION:  CLINICAL HISTORY: Pneumonia.    COMPARISON: Chest radiograph dated 2/11/2020.    TECHNIQUE: Portable frontal chest radiograph. Adequate positioning.    FINDINGS:    Support devices: None.    Cardiac/mediastinum/hilum: Stable.    Lung parenchyma/Pleura: Right upper lung patchy opacities. No pleural effusion or pneumothorax.    Skeleton/soft tissues: Stable.      IMPRESSION:    Right upper lung patchy opacities may reflect pneumonia in the appropriate clinical setting.          ******PRELIMINARY REPORT******    ******PRELIMINARY REPORT******          HELDER CAMPOS M.D., RESIDENT RADIOLOGIST                   (09-09-20 @ 06:16)      CT      CARDIOLOGY TESTING      All available historical records have been reviewed    MEDICATIONS  amLODIPine   Tablet 5  atorvastatin 40  azithromycin   Tablet 250  cefTRIAXone   IVPB 1000  ergocalciferol 61256  influenza   Vaccine 0.5  insulin glargine Injectable (LANTUS) 40  lisinopril 40  magnesium sulfate  IVPB 2  metoprolol succinate   pantoprazole    Tablet 40  rivaroxaban 20      ANTIBIOTICS:  azithromycin   Tablet 250 milliGRAM(s) Oral daily  cefTRIAXone   IVPB 1000 milliGRAM(s) IV Intermittent every 24 hours      All available historical data has been reviewed    ASSESSMENT  75yFemale with a PMH of A. fib s/p ablation x2 (latest in Feb 2020) and DCCV (latest in June 2020), CAD s/p stents x4, HTN, HLD, and DM2 was transferred from [facility] where she was admitted on 09/07/2020 with a diagnosis of Pneumonia.    IMPRESSION  # Possible Pneumonia, sepsis ruled out on admission.     RECOMMENDATIONS  - Follow up CXR, CT chest, and management details from [facility]   - consider sending blood cultures, sputum cultures, and procal   - continue with azithromycin 250mg PO and Rocephin 1g q24h     This is a pended note. All final recommendations to follow pending discussion with ID Attending KENA LOZADA  75y, Female  Allergy: adhesives (Pruritus; Rash)  doxycycline (Other)  latex (Rash)  Milk (Diarrhea)      CHIEF COMPLAINT: Transfer from [facility] - Pneumonia  Dizziness  Chest pain (09 Sep 2020 07:10)      HPI:  75yFemale with a past medical history of A. fib s/p ablation x2 (latest in Feb 2020) and DCCV (latest in June 2020), CAD s/p stents x4, HTN, HLD, and DM2 was transferred from [facility] where she was admitted on 09/07/2020 with a diagnosis of Pneumonia. According to the daughter in law the patient started feeling tiredness and dizziness 2 weeks ago, the symptoms gradually worsened until 3 days ago when she became lethargic and was taken to [facility] for treatment. In [facility] she was found to have fever, rapid heart rate and high BP. She had chest X-ray and CT chest done and was treated for pneumonia. According to the daughter in law she had occasional shortness of breath on exertion. She also had chest pain on the left side which started about 2 weeks ago, it was intermittent, on the front and back of the left side of chest, not radiating, exacerbated by deep breath, non radiating, 5/10 in severity, which has worsened to 7/10 since yesterday. According to the daughter in law the patient was on amiodarone for atrial fibrillation which was discontinued 2 weeks ago by her electrophysiologist, whom she saw after developing the dizziness and weakness.      Infectious Diseases History: Working diagnosis of Pneumonia admitted on [facility] (09/07/2020)  Old Micro Data/Cultures:     FAMILY HISTORY:  FH: coronary artery disease (Sibling)    PAST MEDICAL & SURGICAL HISTORY:  S/P ablation of atrial fibrillation  Diabetes mellitus  High cholesterol  HTN (hypertension)  Atrial fibrillation, unspecified type  Coronary stent patent  History of back surgery      SOCIAL HISTORY  Social History:  Marital Status:  (   )    ( ) Single    (   )    (  )   Lives with: (  ) alone  (  ) children   (  ) spouse   (  ) parents  (  ) other  Recent Travel: No recent travel  Occupation:    Substance Use (street drugs): ( x ) never used  (  ) other:  Tobacco Usage:  ( x  ) never smoked   (   ) former smoker   (   ) current smoker  (     ) pack year  Alcohol Usage: None (09 Sep 2020 02:40)      Recent Travel:  Other Exposures:     ROS  General: Denies rigors, nightsweats  HEENT: Denies headache, rhinorrhea, sore throat, eye pain  CV: Denies CP, palpitations  PULM: Denies wheezing, hemoptysis  GI: Denies hematemesis, hematochezia, melena  : Denies discharge, hematuria  MSK: Denies arthralgias, myalgias  SKIN: Denies rash, lesions  NEURO: Denies paresthesias, weakness  PSYCH: Denies depression, anxiety    VITALS:  T(F): 98.9, Max: 98.9 (09-09-20 @ 05:20)  HR: 84  BP: 156/74  RR: 16Vital Signs Last 24 Hrs  T(C): 37.2 (09 Sep 2020 05:20), Max: 37.2 (09 Sep 2020 05:20)  T(F): 98.9 (09 Sep 2020 05:20), Max: 98.9 (09 Sep 2020 05:20)  HR: 84 (09 Sep 2020 05:20) (77 - 89)  BP: 156/74 (09 Sep 2020 05:20) (133/85 - 159/70)  BP(mean): --  RR: 16 (09 Sep 2020 05:20) (16 - 18)  SpO2: 98% (09 Sep 2020 06:25) (98% - 99%)    PHYSICAL EXAM:  Gen: NAD, resting in bed  HEENT: Normocephalic, atraumatic  Neck: supple, no lymphadenopathy  CV: Regular rate & regular rhythm  Lungs: CTAB  Abdomen: Soft, BS present  Ext: Warm, well perfused  Neuro: non focal, awake  Skin: no rash, no lesions  Lines: no phlebitis    TESTS & MEASUREMENTS:                        11.1   11.88 )-----------( 304      ( 09 Sep 2020 05:25 )             33.3     09-09    132<L>  |  99  |  17  ----------------------------<  164<H>  4.2   |  20  |  0.8    Ca    9.5      09 Sep 2020 05:25  Mg     1.6     09-09    TPro  6.6  /  Alb  3.8  /  TBili  0.5  /  DBili  x   /  AST  103<H>  /  ALT  109<H>  /  AlkPhos  58  09-09    eGFR if Non African American: 73 mL/min/1.73M2 (09-09-20 @ 05:25)  eGFR if African American: 84 mL/min/1.73M2 (09-09-20 @ 05:25)    LIVER FUNCTIONS - ( 09 Sep 2020 05:25 )  Alb: 3.8 g/dL / Pro: 6.6 g/dL / ALK PHOS: 58 U/L / ALT: 109 U/L / AST: 103 U/L / GGT: x                     INFECTIOUS DISEASES TESTING      RADIOLOGY & ADDITIONAL TESTS:  I have personally reviewed the last available Chest xray  CXR  Xray Chest 1 View- PORTABLE-Urgent:   ******PRELIMINARY REPORT******    ******PRELIMINARY REPORT******          EXAM:  XR CHEST PORTABLE URGENT 1V            PROCEDURE DATE:  09/09/2020          ******PRELIMINARY REPORT******    ******PRELIMINARY REPORT******          INTERPRETATION:  CLINICAL HISTORY: Pneumonia.    COMPARISON: Chest radiograph dated 2/11/2020.    TECHNIQUE: Portable frontal chest radiograph. Adequate positioning.    FINDINGS:    Support devices: None.    Cardiac/mediastinum/hilum: Stable.    Lung parenchyma/Pleura: Right upper lung patchy opacities. No pleural effusion or pneumothorax.    Skeleton/soft tissues: Stable.      IMPRESSION:    Right upper lung patchy opacities may reflect pneumonia in the appropriate clinical setting.          ******PRELIMINARY REPORT******    ******PRELIMINARY REPORT******          HELDER CAMPOS M.D., RESIDENT RADIOLOGIST                   (09-09-20 @ 06:16)      CT      CARDIOLOGY TESTING      All available historical records have been reviewed    MEDICATIONS  amLODIPine   Tablet 5  atorvastatin 40  azithromycin   Tablet 250  cefTRIAXone   IVPB 1000  ergocalciferol 73485  influenza   Vaccine 0.5  insulin glargine Injectable (LANTUS) 40  lisinopril 40  magnesium sulfate  IVPB 2  metoprolol succinate   pantoprazole    Tablet 40  rivaroxaban 20      ANTIBIOTICS:  azithromycin   Tablet 250 milliGRAM(s) Oral daily  cefTRIAXone   IVPB 1000 milliGRAM(s) IV Intermittent every 24 hours      All available historical data has been reviewed    ASSESSMENT  75yFemale with a PMH of A. fib s/p ablation x2 (latest in Feb 2020) and DCCV (latest in June 2020), CAD s/p stents x4, HTN, HLD, and DM2 was transferred from [facility] where she was admitted on 09/07/2020 with a diagnosis of Pneumonia.    IMPRESSION  # Possible Pneumonia, sepsis ruled out on admission.     RECOMMENDATIONS  - Follow up CXR, CT chest, and management details from [facility]   - consider sending blood cultures, sputum cultures, urine strept and legionella antigen, and procal   - continue with azithromycin 250mg PO and Rocephin 1g q24h     This is a pended note. All final recommendations to follow pending discussion with ID Attending KENA LOZADA  75y, Female  Allergy: adhesives (Pruritus; Rash)  doxycycline (Other)  latex (Rash)  Milk (Diarrhea)      CHIEF COMPLAINT: Transfer from [facility] - Pneumonia  Dizziness  Chest pain (09 Sep 2020 07:10)      HPI:  75yFemale with a past medical history of A. fib s/p ablation x2 (latest in Feb 2020) and DCCV (latest in June 2020), CAD s/p stents x4, HTN, HLD, and DM2 was transferred from [facility] where she was admitted on 09/07/2020 with a diagnosis of Pneumonia. According to the daughter in law the patient started feeling tiredness and dizziness 2 weeks ago, the symptoms gradually worsened until 3 days ago when she became lethargic and was taken to [facility] for treatment. In [facility] she was found to have fever, rapid heart rate and high BP. She had chest X-ray and CT chest done and was treated for pneumonia. According to the daughter in law she had occasional shortness of breath on exertion. She also had chest pain on the left side which started about 2 weeks ago, it was intermittent, on the front and back of the left side of chest, not radiating, exacerbated by deep breath, non radiating, 5/10 in severity, which has worsened to 7/10 since yesterday. According to the daughter in law the patient was on amiodarone for atrial fibrillation which was discontinued 2 weeks ago by her electrophysiologist, whom she saw after developing the dizziness and weakness.      Infectious Diseases History: Working diagnosis of Pneumonia admitted on [facility] (09/07/2020). Admits to fatigue and subjective fevers for over a week. Denies any cough, shorness of breath, or suptum production.   Old Micro Data/Cultures:     FAMILY HISTORY:  FH: coronary artery disease (Sibling)    PAST MEDICAL & SURGICAL HISTORY:  S/P ablation of atrial fibrillation  Diabetes mellitus  High cholesterol  HTN (hypertension)  Atrial fibrillation, unspecified type  Coronary stent patent  History of back surgery      SOCIAL HISTORY  Social History:  Marital Status:  (   )    ( ) Single    (   )    ( x )   Lives with: (  ) alone  ( x ) children   (  ) spouse   (  ) parents  (  ) other  Recent Travel: No recent travel  Occupation:    Substance Use (street drugs): ( x ) never used  (  ) other:  Tobacco Usage:  ( x  ) never smoked   (   ) former smoker   (   ) current smoker  (     ) pack year  Alcohol Usage: None (09 Sep 2020 02:40)      Recent Travel:  Other Exposures:     ROS  General: Endorses worsening fatigue and subjective fevers at home. Denies rigors, nightsweats  HEENT: Denies headache, rhinorrhea, sore throat, eye pain  CV: Denies CP, palpitations  PULM: Denies wheezing, hemoptysis  GI: Denies hematemesis, hematochezia, melena  : Denies discharge, hematuria  MSK: Denies arthralgias, myalgias  SKIN: Denies rash, lesions  NEURO: Denies paresthesias, weakness  PSYCH: Denies depression, anxiety    VITALS:  T(F): 98.9, Max: 98.9 (09-09-20 @ 05:20)  HR: 84  BP: 156/74  RR: 16Vital Signs Last 24 Hrs  T(C): 37.2 (09 Sep 2020 05:20), Max: 37.2 (09 Sep 2020 05:20)  T(F): 98.9 (09 Sep 2020 05:20), Max: 98.9 (09 Sep 2020 05:20)  HR: 84 (09 Sep 2020 05:20) (77 - 89)  BP: 156/74 (09 Sep 2020 05:20) (133/85 - 159/70)  BP(mean): --  RR: 16 (09 Sep 2020 05:20) (16 - 18)  SpO2: 98% (09 Sep 2020 06:25) (98% - 99%)    PHYSICAL EXAM:  Gen: ill appearing flushed female in NAD  HEENT: Normocephalic, atraumatic  Neck: supple, no lymphadenopathy  CV: Regular rate & regular rhythm  Lungs: audible breath sounds bilateral   Abdomen: Soft, BS present  Ext: Warm, well perfused  Neuro: non focal, awake, AOx4  Skin: no rash, no lesions  Lines: no phlebitis    TESTS & MEASUREMENTS:                        11.1   11.88 )-----------( 304      ( 09 Sep 2020 05:25 )             33.3     09-09    132<L>  |  99  |  17  ----------------------------<  164<H>  4.2   |  20  |  0.8    Ca    9.5      09 Sep 2020 05:25  Mg     1.6     09-09    TPro  6.6  /  Alb  3.8  /  TBili  0.5  /  DBili  x   /  AST  103<H>  /  ALT  109<H>  /  AlkPhos  58  09-09    eGFR if Non African American: 73 mL/min/1.73M2 (09-09-20 @ 05:25)  eGFR if African American: 84 mL/min/1.73M2 (09-09-20 @ 05:25)    LIVER FUNCTIONS - ( 09 Sep 2020 05:25 )  Alb: 3.8 g/dL / Pro: 6.6 g/dL / ALK PHOS: 58 U/L / ALT: 109 U/L / AST: 103 U/L / GGT: x                     INFECTIOUS DISEASES TESTING      RADIOLOGY & ADDITIONAL TESTS:  I have personally reviewed the last available Chest xray  CXR  Xray Chest 1 View- PORTABLE-Urgent:   ******PRELIMINARY REPORT******    ******PRELIMINARY REPORT******          EXAM:  XR CHEST PORTABLE URGENT 1V            PROCEDURE DATE:  09/09/2020          ******PRELIMINARY REPORT******    ******PRELIMINARY REPORT******          INTERPRETATION:  CLINICAL HISTORY: Pneumonia.    COMPARISON: Chest radiograph dated 2/11/2020.    TECHNIQUE: Portable frontal chest radiograph. Adequate positioning.    FINDINGS:    Support devices: None.    Cardiac/mediastinum/hilum: Stable.    Lung parenchyma/Pleura: Right upper lung patchy opacities. No pleural effusion or pneumothorax.    Skeleton/soft tissues: Stable.      IMPRESSION:    Right upper lung patchy opacities may reflect pneumonia in the appropriate clinical setting.          ******PRELIMINARY REPORT******    ******PRELIMINARY REPORT******          HELDER CAMPOS M.D., RESIDENT RADIOLOGIST                   (09-09-20 @ 06:16)      CT      CARDIOLOGY TESTING      All available historical records have been reviewed    MEDICATIONS  amLODIPine   Tablet 5  atorvastatin 40  azithromycin   Tablet 250  cefTRIAXone   IVPB 1000  ergocalciferol 69425  influenza   Vaccine 0.5  insulin glargine Injectable (LANTUS) 40  lisinopril 40  magnesium sulfate  IVPB 2  metoprolol succinate   pantoprazole    Tablet 40  rivaroxaban 20      ANTIBIOTICS:  azithromycin   Tablet 250 milliGRAM(s) Oral daily  cefTRIAXone   IVPB 1000 milliGRAM(s) IV Intermittent every 24 hours      All available historical data has been reviewed    ASSESSMENT  75yFemale with a PMH of A. fib s/p ablation x2 (latest in Feb 2020) and DCCV (latest in June 2020), CAD s/p stents x4, HTN, HLD, and DM2 was transferred from [facility] where she was admitted on 09/07/2020 with a diagnosis of Pneumonia.    IMPRESSION  # Possible Pneumonia? sepsis ruled out on admission.     RECOMMENDATIONS  - Follow up CXR CT chest, and management details from [facility]   - consider sending blood cultures, sputum cultures, urine strept and legionella antigen, and procal   - consider UA  - continue with azithromycin 250mg PO and Rocephin 1g q24h     This is a pended note. All final recommendations to follow pending discussion with ID Attending KENA LOZADA  75y, Female  Allergy: adhesives (Pruritus; Rash)  doxycycline (Other)  latex (Rash)  Milk (Diarrhea)      CHIEF COMPLAINT: Transfer from [facility] - Pneumonia  Dizziness  Chest pain (09 Sep 2020 07:10)      HPI:  75yFemale with a past medical history of A. fib s/p ablation x2 (latest in Feb 2020) and DCCV (latest in June 2020), CAD s/p stents x4, HTN, HLD, and DM2 was transferred from [facility] where she was admitted on 09/07/2020 with a diagnosis of Pneumonia. According to the daughter in law the patient started feeling tiredness and dizziness 2 weeks ago, the symptoms gradually worsened until 3 days ago when she became lethargic and was taken to [facility] for treatment. In [facility] she was found to have fever, rapid heart rate and high BP. She had chest X-ray and CT chest done and was treated for pneumonia. According to the daughter in law she had occasional shortness of breath on exertion. She also had chest pain on the left side which started about 2 weeks ago, it was intermittent, on the front and back of the left side of chest, not radiating, exacerbated by deep breath, non radiating, 5/10 in severity, which has worsened to 7/10 since yesterday. According to the daughter in law the patient was on amiodarone for atrial fibrillation which was discontinued 2 weeks ago by her electrophysiologist, whom she saw after developing the dizziness and weakness.      Infectious Diseases History: Working diagnosis of Pneumonia admitted on [facility] (09/07/2020). Admits to fatigue and subjective fevers for over a week. Denies any cough, shorness of breath, or suptum production.   Old Micro Data/Cultures:     FAMILY HISTORY:  FH: coronary artery disease (Sibling)    PAST MEDICAL & SURGICAL HISTORY:  S/P ablation of atrial fibrillation  Diabetes mellitus  High cholesterol  HTN (hypertension)  Atrial fibrillation, unspecified type  Coronary stent patent  History of back surgery      SOCIAL HISTORY  Social History:  Marital Status:  (   )    ( ) Single    (   )    ( x )   Lives with: (  ) alone  ( x ) children   (  ) spouse   (  ) parents  (  ) other  Recent Travel: No recent travel  Occupation:    Substance Use (street drugs): ( x ) never used  (  ) other:  Tobacco Usage:  ( x  ) never smoked   (   ) former smoker   (   ) current smoker  (     ) pack year  Alcohol Usage: None (09 Sep 2020 02:40)      Recent Travel:  Other Exposures:     ROS  General: Endorses worsening fatigue and subjective fevers at home. Denies rigors, nightsweats  HEENT: Denies headache, rhinorrhea, sore throat, eye pain  CV: Denies CP, palpitations  PULM: Denies wheezing, hemoptysis  GI: Denies hematemesis, hematochezia, melena  : Denies discharge, hematuria  MSK: Denies arthralgias, myalgias  SKIN: Denies rash, lesions  NEURO: Denies paresthesias, weakness  PSYCH: Denies depression, anxiety    VITALS:  T(F): 98.9, Max: 98.9 (09-09-20 @ 05:20)  HR: 84  BP: 156/74  RR: 16Vital Signs Last 24 Hrs  T(C): 37.2 (09 Sep 2020 05:20), Max: 37.2 (09 Sep 2020 05:20)  T(F): 98.9 (09 Sep 2020 05:20), Max: 98.9 (09 Sep 2020 05:20)  HR: 84 (09 Sep 2020 05:20) (77 - 89)  BP: 156/74 (09 Sep 2020 05:20) (133/85 - 159/70)  BP(mean): --  RR: 16 (09 Sep 2020 05:20) (16 - 18)  SpO2: 98% (09 Sep 2020 06:25) (98% - 99%)    PHYSICAL EXAM:  Gen: ill appearing flushed female in NAD  HEENT: Normocephalic, atraumatic  Neck: supple, no lymphadenopathy  CV: Regular rate & regular rhythm  Lungs: audible breath sounds bilateral   Abdomen: Soft, BS present  Ext: Warm, well perfused  Neuro: non focal, awake, AOx4  Skin: no rash, no lesions  Lines: no phlebitis    TESTS & MEASUREMENTS:                        11.1   11.88 )-----------( 304      ( 09 Sep 2020 05:25 )             33.3     09-09    132<L>  |  99  |  17  ----------------------------<  164<H>  4.2   |  20  |  0.8    Ca    9.5      09 Sep 2020 05:25  Mg     1.6     09-09    TPro  6.6  /  Alb  3.8  /  TBili  0.5  /  DBili  x   /  AST  103<H>  /  ALT  109<H>  /  AlkPhos  58  09-09    eGFR if Non African American: 73 mL/min/1.73M2 (09-09-20 @ 05:25)  eGFR if African American: 84 mL/min/1.73M2 (09-09-20 @ 05:25)    LIVER FUNCTIONS - ( 09 Sep 2020 05:25 )  Alb: 3.8 g/dL / Pro: 6.6 g/dL / ALK PHOS: 58 U/L / ALT: 109 U/L / AST: 103 U/L / GGT: x                     INFECTIOUS DISEASES TESTING      RADIOLOGY & ADDITIONAL TESTS:  I have personally reviewed the last available Chest xray  CXR  Xray Chest 1 View- PORTABLE-Urgent:   ******PRELIMINARY REPORT******    ******PRELIMINARY REPORT******          EXAM:  XR CHEST PORTABLE URGENT 1V            PROCEDURE DATE:  09/09/2020          ******PRELIMINARY REPORT******    ******PRELIMINARY REPORT******          INTERPRETATION:  CLINICAL HISTORY: Pneumonia.    COMPARISON: Chest radiograph dated 2/11/2020.    TECHNIQUE: Portable frontal chest radiograph. Adequate positioning.    FINDINGS:    Support devices: None.    Cardiac/mediastinum/hilum: Stable.    Lung parenchyma/Pleura: Right upper lung patchy opacities. No pleural effusion or pneumothorax.    Skeleton/soft tissues: Stable.      IMPRESSION:    Right upper lung patchy opacities may reflect pneumonia in the appropriate clinical setting.          ******PRELIMINARY REPORT******    ******PRELIMINARY REPORT******          HELDER CAMPOS M.D., RESIDENT RADIOLOGIST                   (09-09-20 @ 06:16)      CT      CARDIOLOGY TESTING      All available historical records have been reviewed    MEDICATIONS  amLODIPine   Tablet 5  atorvastatin 40  azithromycin   Tablet 250  cefTRIAXone   IVPB 1000  ergocalciferol 33305  influenza   Vaccine 0.5  insulin glargine Injectable (LANTUS) 40  lisinopril 40  magnesium sulfate  IVPB 2  metoprolol succinate   pantoprazole    Tablet 40  rivaroxaban 20      ANTIBIOTICS:  azithromycin   Tablet 250 milliGRAM(s) Oral daily  cefTRIAXone   IVPB 1000 milliGRAM(s) IV Intermittent every 24 hours      All available historical data has been reviewed    ASSESSMENT  75yFemale with a PMH of A. fib s/p ablation x2 (latest in Feb 2020) and DCCV (latest in June 2020), CAD s/p stents x4, HTN, HLD, and DM2 was transferred from [facility] where she was admitted on 09/07/2020 with a diagnosis of Pneumonia.    IMPRESSION  # Possible Pneumonia? sepsis ruled out on admission.     RECOMMENDATIONS  - Follow up CXR CT chest, and management details from [facility]   - consider sending blood cultures, sputum cultures, urine strept and legionella antigen, and procalcitonin  - continue with azithromycin 250mg PO and Rocephin 1g q24h to finish abx course for now. Follow up [facility] CT scan     This is a pended note. All final recommendations to follow pending discussion with ID Attending KENA LOZADA  75y, Female  Allergy: adhesives (Pruritus; Rash)  doxycycline (Other)  latex (Rash)  Milk (Diarrhea)      CHIEF COMPLAINT: Transfer from [facility] - Pneumonia  Dizziness  Chest pain (09 Sep 2020 07:10)      HPI:  75yFemale with a past medical history of A. fib s/p ablation x2 (latest in Feb 2020) and DCCV (latest in June 2020), CAD s/p stents x4, HTN, HLD, and DM2 was transferred from [facility] where she was admitted on 09/07/2020 with a diagnosis of Pneumonia. According to the daughter in law the patient started feeling tiredness and dizziness 2 weeks ago, the symptoms gradually worsened until 3 days ago when she became lethargic and was taken to [facility] for treatment. In [facility] she was found to have fever, rapid heart rate and high BP. She had chest X-ray and CT chest done and was treated for pneumonia. According to the daughter in law she had occasional shortness of breath on exertion. She also had chest pain on the left side which started about 2 weeks ago, it was intermittent, on the front and back of the left side of chest, not radiating, exacerbated by deep breath, non radiating, 5/10 in severity, which has worsened to 7/10 since yesterday. According to the daughter in law the patient was on amiodarone for atrial fibrillation which was discontinued 2 weeks ago by her electrophysiologist, whom she saw after developing the dizziness and weakness.      Infectious Diseases History: Working diagnosis of Pneumonia admitted on [facility] (09/07/2020). Admits to fatigue and subjective fevers for over a week. Denies any cough, shorness of breath, or suptum production.   Old Micro Data/Cultures:     FAMILY HISTORY:  FH: coronary artery disease (Sibling)    PAST MEDICAL & SURGICAL HISTORY:  S/P ablation of atrial fibrillation  Diabetes mellitus  High cholesterol  HTN (hypertension)  Atrial fibrillation, unspecified type  Coronary stent patent  History of back surgery      SOCIAL HISTORY  Social History:  Marital Status:  (   )    ( ) Single    (   )    ( x )   Lives with: (  ) alone  ( x ) children   (  ) spouse   (  ) parents  (  ) other  Recent Travel: No recent travel  Occupation:    Substance Use (street drugs): ( x ) never used  (  ) other:  Tobacco Usage:  ( x  ) never smoked   (   ) former smoker   (   ) current smoker  (     ) pack year  Alcohol Usage: None (09 Sep 2020 02:40)      Recent Travel:  Other Exposures:     ROS  General: Endorses worsening fatigue and subjective fevers at home. Denies rigors, nightsweats  HEENT: Denies headache, rhinorrhea, sore throat, eye pain  CV: Denies CP, palpitations  PULM: Denies wheezing, hemoptysis  GI: Denies hematemesis, hematochezia, melena  : Denies discharge, hematuria  MSK: Denies arthralgias, myalgias  SKIN: Denies rash, lesions  NEURO: Denies paresthesias, weakness  PSYCH: Denies depression, anxiety    VITALS:  T(F): 98.9, Max: 98.9 (09-09-20 @ 05:20)  HR: 84  BP: 156/74  RR: 16Vital Signs Last 24 Hrs  T(C): 37.2 (09 Sep 2020 05:20), Max: 37.2 (09 Sep 2020 05:20)  T(F): 98.9 (09 Sep 2020 05:20), Max: 98.9 (09 Sep 2020 05:20)  HR: 84 (09 Sep 2020 05:20) (77 - 89)  BP: 156/74 (09 Sep 2020 05:20) (133/85 - 159/70)  BP(mean): --  RR: 16 (09 Sep 2020 05:20) (16 - 18)  SpO2: 98% (09 Sep 2020 06:25) (98% - 99%)    PHYSICAL EXAM:  Gen: ill appearing flushed female in NAD  HEENT: Normocephalic, atraumatic  Neck: supple, no lymphadenopathy  CV: Regular rate & regular rhythm  Lungs: audible breath sounds bilateral   Abdomen: Soft, BS present  Ext: Warm, well perfused  Neuro: non focal, awake, AOx4  Skin: no rash, no lesions  Lines: no phlebitis    TESTS & MEASUREMENTS:                        11.1   11.88 )-----------( 304      ( 09 Sep 2020 05:25 )             33.3     09-09    132<L>  |  99  |  17  ----------------------------<  164<H>  4.2   |  20  |  0.8    Ca    9.5      09 Sep 2020 05:25  Mg     1.6     09-09    TPro  6.6  /  Alb  3.8  /  TBili  0.5  /  DBili  x   /  AST  103<H>  /  ALT  109<H>  /  AlkPhos  58  09-09    eGFR if Non African American: 73 mL/min/1.73M2 (09-09-20 @ 05:25)  eGFR if African American: 84 mL/min/1.73M2 (09-09-20 @ 05:25)    LIVER FUNCTIONS - ( 09 Sep 2020 05:25 )  Alb: 3.8 g/dL / Pro: 6.6 g/dL / ALK PHOS: 58 U/L / ALT: 109 U/L / AST: 103 U/L / GGT: x                     INFECTIOUS DISEASES TESTING      RADIOLOGY & ADDITIONAL TESTS:  I have personally reviewed the last available Chest xray  CXR  Xray Chest 1 View- PORTABLE-Urgent:   ******PRELIMINARY REPORT******    ******PRELIMINARY REPORT******          EXAM:  XR CHEST PORTABLE URGENT 1V            PROCEDURE DATE:  09/09/2020          ******PRELIMINARY REPORT******    ******PRELIMINARY REPORT******          INTERPRETATION:  CLINICAL HISTORY: Pneumonia.    COMPARISON: Chest radiograph dated 2/11/2020.    TECHNIQUE: Portable frontal chest radiograph. Adequate positioning.    FINDINGS:    Support devices: None.    Cardiac/mediastinum/hilum: Stable.    Lung parenchyma/Pleura: Right upper lung patchy opacities. No pleural effusion or pneumothorax.    Skeleton/soft tissues: Stable.      IMPRESSION:    Right upper lung patchy opacities may reflect pneumonia in the appropriate clinical setting.          ******PRELIMINARY REPORT******    ******PRELIMINARY REPORT******          HELDER CAMPOS M.D., RESIDENT RADIOLOGIST                   (09-09-20 @ 06:16)      CT      CARDIOLOGY TESTING      All available historical records have been reviewed    MEDICATIONS  amLODIPine   Tablet 5  atorvastatin 40  azithromycin   Tablet 250  cefTRIAXone   IVPB 1000  ergocalciferol 71386  influenza   Vaccine 0.5  insulin glargine Injectable (LANTUS) 40  lisinopril 40  magnesium sulfate  IVPB 2  metoprolol succinate   pantoprazole    Tablet 40  rivaroxaban 20      ANTIBIOTICS:  azithromycin   Tablet 250 milliGRAM(s) Oral daily  cefTRIAXone   IVPB 1000 milliGRAM(s) IV Intermittent every 24 hours      All available historical data has been reviewed    ASSESSMENT  75yFemale with a PMH of A. fib s/p ablation x2 (latest in Feb 2020) and DCCV (latest in June 2020), CAD s/p stents x4, HTN, HLD, and DM2 was transferred from [facility] where she was admitted on 09/07/2020 with a diagnosis of Pneumonia.    CT Chest at [facility] 9/6 with RUL subsegmental airspace consolidation likely representing pneumonia     IMPRESSION  # Possible Pneumonia? sepsis ruled out on admission.     RECOMMENDATIONS  - Follow up CXR CT chest, and management details from [facility]   - recommend sending blood cultures, sputum cultures, quantiferon gold, procalcitonin  - continue with azithromycin 250mg PO and Rocephin 1g q24h to finish abx course for now. Follow up [facility] CT scan, need images.      This is a pended note. All final recommendations to follow pending discussion with ID Attending KENA LOZADA  75y, Female  Allergy: adhesives (Pruritus; Rash)  doxycycline (Other)  latex (Rash)  Milk (Diarrhea)      CHIEF COMPLAINT: Transfer from [facility] - Pneumonia  Dizziness  Chest pain (09 Sep 2020 07:10)      HPI:  75yFemale with a past medical history of A. fib s/p ablation x2 (latest in Feb 2020) and DCCV (latest in June 2020), CAD s/p stents x4, HTN, HLD, and DM2 was transferred from [facility] where she was admitted on 09/07/2020 with a diagnosis of Pneumonia. According to the daughter in law the patient started feeling tiredness and dizziness 2 weeks ago, the symptoms gradually worsened until 3 days ago when she became lethargic and was taken to [facility] for treatment. In [facility] she was found to have fever, rapid heart rate and high BP. She had chest X-ray and CT chest done and was treated for pneumonia. According to the daughter in law she had occasional shortness of breath on exertion. She also had chest pain on the left side which started about 2 weeks ago, it was intermittent, on the front and back of the left side of chest, not radiating, exacerbated by deep breath, non radiating, 5/10 in severity, which has worsened to 7/10 since yesterday. According to the daughter in law the patient was on amiodarone for atrial fibrillation which was discontinued 2 weeks ago by her electrophysiologist, whom she saw after developing the dizziness and weakness.      Infectious Diseases History: Working diagnosis of Pneumonia admitted on [facility] (09/07/2020). Admits to fatigue and subjective fevers for over a week. Denies any cough, shorness of breath, or suptum production.   Old Micro Data/Cultures:     FAMILY HISTORY:  FH: coronary artery disease (Sibling)    PAST MEDICAL & SURGICAL HISTORY:  S/P ablation of atrial fibrillation  Diabetes mellitus  High cholesterol  HTN (hypertension)  Atrial fibrillation, unspecified type  Coronary stent patent  History of back surgery      SOCIAL HISTORY  Social History:  Marital Status:  (   )    ( ) Single    (   )    ( x )   Lives with: (  ) alone  ( x ) children   (  ) spouse   (  ) parents  (  ) other  Recent Travel: No recent travel  Occupation:    Substance Use (street drugs): ( x ) never used  (  ) other:  Tobacco Usage:  ( x  ) never smoked   (   ) former smoker   (   ) current smoker  (     ) pack year  Alcohol Usage: None (09 Sep 2020 02:40)      Recent Travel:  Other Exposures:     ROS  General: Endorses worsening fatigue and subjective fevers at home. Denies rigors, nightsweats  HEENT: Denies headache, rhinorrhea, sore throat, eye pain  CV: Denies CP, palpitations  PULM: Denies wheezing, hemoptysis  GI: Denies hematemesis, hematochezia, melena  : Denies discharge, hematuria  MSK: Denies arthralgias, myalgias  SKIN: Denies rash, lesions  NEURO: Denies paresthesias, weakness  PSYCH: Denies depression, anxiety    VITALS:  T(F): 98.9, Max: 98.9 (09-09-20 @ 05:20)  HR: 84  BP: 156/74  RR: 16Vital Signs Last 24 Hrs  T(C): 37.2 (09 Sep 2020 05:20), Max: 37.2 (09 Sep 2020 05:20)  T(F): 98.9 (09 Sep 2020 05:20), Max: 98.9 (09 Sep 2020 05:20)  HR: 84 (09 Sep 2020 05:20) (77 - 89)  BP: 156/74 (09 Sep 2020 05:20) (133/85 - 159/70)  BP(mean): --  RR: 16 (09 Sep 2020 05:20) (16 - 18)  SpO2: 98% (09 Sep 2020 06:25) (98% - 99%)    PHYSICAL EXAM:  Gen: ill appearing flushed female in NAD  HEENT: Normocephalic, atraumatic  Neck: supple, no lymphadenopathy  CV: Regular rate & regular rhythm  Lungs: audible breath sounds bilateral   Abdomen: Soft, BS present  Ext: Warm, well perfused  Neuro: non focal, awake, AOx4  Skin: no rash, no lesions  Lines: no phlebitis    TESTS & MEASUREMENTS:                        11.1   11.88 )-----------( 304      ( 09 Sep 2020 05:25 )             33.3     09-09    132<L>  |  99  |  17  ----------------------------<  164<H>  4.2   |  20  |  0.8    Ca    9.5      09 Sep 2020 05:25  Mg     1.6     09-09    TPro  6.6  /  Alb  3.8  /  TBili  0.5  /  DBili  x   /  AST  103<H>  /  ALT  109<H>  /  AlkPhos  58  09-09    eGFR if Non African American: 73 mL/min/1.73M2 (09-09-20 @ 05:25)  eGFR if African American: 84 mL/min/1.73M2 (09-09-20 @ 05:25)    LIVER FUNCTIONS - ( 09 Sep 2020 05:25 )  Alb: 3.8 g/dL / Pro: 6.6 g/dL / ALK PHOS: 58 U/L / ALT: 109 U/L / AST: 103 U/L / GGT: x                     INFECTIOUS DISEASES TESTING      RADIOLOGY & ADDITIONAL TESTS:  I have personally reviewed the last available Chest xray  CXR  Xray Chest 1 View- PORTABLE-Urgent:   ******PRELIMINARY REPORT******    ******PRELIMINARY REPORT******          EXAM:  XR CHEST PORTABLE URGENT 1V            PROCEDURE DATE:  09/09/2020          ******PRELIMINARY REPORT******    ******PRELIMINARY REPORT******          INTERPRETATION:  CLINICAL HISTORY: Pneumonia.    COMPARISON: Chest radiograph dated 2/11/2020.    TECHNIQUE: Portable frontal chest radiograph. Adequate positioning.    FINDINGS:    Support devices: None.    Cardiac/mediastinum/hilum: Stable.    Lung parenchyma/Pleura: Right upper lung patchy opacities. No pleural effusion or pneumothorax.    Skeleton/soft tissues: Stable.      IMPRESSION:    Right upper lung patchy opacities may reflect pneumonia in the appropriate clinical setting.          ******PRELIMINARY REPORT******    ******PRELIMINARY REPORT******          HELDER CAMPOS M.D., RESIDENT RADIOLOGIST                   (09-09-20 @ 06:16)      CT      CARDIOLOGY TESTING      All available historical records have been reviewed    MEDICATIONS  amLODIPine   Tablet 5  atorvastatin 40  azithromycin   Tablet 250  cefTRIAXone   IVPB 1000  ergocalciferol 18430  influenza   Vaccine 0.5  insulin glargine Injectable (LANTUS) 40  lisinopril 40  magnesium sulfate  IVPB 2  metoprolol succinate   pantoprazole    Tablet 40  rivaroxaban 20      ANTIBIOTICS:  azithromycin   Tablet 250 milliGRAM(s) Oral daily  cefTRIAXone   IVPB 1000 milliGRAM(s) IV Intermittent every 24 hours      All available historical data has been reviewed

## 2020-09-09 NOTE — H&P ADULT - REASON FOR ADMISSION
Transfer from [facility] - c/o Dizziness, Chest pain Transfer from [facility] - Pneumonia  Dizziness  Chest pain

## 2020-09-10 ENCOUNTER — TRANSCRIPTION ENCOUNTER (OUTPATIENT)
Age: 75
End: 2020-09-10

## 2020-09-10 LAB
A1C WITH ESTIMATED AVERAGE GLUCOSE RESULT: 8 % — HIGH (ref 4–5.6)
ALBUMIN SERPL ELPH-MCNC: 3.9 G/DL — SIGNIFICANT CHANGE UP (ref 3.5–5.2)
ALP SERPL-CCNC: 62 U/L — SIGNIFICANT CHANGE UP (ref 30–115)
ALT FLD-CCNC: 125 U/L — HIGH (ref 0–41)
ANION GAP SERPL CALC-SCNC: 16 MMOL/L — HIGH (ref 7–14)
AST SERPL-CCNC: 83 U/L — HIGH (ref 0–41)
BILIRUB SERPL-MCNC: 0.8 MG/DL — SIGNIFICANT CHANGE UP (ref 0.2–1.2)
BUN SERPL-MCNC: 14 MG/DL — SIGNIFICANT CHANGE UP (ref 10–20)
CALCIUM SERPL-MCNC: 9.2 MG/DL — SIGNIFICANT CHANGE UP (ref 8.5–10.1)
CHLORIDE SERPL-SCNC: 101 MMOL/L — SIGNIFICANT CHANGE UP (ref 98–110)
CO2 SERPL-SCNC: 22 MMOL/L — SIGNIFICANT CHANGE UP (ref 17–32)
CREAT SERPL-MCNC: 0.9 MG/DL — SIGNIFICANT CHANGE UP (ref 0.7–1.5)
ESTIMATED AVERAGE GLUCOSE: 183 MG/DL — HIGH (ref 68–114)
GLUCOSE BLDC GLUCOMTR-MCNC: 120 MG/DL — HIGH (ref 70–99)
GLUCOSE BLDC GLUCOMTR-MCNC: 133 MG/DL — HIGH (ref 70–99)
GLUCOSE BLDC GLUCOMTR-MCNC: 222 MG/DL — HIGH (ref 70–99)
GLUCOSE BLDC GLUCOMTR-MCNC: 262 MG/DL — HIGH (ref 70–99)
GLUCOSE SERPL-MCNC: 131 MG/DL — HIGH (ref 70–99)
HCT VFR BLD CALC: 34.4 % — LOW (ref 37–47)
HCV AB S/CO SERPL IA: 0.04 COI — SIGNIFICANT CHANGE UP
HCV AB SERPL-IMP: SIGNIFICANT CHANGE UP
HGB BLD-MCNC: 11.4 G/DL — LOW (ref 12–16)
MAGNESIUM SERPL-MCNC: 2 MG/DL — SIGNIFICANT CHANGE UP (ref 1.8–2.4)
MCHC RBC-ENTMCNC: 28.8 PG — SIGNIFICANT CHANGE UP (ref 27–31)
MCHC RBC-ENTMCNC: 33.1 G/DL — SIGNIFICANT CHANGE UP (ref 32–37)
MCV RBC AUTO: 86.9 FL — SIGNIFICANT CHANGE UP (ref 81–99)
NRBC # BLD: 0 /100 WBCS — SIGNIFICANT CHANGE UP (ref 0–0)
PLATELET # BLD AUTO: 301 K/UL — SIGNIFICANT CHANGE UP (ref 130–400)
POTASSIUM SERPL-MCNC: 4.1 MMOL/L — SIGNIFICANT CHANGE UP (ref 3.5–5)
POTASSIUM SERPL-SCNC: 4.1 MMOL/L — SIGNIFICANT CHANGE UP (ref 3.5–5)
PROCALCITONIN SERPL-MCNC: 0.15 NG/ML — HIGH (ref 0.02–0.1)
PROT SERPL-MCNC: 6.6 G/DL — SIGNIFICANT CHANGE UP (ref 6–8)
RBC # BLD: 3.96 M/UL — LOW (ref 4.2–5.4)
RBC # FLD: 13.3 % — SIGNIFICANT CHANGE UP (ref 11.5–14.5)
SARS-COV-2 IGG SERPL QL IA: POSITIVE
SARS-COV-2 IGM SERPL IA-ACNC: 147 AU/ML — HIGH
SODIUM SERPL-SCNC: 139 MMOL/L — SIGNIFICANT CHANGE UP (ref 135–146)
TROPONIN T SERPL-MCNC: <0.01 NG/ML — SIGNIFICANT CHANGE UP
TROPONIN T SERPL-MCNC: <0.01 NG/ML — SIGNIFICANT CHANGE UP
WBC # BLD: 7.66 K/UL — SIGNIFICANT CHANGE UP (ref 4.8–10.8)
WBC # FLD AUTO: 7.66 K/UL — SIGNIFICANT CHANGE UP (ref 4.8–10.8)

## 2020-09-10 PROCEDURE — 71045 X-RAY EXAM CHEST 1 VIEW: CPT | Mod: 26

## 2020-09-10 RX ORDER — SERTRALINE 25 MG/1
25 TABLET, FILM COATED ORAL DAILY
Refills: 0 | Status: DISCONTINUED | OUTPATIENT
Start: 2020-09-10 | End: 2020-09-11

## 2020-09-10 RX ADMIN — Medication 3: at 12:23

## 2020-09-10 RX ADMIN — Medication 2: at 17:26

## 2020-09-10 RX ADMIN — LISINOPRIL 40 MILLIGRAM(S): 2.5 TABLET ORAL at 05:32

## 2020-09-10 RX ADMIN — AZITHROMYCIN 250 MILLIGRAM(S): 500 TABLET, FILM COATED ORAL at 12:24

## 2020-09-10 RX ADMIN — Medication 5 UNIT(S): at 12:23

## 2020-09-10 RX ADMIN — Medication 100 MILLIGRAM(S): at 05:31

## 2020-09-10 RX ADMIN — CEFTRIAXONE 100 MILLIGRAM(S): 500 INJECTION, POWDER, FOR SOLUTION INTRAMUSCULAR; INTRAVENOUS at 05:31

## 2020-09-10 RX ADMIN — Medication 5 UNIT(S): at 17:26

## 2020-09-10 RX ADMIN — SERTRALINE 25 MILLIGRAM(S): 25 TABLET, FILM COATED ORAL at 17:44

## 2020-09-10 RX ADMIN — RIVAROXABAN 20 MILLIGRAM(S): KIT at 12:23

## 2020-09-10 RX ADMIN — Medication 5 UNIT(S): at 08:11

## 2020-09-10 RX ADMIN — INSULIN GLARGINE 15 UNIT(S): 100 INJECTION, SOLUTION SUBCUTANEOUS at 21:47

## 2020-09-10 RX ADMIN — ATORVASTATIN CALCIUM 40 MILLIGRAM(S): 80 TABLET, FILM COATED ORAL at 21:47

## 2020-09-10 RX ADMIN — AMLODIPINE BESYLATE 5 MILLIGRAM(S): 2.5 TABLET ORAL at 05:31

## 2020-09-10 RX ADMIN — PANTOPRAZOLE SODIUM 40 MILLIGRAM(S): 20 TABLET, DELAYED RELEASE ORAL at 05:31

## 2020-09-10 NOTE — PROGRESS NOTE ADULT - SUBJECTIVE AND OBJECTIVE BOX
KENA LOZADA 76yo W  Female transferred from [facility] where she was being tx for CA PNA.  The pt was transferred to [facility] as she has complex cardiac issues and all her MDs are with this hospital.  Of note the pt has not been feeling well x 2 wks with fatigue, lethargy, T, L sided CP exacerbated by deep inspirations.  The pt is on Ceftriaxone ans azithromycin. The CXR shows R upper and L midobe opacities.The PMHx includes:  HTN, ASHD, CAD, sp stents, afib, sp cardioversions, on AC with Xarelto, DLD, DM II, OA, DDD, DJD, GERD, Diverticulosis.  The pt is DNR.    INTERVAL HPI/OVERNIGHT EVENTS: pt weak and fatigued, chronically ill looking but medcally stable    MEDICATIONS  (STANDING):  amLODIPine   Tablet 5 milliGRAM(s) Oral daily  atorvastatin 40 milliGRAM(s) Oral at bedtime  azithromycin   Tablet 250 milliGRAM(s) Oral daily  cefTRIAXone   IVPB 1000 milliGRAM(s) IV Intermittent every 24 hours  dextrose 5%. 1000 milliLiter(s) (50 mL/Hr) IV Continuous <Continuous>  dextrose 50% Injectable 12.5 Gram(s) IV Push once  dextrose 50% Injectable 25 Gram(s) IV Push once  dextrose 50% Injectable 25 Gram(s) IV Push once  ergocalciferol 57824 Unit(s) Oral every week  influenza   Vaccine 0.5 milliLiter(s) IntraMuscular once  insulin glargine Injectable (LANTUS) 15 Unit(s) SubCutaneous at bedtime  insulin lispro (HumaLOG) corrective regimen sliding scale   SubCutaneous three times a day before meals  insulin lispro Injectable (HumaLOG) 5 Unit(s) SubCutaneous three times a day before meals  lisinopril 40 milliGRAM(s) Oral daily  metoprolol succinate  milliGRAM(s) Oral daily  pantoprazole    Tablet 40 milliGRAM(s) Oral before breakfast  rivaroxaban 20 milliGRAM(s) Oral daily    MEDICATIONS  (PRN):  acetaminophen   Tablet .. 650 milliGRAM(s) Oral every 6 hours PRN Temp greater or equal to 38.5C (101.3F), Severe Pain (7 - 10)  dextrose 40% Gel 15 Gram(s) Oral once PRN Blood Glucose LESS THAN 70 milliGRAM(s)/deciliter  glucagon  Injectable 1 milliGRAM(s) IntraMuscular once PRN Glucose LESS THAN 70 milligrams/deciliter  metoclopramide 10 milliGRAM(s) Oral two times a day before meals PRN Nausea, vomiting      Allergies    adhesives (Pruritus; Rash)  doxycycline (Other)  latex (Rash)  Milk (Diarrhea)  	    Vital Signs Last 24 Hrs  T(C): 36.3 (10 Sep 2020 05:21), Max: 36.9 (09 Sep 2020 20:00)  T(F): 97.3 (10 Sep 2020 05:21), Max: 98.5 (09 Sep 2020 20:00)  HR: 74 (10 Sep 2020 05:21) (71 - 97)  BP: 143/70 (10 Sep 2020 05:21) (128/69 - 150/65)  BP(mean): --  RR: 18 (10 Sep 2020 05:21) (18 - 18)  SpO2: --    PHYSICAL EXAM:      Constitutional:  alert, fatigued, elderly and chronically ill looking WF but in NAD    Eyes: nonicteric    ENMT: dry oral mucosa, dental defects    Neck: supple, no JVD, no bruits    Back: kyphosis    Respiratory: shallow resp, scattered rhonchi, poor air exchange    Cardiovascular: s1S2 irreg    Gastrointestinal: globose, soft and benign    Genitourinary:    Extremities: moves all ext, + arthritic changes, poor mm mass and tone    Vascular: dec pedal pulses    Neurological: nonfocal    Skin: no rash      LABS:                        11.4   7.66  )-----------( 301      ( 10 Sep 2020 05:25 )             34.4     09-10    139  |  101  |  14  ----------------------------<  131<H>  4.1   |  22  |  0.9  A1c 8.0  trop 0.01 x 3  Ca    9.2      10 Sep 2020 05:25  Mg     2.0     09-10    TPro  6.6  /  Alb  3.9  /  TBili  0.8  /  DBili  x   /  AST  83<H>  /  ALT  125<H>  /  AlkPhos  62  09-10          RADIOLOGY & ADDITIONAL TESTS:    EKG:  afib 74/min no acute changes    CXR:  R upper ad L mid lung opaciteis

## 2020-09-10 NOTE — PROGRESS NOTE ADULT - ASSESSMENT
Elderly pt with multiple medical issues  transferred from [facility] for community acquired BL PNA.    CA PNA, BL opacities  Sepsis R/O on admission  Transaminitis  Hx of HTN, ASHD, CAD, sp stents,  afib, sp cardioversion  Hx of DLD  Hx of DM II  Hx of OA, DDD, DJD  Hx of GERD. Diverticulosis  Hx of Anemia  DNR     The CXR shows R upper lobe and L mid lung opacities   Pt is on IV ceftriaxone and po azithromycin  cont all home meds  monitor pulse ox, encourage deep breathy  Pul consult  CT of Chest  ID consult    Cardio consult:  Dr Bhagat  Pt is DNR

## 2020-09-10 NOTE — PROGRESS NOTE ADULT - SUBJECTIVE AND OBJECTIVE BOX
SUBJ: Feels better      MEDICATIONS  (STANDING):  amLODIPine   Tablet 5 milliGRAM(s) Oral daily  atorvastatin 40 milliGRAM(s) Oral at bedtime  azithromycin   Tablet 250 milliGRAM(s) Oral daily  cefTRIAXone   IVPB 1000 milliGRAM(s) IV Intermittent every 24 hours  dextrose 5%. 1000 milliLiter(s) (50 mL/Hr) IV Continuous <Continuous>  dextrose 50% Injectable 12.5 Gram(s) IV Push once  dextrose 50% Injectable 25 Gram(s) IV Push once  dextrose 50% Injectable 25 Gram(s) IV Push once  ergocalciferol 49433 Unit(s) Oral every week  influenza   Vaccine 0.5 milliLiter(s) IntraMuscular once  insulin glargine Injectable (LANTUS) 15 Unit(s) SubCutaneous at bedtime  insulin lispro (HumaLOG) corrective regimen sliding scale   SubCutaneous three times a day before meals  insulin lispro Injectable (HumaLOG) 5 Unit(s) SubCutaneous three times a day before meals  lisinopril 40 milliGRAM(s) Oral daily  metoprolol succinate  milliGRAM(s) Oral daily  pantoprazole    Tablet 40 milliGRAM(s) Oral before breakfast  rivaroxaban 20 milliGRAM(s) Oral daily    MEDICATIONS  (PRN):  acetaminophen   Tablet .. 650 milliGRAM(s) Oral every 6 hours PRN Temp greater or equal to 38.5C (101.3F), Severe Pain (7 - 10)  dextrose 40% Gel 15 Gram(s) Oral once PRN Blood Glucose LESS THAN 70 milliGRAM(s)/deciliter  glucagon  Injectable 1 milliGRAM(s) IntraMuscular once PRN Glucose LESS THAN 70 milligrams/deciliter  metoclopramide 10 milliGRAM(s) Oral two times a day before meals PRN Nausea, vomiting            Vital Signs Last 24 Hrs  T(C): 36.3 (10 Sep 2020 05:21), Max: 36.9 (09 Sep 2020 20:00)  T(F): 97.3 (10 Sep 2020 05:21), Max: 98.5 (09 Sep 2020 20:00)  HR: 74 (10 Sep 2020 05:21) (71 - 97)  BP: 143/70 (10 Sep 2020 05:21) (128/69 - 150/65)  BP(mean): --  RR: 18 (10 Sep 2020 05:21) (18 - 18)  SpO2: --     REVIEW OF SYSTEMS:  CONSTITUTIONAL: No fever, weight loss, or fatigue  CARDIOLOGY: PAtient denies chest pain, shortness of breath or syncopal episodes.   RESPIRATORY: denies shortness of breath, wheezeing.   NEUROLOGICAL: NO weakness, no focal deficits to report.  ENDOCRINOLOGICAL: no recent change in diabetic medications.   GI: no BRBPR, no N,V,diarrhea.    PSYCHIATRY: normal mood and affect  HEENT: no nasal discharge, no ecchymosis  SKIN: no ecchymosis, no breakdown  MUSCULOSKELETAL: Full range of motion x4.        PHYSICAL EXAM:  · CONSTITUTIONAL:	Well-developed, well nourished    BMI-  ·RESPIRATORY:   airway patent; breath sounds equal; good air movement; respirations non-labored; clear to auscultation bilaterally; no chest wall tenderness; no intercostal retractions; no rales,rhonchi or wheeze  · CARDIOVASCULAR	regular rate and rhythm  no rub  no murmur  normal PMI  · EXTREMITIES: No cyanosis, clubbing or edema  · VASCULAR: 	Equal and normal pulses (carotid, femoral, dorsalis pedis)  	  TELEMETRY:    ECG:    TTE:    LABS:                        11.4   7.66  )-----------( 301      ( 10 Sep 2020 05:25 )             34.4     09-10    139  |  101  |  14  ----------------------------<  131<H>  4.1   |  22  |  0.9    Ca    9.2      10 Sep 2020 05:25  Mg     2.0     09-10    TPro  6.6  /  Alb  3.9  /  TBili  0.8  /  DBili  x   /  AST  83<H>  /  ALT  125<H>  /  AlkPhos  62  09-10    CARDIAC MARKERS ( 10 Sep 2020 05:25 )  x     / <0.01 ng/mL / x     / x     / x      CARDIAC MARKERS ( 10 Sep 2020 00:25 )  x     / <0.01 ng/mL / x     / x     / x      CARDIAC MARKERS ( 09 Sep 2020 16:57 )  x     / <0.01 ng/mL / x     / x     / x              I&O's Summary    09 Sep 2020 07:01  -  10 Sep 2020 07:00  --------------------------------------------------------  IN: 120 mL / OUT: 0 mL / NET: 120 mL      BNP  RADIOLOGY & ADDITIONAL STUDIES:    IMPRESSION AND PLAN:    PNA  afib in rate controlled on DOACs  continue with current management

## 2020-09-10 NOTE — PROGRESS NOTE ADULT - SUBJECTIVE AND OBJECTIVE BOX
KENA LOZADA 75y o Female transferred from [facility]  for PNA being actively tx with fatigue, SOB and L sided CP worsened by deep inspiration.  The pt has extensive cardiac Hx with MDs affiliated with [facility], thus, pt transferred to continue her care.  The pt is on Ceftriaxone and azithromycin.  Th XCR shows R upper lobe and L midlung opacities.  Th PMHX includes:  HTN, ASHD CAD sp stents, afib with ablation x 2, DLD, DM II, GERD, diverticulosis, OA, DDD, DJD.  The pt is DNR.    INTERVAL HPI/OVERNIGHT EVENTS:  pt on ABX, medically improving but weak and fatigued    MEDICATIONS  (STANDING):  amLODIPine   Tablet 5 milliGRAM(s) Oral daily  atorvastatin 40 milliGRAM(s) Oral at bedtime  azithromycin   Tablet 250 milliGRAM(s) Oral daily  cefTRIAXone   IVPB 1000 milliGRAM(s) IV Intermittent every 24 hours  dextrose 5%. 1000 milliLiter(s) (50 mL/Hr) IV Continuous <Continuous>  dextrose 50% Injectable 12.5 Gram(s) IV Push once  dextrose 50% Injectable 25 Gram(s) IV Push once  dextrose 50% Injectable 25 Gram(s) IV Push once  ergocalciferol 85221 Unit(s) Oral every week  influenza   Vaccine 0.5 milliLiter(s) IntraMuscular once  insulin glargine Injectable (LANTUS) 15 Unit(s) SubCutaneous at bedtime  insulin lispro (HumaLOG) corrective regimen sliding scale   SubCutaneous three times a day before meals  insulin lispro Injectable (HumaLOG) 5 Unit(s) SubCutaneous three times a day before meals  lisinopril 40 milliGRAM(s) Oral daily  metoprolol succinate  milliGRAM(s) Oral daily  pantoprazole    Tablet 40 milliGRAM(s) Oral before breakfast  rivaroxaban 20 milliGRAM(s) Oral daily    MEDICATIONS  (PRN):  acetaminophen   Tablet .. 650 milliGRAM(s) Oral every 6 hours PRN Temp greater or equal to 38.5C (101.3F), Severe Pain (7 - 10)  dextrose 40% Gel 15 Gram(s) Oral once PRN Blood Glucose LESS THAN 70 milliGRAM(s)/deciliter  glucagon  Injectable 1 milliGRAM(s) IntraMuscular once PRN Glucose LESS THAN 70 milligrams/deciliter  metoclopramide 10 milliGRAM(s) Oral two times a day before meals PRN Nausea, vomiting      Allergies    adhesives (Pruritus; Rash)  doxycycline (Other)  latex (Rash)  Milk (Diarrhea)      Vital Signs Last 24 Hrs  T(C): 36.3 (10 Sep 2020 05:21), Max: 36.9 (09 Sep 2020 20:00)  T(F): 97.3 (10 Sep 2020 05:21), Max: 98.5 (09 Sep 2020 20:00)  HR: 74 (10 Sep 2020 05:21) (71 - 97)  BP: 143/70 (10 Sep 2020 05:21) (128/69 - 150/65)  BP(mean): --  RR: 18 (10 Sep 2020 05:21) (18 - 18)  SpO2: --    PHYSICAL EXAM:      Constitutional: alert, + language barrier, weak, chronically ill elderly WF, NAD    Eyes:  nonicteric    ENMT: dry oral mucosa, dental defects    Neck:  supple, no JVD, no bruits    Back: kyphosis    Respiratory: shallow resp, scattered rhonchi, dec bS    Cardiovascular:  S1S2 irreg    Gastrointestinal:  globose soft and benign    Genitourinary:     Extremities:  + arthritic changes, moves all ext    Vascular: dec pedal pulses    Neurological:  nonfocal    Skin:  no rash    Lymph Nodes:  not enlarged    Musculoskeletal: poor mm mass and tone    Psychiatric:       LABS:                        11.4   7.66  )-----------( 301      ( 10 Sep 2020 05:25 )             34.4     09-10    139  |  101  |  14  ----------------------------<  131<H>  4.1   |  22  |  0.9    Ca    9.2      10 Sep 2020 05:25  Mg     2.0     09-10    TPro  6.6  /  Alb  3.9  /  TBili  0.8  /  DBili  x   /  AST  83<H>  /  ALT  125<H>  /  AlkPhos  62  09-10          RADIOLOGY & ADDITIONAL TESTS:    EKG:  afib 74/min  CXR:  R uper and L mid lung opacities

## 2020-09-10 NOTE — PROGRESS NOTE ADULT - ASSESSMENT
76 y/o F with PMHx of Afib on Xarelto s/p Ablation x2 (latest in Feb 2020) and DCCV (latest in June 2020); CAD s/p stent x4, HTN, HLD and DMII who was transferred from [facility] where she was admitted on 09/07/2020 with a diagnosis of Pneumonia.    # Pneumonia  - Pt admitted to [facility] on 9/6 for PNA, transferred to [facility] on 9/9; Records obtained from [facility]   - CT Chest at [facility] 9/6 with RUL subsegmental airspace consolidation likely representing pneumonia   - CXR 9/9 at [facility]: Right upper lung patchy opacities may reflect pneumonia in the appropriate clinical setting  - Was given Levofloxacin (1 dose), then Doxycycline + Rocephin, and Zosyn at [facility]; today (9/10) is Day 5 on antibiotics  - Pulm recs finishing abx course and f/u outpatient w/ possible repeat CT  - ID recs finishing abx course as PO and outpatient; can f/u with Quantiferon and UAg for legionella + Strep outpatient  - c/w azithromycin and ceftriaxone (9/10 is day 5 of 7 on antibiotics), finish course; Pt allergic to doxycycline    # Dizziness and Fatigue, r/o thyroid dysfunction d/t amiodarone use   - monitor blood glucose  - f/u orthostatics   - consider TFT as patient was on amiodarone which was recently discontinued     # Atrial fibrillation- rate controlled  - h/o multiple ablations and DCCV  - Pt currently rate controlled; HR 70's to 90's overnight (9/10)  - c/w Xarelto 20mg OD  - c/w metoprolol    # Hyponatremia - resolving  - Na was 124 on 9/7 @ [facility] --> Na 139 on 9/10   - f/u with urine Legionella (pending)     # HTN: Continue home meds    # HLD: continue home Lipitor    # DM-II  -FS qac and qhs  - Insulin basal/bolus regimen     # Chest pain  - EKG 9/9: atrial fibrillation - rate controlled; no ST, T wave changes  - likely pleuritic pain d/t pneumonia  - Trops (-) x3 (9/9)  - Acetaminophen for pain control      Code Status: DNR/DNI   Diet: DASH   Activity: Increase as tolerated   Dispo: From home  DVT ppx: Xarelto 74 y/o F with PMHx of Afib on Xarelto s/p Ablation x2 (latest in Feb 2020) and DCCV (latest in June 2020); CAD s/p stent x4, HTN, HLD and DMII who was transferred from [facility] where she was admitted on 09/07/2020 with a diagnosis of Pneumonia.    # Possible pneumonia?   - Pt admitted to [facility] on 9/6 for PNA, transferred to [facility] on 9/9; Records obtained from [facility]   - CT Chest at [facility] 9/6 with RUL subsegmental airspace consolidation likely representing pneumonia   - CXR 9/9 at [facility]: Right upper lung patchy opacities may reflect pneumonia in the appropriate clinical setting  - Was given Levofloxacin (1 dose), then Doxycycline + Rocephin, and Zosyn at [facility]; today (9/10) is Day 5 on antibiotics  - Pulm recs finishing abx course and f/u outpatient w/ possible repeat CT  - ID recs finishing abx course as PO and outpatient; can f/u with Quantiferon and UAg for legionella + Strep outpatient  - c/w azithromycin and ceftriaxone (9/10 is day 5 of 7 on antibiotics), finish course; Pt allergic to doxycycline    # Dizziness and Fatigue, r/o thyroid dysfunction d/t amiodarone use   - monitor blood glucose  - f/u orthostatics   - consider TFT as patient was on amiodarone which was recently discontinued     # Atrial fibrillation- rate controlled  - h/o multiple ablations and DCCV  - Pt currently rate controlled; HR 70's to 90's overnight (9/10)  - c/w Xarelto 20mg OD  - c/w metoprolol    # Hyponatremia - resolving  - Na was 124 on 9/7 @ [facility] --> Na 139 on 9/10   - f/u with urine Legionella (pending)     # HTN: Continue home meds    # HLD: continue home Lipitor    # DM-II  -FS qac and qhs  - Insulin basal/bolus regimen     # Chest pain  - EKG 9/9: atrial fibrillation - rate controlled; no ST, T wave changes  - likely pleuritic pain d/t pneumonia  - Trops (-) x3 (9/9)  - Acetaminophen for pain control      DVT ppx: Xarelto   Diet: DASH   Activity: Increase as tolerated   Dispo: From home  DNR/DNI 76 y/o F with PMHx of Afib on Xarelto s/p Ablation x2 (latest in Feb 2020) and DCCV (latest in June 2020); CAD s/p stent x4, HTN, HLD and DMII who was transferred from [facility] where she was admitted on 09/07/2020 with a diagnosis of Pneumonia.    # Possible pneumonia?   - Pt admitted to [facility] on 9/6 for PNA, transferred to [facility] on 9/9; Records obtained from [facility]   - CT Chest at [facility] 9/6 with RUL subsegmental airspace consolidation likely representing pneumonia   - CXR 9/9 at [facility]: Right upper lung patchy opacities may reflect pneumonia in the appropriate clinical setting  - Was given Levofloxacin (1 dose), then Doxycycline + Rocephin, and Zosyn at [facility]; today (9/10) is Day 5 on antibiotics  - Pulm recs finishing abx course and f/u outpatient w/ possible repeat CT  - ID recs finishing abx course as PO and outpatient; can f/u with Quantiferon and UAg for legionella + Strep outpatient  - c/w azithromycin and ceftriaxone (9/10 is day 5 of 7 on antibiotics), finish course; Pt allergic to doxycycline    # Dizziness and Fatigue, r/o thyroid dysfunction d/t amiodarone use   - monitor blood glucose  - f/u orthostatics   - consider TFT as patient was on amiodarone which was recently discontinued     # Atrial fibrillation- rate controlled  - h/o multiple ablations and DCCV  - Pt currently rate controlled; HR 70's to 90's overnight (9/10)  - c/w Xarelto 20mg OD  - c/w metoprolol    # Hyponatremia - resolving  - Na was 124 on 9/7 @ [facility] --> Na 139 on 9/10   - f/u with urine Legionella (pending)     # HTN: Continue home meds    # HLD: continue home Lipitor    # DM-II  -FS qac and qhs  - Insulin basal/bolus regimen     # Chest pain  - EKG 9/9: atrial fibrillation - rate controlled; no ST, T wave changes  - likely pleuritic pain d/t pneumonia  - Trops (-) x3 (9/9)  - Acetaminophen for pain control      DVT ppx: Xarelto   Diet: DASH   Activity: Increase as tolerated   Dispo: From home  DNR/DNI     Agree with note above. Changes made in body of the note as necessary. Plan discussed with care team.

## 2020-09-10 NOTE — DISCHARGE NOTE PROVIDER - CARE PROVIDER_API CALL
Lam Negron  INTERNAL MEDICINE  [address] [facility] [facility] [address]  [place] [address]  Phone: [phone]  Fax: [phone]  Follow Up Time: 1 week    Cem Loving)  Critical Care Medicine; Pulmonary Disease; Sleep Medicine  [address] [place] [address]  Phone: [phone]  Fax: [phone]  Follow Up Time: 2 weeks    Maria Victoria Barnett)  Internal Medicine  [address] [place] [address]  Phone: [phone]  Fax: [phone]  Follow Up Time: 2 weeks

## 2020-09-10 NOTE — DISCHARGE NOTE PROVIDER - NSDCMRMEDTOKEN_GEN_ALL_CORE_FT
amLODIPine 5 mg oral tablet: 1 tab(s) orally once a day  atorvastatin 40 mg oral tablet: 1 tab(s) orally once a day  ergocalciferol 50,000 intl units (1.25 mg) oral capsule: 1 cap(s) orally once a week  Lantus 100 units/mL subcutaneous solution: 20 unit(s) subcutaneous once a day (at bedtime)  lisinopril 40 mg oral tablet: 1 tab(s) orally once a day  metFORMIN 1000 mg oral tablet: 1 tab(s) orally 2 times a day  metoclopramide 10 mg oral tablet: 1 tab(s) orally 3 times  metoprolol succinate 50 mg oral tablet, extended release: 1 tab(s) orally 2 times a day  NexIUM 40 mg oral delayed release capsule: 1 cap(s) orally once a day  Ozempic (0.25 mg or 0.5 mg dose) 2 mg/1.5 mL subcutaneous solution: once a week on Tuesdays  Protonix 40 mg oral delayed release tablet: 1 tab(s) orally once a day  Xarelto 20 mg oral tablet: 1 tab(s) orally once a day (in the evening) amLODIPine 5 mg oral tablet: 1 tab(s) orally once a day  ergocalciferol 50,000 intl units (1.25 mg) oral capsule: 1 cap(s) orally once a week  lisinopril 40 mg oral tablet: 1 tab(s) orally once a day  metFORMIN 1000 mg oral tablet: 1 tab(s) orally 2 times a day  metoclopramide 10 mg oral tablet: 1 tab(s) orally 3 times  metoprolol succinate 50 mg oral tablet, extended release: 1 tab(s) orally 2 times a day  NexIUM 40 mg oral delayed release capsule: 1 cap(s) orally once a day  Ozempic (0.25 mg or 0.5 mg dose) 2 mg/1.5 mL subcutaneous solution: once a week on Tuesdays  sertraline 25 mg oral tablet: 1 tab(s) orally once a day  Xarelto 20 mg oral tablet: 1 tab(s) orally once a day (in the evening)

## 2020-09-10 NOTE — DISCHARGE NOTE PROVIDER - NSDCCPCAREPLAN_GEN_ALL_CORE_FT
PRINCIPAL DISCHARGE DIAGNOSIS  Diagnosis: Community acquired pneumonia  Assessment and Plan of Treatment: You were admitted to the hospital for pneumonia, or an infection of your lungs. While you were here, your lungs were monitored closely with chest xrays. Your breathing was monitored to make sure you were getting enough oxygen. You received antibiotics to help fight the bacteria causing your infection. You will be discharged on antibiotic pills. Please take medications as indicated. Please be sure to follow up with your primary care physician, pulmonologist, and infectious disease specialist upon discharge.      SECONDARY DISCHARGE DIAGNOSES  Diagnosis: Chest pain  Assessment and Plan of Treatment: The chest pain you experienced was likely due to your pneumonia, or lung infection. It is likely that the tissue around your lungs was inflamed as a result of the infection. You were treated for the underlying pneumonia. A cardiologist also followed your care to ensure that the pain wasn't from your heart. Please follow up with your primary care physician, and take all medications as perscribed. PRINCIPAL DISCHARGE DIAGNOSIS  Diagnosis: Community acquired pneumonia  Assessment and Plan of Treatment: You were admitted to the hospital for pneumonia, or an infection of your lungs. While you were here, your lungs were monitored closely with chest xrays. Your breathing was monitored to make sure you were getting enough oxygen. You received antibiotics to help fight the bacteria causing your infection. You will be discharged on antibiotic pills. Please take medications as indicated. Please be sure to follow up with your primary care physician, pulmonologist, and infectious disease specialist upon discharge.  Please take your medications as directed. Don’t skip doses. Follow up with your primary care physician within 3 days. Continue taking your antibiotics as directed until they are all gone—even if you start to feel better. This will prevent the pneumonia from  Coughing up mucus is normal. Don’t use medicines to suppress your cough unless your cough is dry, painful, or interferes with your sleep. Get plenty of rest until your fever, shortness of breath, and chest pain go away. Plan to get a flu shot every year. Ask your primary care doctor about pneumonia vaccines.  Seek immediate medical attention if you experience chest pain, trouble breathing, blue lips or fingernails, fever of 100.4°F  (38°C) or higher, yellow, green, bloody, or smelly sputum, more than normal mucus production, vomiting or diarrhea.      SECONDARY DISCHARGE DIAGNOSES  Diagnosis: Chest pain  Assessment and Plan of Treatment: The chest pain you experienced was likely due to your pneumonia, or lung infection. It is likely that the tissue around your lungs was inflamed as a result of the infection. You were treated for the underlying pneumonia. A cardiologist also followed your care to ensure that the pain wasn't from your heart. Please follow up with your primary care physician, and take all medications as perscribed.

## 2020-09-10 NOTE — DISCHARGE NOTE PROVIDER - HOSPITAL COURSE
Pt was transferred from [facility] to [facility] due to pneumonia. At [facility], CT Chest and CXR showed right lobe consolidation concerning for community acquired pneumonia. She received Levaquin, Ceftriaxone, Doxycycline, and Pip Tazo at [facility]. After transfer to [facility], she was continued on Ceftriaxone and Azithromycin. CXR showed improvement.         Pulmonology was consulted, and recommended outpatient follow-up CT within a few months.     ID was consulted, and recommended outpatient follow-up of pending Urine Ag for Legionella + strep, Quantiferon, and discharge on PO antibiotics for the remainder of the course.     Cardiology was consulted for history of A-Fib, and they recommended continuing home anticoagulation medication.         Today currently, pt is medically cleared and stable for discharge. She is agreeable for discharge home. The patient is a 75 year old female with PMHx of Afib on Xarelto s/p Ablation x2 (latest in Feb 2020) and DCCV (latest in June 2020); CAD s/p stent x4, HTN, HLD and DMII who was transferred from [facility] to [facility] due to pneumonia. At [facility], CT Chest and CXR showed right lobe consolidation concerning for community acquired pneumonia. She received Levaquin, Ceftriaxone, Doxycycline, and Pip Tazo at [facility]. After transfer to [facility], she was continued on Ceftriaxone and Azithromycin. Chest xray showed daily improvement.     During her hospital admission, the patient was seen by pulmonology, cardiology and infectious disease. Pulmonology recommended outpatient follow-up CT within a few months and     infectious disease recommended outpatient follow-up of pending Urine antigen for Legionella + Strep, in addition to Quantiferon.         The patient is currently cleared for discharge. She was seen daily by physical therapy and ambulated.

## 2020-09-10 NOTE — PROGRESS NOTE ADULT - ASSESSMENT
Elderly WF with complex cardiac Hx and multiple medical issues transferred from [facility] where she was admitted for T, L sided CP and BL PNA.    Community Acquired PNA ( R upper and L mid lung opaciteis)  Hx of HTN, ASHD, CAD, sp stents, afib, AC with Xarelto  Hx of DLD  Hx of DM II  Hx of OA, DDD, DJD  Hx of GERD, Diverticulosis  DNR    pt transferred from [facility], being tx for community acquired BL PNA assoc with CP and SOB  CXR shows R upper  and L mid lung opacities  pt on Ceftriaxone and azithromycin  Pul consult  CT of chest  ID consult  cont all home meds  pt is DNR

## 2020-09-10 NOTE — PROGRESS NOTE ADULT - SUBJECTIVE AND OBJECTIVE BOX
HPI:  Pt is a 74 y/o F with PMHx of Afib on Xarelto s/p Ablation x2 (latest in Feb 2020) and DCCV (latest in June 2020); CAD s/p stent x4; HTN; HLD; DM2 was transferred from [facility] where she was admitted on 09/07/2020 with a diagnosis of Pneumonia. According to the daughter in law, the patient felt tired and dizzy for 2 weeks; Symptoms worsened 3 days prior to her admission at [facility] (9/7) where she was found to be febrile, tachycardic, and hypertensive. She had CXR and CT chest done and was treated for pneumonia. According to the daughter in law she had occasional shortness of breath on exertion. She also had chest pain on the left side which started about 2 weeks ago; it was intermittent, on the front and back of the left side of chest, not radiating, exacerbated by deep breath, non radiating, 5/10 in severity, which has worsened to 7/10 since yesterday. According to the daughter in law the patient was on amiodarone for atrial fibrillation which was discontinued 2 weeks ago by her electrophysiologist, whom she saw after developing the dizziness and weakness.    Currently admitted to medicine with the primary diagnosis of pneumonia.  Pt speaks Serbian.    INTERVAL HPI / OVERNIGHT EVENTS:  Patient was examined and seen at bedside. This morning she is sitting up in a chair.   Dr. Barnett from ID was contacted on 9/10 AM, who mentioned that the pt was safe for discharge, and any pending bloodwork can be followed up outpatient.   CT Images from CD from [facility] was given to radiology and uploaded. It is not visible in the EMR, but radiology confirmed that it has been successfully uploaded on their end (9/10)    ROS: Otherwise unremarkable     MEDICATIONS  STANDING MEDICATIONS  amLODIPine   Tablet 5 milliGRAM(s) Oral daily  atorvastatin 40 milliGRAM(s) Oral at bedtime  azithromycin   Tablet 250 milliGRAM(s) Oral daily  cefTRIAXone   IVPB 1000 milliGRAM(s) IV Intermittent every 24 hours  dextrose 5%. 1000 milliLiter(s) IV Continuous <Continuous>  dextrose 50% Injectable 12.5 Gram(s) IV Push once  dextrose 50% Injectable 25 Gram(s) IV Push once  dextrose 50% Injectable 25 Gram(s) IV Push once  ergocalciferol 59802 Unit(s) Oral every week  influenza   Vaccine 0.5 milliLiter(s) IntraMuscular once  insulin glargine Injectable (LANTUS) 15 Unit(s) SubCutaneous at bedtime  insulin lispro (HumaLOG) corrective regimen sliding scale   SubCutaneous three times a day before meals  insulin lispro Injectable (HumaLOG) 5 Unit(s) SubCutaneous three times a day before meals  lisinopril 40 milliGRAM(s) Oral daily  metoprolol succinate  milliGRAM(s) Oral daily  pantoprazole    Tablet 40 milliGRAM(s) Oral before breakfast  rivaroxaban 20 milliGRAM(s) Oral daily    VITALS:  Vital Signs Last 24 Hrs  T(C): 36.2 (10 Sep 2020 13:47), Max: 36.9 (09 Sep 2020 20:00)  T(F): 97.1 (10 Sep 2020 13:47), Max: 98.5 (09 Sep 2020 20:00)  HR: 85 (10 Sep 2020 13:47) (71 - 85)  BP: 119/55 (10 Sep 2020 13:47) (119/55 - 150/65)  BP(mean): --  RR: 20 (10 Sep 2020 13:47) (18 - 20)  SpO2: --    PHYSICAL EXAM  GEN: No acute distress or labored breathing noted on room air; patient is examined in chair  PULM: mild crackles noted in upper lungs bilaterally   CVS: S1 S2 have normal rate and rhythm; no murmurs noted.   ABD: Soft, non-tender, non-distended  NEURO: A&Ox3, no focal deficits    LABS                        11.4   7.66  )-----------( 301      ( 10 Sep 2020 05:25 )             34.4     09-10    139  |  101  |  14  ----------------------------<  131<H>  4.1   |  22  |  0.9    Ca    9.2      10 Sep 2020 05:25  Mg     2.0     09-10    TPro  6.6  /  Alb  3.9  /  TBili  0.8  /  DBili  x   /  AST  83<H>  /  ALT  125<H>  /  AlkPhos  62  09-10          Troponin T, Serum: <0.01 ng/mL (09-10-20 @ 05:25)  Troponin T, Serum: <0.01 ng/mL (09-10-20 @ 00:25)  Troponin T, Serum: <0.01 ng/mL (09-09-20 @ 16:57)      CARDIAC MARKERS ( 10 Sep 2020 05:25 )  x     / <0.01 ng/mL / x     / x     / x      CARDIAC MARKERS ( 10 Sep 2020 00:25 )  x     / <0.01 ng/mL / x     / x     / x      CARDIAC MARKERS ( 09 Sep 2020 16:57 )  x     / <0.01 ng/mL / x     / x     / x          RADIOLOGY HPI:  Pt is a 76 y/o F with PMHx of Afib on Xarelto s/p Ablation x2 (latest in Feb 2020) and DCCV (latest in June 2020); CAD s/p stent x4; HTN; HLD; DM2 was transferred from [facility] where she was admitted on 09/07/2020 with a diagnosis of Pneumonia. According to the daughter in law, the patient felt tired and dizzy for 2 weeks; Symptoms worsened 3 days prior to her admission at [facility] (9/7) where she was found to be febrile, tachycardic, and hypertensive. She had CXR and CT chest done and was treated for pneumonia. According to the daughter in law she had occasional shortness of breath on exertion. She also had chest pain on the left side which started about 2 weeks ago; it was intermittent, on the front and back of the left side of chest, not radiating, exacerbated by deep breath, non radiating, 5/10 in severity, which has worsened to 7/10 since yesterday. According to the daughter in law the patient was on amiodarone for atrial fibrillation which was discontinued 2 weeks ago by her electrophysiologist, whom she saw after developing the dizziness and weakness.    Currently admitted to medicine with the primary diagnosis of pneumonia.  Pt speaks Cymro.    INTERVAL HPI / OVERNIGHT EVENTS:  Patient was examined and seen at bedside. This morning she is sitting up in a chair.   Dr. Barnett from ID was contacted on 9/10 AM, who mentioned that the pt was safe for discharge, and any pending bloodwork can be followed up outpatient.   CT Images from CD from [facility] was given to radiology and uploaded. It is not visible in the EMR, but radiology confirmed that it has been successfully uploaded on their end (9/10)    ROS: Otherwise unremarkable     MEDICATIONS  STANDING MEDICATIONS  amLODIPine   Tablet 5 milliGRAM(s) Oral daily  atorvastatin 40 milliGRAM(s) Oral at bedtime  azithromycin   Tablet 250 milliGRAM(s) Oral daily  cefTRIAXone   IVPB 1000 milliGRAM(s) IV Intermittent every 24 hours  dextrose 5%. 1000 milliLiter(s) IV Continuous <Continuous>  dextrose 50% Injectable 12.5 Gram(s) IV Push once  dextrose 50% Injectable 25 Gram(s) IV Push once  dextrose 50% Injectable 25 Gram(s) IV Push once  ergocalciferol 91001 Unit(s) Oral every week  influenza   Vaccine 0.5 milliLiter(s) IntraMuscular once  insulin glargine Injectable (LANTUS) 15 Unit(s) SubCutaneous at bedtime  insulin lispro (HumaLOG) corrective regimen sliding scale   SubCutaneous three times a day before meals  insulin lispro Injectable (HumaLOG) 5 Unit(s) SubCutaneous three times a day before meals  lisinopril 40 milliGRAM(s) Oral daily  metoprolol succinate  milliGRAM(s) Oral daily  pantoprazole    Tablet 40 milliGRAM(s) Oral before breakfast  rivaroxaban 20 milliGRAM(s) Oral daily    VITALS:  Vital Signs Last 24 Hrs  T(C): 36.2 (10 Sep 2020 13:47), Max: 36.9 (09 Sep 2020 20:00)  T(F): 97.1 (10 Sep 2020 13:47), Max: 98.5 (09 Sep 2020 20:00)  HR: 85 (10 Sep 2020 13:47) (71 - 85)  BP: 119/55 (10 Sep 2020 13:47) (119/55 - 150/65)  BP(mean): --  RR: 20 (10 Sep 2020 13:47) (18 - 20)  SpO2: --    PHYSICAL EXAM  GEN: Sitting in chair, No acute distress or labored breathing noted on room air  PULM: mild crackles in upper lungs bilaterally   CVS: Normal S1 S2  ABD: Soft, non-tender, non-distended  NEURO: A&Ox3, no focal deficits    LABS                        11.4   7.66  )-----------( 301      ( 10 Sep 2020 05:25 )             34.4     09-10    139  |  101  |  14  ----------------------------<  131<H>  4.1   |  22  |  0.9    Ca    9.2      10 Sep 2020 05:25  Mg     2.0     09-10    TPro  6.6  /  Alb  3.9  /  TBili  0.8  /  DBili  x   /  AST  83<H>  /  ALT  125<H>  /  AlkPhos  62  09-10          Troponin T, Serum: <0.01 ng/mL (09-10-20 @ 05:25)  Troponin T, Serum: <0.01 ng/mL (09-10-20 @ 00:25)  Troponin T, Serum: <0.01 ng/mL (09-09-20 @ 16:57)      CARDIAC MARKERS ( 10 Sep 2020 05:25 )  x     / <0.01 ng/mL / x     / x     / x      CARDIAC MARKERS ( 10 Sep 2020 00:25 )  x     / <0.01 ng/mL / x     / x     / x      CARDIAC MARKERS ( 09 Sep 2020 16:57 )  x     / <0.01 ng/mL / x     / x     / x          RADIOLOGY

## 2020-09-10 NOTE — DISCHARGE NOTE PROVIDER - CARE PROVIDERS DIRECT ADDRESSES
[email or web address]

## 2020-09-11 ENCOUNTER — TRANSCRIPTION ENCOUNTER (OUTPATIENT)
Age: 75
End: 2020-09-11

## 2020-09-11 VITALS
TEMPERATURE: 97 F | RESPIRATION RATE: 20 BRPM | SYSTOLIC BLOOD PRESSURE: 132 MMHG | HEART RATE: 85 BPM | DIASTOLIC BLOOD PRESSURE: 58 MMHG

## 2020-09-11 LAB
ALBUMIN SERPL ELPH-MCNC: 3.9 G/DL — SIGNIFICANT CHANGE UP (ref 3.5–5.2)
ALP SERPL-CCNC: 71 U/L — SIGNIFICANT CHANGE UP (ref 30–115)
ALT FLD-CCNC: 173 U/L — HIGH (ref 0–41)
ANION GAP SERPL CALC-SCNC: 13 MMOL/L — SIGNIFICANT CHANGE UP (ref 7–14)
AST SERPL-CCNC: 108 U/L — HIGH (ref 0–41)
BILIRUB SERPL-MCNC: 0.7 MG/DL — SIGNIFICANT CHANGE UP (ref 0.2–1.2)
BUN SERPL-MCNC: 18 MG/DL — SIGNIFICANT CHANGE UP (ref 10–20)
CALCIUM SERPL-MCNC: 9.2 MG/DL — SIGNIFICANT CHANGE UP (ref 8.5–10.1)
CHLORIDE SERPL-SCNC: 97 MMOL/L — LOW (ref 98–110)
CO2 SERPL-SCNC: 23 MMOL/L — SIGNIFICANT CHANGE UP (ref 17–32)
CREAT SERPL-MCNC: 1 MG/DL — SIGNIFICANT CHANGE UP (ref 0.7–1.5)
GLUCOSE BLDC GLUCOMTR-MCNC: 132 MG/DL — HIGH (ref 70–99)
GLUCOSE BLDC GLUCOMTR-MCNC: 195 MG/DL — HIGH (ref 70–99)
GLUCOSE BLDC GLUCOMTR-MCNC: 254 MG/DL — HIGH (ref 70–99)
GLUCOSE SERPL-MCNC: 171 MG/DL — HIGH (ref 70–99)
HCT VFR BLD CALC: 35.9 % — LOW (ref 37–47)
HGB BLD-MCNC: 11.9 G/DL — LOW (ref 12–16)
LEGIONELLA AG UR QL: NEGATIVE — SIGNIFICANT CHANGE UP
MAGNESIUM SERPL-MCNC: 1.9 MG/DL — SIGNIFICANT CHANGE UP (ref 1.8–2.4)
MCHC RBC-ENTMCNC: 28.9 PG — SIGNIFICANT CHANGE UP (ref 27–31)
MCHC RBC-ENTMCNC: 33.1 G/DL — SIGNIFICANT CHANGE UP (ref 32–37)
MCV RBC AUTO: 87.1 FL — SIGNIFICANT CHANGE UP (ref 81–99)
NRBC # BLD: 0 /100 WBCS — SIGNIFICANT CHANGE UP (ref 0–0)
PLATELET # BLD AUTO: 329 K/UL — SIGNIFICANT CHANGE UP (ref 130–400)
POTASSIUM SERPL-MCNC: 4.1 MMOL/L — SIGNIFICANT CHANGE UP (ref 3.5–5)
POTASSIUM SERPL-SCNC: 4.1 MMOL/L — SIGNIFICANT CHANGE UP (ref 3.5–5)
PROT SERPL-MCNC: 6.8 G/DL — SIGNIFICANT CHANGE UP (ref 6–8)
RBC # BLD: 4.12 M/UL — LOW (ref 4.2–5.4)
RBC # FLD: 13.3 % — SIGNIFICANT CHANGE UP (ref 11.5–14.5)
SODIUM SERPL-SCNC: 133 MMOL/L — LOW (ref 135–146)
WBC # BLD: 8.06 K/UL — SIGNIFICANT CHANGE UP (ref 4.8–10.8)
WBC # FLD AUTO: 8.06 K/UL — SIGNIFICANT CHANGE UP (ref 4.8–10.8)

## 2020-09-11 PROCEDURE — 71045 X-RAY EXAM CHEST 1 VIEW: CPT | Mod: 26

## 2020-09-11 RX ORDER — SERTRALINE 25 MG/1
1 TABLET, FILM COATED ORAL
Qty: 0 | Refills: 0 | DISCHARGE
Start: 2020-09-11

## 2020-09-11 RX ORDER — ATORVASTATIN CALCIUM 80 MG/1
1 TABLET, FILM COATED ORAL
Qty: 0 | Refills: 0 | DISCHARGE

## 2020-09-11 RX ORDER — INSULIN GLARGINE 100 [IU]/ML
20 INJECTION, SOLUTION SUBCUTANEOUS
Qty: 0 | Refills: 0 | DISCHARGE

## 2020-09-11 RX ORDER — SERTRALINE 25 MG/1
1 TABLET, FILM COATED ORAL
Qty: 30 | Refills: 0
Start: 2020-09-11 | End: 2020-10-10

## 2020-09-11 RX ADMIN — Medication 5 UNIT(S): at 12:08

## 2020-09-11 RX ADMIN — CEFTRIAXONE 100 MILLIGRAM(S): 500 INJECTION, POWDER, FOR SOLUTION INTRAMUSCULAR; INTRAVENOUS at 05:43

## 2020-09-11 RX ADMIN — Medication 3: at 12:08

## 2020-09-11 RX ADMIN — LISINOPRIL 40 MILLIGRAM(S): 2.5 TABLET ORAL at 05:43

## 2020-09-11 RX ADMIN — Medication 5 UNIT(S): at 08:16

## 2020-09-11 RX ADMIN — Medication 1: at 08:16

## 2020-09-11 RX ADMIN — SERTRALINE 25 MILLIGRAM(S): 25 TABLET, FILM COATED ORAL at 12:09

## 2020-09-11 RX ADMIN — RIVAROXABAN 20 MILLIGRAM(S): KIT at 12:09

## 2020-09-11 RX ADMIN — AMLODIPINE BESYLATE 5 MILLIGRAM(S): 2.5 TABLET ORAL at 05:44

## 2020-09-11 RX ADMIN — PANTOPRAZOLE SODIUM 40 MILLIGRAM(S): 20 TABLET, DELAYED RELEASE ORAL at 05:44

## 2020-09-11 RX ADMIN — Medication 100 MILLIGRAM(S): at 05:43

## 2020-09-11 NOTE — PROGRESS NOTE ADULT - SUBJECTIVE AND OBJECTIVE BOX
KENA LOZADA 76yo W  Female transferred from [facility] where she was being tx for CA PNA.  The pt was transferred to [facility] as she has complex cardiac issues and all her MDs are with this hospital.  Of note the pt has not been feeling well x 2 wks with fatigue, lethargy, T, L sided CP exacerbated by deep inspirations.  The pt is on Ceftriaxone ans azithromycin. The CXR shows R upper and L midobe opacities.The PMHx includes:  HTN, ASHD, CAD, sp stents, afib, sp cardioversions, on AC with Xarelto, DLD, DM II, OA, DDD, DJD, GERD, Diverticulosis.  The pt is DNR.    INTERVAL HPI/OVERNIGHT EVENTS: pt weak and fatigued, chronically ill looking, ? depressed, but medcally stable for D/C    MEDICATIONS  (STANDING):  amLODIPine   Tablet 5 milliGRAM(s) Oral daily  atorvastatin 40 milliGRAM(s) Oral at bedtime  azithromycin   Tablet 250 milliGRAM(s) Oral daily  cefTRIAXone   IVPB 1000 milliGRAM(s) IV Intermittent every 24 hours  dextrose 5%. 1000 milliLiter(s) (50 mL/Hr) IV Continuous <Continuous>  dextrose 50% Injectable 12.5 Gram(s) IV Push once  dextrose 50% Injectable 25 Gram(s) IV Push once  dextrose 50% Injectable 25 Gram(s) IV Push once  ergocalciferol 96801 Unit(s) Oral every week  influenza   Vaccine 0.5 milliLiter(s) IntraMuscular once  insulin glargine Injectable (LANTUS) 15 Unit(s) SubCutaneous at bedtime  insulin lispro (HumaLOG) corrective regimen sliding scale   SubCutaneous three times a day before meals  insulin lispro Injectable (HumaLOG) 5 Unit(s) SubCutaneous three times a day before meals  lisinopril 40 milliGRAM(s) Oral daily  metoprolol succinate  milliGRAM(s) Oral daily  pantoprazole    Tablet 40 milliGRAM(s) Oral before breakfast  rivaroxaban 20 milliGRAM(s) Oral daily    MEDICATIONS  (PRN):  acetaminophen   Tablet .. 650 milliGRAM(s) Oral every 6 hours PRN Temp greater or equal to 38.5C (101.3F), Severe Pain (7 - 10)  dextrose 40% Gel 15 Gram(s) Oral once PRN Blood Glucose LESS THAN 70 milliGRAM(s)/deciliter  glucagon  Injectable 1 milliGRAM(s) IntraMuscular once PRN Glucose LESS THAN 70 milligrams/deciliter  metoclopramide 10 milliGRAM(s) Oral two times a day before meals PRN Nausea, vomiting      Allergies    adhesives (Pruritus; Rash)  doxycycline (Other)  latex (Rash)  Milk (Diarrhea)  	    Vital Signs Last 24 Hrs  T(F): 96  HR: 79  BP: 180/8-    RR: 17      PHYSICAL EXAM:      Constitutional:  A&O,  elderly and chronically ill looking WF but in NAD    Eyes: nonicteric    ENMT: dry oral mucosa, dental defects    Neck: supple, no JVD, no bruits    Back: kyphosis    Respiratory: shallow resp, scattered rhonchi, poor air exchange    Cardiovascular: s1S2 irreg    Gastrointestinal: globose, soft and benign    Genitourinary: no cummings    Extremities: moves all ext, + arthritic changes, poor mm mass and tone    Vascular: dec pedal pulses    Neurological: nonfocal    Skin: no rash      LABS:                        11.9  8 )-----------( 329                35.9      133  |  97  |  18  ----------------------------<  171  4.1   |  23  |  1.0  GFR 55  A1c 8.0  trop 0.01 x 3  Ca    9.2      10 Sep 2020 05:25  Mg     2.0     09-10  Covid Ab +  TPro  6.6  /  Alb  3.9  /  TBili  0.8  /  DBili  x   /  AST  83, 08  /  ALT  125, 173  /  AlkPhos  62  09-10          RADIOLOGY & ADDITIONAL TESTS:    EKG:  afib 74/min no acute changes    CXR:  R upper ad L mid lung opaciteis  CXR:  dec R upper and mid lung opacities KENA LOZADA 76yo W  Female transferred from [facility] where she was being tx for CA PNA.  The pt was transferred to [facility] as she has complex cardiac issues and all her MDs are with this hospital.  Of note the pt has not been feeling well x 2 wks with fatigue, lethargy, T, L sided CP exacerbated by deep inspirations.  The pt is on Ceftriaxone ans azithromycin. The CXR shows R upper and L midobe opacities.The PMHx includes:  HTN, ASHD, CAD, sp stents, afib, sp cardioversions, on AC with Xarelto, DLD, DM II, OA, DDD, DJD, GERD, Diverticulosis.  The pt is DNR.    INTERVAL HPI/OVERNIGHT EVENTS: pt weak and fatigued, chronically ill looking, ? depressed and started low dose Zoloft, will adjust as OP but medcally stable for D/C    MEDICATIONS  (STANDING):  amLODIPine   Tablet 5 milliGRAM(s) Oral daily  atorvastatin 40 milliGRAM(s) Oral at bedtime  azithromycin   Tablet 250 milliGRAM(s) Oral daily  cefTRIAXone   IVPB 1000 milliGRAM(s) IV Intermittent every 24 hours  dextrose 5%. 1000 milliLiter(s) (50 mL/Hr) IV Continuous <Continuous>  dextrose 50% Injectable 12.5 Gram(s) IV Push once  dextrose 50% Injectable 25 Gram(s) IV Push once  dextrose 50% Injectable 25 Gram(s) IV Push once  ergocalciferol 34067 Unit(s) Oral every week  influenza   Vaccine 0.5 milliLiter(s) IntraMuscular once  insulin glargine Injectable (LANTUS) 15 Unit(s) SubCutaneous at bedtime  insulin lispro (HumaLOG) corrective regimen sliding scale   SubCutaneous three times a day before meals  insulin lispro Injectable (HumaLOG) 5 Unit(s) SubCutaneous three times a day before meals  lisinopril 40 milliGRAM(s) Oral daily  metoprolol succinate  milliGRAM(s) Oral daily  pantoprazole    Tablet 40 milliGRAM(s) Oral before breakfast  rivaroxaban 20 milliGRAM(s) Oral daily    MEDICATIONS  (PRN):  acetaminophen   Tablet .. 650 milliGRAM(s) Oral every 6 hours PRN Temp greater or equal to 38.5C (101.3F), Severe Pain (7 - 10)  dextrose 40% Gel 15 Gram(s) Oral once PRN Blood Glucose LESS THAN 70 milliGRAM(s)/deciliter  glucagon  Injectable 1 milliGRAM(s) IntraMuscular once PRN Glucose LESS THAN 70 milligrams/deciliter  metoclopramide 10 milliGRAM(s) Oral two times a day before meals PRN Nausea, vomiting      Allergies    adhesives (Pruritus; Rash)  doxycycline (Other)  latex (Rash)  Milk (Diarrhea)  	    Vital Signs Last 24 Hrs  T(F): 96  HR: 79  BP: 180/8-    RR: 17      PHYSICAL EXAM:      Constitutional:  A&O,  elderly and chronically ill looking WF but in NAD    Eyes: nonicteric    ENMT: dry oral mucosa, dental defects    Neck: supple, no JVD, no bruits    Back: kyphosis    Respiratory: shallow resp, scattered rhonchi, poor air exchange    Cardiovascular: s1S2 irreg    Gastrointestinal: globose, soft and benign    Genitourinary: no cummings    Extremities: moves all ext, + arthritic changes, poor mm mass and tone    Vascular: dec pedal pulses    Neurological: nonfocal    Skin: no rash      LABS:                        11.9  8 )-----------( 329                35.9      133  |  97  |  18  ----------------------------<  171  4.1   |  23  |  1.0  GFR 55  A1c 8.0  trop 0.01 x 3  Ca    9.2      10 Sep 2020 05:25  Mg     2.0     09-10  Covid Ab +  TPro  6.6  /  Alb  3.9  /  TBili  0.8  /  DBili  x   /  AST  83, 08  /  ALT  125, 173  /  AlkPhos  62  09-10          RADIOLOGY & ADDITIONAL TESTS:    EKG:  afib 74/min no acute changes    CXR:  R upper ad L mid lung opaciteis  CXR:  dec R upper and mid lung opacities

## 2020-09-11 NOTE — PROGRESS NOTE ADULT - SUBJECTIVE AND OBJECTIVE BOX
HPI:  Pt is a 74 y/o F with PMHx of Afib on Xarelto s/p Ablation x2 (latest in Feb 2020) and DCCV (latest in June 2020); CAD s/p stent x4; HTN; HLD; DM2 was transferred from [facility] where she was admitted on 09/07/2020 with a diagnosis of Pneumonia. According to the daughter in law, the patient felt tired and dizzy for 2 weeks; Symptoms worsened 3 days prior to her admission at [facility] (9/7) where she was found to be febrile, tachycardic, and hypertensive. She had CXR and CT chest done and was treated for pneumonia. According to the daughter in law she had occasional shortness of breath on exertion. She also had chest pain on the left side which started about 2 weeks ago; it was intermittent, on the front and back of the left side of chest, not radiating, exacerbated by deep breath, non radiating, 5/10 in severity, which has worsened to 7/10 since yesterday. According to the daughter in law the patient was on amiodarone for atrial fibrillation which was discontinued 2 weeks ago by her electrophysiologist, whom she saw after developing the dizziness and weakness.    Currently admitted to medicine with the primary diagnosis of pneumonia.  Pt speaks Burmese.    INTERVAL HPI / OVERNIGHT EVENTS:  Patient was examined and seen at bedside. This morning she is sitting comfortably in a chair and eating breakfast.     ROS: Otherwise unremarkable     MEDICATIONS  STANDING MEDICATIONS  amLODIPine   Tablet 5 milliGRAM(s) Oral daily  dextrose 5%. 1000 milliLiter(s) IV Continuous <Continuous>  dextrose 50% Injectable 12.5 Gram(s) IV Push once  dextrose 50% Injectable 25 Gram(s) IV Push once  dextrose 50% Injectable 25 Gram(s) IV Push once  ergocalciferol 75747 Unit(s) Oral every week  influenza   Vaccine 0.5 milliLiter(s) IntraMuscular once  insulin glargine Injectable (LANTUS) 15 Unit(s) SubCutaneous at bedtime  insulin lispro (HumaLOG) corrective regimen sliding scale   SubCutaneous three times a day before meals  insulin lispro Injectable (HumaLOG) 5 Unit(s) SubCutaneous three times a day before meals  lisinopril 40 milliGRAM(s) Oral daily  metoprolol succinate  milliGRAM(s) Oral daily  pantoprazole    Tablet 40 milliGRAM(s) Oral before breakfast  rivaroxaban 20 milliGRAM(s) Oral daily  sertraline 25 milliGRAM(s) Oral daily    PRN MEDICATIONS  acetaminophen   Tablet .. 650 milliGRAM(s) Oral every 6 hours PRN  dextrose 40% Gel 15 Gram(s) Oral once PRN  glucagon  Injectable 1 milliGRAM(s) IntraMuscular once PRN  metoclopramide 10 milliGRAM(s) Oral two times a day before meals PRN    VITALS:  Vital Signs Last 24 Hrs  T(C): 35.6 (11 Sep 2020 05:16), Max: 36.2 (10 Sep 2020 13:47)  T(F): 96 (11 Sep 2020 05:16), Max: 97.1 (10 Sep 2020 13:47)  HR: 79 (11 Sep 2020 05:16) (73 - 85)  BP: 181/80 (11 Sep 2020 05:16) (119/55 - 181/80)  BP(mean): --  RR: 17 (11 Sep 2020 05:16) (17 - 20)  SpO2: --    PHYSICAL EXAM  GEN: No acute distress; sitting comfortably in a chair; no labored breathing noted  PULM: Clear breath sounds to auscultation bilaterally, No wheezes heard  CVS: Regular rate and rhythm, S1-S2, no murmurs or rubs  ABD: Soft, non-tender, non-distended  EXT: No edema; 3/4 DP/PT pulses palpable in both LE bilaterally   NEURO: A&Ox3, no focal deficits    LABS                        11.9   8.06  )-----------( 329      ( 11 Sep 2020 05:19 )             35.9     09-11    133<L>  |  97<L>  |  18  ----------------------------<  171<H>  4.1   |  23  |  1.0    Ca    9.2      11 Sep 2020 05:19  Mg     1.9     09-11    TPro  6.8  /  Alb  3.9  /  TBili  0.7  /  DBili  x   /  AST  108<H>  /  ALT  173<H>  /  AlkPhos  71  09-11    CARDIAC MARKERS ( 10 Sep 2020 05:25 )  x     / <0.01 ng/mL / x     / x     / x      CARDIAC MARKERS ( 10 Sep 2020 00:25 )  x     / <0.01 ng/mL / x     / x     / x      CARDIAC MARKERS ( 09 Sep 2020 16:57 )  x     / <0.01 ng/mL / x     / x     / x          RADIOLOGY    < from: Xray Chest 1 View- PORTABLE-Routine (09.11.20 @ 09:30) >    Impression:    Improving right-sided opacities.    < end of copied text > HPI:  Pt is a 76 y/o F with PMHx of Afib on Xarelto s/p Ablation x2 (latest in Feb 2020) and DCCV (latest in June 2020); CAD s/p stent x4; HTN; HLD; DM2 was transferred from [facility] where she was admitted on 09/07/2020 with a diagnosis of Pneumonia. According to the daughter in law, the patient felt tired and dizzy for 2 weeks; Symptoms worsened 3 days prior to her admission at [facility] (9/7) where she was found to be febrile, tachycardic, and hypertensive. She had CXR and CT chest done and was treated for pneumonia. According to the daughter in law she had occasional shortness of breath on exertion. She also had chest pain on the left side which started about 2 weeks ago; it was intermittent, on the front and back of the left side of chest, not radiating, exacerbated by deep breath, non radiating, 5/10 in severity, which has worsened to 7/10 since yesterday. According to the daughter in law the patient was on amiodarone for atrial fibrillation which was discontinued 2 weeks ago by her electrophysiologist, whom she saw after developing the dizziness and weakness.    Currently admitted to medicine with the primary diagnosis of pneumonia.  Pt speaks Portuguese.    INTERVAL HPI / OVERNIGHT EVENTS:  Patient was examined and seen at bedside. This morning she is sitting comfortably in a chair and eating breakfast.     ROS: Otherwise unremarkable     MEDICATIONS  STANDING MEDICATIONS  amLODIPine   Tablet 5 milliGRAM(s) Oral daily  dextrose 5%. 1000 milliLiter(s) IV Continuous <Continuous>  dextrose 50% Injectable 12.5 Gram(s) IV Push once  dextrose 50% Injectable 25 Gram(s) IV Push once  dextrose 50% Injectable 25 Gram(s) IV Push once  ergocalciferol 09496 Unit(s) Oral every week  influenza   Vaccine 0.5 milliLiter(s) IntraMuscular once  insulin glargine Injectable (LANTUS) 15 Unit(s) SubCutaneous at bedtime  insulin lispro (HumaLOG) corrective regimen sliding scale   SubCutaneous three times a day before meals  insulin lispro Injectable (HumaLOG) 5 Unit(s) SubCutaneous three times a day before meals  lisinopril 40 milliGRAM(s) Oral daily  metoprolol succinate  milliGRAM(s) Oral daily  pantoprazole    Tablet 40 milliGRAM(s) Oral before breakfast  rivaroxaban 20 milliGRAM(s) Oral daily  sertraline 25 milliGRAM(s) Oral daily    PRN MEDICATIONS  acetaminophen   Tablet .. 650 milliGRAM(s) Oral every 6 hours PRN  dextrose 40% Gel 15 Gram(s) Oral once PRN  glucagon  Injectable 1 milliGRAM(s) IntraMuscular once PRN  metoclopramide 10 milliGRAM(s) Oral two times a day before meals PRN    VITALS:  Vital Signs Last 24 Hrs  T(C): 35.6 (11 Sep 2020 05:16), Max: 36.2 (10 Sep 2020 13:47)  T(F): 96 (11 Sep 2020 05:16), Max: 97.1 (10 Sep 2020 13:47)  HR: 79 (11 Sep 2020 05:16) (73 - 85)  BP: 181/80 (11 Sep 2020 05:16) (119/55 - 181/80)  BP(mean): --  RR: 17 (11 Sep 2020 05:16) (17 - 20)  SpO2: --    PHYSICAL EXAM  GEN: No acute distress; sitting comfortably in a chair; no labored breathing noted  PULM: Clear breath sounds to auscultation bilaterally, No wheezes heard  CVS: Regular rate and rhythm, S1-S2, no murmurs or rubs  ABD: Soft, non-tender, non-distended  EXT: No edema; 3/4 DP/PT pulses palpable in both LE bilaterally   NEURO: A&Ox3, no focal deficits    LABS                        11.9   8.06  )-----------( 329      ( 11 Sep 2020 05:19 )             35.9     09-11    133<L>  |  97<L>  |  18  ----------------------------<  171<H>  4.1   |  23  |  1.0    Ca    9.2      11 Sep 2020 05:19  Mg     1.9     09-11    TPro  6.8  /  Alb  3.9  /  TBili  0.7  /  DBili  x   /  AST  108<H>  /  ALT  173<H>  /  AlkPhos  71  09-11    CARDIAC MARKERS ( 10 Sep 2020 05:25 )  x     / <0.01 ng/mL / x     / x     / x      CARDIAC MARKERS ( 10 Sep 2020 00:25 )  x     / <0.01 ng/mL / x     / x     / x      CARDIAC MARKERS ( 09 Sep 2020 16:57 )  x     / <0.01 ng/mL / x     / x     / x          RADIOLOGY    < from: Xray Chest 1 View- PORTABLE-Routine (09.11.20 @ 09:30) >    Impression:    Improving right-sided opacities.    < end of copied text >

## 2020-09-11 NOTE — PROGRESS NOTE ADULT - ASSESSMENT
74 y/o F with PMHx of Afib on Xarelto s/p Ablation x2 (latest in Feb 2020) and DCCV (latest in June 2020); CAD s/p stent x4, HTN, HLD and DMII who was transferred from [facility] where she was admitted on 09/07/2020 with a diagnosis of Pneumonia.    # Possible pneumonia?   - Pt admitted to [facility] on 9/6 for PNA, transferred to [facility] on 9/9; Records obtained from [facility]   - CT Chest at [facility] 9/6 with RUL subsegmental airspace consolidation likely representing pneumonia   - CXR 9/9 at [facility]: Right upper lung patchy opacities may reflect pneumonia in the appropriate clinical setting  - Was given Levofloxacin (1 dose), then Doxycycline + Rocephin, and Zosyn at [facility]; today (9/10) is Day 5 on antibiotics  - Pulm recs finishing abx course and f/u outpatient w/ possible repeat CT  - ID recs finishing abx course as PO and outpatient; can f/u with Quantiferon and UAg for legionella + Strep outpatient  - today is last day of azithromycin and ceftriaxone (9/11 is day 6 of 7 on antibiotics), finish course; Pt allergic to doxycycline    #Transaminitis, likely d/t prolonged antibiotic use   - AST:ALT  103:109 (9/9) --> 83:125 (9/10) --> 108:173 (9/11)  - continue to trend; f/u stat LFT (9/11)    # Dizziness and Fatigue, r/o thyroid dysfunction d/t amiodarone use   - monitor blood glucose  - f/u orthostatics   - consider TFT as patient was on amiodarone which was recently discontinued     # Atrial fibrillation- rate controlled  - h/o multiple ablations and DCCV  - Pt currently rate controlled; HR 70's to 90's overnight (9/10)  - c/w Xarelto 20mg OD  - c/w metoprolol    # Hyponatremia - resolving  - Na was 124 on 9/7 @ [facility] --> Na 139 on 9/10   - f/u with urine Legionella (pending)     # HTN: Continue home meds    # HLD: continue home Lipitor    # DM-II  -FS qac and qhs  - Insulin basal/bolus regimen     # Chest pain  - EKG 9/9: atrial fibrillation - rate controlled; no ST, T wave changes  - likely pleuritic pain d/t pneumonia  - Trops (-) x3 (9/9)  - Acetaminophen for pain control      DVT ppx: Xarelto   Diet: DASH   Activity: Increase as tolerated   Dispo: From home  DNR/DNI 74 y/o F with PMHx of Afib on Xarelto s/p Ablation x2 (latest in Feb 2020) and DCCV (latest in June 2020), CAD s/p stent x4, HTN, HLD and DMII who was transferred from [facility] where she was admitted on 09/07/2020 with a diagnosis of Pneumonia    # Possible pneumonia?    - Pt admitted to [facility] on 9/6 for PNA, transferred to [facility] on 9/9; Records obtained from [facility]   - CT Chest at [facility] 9/6 with RUL subsegmental airspace consolidation likely representing pneumonia   - CXR 9/9 at [facility]: Right upper lung patchy opacities may reflect pneumonia in the appropriate clinical setting  - Was given Levofloxacin (1 dose), then Doxycycline + Rocephin, and Zosyn at [facility]; currently completed abx course   - Pulm recs f/u outpatient for possible repeat CT chest  - ID recs finish abx course outpatient, can f/u for Quantiferon and UAg for legionella + Strep outpatient    #Transaminitis, possibly d/t prolonged antibiotic use?   - AST:ALT  103:109 (9/9) --> 83:125 (9/10) --> 108:173 (9/11)  - continue to trend; f/u LFT (9/11)    # Dizziness and Fatigue, r/o thyroid dysfunction d/t amiodarone use   - monitor blood glucose  - f/u orthostatics   - consider TFT as patient was on amiodarone which was recently discontinued     # Atrial fibrillation- rate controlled  - h/o multiple ablations and DCCV  - Pt currently rate controlled; HR 70's to 90's overnight (9/10)  - c/w Xarelto 20mg OD  - c/w metoprolol    # Hyponatremia - resolving  - Na was 124 on 9/7 @ [facility] --> Na 139 on 9/10   - f/u urine Legionella (pending)     # HTN: Continue home meds    # HLD: Continue home Lipitor?     # DM-II  -FS qac and qhs  - Insulin basal/bolus regimen     # Chest pain- resolved  - EKG 9/9: atrial fibrillation - rate controlled; no ST, T wave changes  - likely pleuritic pain d/t pneumonia  - Trops (-) x3 (9/9)  - Acetaminophen prn pain control    DVT ppx: Xarelto   Diet: DASH   Activity: Increase as tolerated   Dispo: From home  DNR/DNI

## 2020-09-11 NOTE — DISCHARGE NOTE NURSING/CASE MANAGEMENT/SOCIAL WORK - PATIENT PORTAL LINK FT
You can access the FollowMyHealth Patient Portal offered by [facility] by registering at the following website: [email or web address]. By joining [employer]’s FollowMyHealth portal, you will also be able to view your health information using other applications (apps) compatible with our system.

## 2020-09-11 NOTE — PROGRESS NOTE ADULT - REASON FOR ADMISSION
Transfer from [facility] - Pneumonia  Dizziness  Chest pain
Transfer from [facility] - Pneumonia  Dizziness  Chest pain
Transfer from [facility] - Pneumonia  Dizziness  Chest pain
Transfer from [facility] - Pneumonia  Dizziness  Chest pain
Transfer from [facility] - Pneumonia  Dizziness  Chest pain
Transfer from [facility] - Pneumonia  Dizziness  Chest pain
Transfer from [facility] - Pneumonia  Dizziness  Chest pain
Transfer from [facility] - Pneumonia  Dizziness  Chest pain
Transfer from [email or web address] - Pneumonia  Dizziness  Chest pain
Transfer from [facility] - Pneumonia  Dizziness  Chest pain

## 2020-09-11 NOTE — PROGRESS NOTE ADULT - ASSESSMENT
Elderly pt with multiple medical issues  transferred from [facility] for community acquired BL PNA.    CA PNA, BL opacities  Sepsis R/O on admission  Transaminitis  Hx of HTN, ASHD, CAD, sp stents,  afib, sp cardioversion  Hx of DLD  Hx of DM II  Hx of OA, DDD, DJD  Hx of GERD. Diverticulosis  Hx of Anemia  DNR     The CXR shows R upper lobe and L mid lung opacities, repeat CXR shows dec opacities  Pt is on IV ceftriaxone and po azithromycin, may be switched to po abx on D/C  cont all home meds  pt appeart depressed start sertraline 25mg  monitor pulse ox, encourage deep breathy  Pul consult appreciated       Cardio consult:  Dr Leola edouard [facility] for safe D/C home Elderly pt with multiple medical issues  transferred from [facility] for community acquired BL PNA.    CA PNA, BL opacities  Sepsis R/O on admission  Transaminitis  Hx of HTN, ASHD, CAD, sp stents,  afib, sp cardioversion  Hx of DLD  Hx of DM II  Hx of OA, DDD, DJD  Hx of GERD. Diverticulosis  Hx of Anemia  DNR     The CXR shows R upper lobe and L mid lung opacities, repeat CXR shows dec opacities  Pt was on IV ceftriaxone and po azithromycin, completed course of AB bet [facility] and here  cont all home meds  pt appears depressed started sertraline 25mg  monitor pulse ox, encourage deep breaths  pt tested + for Covid Ab from previous exposure  Pul consult appreciated       Cardio consult:  Dr Leola edouard [facility] for safe D/C home

## 2020-09-11 NOTE — PROGRESS NOTE ADULT - SUBJECTIVE AND OBJECTIVE BOX
SUBJ: no new complains      MEDICATIONS  (STANDING):  amLODIPine   Tablet 5 milliGRAM(s) Oral daily  dextrose 5%. 1000 milliLiter(s) (50 mL/Hr) IV Continuous <Continuous>  dextrose 50% Injectable 12.5 Gram(s) IV Push once  dextrose 50% Injectable 25 Gram(s) IV Push once  dextrose 50% Injectable 25 Gram(s) IV Push once  ergocalciferol 34047 Unit(s) Oral every week  influenza   Vaccine 0.5 milliLiter(s) IntraMuscular once  insulin glargine Injectable (LANTUS) 15 Unit(s) SubCutaneous at bedtime  insulin lispro (HumaLOG) corrective regimen sliding scale   SubCutaneous three times a day before meals  insulin lispro Injectable (HumaLOG) 5 Unit(s) SubCutaneous three times a day before meals  lisinopril 40 milliGRAM(s) Oral daily  metoprolol succinate  milliGRAM(s) Oral daily  pantoprazole    Tablet 40 milliGRAM(s) Oral before breakfast  rivaroxaban 20 milliGRAM(s) Oral daily  sertraline 25 milliGRAM(s) Oral daily    MEDICATIONS  (PRN):  acetaminophen   Tablet .. 650 milliGRAM(s) Oral every 6 hours PRN Temp greater or equal to 38.5C (101.3F), Severe Pain (7 - 10)  dextrose 40% Gel 15 Gram(s) Oral once PRN Blood Glucose LESS THAN 70 milliGRAM(s)/deciliter  glucagon  Injectable 1 milliGRAM(s) IntraMuscular once PRN Glucose LESS THAN 70 milligrams/deciliter  metoclopramide 10 milliGRAM(s) Oral two times a day before meals PRN Nausea, vomiting            Vital Signs Last 24 Hrs  T(C): 35.6 (11 Sep 2020 05:16), Max: 36.2 (10 Sep 2020 13:47)  T(F): 96 (11 Sep 2020 05:16), Max: 97.1 (10 Sep 2020 13:47)  HR: 79 (11 Sep 2020 05:16) (73 - 85)  BP: 181/80 (11 Sep 2020 05:16) (119/55 - 181/80)  BP(mean): --  RR: 17 (11 Sep 2020 05:16) (17 - 20)  SpO2: --     REVIEW OF SYSTEMS:  CONSTITUTIONAL: No fever, weight loss, or fatigue  CARDIOLOGY: PAtient denies chest pain, shortness of breath or syncopal episodes.   RESPIRATORY: denies shortness of breath, wheezeing.   NEUROLOGICAL: NO weakness, no focal deficits to report.  ENDOCRINOLOGICAL: no recent change in diabetic medications.   GI: no BRBPR, no N,V,diarrhea.    PSYCHIATRY: normal mood and affect  HEENT: no nasal discharge, no ecchymosis  SKIN: no ecchymosis, no breakdown  MUSCULOSKELETAL: Full range of motion x4.        PHYSICAL EXAM:  · CONSTITUTIONAL:	Well-developed, well nourished    BMI-  ·RESPIRATORY:   airway patent; breath sounds equal; good air movement; respirations non-labored; clear to auscultation bilaterally; no chest wall tenderness; no intercostal retractions; no rales,rhonchi or wheeze  · CARDIOVASCULAR	regular rate and rhythm  no rub  no murmur  normal PMI  · EXTREMITIES: No cyanosis, clubbing or edema  · VASCULAR: 	Equal and normal pulses (carotid, femoral, dorsalis pedis)  	  TELEMETRY:    ECG:    TTE:    LABS:                        11.9   8.06  )-----------( 329      ( 11 Sep 2020 05:19 )             35.9     09-11    133<L>  |  97<L>  |  18  ----------------------------<  171<H>  4.1   |  23  |  1.0    Ca    9.2      11 Sep 2020 05:19  Mg     1.9     09-11    TPro  6.8  /  Alb  3.9  /  TBili  0.7  /  DBili  x   /  AST  108<H>  /  ALT  173<H>  /  AlkPhos  71  09-11    CARDIAC MARKERS ( 10 Sep 2020 05:25 )  x     / <0.01 ng/mL / x     / x     / x      CARDIAC MARKERS ( 10 Sep 2020 00:25 )  x     / <0.01 ng/mL / x     / x     / x      CARDIAC MARKERS ( 09 Sep 2020 16:57 )  x     / <0.01 ng/mL / x     / x     / x              I&O's Summary    10 Sep 2020 07:01  -  11 Sep 2020 07:00  --------------------------------------------------------  IN: 450 mL / OUT: 0 mL / NET: 450 mL      BNP  RADIOLOGY & ADDITIONAL STUDIES:    IMPRESSION AND PLAN:    Patient doing better  going home

## 2020-09-11 NOTE — PROGRESS NOTE ADULT - ASSESSMENT
Impression:    Possible CAP on therapy   COVID ab +ve  Recommendations:    Finish ABX course  OP pulm FU for possible repeat CT chest NC   OOB to chair   Continue PT  HOB elevation  OOB to chair  DVT ppx  Recall as needed.

## 2020-09-11 NOTE — PROGRESS NOTE ADULT - PROVIDER SPECIALTY LIST ADULT
Cardiology
Infectious Disease
Internal Medicine
Pulmonology
Cardiology
Internal Medicine

## 2020-09-11 NOTE — PROGRESS NOTE ADULT - SUBJECTIVE AND OBJECTIVE BOX
Patient is a 75y old  Female who presents with a chief complaint of Transfer from [facility] - Pneumonia  Dizziness  Chest pain (10 Sep 2020 14:14)        SUBJECTIVE: denied any SOB, Patient complains of generalized weakness.      REVIEW OF SYSTEMS:    All Pertinent ROS are negative except per HPI       PHYSICAL EXAM  Vital Signs Last 24 Hrs  T(C): 35.6 (11 Sep 2020 05:16), Max: 36.2 (10 Sep 2020 13:47)  T(F): 96 (11 Sep 2020 05:16), Max: 97.1 (10 Sep 2020 13:47)  HR: 79 (11 Sep 2020 05:16) (73 - 85)  BP: 181/80 (11 Sep 2020 05:16) (119/55 - 181/80)  BP(mean): --  RR: 17 (11 Sep 2020 05:16) (17 - 20)  SpO2: 97% on ra    CONSTITUTIONAL:  Well nourished.  NAD    ENT:   Airway patent,   No thrush    CARDIAC:   Normal rate,   irregular rhythm.    no edema      RESPIRATORY:   NO Wheezing  Nnormal chest expansion  Nnot tachypneic,  No use of accessory muscles    GASTROINTESTINAL:  Abdomen soft,   non-tender,   no guarding,   + BS    MUSCULOSKELETAL:   range of motion is not limited,  no clubbing, cyanosis    NEUROLOGICAL:   Alert and oriented   no motor or deficits.    SKIN:   Skin normal color for race,   warm, dry   No evidence of rash.      09-10-20 @ 07:01  -  09-11-20 @ 07:00  --------------------------------------------------------  IN:    Oral Fluid: 450 mL  Total IN: 450 mL    OUT:  Total OUT: 0 mL    Total NET: 450 mL          LABS:                          11.9   8.06  )-----------( 329      ( 11 Sep 2020 05:19 )             35.9                                               09-11    133<L>  |  97<L>  |  18  ----------------------------<  171<H>  4.1   |  23  |  1.0    Ca    9.2      11 Sep 2020 05:19  Mg     1.9     09-11    TPro  6.8  /  Alb  3.9  /  TBili  0.7  /  DBili  x   /  AST  108<H>  /  ALT  173<H>  /  AlkPhos  71  09-11                                                 CARDIAC MARKERS ( 10 Sep 2020 05:25 )  x     / <0.01 ng/mL / x     / x     / x      CARDIAC MARKERS ( 10 Sep 2020 00:25 )  x     / <0.01 ng/mL / x     / x     / x      CARDIAC MARKERS ( 09 Sep 2020 16:57 )  x     / <0.01 ng/mL / x     / x     / x                                                LIVER FUNCTIONS - ( 11 Sep 2020 05:19 )  Alb: 3.9 g/dL / Pro: 6.8 g/dL / ALK PHOS: 71 U/L / ALT: 173 U/L / AST: 108 U/L / GGT: x                                                                                                MEDICATIONS  (STANDING):  amLODIPine   Tablet 5 milliGRAM(s) Oral daily  dextrose 5%. 1000 milliLiter(s) (50 mL/Hr) IV Continuous <Continuous>  dextrose 50% Injectable 12.5 Gram(s) IV Push once  dextrose 50% Injectable 25 Gram(s) IV Push once  dextrose 50% Injectable 25 Gram(s) IV Push once  ergocalciferol 42059 Unit(s) Oral every week  influenza   Vaccine 0.5 milliLiter(s) IntraMuscular once  insulin glargine Injectable (LANTUS) 15 Unit(s) SubCutaneous at bedtime  insulin lispro (HumaLOG) corrective regimen sliding scale   SubCutaneous three times a day before meals  insulin lispro Injectable (HumaLOG) 5 Unit(s) SubCutaneous three times a day before meals  lisinopril 40 milliGRAM(s) Oral daily  metoprolol succinate  milliGRAM(s) Oral daily  pantoprazole    Tablet 40 milliGRAM(s) Oral before breakfast  rivaroxaban 20 milliGRAM(s) Oral daily  sertraline 25 milliGRAM(s) Oral daily    MEDICATIONS  (PRN):  acetaminophen   Tablet .. 650 milliGRAM(s) Oral every 6 hours PRN Temp greater or equal to 38.5C (101.3F), Severe Pain (7 - 10)  dextrose 40% Gel 15 Gram(s) Oral once PRN Blood Glucose LESS THAN 70 milliGRAM(s)/deciliter  glucagon  Injectable 1 milliGRAM(s) IntraMuscular once PRN Glucose LESS THAN 70 milligrams/deciliter  metoclopramide 10 milliGRAM(s) Oral two times a day before meals PRN Nausea, vomiting      X-Rays reviewed    CXR interpreted by me:

## 2020-09-12 LAB
GAMMA INTERFERON BACKGROUND BLD IA-ACNC: 0.04 IU/ML — SIGNIFICANT CHANGE UP
M TB IFN-G BLD-IMP: NEGATIVE — SIGNIFICANT CHANGE UP
M TB IFN-G CD4+ BCKGRND COR BLD-ACNC: 0.01 IU/ML — SIGNIFICANT CHANGE UP
M TB IFN-G CD4+CD8+ BCKGRND COR BLD-ACNC: 0 IU/ML — SIGNIFICANT CHANGE UP
QUANT TB PLUS MITOGEN MINUS NIL: 2.06 IU/ML — SIGNIFICANT CHANGE UP

## 2020-09-13 LAB — S PNEUM AG UR QL: NEGATIVE — SIGNIFICANT CHANGE UP

## 2020-09-14 ENCOUNTER — APPOINTMENT (OUTPATIENT)
Dept: CARE COORDINATION | Facility: HOME HEALTH | Age: 75
End: 2020-09-14
Payer: MEDICARE

## 2020-09-14 DIAGNOSIS — J18.9 PNEUMONIA, UNSPECIFIED ORGANISM: ICD-10-CM

## 2020-09-14 DIAGNOSIS — E11.9 TYPE 2 DIABETES MELLITUS W/OUT COMPLICATIONS: ICD-10-CM

## 2020-09-14 PROCEDURE — 99347 HOME/RES VST EST SF MDM 20: CPT | Mod: 95

## 2020-09-14 RX ORDER — INSULIN GLARGINE 100 [IU]/ML
100 INJECTION, SOLUTION SUBCUTANEOUS
Refills: 0 | Status: DISCONTINUED | COMMUNITY
End: 2020-09-14

## 2020-09-14 RX ORDER — AMIODARONE HYDROCHLORIDE 200 MG/1
200 TABLET ORAL DAILY
Refills: 0 | Status: DISCONTINUED | COMMUNITY
End: 2020-09-14

## 2020-09-14 RX ORDER — OXYCODONE AND ACETAMINOPHEN 5; 325 MG/1; MG/1
5-325 TABLET ORAL DAILY
Refills: 0 | Status: DISCONTINUED | COMMUNITY
End: 2020-09-14

## 2020-09-14 NOTE — HISTORY OF PRESENT ILLNESS
[Home] : at home, [unfilled] , at the time of the visit. [Other Location: e.g. Home (Enter Location, City,State)___] : at [unfilled] [Formal Caregiver] : formal caregiver [Verbal consent obtained from patient] : the patient, [unfilled] [FreeTextEntry4] : serrete daughter in agreement as well  [FreeTextEntry1] : follow up s/p recent hospitlzation for PNA [de-identified] : Patent is ia 75 year old female enrolled in the [facility] with PMH of Afib on Xarelto , HTN, HLD and DMII who was transferred from [facility] to [facility] due to pneumonia. During her hospital stay patient was treated with  Levaquin, Ceftriaxone, Doxycycline and Azithromycin. Chest xray showed daily improvement. During her hospital admission, the patient was seen by pulmonology, cardiology and infectious disease. She was found stable for dc to home, family refused HCS have a HHA in place , and she resides with adult children, Patient observed via tele health alert in no distress

## 2020-09-14 NOTE — PHYSICAL EXAM
[No Acute Distress] : no acute distress [No Respiratory Distress] : no respiratory distress  [Well-Appearing] : well-appearing [No Accessory Muscle Use] : no accessory muscle use [No Edema] : there was no peripheral edema [Non-distended] : non-distended [No Rash] : no rash [Normal Gait] : normal gait [Normal Affect] : the affect was normal [Alert and Oriented x3] : oriented to person, place, and time [Normal Insight/Judgement] : insight and judgment were intact [de-identified] : observed via tele health

## 2020-09-14 NOTE — COUNSELING
[de-identified] : Pt was informed about CN’s role/ STARS program and overview of transitional care reviewed with patient. Pt educated on topics of importance such as compliance with prescribed medication regimen, PNA action plan and escalation process, adequate hydration, and proper diet. Pt encouraged calling CN with any issues, concerns or questions, also educated to notify CN if experiencing CP, SOB, cough, increased mucus production, increased use of rescue inhaler, fever, chills, fatigue, “chest cold symptoms” dizziness, lightheadedness, n/v/d/c, swelling to extremities and/or any signs of infection/ flare as reviewed. Reassurance provided. Will continue to monitor\par \par

## 2020-09-14 NOTE — PLAN
[FreeTextEntry1] : PNA-Adhere to all medications .Increase activity as tolerated and maintain optimal activity levels. Continue coughing and deep breathing exercises including use of Incentive Spirometry.\par Receive routine pneumococcal and influenza vaccinations.\par Maintain proper nutrition and adequate hydration.\par afib- c/w BB , xarelto \par DM- low carb diet , Bs monitoring, metformin\par Notify NP for worsening symptoms including fever, chills, SOB, CP, increased cough and secretions.\par Follow up with MD within 7 days of discharge.\par \par

## 2020-09-15 LAB
CULTURE RESULTS: SIGNIFICANT CHANGE UP
SPECIMEN SOURCE: SIGNIFICANT CHANGE UP

## 2020-09-16 DIAGNOSIS — Z91.011 ALLERGY TO MILK PRODUCTS: ICD-10-CM

## 2020-09-16 DIAGNOSIS — R42 DIZZINESS AND GIDDINESS: ICD-10-CM

## 2020-09-16 DIAGNOSIS — R74.0 NONSPECIFIC ELEVATION OF LEVELS OF TRANSAMINASE AND LACTIC ACID DEHYDROGENASE [LDH]: ICD-10-CM

## 2020-09-16 DIAGNOSIS — Z01.84 ENCOUNTER FOR ANTIBODY RESPONSE EXAMINATION: ICD-10-CM

## 2020-09-16 DIAGNOSIS — F32.9 MAJOR DEPRESSIVE DISORDER, SINGLE EPISODE, UNSPECIFIED: ICD-10-CM

## 2020-09-16 DIAGNOSIS — M51.9 UNSPECIFIED THORACIC, THORACOLUMBAR AND LUMBOSACRAL INTERVERTEBRAL DISC DISORDER: ICD-10-CM

## 2020-09-16 DIAGNOSIS — Z79.01 LONG TERM (CURRENT) USE OF ANTICOAGULANTS: ICD-10-CM

## 2020-09-16 DIAGNOSIS — Z95.5 PRESENCE OF CORONARY ANGIOPLASTY IMPLANT AND GRAFT: ICD-10-CM

## 2020-09-16 DIAGNOSIS — Z91.040 LATEX ALLERGY STATUS: ICD-10-CM

## 2020-09-16 DIAGNOSIS — R53.83 OTHER FATIGUE: ICD-10-CM

## 2020-09-16 DIAGNOSIS — I25.10 ATHEROSCLEROTIC HEART DISEASE OF NATIVE CORONARY ARTERY WITHOUT ANGINA PECTORIS: ICD-10-CM

## 2020-09-16 DIAGNOSIS — E11.9 TYPE 2 DIABETES MELLITUS WITHOUT COMPLICATIONS: ICD-10-CM

## 2020-09-16 DIAGNOSIS — R26.9 UNSPECIFIED ABNORMALITIES OF GAIT AND MOBILITY: ICD-10-CM

## 2020-09-16 DIAGNOSIS — M19.90 UNSPECIFIED OSTEOARTHRITIS, UNSPECIFIED SITE: ICD-10-CM

## 2020-09-16 DIAGNOSIS — D64.9 ANEMIA, UNSPECIFIED: ICD-10-CM

## 2020-09-16 DIAGNOSIS — Z86.19 PERSONAL HISTORY OF OTHER INFECTIOUS AND PARASITIC DISEASES: ICD-10-CM

## 2020-09-16 DIAGNOSIS — K21.9 GASTRO-ESOPHAGEAL REFLUX DISEASE WITHOUT ESOPHAGITIS: ICD-10-CM

## 2020-09-16 DIAGNOSIS — J18.9 PNEUMONIA, UNSPECIFIED ORGANISM: ICD-10-CM

## 2020-09-16 DIAGNOSIS — E87.1 HYPO-OSMOLALITY AND HYPONATREMIA: ICD-10-CM

## 2020-09-16 DIAGNOSIS — I48.91 UNSPECIFIED ATRIAL FIBRILLATION: ICD-10-CM

## 2020-09-16 DIAGNOSIS — I10 ESSENTIAL (PRIMARY) HYPERTENSION: ICD-10-CM

## 2020-09-16 DIAGNOSIS — R76.0 RAISED ANTIBODY TITER: ICD-10-CM

## 2020-09-16 DIAGNOSIS — Z66 DO NOT RESUSCITATE: ICD-10-CM

## 2020-09-16 DIAGNOSIS — Z79.4 LONG TERM (CURRENT) USE OF INSULIN: ICD-10-CM

## 2020-09-18 PROBLEM — Z98.890 OTHER SPECIFIED POSTPROCEDURAL STATES: Chronic | Status: ACTIVE | Noted: 2020-09-09

## 2020-09-18 PROBLEM — E11.9 TYPE 2 DIABETES MELLITUS WITHOUT COMPLICATIONS: Chronic | Status: ACTIVE | Noted: 2020-09-09

## 2020-09-18 PROBLEM — E10.65 TYPE 1 DIABETES MELLITUS WITH HYPERGLYCEMIA: Chronic | Status: INACTIVE | Noted: 2019-12-11 | Resolved: 2020-09-09

## 2020-09-28 ENCOUNTER — NON-APPOINTMENT (OUTPATIENT)
Age: 75
End: 2020-09-28

## 2020-10-06 ENCOUNTER — NON-APPOINTMENT (OUTPATIENT)
Age: 75
End: 2020-10-06

## 2021-04-22 ENCOUNTER — OUTPATIENT (OUTPATIENT)
Dept: OUTPATIENT SERVICES | Facility: HOSPITAL | Age: 76
LOS: 1 days | Discharge: HOME | End: 2021-04-22
Payer: MEDICARE

## 2021-04-22 DIAGNOSIS — I48.91 UNSPECIFIED ATRIAL FIBRILLATION: ICD-10-CM

## 2021-04-22 DIAGNOSIS — Z95.5 PRESENCE OF CORONARY ANGIOPLASTY IMPLANT AND GRAFT: Chronic | ICD-10-CM

## 2021-04-22 DIAGNOSIS — Z98.890 OTHER SPECIFIED POSTPROCEDURAL STATES: Chronic | ICD-10-CM

## 2021-04-22 PROCEDURE — 71046 X-RAY EXAM CHEST 2 VIEWS: CPT | Mod: 26

## 2021-05-04 ENCOUNTER — APPOINTMENT (OUTPATIENT)
Dept: HEART AND VASCULAR | Facility: CLINIC | Age: 76
End: 2021-05-04
Payer: MEDICARE

## 2021-05-04 PROCEDURE — 93000 ELECTROCARDIOGRAM COMPLETE: CPT

## 2021-05-04 PROCEDURE — 99213 OFFICE O/P EST LOW 20 MIN: CPT

## 2021-09-09 NOTE — PATIENT PROFILE ADULT - BRADEN MOBILITY
A 44 y/o male with pmhx of lower back pain/sciatica , herniated disc L4-L5  presents with lower back and left leg pain since Monday.    #lower back pain /sciatica   xray lumbar -No evidence of compression deformity or subluxation  Disc space narrowing at L4-L5 and L5-S1.    -pain meds  -prednisone 40 mg x 1  -MRI lumbar non contrast  -Pt consult   A 44 y/o male with pmhx of lower back pain/sciatica , herniated disc L4-L5  presents with lower back and left leg pain since Monday.    #lower back pain /sciatica   xray lumbar -No evidence of compression deformity or subluxation  Disc space narrowing at L4-L5 and L5-S1.    -pain meds  -prednisone 40 mg x 1  -MRI lumbar non contrast  -Pt consult  -NSAID  -Anayeli attempt to obtain recent MRI report   (4) no limitation

## 2021-10-06 PROBLEM — I10 ESSENTIAL HYPERTENSION: Status: ACTIVE | Noted: 2019-12-05

## 2022-03-25 ENCOUNTER — OUTPATIENT (OUTPATIENT)
Dept: OUTPATIENT SERVICES | Facility: HOSPITAL | Age: 77
LOS: 1 days | Discharge: HOME | End: 2022-03-25
Payer: MEDICARE

## 2022-03-25 DIAGNOSIS — Z95.5 PRESENCE OF CORONARY ANGIOPLASTY IMPLANT AND GRAFT: Chronic | ICD-10-CM

## 2022-03-25 DIAGNOSIS — Z98.890 OTHER SPECIFIED POSTPROCEDURAL STATES: Chronic | ICD-10-CM

## 2022-03-25 DIAGNOSIS — R10.9 UNSPECIFIED ABDOMINAL PAIN: ICD-10-CM

## 2022-03-25 PROCEDURE — 76700 US EXAM ABDOM COMPLETE: CPT | Mod: 26

## 2022-06-14 ENCOUNTER — APPOINTMENT (OUTPATIENT)
Age: 77
End: 2022-06-14
Payer: MEDICARE

## 2022-06-14 VITALS
DIASTOLIC BLOOD PRESSURE: 76 MMHG | BODY MASS INDEX: 31.16 KG/M2 | OXYGEN SATURATION: 98 % | HEIGHT: 65 IN | HEART RATE: 85 BPM | WEIGHT: 187 LBS | SYSTOLIC BLOOD PRESSURE: 126 MMHG | RESPIRATION RATE: 14 BRPM

## 2022-06-14 DIAGNOSIS — E11.9 TYPE 2 DIABETES MELLITUS W/OUT COMPLICATIONS: ICD-10-CM

## 2022-06-14 DIAGNOSIS — R06.02 SHORTNESS OF BREATH: ICD-10-CM

## 2022-06-14 DIAGNOSIS — I25.10 ATHEROSCLEROTIC HEART DISEASE OF NATIVE CORONARY ARTERY W/OUT ANGINA PECTORIS: ICD-10-CM

## 2022-06-14 DIAGNOSIS — Z78.9 OTHER SPECIFIED HEALTH STATUS: ICD-10-CM

## 2022-06-14 PROCEDURE — 99203 OFFICE O/P NEW LOW 30 MIN: CPT

## 2022-06-14 NOTE — DISCUSSION/SUMMARY
[FreeTextEntry1] : SOB ON EXERTION NON SMOKER\par HX OF A FIB SP CARDIOVERSION ON XARELTO\par POX ON EXERTION\par CHEST CT\par PFT\par SPOKE WITH DAUGHTER IN LAW OVER THE PHONE

## 2022-06-14 NOTE — PHYSICAL EXAM
[No Acute Distress] : no acute distress [Normal Oropharynx] : normal oropharynx [Normal Appearance] : normal appearance [No Neck Mass] : no neck mass [No Resp Distress] : no resp distress [Clear to Auscultation Bilaterally] : clear to auscultation bilaterally [No Abnormalities] : no abnormalities [Benign] : benign [Normal Gait] : normal gait [No Clubbing] : no clubbing [No Cyanosis] : no cyanosis [No Edema] : no edema [FROM] : FROM [Normal Color/ Pigmentation] : normal color/ pigmentation [No Focal Deficits] : no focal deficits [Oriented x3] : oriented x3 [Normal Affect] : normal affect [TextBox_54] : ANSLEY 3/6

## 2022-06-14 NOTE — HISTORY OF PRESENT ILLNESS
[Shortness of Breath] : Shortness of Breath [Dyspnea] : dyspnea [Fatigue] : fatigue [Cough] : no cough [Wheezing] : no wheezing [Fever] : no fever [Weight Gain] : no weight gain [Leg Pain] : no leg pain [Edema] : no edema [TextBox_4] : SOB ON EXERTION, LAST 1 YEAR, WORSENING SAW CARDIO REFERRED, NON SMOKER, NO COUGH, WHEEZING

## 2022-07-28 ENCOUNTER — APPOINTMENT (OUTPATIENT)
Age: 77
End: 2022-07-28

## 2022-11-23 ENCOUNTER — APPOINTMENT (OUTPATIENT)
Dept: NEUROLOGY | Facility: CLINIC | Age: 77
End: 2022-11-23

## 2023-06-30 ENCOUNTER — APPOINTMENT (OUTPATIENT)
Dept: ELECTROPHYSIOLOGY | Facility: CLINIC | Age: 78
End: 2023-06-30
Payer: MEDICARE

## 2023-06-30 VITALS
TEMPERATURE: 97.1 F | BODY MASS INDEX: 29.16 KG/M2 | WEIGHT: 175 LBS | DIASTOLIC BLOOD PRESSURE: 82 MMHG | HEIGHT: 65 IN | SYSTOLIC BLOOD PRESSURE: 150 MMHG | HEART RATE: 82 BPM | RESPIRATION RATE: 14 BRPM

## 2023-06-30 DIAGNOSIS — I48.91 UNSPECIFIED ATRIAL FIBRILLATION: ICD-10-CM

## 2023-06-30 PROCEDURE — 99204 OFFICE O/P NEW MOD 45 MIN: CPT

## 2023-06-30 PROCEDURE — 93000 ELECTROCARDIOGRAM COMPLETE: CPT

## 2023-06-30 RX ORDER — METOCLOPRAMIDE 10 MG/1
10 TABLET ORAL 3 TIMES DAILY
Refills: 0 | Status: COMPLETED | COMMUNITY
End: 2023-06-30

## 2023-06-30 RX ORDER — AMLODIPINE BESYLATE 5 MG/1
5 TABLET ORAL
Refills: 0 | Status: COMPLETED | COMMUNITY
End: 2023-06-30

## 2023-06-30 RX ORDER — ATORVASTATIN CALCIUM 40 MG/1
40 TABLET, FILM COATED ORAL
Refills: 0 | Status: COMPLETED | COMMUNITY
Start: 1900-01-01 | End: 2020-09-14

## 2023-06-30 RX ORDER — LISINOPRIL 40 MG/1
40 TABLET ORAL DAILY
Refills: 0 | Status: COMPLETED | COMMUNITY
End: 2023-06-30

## 2023-06-30 RX ORDER — SERTRALINE 25 MG/1
25 TABLET, FILM COATED ORAL DAILY
Refills: 0 | Status: ACTIVE | COMMUNITY

## 2023-06-30 RX ORDER — SACUBITRIL AND VALSARTAN 97; 103 MG/1; MG/1
97-103 TABLET, FILM COATED ORAL TWICE DAILY
Refills: 0 | Status: ACTIVE | COMMUNITY

## 2023-06-30 RX ORDER — RIVAROXABAN 20 MG/1
20 TABLET, FILM COATED ORAL DAILY
Refills: 0 | Status: ACTIVE | COMMUNITY

## 2023-06-30 RX ORDER — METFORMIN HYDROCHLORIDE 1000 MG/1
1000 TABLET, COATED ORAL
Refills: 0 | Status: ACTIVE | COMMUNITY

## 2023-06-30 RX ORDER — INSULIN GLARGINE 100 [IU]/ML
100 INJECTION, SOLUTION SUBCUTANEOUS DAILY
Refills: 0 | Status: ACTIVE | COMMUNITY

## 2023-06-30 RX ORDER — SEMAGLUTIDE 0.68 MG/ML
INJECTION, SOLUTION SUBCUTANEOUS WEEKLY
Refills: 0 | Status: ACTIVE | COMMUNITY

## 2023-06-30 RX ORDER — METOPROLOL TARTRATE 50 MG/1
50 TABLET, FILM COATED ORAL TWICE DAILY
Refills: 0 | Status: COMPLETED | COMMUNITY
End: 2023-06-30

## 2023-06-30 RX ORDER — CHOLECALCIFEROL (VITAMIN D3) 1250 MCG
1.25 MG CAPSULE ORAL WEEKLY
Refills: 0 | Status: ACTIVE | COMMUNITY

## 2023-06-30 RX ORDER — ESOMEPRAZOLE MAGNESIUM 40 MG/1
40 CAPSULE, DELAYED RELEASE ORAL DAILY
Refills: 0 | Status: ACTIVE | COMMUNITY

## 2023-06-30 RX ORDER — CARBIDOPA AND LEVODOPA 10; 100 MG/1; MG/1
10-100 TABLET ORAL TWICE DAILY
Refills: 0 | Status: ACTIVE | COMMUNITY

## 2023-07-03 PROBLEM — I48.91 ATRIAL FIBRILLATION, UNSPECIFIED TYPE: Status: ACTIVE | Noted: 2017-01-19

## 2023-07-03 NOTE — PHYSICAL EXAM
[Normal Oral Mucosa] : normal oral mucosa [No Oral Pallor] : no oral pallor [No Oral Cyanosis] : no oral cyanosis [Normal Jugular Venous A Waves Present] : normal jugular venous A waves present [Normal Jugular Venous V Waves Present] : normal jugular venous V waves present [No Jugular Venous Marcus A Waves] : no jugular venous marcus A waves [Respiration, Rhythm And Depth] : normal respiratory rhythm and effort [Exaggerated Use Of Accessory Muscles For Inspiration] : no accessory muscle use [Auscultation Breath Sounds / Voice Sounds] : lungs were clear to auscultation bilaterally [Heart Rate And Rhythm] : heart rate and rhythm were normal [Heart Sounds] : normal S1 and S2 [Murmurs] : no murmurs present [Abdomen Soft] : soft [Abdomen Tenderness] : non-tender [Abdomen Mass (___ Cm)] : no abdominal mass palpated [Nail Clubbing] : no clubbing of the fingernails [Cyanosis, Localized] : no localized cyanosis [Petechial Hemorrhages (___cm)] : no petechial hemorrhages [] : no ischemic changes [Well Developed] : well developed [Well Nourished] : well nourished [No Acute Distress] : no acute distress [Normal Conjunctiva] : normal conjunctiva [Normal Venous Pressure] : normal venous pressure [No Carotid Bruit] : no carotid bruit [Normal S1, S2] : normal S1, S2 [No Murmur] : no murmur [No Rub] : no rub [No Gallop] : no gallop [Clear Lung Fields] : clear lung fields [Good Air Entry] : good air entry [No Respiratory Distress] : no respiratory distress  [Soft] : abdomen soft [Non Tender] : non-tender [No Masses/organomegaly] : no masses/organomegaly [Normal Bowel Sounds] : normal bowel sounds [Normal Gait] : normal gait [No Edema] : no edema [No Cyanosis] : no cyanosis [No Clubbing] : no clubbing [No Varicosities] : no varicosities [No Rash] : no rash [No Skin Lesions] : no skin lesions [Moves all extremities] : moves all extremities [No Focal Deficits] : no focal deficits [Normal Speech] : normal speech [Alert and Oriented] : alert and oriented [Normal memory] : normal memory

## 2023-07-26 ENCOUNTER — INPATIENT (INPATIENT)
Facility: HOSPITAL | Age: 78
LOS: 3 days | Discharge: HOME | DRG: 811 | End: 2023-07-30
Attending: STUDENT IN AN ORGANIZED HEALTH CARE EDUCATION/TRAINING PROGRAM | Admitting: STUDENT IN AN ORGANIZED HEALTH CARE EDUCATION/TRAINING PROGRAM
Payer: MEDICARE

## 2023-07-26 VITALS
TEMPERATURE: 98 F | WEIGHT: 179.9 LBS | SYSTOLIC BLOOD PRESSURE: 139 MMHG | OXYGEN SATURATION: 99 % | HEART RATE: 87 BPM | RESPIRATION RATE: 18 BRPM | DIASTOLIC BLOOD PRESSURE: 66 MMHG

## 2023-07-26 DIAGNOSIS — D64.9 ANEMIA, UNSPECIFIED: ICD-10-CM

## 2023-07-26 DIAGNOSIS — Z98.890 OTHER SPECIFIED POSTPROCEDURAL STATES: Chronic | ICD-10-CM

## 2023-07-26 DIAGNOSIS — Z95.5 PRESENCE OF CORONARY ANGIOPLASTY IMPLANT AND GRAFT: Chronic | ICD-10-CM

## 2023-07-26 LAB
ABO RH CONFIRMATION: SIGNIFICANT CHANGE UP
ALBUMIN SERPL ELPH-MCNC: 4.5 G/DL — SIGNIFICANT CHANGE UP (ref 3.5–5.2)
ALP SERPL-CCNC: 94 U/L — SIGNIFICANT CHANGE UP (ref 30–115)
ALT FLD-CCNC: 9 U/L — SIGNIFICANT CHANGE UP (ref 0–41)
ANION GAP SERPL CALC-SCNC: 13 MMOL/L — SIGNIFICANT CHANGE UP (ref 7–14)
APPEARANCE UR: CLEAR — SIGNIFICANT CHANGE UP
APTT BLD: 35.6 SEC — SIGNIFICANT CHANGE UP (ref 27–39.2)
AST SERPL-CCNC: 15 U/L — SIGNIFICANT CHANGE UP (ref 0–41)
BACTERIA # UR AUTO: NEGATIVE — SIGNIFICANT CHANGE UP
BASOPHILS # BLD AUTO: 0.05 K/UL — SIGNIFICANT CHANGE UP (ref 0–0.2)
BASOPHILS NFR BLD AUTO: 0.7 % — SIGNIFICANT CHANGE UP (ref 0–1)
BILIRUB SERPL-MCNC: 0.2 MG/DL — SIGNIFICANT CHANGE UP (ref 0.2–1.2)
BILIRUB UR-MCNC: NEGATIVE — SIGNIFICANT CHANGE UP
BLD GP AB SCN SERPL QL: SIGNIFICANT CHANGE UP
BUN SERPL-MCNC: 17 MG/DL — SIGNIFICANT CHANGE UP (ref 10–20)
CALCIUM SERPL-MCNC: 9.3 MG/DL — SIGNIFICANT CHANGE UP (ref 8.4–10.4)
CHLORIDE SERPL-SCNC: 93 MMOL/L — LOW (ref 98–110)
CO2 SERPL-SCNC: 22 MMOL/L — SIGNIFICANT CHANGE UP (ref 17–32)
COLOR SPEC: SIGNIFICANT CHANGE UP
CREAT SERPL-MCNC: 1 MG/DL — SIGNIFICANT CHANGE UP (ref 0.7–1.5)
DIFF PNL FLD: NEGATIVE — SIGNIFICANT CHANGE UP
EGFR: 58 ML/MIN/1.73M2 — LOW
EOSINOPHIL # BLD AUTO: 0.06 K/UL — SIGNIFICANT CHANGE UP (ref 0–0.7)
EOSINOPHIL NFR BLD AUTO: 0.8 % — SIGNIFICANT CHANGE UP (ref 0–8)
EPI CELLS # UR: 1 /HPF — SIGNIFICANT CHANGE UP (ref 0–5)
GLUCOSE BLDC GLUCOMTR-MCNC: 142 MG/DL — HIGH (ref 70–99)
GLUCOSE SERPL-MCNC: 141 MG/DL — HIGH (ref 70–99)
GLUCOSE UR QL: NEGATIVE — SIGNIFICANT CHANGE UP
HCT VFR BLD CALC: 22.8 % — LOW (ref 37–47)
HGB BLD-MCNC: 7.4 G/DL — LOW (ref 12–16)
HYALINE CASTS # UR AUTO: 0 /LPF — SIGNIFICANT CHANGE UP (ref 0–7)
IMM GRANULOCYTES NFR BLD AUTO: 0.1 % — SIGNIFICANT CHANGE UP (ref 0.1–0.3)
INR BLD: 1.22 RATIO — SIGNIFICANT CHANGE UP (ref 0.65–1.3)
KETONES UR-MCNC: NEGATIVE — SIGNIFICANT CHANGE UP
LEUKOCYTE ESTERASE UR-ACNC: ABNORMAL
LYMPHOCYTES # BLD AUTO: 1.87 K/UL — SIGNIFICANT CHANGE UP (ref 1.2–3.4)
LYMPHOCYTES # BLD AUTO: 26.4 % — SIGNIFICANT CHANGE UP (ref 20.5–51.1)
MCHC RBC-ENTMCNC: 23.9 PG — LOW (ref 27–31)
MCHC RBC-ENTMCNC: 32.5 G/DL — SIGNIFICANT CHANGE UP (ref 32–37)
MCV RBC AUTO: 73.5 FL — LOW (ref 81–99)
MONOCYTES # BLD AUTO: 0.79 K/UL — HIGH (ref 0.1–0.6)
MONOCYTES NFR BLD AUTO: 11.2 % — HIGH (ref 1.7–9.3)
NEUTROPHILS # BLD AUTO: 4.3 K/UL — SIGNIFICANT CHANGE UP (ref 1.4–6.5)
NEUTROPHILS NFR BLD AUTO: 60.8 % — SIGNIFICANT CHANGE UP (ref 42.2–75.2)
NITRITE UR-MCNC: NEGATIVE — SIGNIFICANT CHANGE UP
NRBC # BLD: 0 /100 WBCS — SIGNIFICANT CHANGE UP (ref 0–0)
NT-PROBNP SERPL-SCNC: 1070 PG/ML — HIGH (ref 0–300)
PH UR: 6.5 — SIGNIFICANT CHANGE UP (ref 5–8)
PLATELET # BLD AUTO: 396 K/UL — SIGNIFICANT CHANGE UP (ref 130–400)
PMV BLD: 8.6 FL — SIGNIFICANT CHANGE UP (ref 7.4–10.4)
POTASSIUM SERPL-MCNC: 4.8 MMOL/L — SIGNIFICANT CHANGE UP (ref 3.5–5)
POTASSIUM SERPL-SCNC: 4.8 MMOL/L — SIGNIFICANT CHANGE UP (ref 3.5–5)
PROT SERPL-MCNC: 7.1 G/DL — SIGNIFICANT CHANGE UP (ref 6–8)
PROT UR-MCNC: ABNORMAL
PROTHROM AB SERPL-ACNC: 14 SEC — HIGH (ref 9.95–12.87)
RBC # BLD: 3.1 M/UL — LOW (ref 4.2–5.4)
RBC # FLD: 16.1 % — HIGH (ref 11.5–14.5)
RBC CASTS # UR COMP ASSIST: 1 /HPF — SIGNIFICANT CHANGE UP (ref 0–4)
SODIUM SERPL-SCNC: 128 MMOL/L — LOW (ref 135–146)
SP GR SPEC: 1.01 — SIGNIFICANT CHANGE UP (ref 1.01–1.03)
TROPONIN T SERPL-MCNC: <0.01 NG/ML — SIGNIFICANT CHANGE UP
UROBILINOGEN FLD QL: SIGNIFICANT CHANGE UP
WBC # BLD: 7.08 K/UL — SIGNIFICANT CHANGE UP (ref 4.8–10.8)
WBC # FLD AUTO: 7.08 K/UL — SIGNIFICANT CHANGE UP (ref 4.8–10.8)
WBC UR QL: 22 /HPF — HIGH (ref 0–5)

## 2023-07-26 PROCEDURE — 83010 ASSAY OF HAPTOGLOBIN QUANT: CPT

## 2023-07-26 PROCEDURE — 80053 COMPREHEN METABOLIC PANEL: CPT

## 2023-07-26 PROCEDURE — 88305 TISSUE EXAM BY PATHOLOGIST: CPT

## 2023-07-26 PROCEDURE — 80048 BASIC METABOLIC PNL TOTAL CA: CPT

## 2023-07-26 PROCEDURE — 36430 TRANSFUSION BLD/BLD COMPNT: CPT

## 2023-07-26 PROCEDURE — 82570 ASSAY OF URINE CREATININE: CPT

## 2023-07-26 PROCEDURE — 82728 ASSAY OF FERRITIN: CPT

## 2023-07-26 PROCEDURE — 82962 GLUCOSE BLOOD TEST: CPT

## 2023-07-26 PROCEDURE — 84133 ASSAY OF URINE POTASSIUM: CPT

## 2023-07-26 PROCEDURE — 82746 ASSAY OF FOLIC ACID SERUM: CPT

## 2023-07-26 PROCEDURE — 83540 ASSAY OF IRON: CPT

## 2023-07-26 PROCEDURE — 99223 1ST HOSP IP/OBS HIGH 75: CPT

## 2023-07-26 PROCEDURE — P9016: CPT

## 2023-07-26 PROCEDURE — 86901 BLOOD TYPING SEROLOGIC RH(D): CPT

## 2023-07-26 PROCEDURE — 86850 RBC ANTIBODY SCREEN: CPT

## 2023-07-26 PROCEDURE — 88312 SPECIAL STAINS GROUP 1: CPT

## 2023-07-26 PROCEDURE — 85385 FIBRINOGEN ANTIGEN: CPT

## 2023-07-26 PROCEDURE — 84156 ASSAY OF PROTEIN URINE: CPT

## 2023-07-26 PROCEDURE — 83550 IRON BINDING TEST: CPT

## 2023-07-26 PROCEDURE — 85025 COMPLETE CBC W/AUTO DIFF WBC: CPT

## 2023-07-26 PROCEDURE — 84540 ASSAY OF URINE/UREA-N: CPT

## 2023-07-26 PROCEDURE — 99285 EMERGENCY DEPT VISIT HI MDM: CPT | Mod: FS

## 2023-07-26 PROCEDURE — 81001 URINALYSIS AUTO W/SCOPE: CPT

## 2023-07-26 PROCEDURE — 97162 PT EVAL MOD COMPLEX 30 MIN: CPT | Mod: GP

## 2023-07-26 PROCEDURE — 71045 X-RAY EXAM CHEST 1 VIEW: CPT | Mod: 26

## 2023-07-26 PROCEDURE — 86900 BLOOD TYPING SEROLOGIC ABO: CPT

## 2023-07-26 PROCEDURE — 36415 COLL VENOUS BLD VENIPUNCTURE: CPT

## 2023-07-26 PROCEDURE — 85027 COMPLETE CBC AUTOMATED: CPT

## 2023-07-26 PROCEDURE — 93010 ELECTROCARDIOGRAM REPORT: CPT

## 2023-07-26 PROCEDURE — 84300 ASSAY OF URINE SODIUM: CPT

## 2023-07-26 PROCEDURE — 83935 ASSAY OF URINE OSMOLALITY: CPT

## 2023-07-26 PROCEDURE — 83735 ASSAY OF MAGNESIUM: CPT

## 2023-07-26 PROCEDURE — 82607 VITAMIN B-12: CPT

## 2023-07-26 PROCEDURE — 83036 HEMOGLOBIN GLYCOSYLATED A1C: CPT

## 2023-07-26 RX ORDER — HYDRALAZINE HCL 50 MG
25 TABLET ORAL ONCE
Refills: 0 | Status: COMPLETED | OUTPATIENT
Start: 2023-07-26 | End: 2023-07-26

## 2023-07-26 RX ORDER — DEXTROSE 50 % IN WATER 50 %
12.5 SYRINGE (ML) INTRAVENOUS ONCE
Refills: 0 | Status: DISCONTINUED | OUTPATIENT
Start: 2023-07-26 | End: 2023-07-30

## 2023-07-26 RX ORDER — SEMAGLUTIDE 0.68 MG/ML
0 INJECTION, SOLUTION SUBCUTANEOUS
Qty: 0 | Refills: 0 | DISCHARGE

## 2023-07-26 RX ORDER — HYDRALAZINE HCL 50 MG
10 TABLET ORAL ONCE
Refills: 0 | Status: DISCONTINUED | OUTPATIENT
Start: 2023-07-26 | End: 2023-07-26

## 2023-07-26 RX ORDER — AMLODIPINE BESYLATE 2.5 MG/1
1 TABLET ORAL
Qty: 0 | Refills: 0 | DISCHARGE

## 2023-07-26 RX ORDER — ERGOCALCIFEROL 1.25 MG/1
1 CAPSULE ORAL
Qty: 0 | Refills: 0 | DISCHARGE

## 2023-07-26 RX ORDER — DEXTROSE 50 % IN WATER 50 %
15 SYRINGE (ML) INTRAVENOUS ONCE
Refills: 0 | Status: DISCONTINUED | OUTPATIENT
Start: 2023-07-26 | End: 2023-07-30

## 2023-07-26 RX ORDER — ATORVASTATIN CALCIUM 80 MG/1
1 TABLET, FILM COATED ORAL
Refills: 0 | DISCHARGE

## 2023-07-26 RX ORDER — INSULIN LISPRO 100/ML
VIAL (ML) SUBCUTANEOUS
Refills: 0 | Status: DISCONTINUED | OUTPATIENT
Start: 2023-07-26 | End: 2023-07-30

## 2023-07-26 RX ORDER — METOPROLOL TARTRATE 50 MG
50 TABLET ORAL DAILY
Refills: 0 | Status: DISCONTINUED | OUTPATIENT
Start: 2023-07-26 | End: 2023-07-26

## 2023-07-26 RX ORDER — GLUCAGON INJECTION, SOLUTION 0.5 MG/.1ML
1 INJECTION, SOLUTION SUBCUTANEOUS ONCE
Refills: 0 | Status: DISCONTINUED | OUTPATIENT
Start: 2023-07-26 | End: 2023-07-30

## 2023-07-26 RX ORDER — INSULIN GLARGINE 100 [IU]/ML
20 INJECTION, SOLUTION SUBCUTANEOUS
Refills: 0 | DISCHARGE

## 2023-07-26 RX ORDER — ATORVASTATIN CALCIUM 80 MG/1
40 TABLET, FILM COATED ORAL AT BEDTIME
Refills: 0 | Status: DISCONTINUED | OUTPATIENT
Start: 2023-07-26 | End: 2023-07-30

## 2023-07-26 RX ORDER — FUROSEMIDE 40 MG
1 TABLET ORAL
Refills: 0 | DISCHARGE

## 2023-07-26 RX ORDER — FUROSEMIDE 40 MG
20 TABLET ORAL DAILY
Refills: 0 | Status: DISCONTINUED | OUTPATIENT
Start: 2023-07-26 | End: 2023-07-28

## 2023-07-26 RX ORDER — CARBIDOPA AND LEVODOPA 25; 100 MG/1; MG/1
1 TABLET ORAL
Refills: 0 | Status: DISCONTINUED | OUTPATIENT
Start: 2023-07-26 | End: 2023-07-30

## 2023-07-26 RX ORDER — SERTRALINE 25 MG/1
25 TABLET, FILM COATED ORAL DAILY
Refills: 0 | Status: DISCONTINUED | OUTPATIENT
Start: 2023-07-26 | End: 2023-07-30

## 2023-07-26 RX ORDER — LISINOPRIL 2.5 MG/1
1 TABLET ORAL
Qty: 0 | Refills: 0 | DISCHARGE

## 2023-07-26 RX ORDER — CARBIDOPA AND LEVODOPA 25; 100 MG/1; MG/1
1 TABLET ORAL
Refills: 0 | DISCHARGE

## 2023-07-26 RX ORDER — PANTOPRAZOLE SODIUM 20 MG/1
40 TABLET, DELAYED RELEASE ORAL
Refills: 0 | Status: DISCONTINUED | OUTPATIENT
Start: 2023-07-26 | End: 2023-07-30

## 2023-07-26 RX ORDER — RIVAROXABAN 15 MG-20MG
20 KIT ORAL
Refills: 0 | Status: DISCONTINUED | OUTPATIENT
Start: 2023-07-26 | End: 2023-07-26

## 2023-07-26 RX ORDER — SACUBITRIL AND VALSARTAN 24; 26 MG/1; MG/1
1 TABLET, FILM COATED ORAL
Refills: 0 | Status: DISCONTINUED | OUTPATIENT
Start: 2023-07-26 | End: 2023-07-30

## 2023-07-26 RX ORDER — DEXTROSE 50 % IN WATER 50 %
25 SYRINGE (ML) INTRAVENOUS ONCE
Refills: 0 | Status: DISCONTINUED | OUTPATIENT
Start: 2023-07-26 | End: 2023-07-30

## 2023-07-26 RX ORDER — METOPROLOL TARTRATE 50 MG
1 TABLET ORAL
Refills: 0 | DISCHARGE

## 2023-07-26 RX ORDER — METOPROLOL TARTRATE 50 MG
50 TABLET ORAL
Refills: 0 | Status: DISCONTINUED | OUTPATIENT
Start: 2023-07-26 | End: 2023-07-30

## 2023-07-26 RX ORDER — METOCLOPRAMIDE HCL 10 MG
1 TABLET ORAL
Qty: 0 | Refills: 0 | DISCHARGE

## 2023-07-26 RX ORDER — SODIUM CHLORIDE 9 MG/ML
1000 INJECTION, SOLUTION INTRAVENOUS
Refills: 0 | Status: DISCONTINUED | OUTPATIENT
Start: 2023-07-26 | End: 2023-07-30

## 2023-07-26 RX ORDER — METFORMIN HYDROCHLORIDE 850 MG/1
1 TABLET ORAL
Qty: 0 | Refills: 0 | DISCHARGE

## 2023-07-26 RX ORDER — ESOMEPRAZOLE MAGNESIUM 40 MG/1
1 CAPSULE, DELAYED RELEASE ORAL
Qty: 0 | Refills: 0 | DISCHARGE

## 2023-07-26 RX ORDER — PANTOPRAZOLE SODIUM 20 MG/1
1 TABLET, DELAYED RELEASE ORAL
Refills: 0 | DISCHARGE

## 2023-07-26 RX ORDER — METOPROLOL TARTRATE 50 MG
1 TABLET ORAL
Qty: 0 | Refills: 0 | DISCHARGE

## 2023-07-26 RX ORDER — RIVAROXABAN 15 MG-20MG
1 KIT ORAL
Qty: 0 | Refills: 0 | DISCHARGE

## 2023-07-26 RX ORDER — SACUBITRIL AND VALSARTAN 24; 26 MG/1; MG/1
1 TABLET, FILM COATED ORAL
Refills: 0 | DISCHARGE

## 2023-07-26 RX ADMIN — CARBIDOPA AND LEVODOPA 1 TABLET(S): 25; 100 TABLET ORAL at 18:11

## 2023-07-26 RX ADMIN — Medication 50 MILLIGRAM(S): at 18:12

## 2023-07-26 RX ADMIN — Medication 25 MILLIGRAM(S): at 23:22

## 2023-07-26 RX ADMIN — ATORVASTATIN CALCIUM 40 MILLIGRAM(S): 80 TABLET, FILM COATED ORAL at 23:11

## 2023-07-26 RX ADMIN — SACUBITRIL AND VALSARTAN 1 TABLET(S): 24; 26 TABLET, FILM COATED ORAL at 18:12

## 2023-07-26 NOTE — ED PROVIDER NOTE - PHYSICAL EXAMINATION
CONSTITUTIONAL: Well-appearing; well-nourished; in no apparent distress.   EYES: PERRL; EOM intact.   ENT: normal nose; no rhinorrhea; normal pharynx with no tonsillar hypertrophy.   NECK: Supple; non-tender; no cervical lymphadenopathy  CARDIOVASCULAR: Normal S1, S2; no murmurs, rubs, or gallops.   RESPIRATORY: Normal chest excursion with respiration; breath sounds clear and equal bilaterally; no wheezes, rhonchi, or rales.  GI/: Normal bowel sounds; non-distended; non-tender; no palpable organomegaly.   MS: No evidence of trauma or deformity. Normal ROM in all four extremities; non-tender to palpation; distal pulses are normal.   SKIN: Normal for age and race; warm; dry; good turgor; no apparent lesions or exudate.   NEURO/PSYCH: A & O x 4; grossly unremarkable. mood and manner are appropriate.

## 2023-07-26 NOTE — ED PROVIDER NOTE - DIFFERENTIAL DIAGNOSIS
anemia of chronic disease, lower GI bleeding, upper GI bleeding,   chf exacerbation, volume overload Differential Diagnosis

## 2023-07-26 NOTE — ED PROVIDER NOTE - ATTENDING APP SHARED VISIT CONTRIBUTION OF CARE
I have personally performed a history and physical exam on this patient and personally directed the management of the patient.  77-year-old woman history of A-fib on Xarelto status post ablation x2 and DVT cardioversion, CAD status post 4 stents, hypertension, hyperlipidemia, diabetes brought in by daughter for of generalized weakness and sob. daughter States for the last few weeks has been having gradual weakness and shortness of breath at rest.  pt was seen by her cardiology Dr. Bhagat had recent lab work which showed hemoglobin 7 and advised to go to ED.  Patient denies any bloody stools, melena, abdominal pain, chest pain, fever, cough, lower extremity pain or swelling, history of GI bleeding.  CON: ao x 3, HENMT: neck supple, pale sclera,  CV: s1s2 ctab, RESP: cta b/l, GI:  soft, nontender, no rebound, no guarding, SKIN: no rash, MSK: no deformities, NEURO: no gross motor or sensory deficit Psychiatric: appropriate mood, appropriate affect  will send labs, cxr and reevaluate, likely admit to evaluation of anemia or requiring transfusion.

## 2023-07-26 NOTE — ED PROVIDER NOTE - CLINICAL SUMMARY MEDICAL DECISION MAKING FREE TEXT BOX
77-year-old woman history of A-fib on Xarelto status post ablation x2 and DVT cardioversion, CAD status post 4 stents, hypertension, hyperlipidemia, diabetes brought in by daughter for of generalized weakness and sob. daughter States for the last few weeks has been having gradual weakness and shortness of breath at rest.  pt was seen by her cardiology Dr. Bhagat had recent lab work which showed hemoglobin 7 and advised to go to ED.  Patient denies any bloody stools, melena, abdominal pain, chest pain, fever, cough, lower extremity pain or swelling, history of GI bleeding.  CON: ao x 3, HENMT: neck supple, pale sclera,  CV: s1s2 ctab, RESP: cta b/l, GI:  soft, nontender, no rebound, no guarding, SKIN: no rash, MSK: no deformities, NEURO: no gross motor or sensory deficit Psychiatric: appropriate mood, appropriate affect  labs showed HB7.4 elevated BNP and Na 128. no urgent need for transfusion pt will be admitted for further evaluation of anemia, H&H monitoring.

## 2023-07-26 NOTE — H&P ADULT - NSHPREVIEWOFSYSTEMS_GEN_ALL_CORE
REVIEW OF SYSTEMS:    CONSTITUTIONAL:  Weakness  EYES/ENT:  No visual changes;  No vertigo or throat pain   NECK:  No pain or stiffness  RESPIRATORY:  No cough, wheezing, hemoptysis. Increased shortness of breath at rest  CARDIOVASCULAR:  No chest pain or palpitations  GASTROINTESTINAL:  No abdominal or epigastric pain. No nausea, vomiting, or hematemesis; No diarrhea or constipation. No melena or hematochezia.  GENITOURINARY:  No dysuria, frequency or hematuria  NEUROLOGICAL:  No numbness or weakness  SKIN:  No itching, rashes

## 2023-07-26 NOTE — ED PROVIDER NOTE - NS ED ATTENDING STATEMENT MOD
This was a shared visit with the COLETTE. I reviewed and verified the documentation and independently performed the documented:

## 2023-07-26 NOTE — ED PROVIDER NOTE - OBJECTIVE STATEMENT
77-year-old female history of A-fib on Xarelto status post ablation x2 and DVT cardioversion, CAD status post 4 stents, hypertension, hyperlipidemia, diabetes brought in by daughter for evaluation.  States for the last few weeks has been having gradual weakness and shortness of breath at rest.  Patient has been following cardiology Dr. Bhagat had recent lab work which showed hemoglobin 7.  Patient denies any bloody stools, melena, abdominal pain, chest pain, fever, cough, lower extremity pain or swelling, history of GI bleeding.

## 2023-07-26 NOTE — ED ADULT NURSE NOTE - NSFALLHARMRISKINTERV_ED_ALL_ED

## 2023-07-26 NOTE — ED ADULT NURSE REASSESSMENT NOTE - NS ED NURSE REASSESS COMMENT FT1
Received pt from previous RN, Pt AxOx3, Cy[place] speaking, daughter-in-law present at bedside. Blood transfusion in progress, no delayed adverse reactions noted. Cardiac monitoring maintained. NAD. Safety measures maintained. Call bell within reach. Care to continue.

## 2023-07-26 NOTE — ED ADULT TRIAGE NOTE - CHIEF COMPLAINT QUOTE
As per family, pt has been very tired lately, cardiologist said to come to ED because her hemaglobin was7.

## 2023-07-26 NOTE — H&P ADULT - NSHPPHYSICALEXAM_GEN_ALL_CORE
T(C): 36.4 (07-26-23 @ 11:08), Max: 36.4 (07-26-23 @ 11:08)  HR: 87 (07-26-23 @ 11:08) (87 - 87)  BP: 139/66 (07-26-23 @ 11:08) (139/66 - 139/66)  RR: 18 (07-26-23 @ 11:08) (18 - 18)  SpO2: 99% (07-26-23 @ 11:08) (99% - 99%)    CONSTITUTIONAL: Appears tired   RESP: CTA b/l, no WRR  CV: RRR, +S1S2, no MRG; no JVD; no peripheral edema  GI: Soft, NT, ND, no rebound, no guarding; no palpable masses  MSK: Normal gait; No digital clubbing or cyanosis; NO LOWER EXT EDEMA

## 2023-07-26 NOTE — H&P ADULT - ASSESSMENT
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  States for the last few weeks has been having gradual weakness and shortness of breath at rest. Found to have Hg of 7 on admission, admitted for further management.     # Microcytic anemia   - Hg 10 in 2021   - No sign of overt bleeding, denies melena  - Last colonoscopy more than 10 y ago  - Monitor CBC, keep active type and screen  - Keep Hg above 8   - Ordered 1 u PRBC   - GI cs     #HFmrEF follows with Dr. Bhagat:  - Echo 2020: EF 50-55%  - f/u cardio cs   - Repeat Echo?     # Atrial fibrillation- rate controlled  - hx multiple ablations and DCCV  - c/w Xarelto 20mg OD  - c/w Metoprolol 50 BID    # HLD:  - c/w Atorvastatin 40 QD    # DM-II  - Home med: Lantus 20 u QD, Metformin 1000 BID and Ozempic weekly  - c/w Lantus 20 QD  - Sliding scale and adjust as needed     #Parkinson Disease:  - c/w Carbi/Levo 25/100 BID    #DVT ppx: Xarelto   #Diet: DASH   #Activity: Increase as tolerated   #Dispo: From home  #Code status: DNR/DNI    77-year-old female Iraqi speaking (daughter in law at bedside) history of A-fib on Xarelto status post ablation x2 and DVT cardioversion, 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  States for the last few weeks has been having gradual weakness and shortness of breath at rest. Found to have Hg of 7 on admission, admitted for further management.     # Microcytic anemia 2/2 to possible occult GI bleed vs malignancy   - Hg 10 in 2021   - No sign of overt bleeding, denies melena  - Last colonoscopy more than 10 y ago  - Monitor CBC, keep active type and screen  - Keep Hg above 8   - f/u iron panel folate and B12  - Ordered 1 u PRBC   - GI cs     #Hyponatremia:  - Chronic as per chart and PCP  - f/u urine studies     #HFmrEF follows with Dr. Bhagat:  - Echo 2020: EF 50-55% (Had recent echo as per family available at Dr. Bhagat's office )  - f/u cardio cs     # Atrial fibrillation- rate controlled  - hx multiple ablations and DCCV  - c/w Xarelto 20mg OD  - c/w Metoprolol 50 BID    # HLD:  - c/w Atorvastatin 40 QD    # DM-II  - Home med: Lantus 20 u QD, Metformin 1000 BID and Ozempic weekly  - c/w Lantus 20 QD  - Sliding scale and adjust as needed     #Parkinson Disease:  - c/w Carbi/Levo 25/100 BID    #DVT ppx: Xarelto   #Diet: DASH   #Activity: Increase as tolerated   #Dispo: From home  #Code status: DNR/DNI    77-year-old female South African speaking (daughter in law at bedside) history of A-fib on Xarelto status post ablation x2 and DVT cardioversion, 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  States for the last few weeks has been having gradual weakness and shortness of breath at rest. Found to have Hg of 7 on admission, admitted for further management.     # Microcytic anemia 2/2 to possible occult GI bleed vs malignancy   - Hg 10 in 2021   - No sign of overt bleeding, denies melena  - Last colonoscopy more than 10 y ago  - Monitor CBC, keep active type and screen  - Keep Hg above 8   - f/u iron panel folate and B12  - Ordered 1 u PRBC   - GI cs     #Hyponatremia:  - Chronic as per chart and PCP  - f/u urine studies     #HFmrEF follows with Dr. Bhagat:  - Echo 2020: EF 50-55% (Had recent echo as per family available at Dr. Bhagat's office )  - f/u cardio cs     # Atrial fibrillation- rate controlled  - hx multiple ablations and DCCV  - c/w Xarelto 20mg OD  - c/w Metoprolol 50 BID    # HLD:  - c/w Atorvastatin 40 QD    # DM-II  - Home med: Lantus 20 u QD, Metformin 1000 BID and Ozempic weekly  - c/w Lantus 20 QD  - Sliding scale and adjust as needed     #Parkinson Disease:  - c/w Carbi/Levo 25/100 BID    #DVT ppx: Xarelto   #Diet: DASH   #Activity: Increase as tolerated   #Dispo: From home  #Code status: DNR/DNI   #Med rec: confirmed and updated with daughter in law at bedside

## 2023-07-26 NOTE — H&P ADULT - HISTORY OF PRESENT ILLNESS
77-year-old female Malagasy speaking (daughter in law at bedside) history of A-fib on Xarelto status post ablation x2 and DVT cardioversion, CAD status post 4 stents, hypertension, hyperlipidemia, diabetes and Parkinson brought in by daughter in law for evaluation.  States for the last few weeks has been having gradual weakness and shortness of breath at rest that have been going for a while, states dyspnea is at rest, no associated sx.  Patient has been following cardiology Dr. Bhagat had recent lab work which showed hemoglobin 7.  Patient denies any bloody stools, melena, abdominal pain, chest pain, fever, cough, lower extremity pain or swelling, history of GI bleeding.    In the ED: BP: 139/66 mmHg, HR: 87 bpm, RR: 18, Temp: 97.6, SpO2: 99% on RA    Labs: WBC: 7.08, H.4, MCV: 73.5, Na: 128, K: 4.8, Trop: <0.01, BNP: 1070    Patient admitted to medicine for symptomatic anemia

## 2023-07-26 NOTE — H&P ADULT - ATTENDING COMMENTS
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  States for the last few weeks has been having gradual weakness and shortness of breath at rest. Found to have Hg of 7 on admission, admitted for further management.    Agree  with assessment  except for changes below.     Vital Signs (24 Hrs):  T(C): 36.4 (07-26-23 @ 21:15), Max: 36.4 (07-26-23 @ 11:08)  HR: 73 (07-26-23 @ 22:46) (65 - 87)  BP: 175/75 (07-26-23 @ 22:46) (139/66 - 197/80)  RR: 19 (07-26-23 @ 21:15) (18 - 19)  SpO2: 99% (07-26-23 @ 21:15) (99% - 99%)      IMPRESSION   Microcytic Anemia  possible occult GI bleed   Hemodynamically Stable   Check cbc, pt ptt, bmp (bun:cr >25 suggests ugib), Maintain active t/s , IV PPI  BID,   Clear liquid diet for tonight   Monitor CBC q6   Transfuse to keep hemoglobin > 7   F/u GI for possible EGD/colonoscopy  Correct electrolytes (Target Na = 135-145 | Mg = 1.7-2.2 | K = 3.5-5)  F/U INR,  correct INR to <1.5 if indicated    Hyponatremia   Hyponatremia. Send urine/serum osmo and urine lytes. TSH. Serial BMPs. Do not over correct (<8-10 in 24 hours).     Hx HFmrEF follows with Dr. Bhagat:  - Elevated BNP   - Echo 2020: EF 50-55% (Had recent echo as per family available at Dr. 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  States for the last few weeks has been having gradual weakness and shortness of breath at rest. Found to have Hg of 7 on admission, admitted for further management.    Agree  with assessment  except for changes below.     Vital Signs (24 Hrs):  T(C): 36.4 (07-26-23 @ 21:15), Max: 36.4 (07-26-23 @ 11:08)  HR: 73 (07-26-23 @ 22:46) (65 - 87)  BP: 175/75 (07-26-23 @ 22:46) (139/66 - 197/80)  RR: 19 (07-26-23 @ 21:15) (18 - 19)  SpO2: 99% (07-26-23 @ 21:15) (99% - 99%)    PHYSICAL EXAM  GENERAL: NAD,  HEAD:  NCAT, EOMI, MM  NECK: Supple, Nontender  NERVOUS SYSTEM:  AAOx2-3, NFD  CHEST/LUNG: +bs b/l, No wheezing   HEART: +s1s2 RRR  ABDOMEN: soft, NT/ND  EXTREMITIES:  pp, no edema  SKIN: age related skin changes       IMPRESSION   Microcytic Anemia  possible occult GI bleed   Hemodynamically Stable   Check cbc, pt ptt, bmp , Maintain active t/s , IV PPI  BID,   Clear liquid diet for tonight   Monitor CBC q6   Transfuse to keep hemoglobin > 7   F/u GI for possible EGD/colonoscopy  Correct electrolytes (Target Na = 135-145 | Mg = 1.7-2.2 | K = 3.5-5)  F/U INR,  correct INR to <1.5 if indicated    Hyponatremia   Hyponatremia. Send urine/serum osmo and urine lytes. TSH. Serial BMPs. Do not over correct (<8-10 in 24 hours).     Hx HFmrEF follows with Dr. Bhagat:  - Elevated BNP   - Echo 2020: EF 50-55% (Had recent echo as per family available at Dr. Bhagat's office )  - c/w lasix  - f/u cardio cs     Hx Atrial fibrillation- rate controlled  hx multiple ablations and DCCV  Hold  Xarelto 20mg OD ( Given Elevated Bleeding Risk)  c/w Metoprolol 50 BI    Hx HLD: - c/w Atorvastatin 40 QD    Hx  DM-II  Home med: Lantus 20 u QD, Metformin 1000 BID and Ozempic weekly  c/w Lantus 20 QD  Sliding scale and adjust as needed     Hx Parkinson Disease:- c/w Carbi/Levo 25/100 BID    Seen on 07/26/23

## 2023-07-27 ENCOUNTER — TRANSCRIPTION ENCOUNTER (OUTPATIENT)
Age: 78
End: 2023-07-27

## 2023-07-27 LAB
A1C WITH ESTIMATED AVERAGE GLUCOSE RESULT: 7.5 % — HIGH (ref 4–5.6)
ALBUMIN SERPL ELPH-MCNC: 4.4 G/DL — SIGNIFICANT CHANGE UP (ref 3.5–5.2)
ALP SERPL-CCNC: 102 U/L — SIGNIFICANT CHANGE UP (ref 30–115)
ALT FLD-CCNC: <5 U/L — SIGNIFICANT CHANGE UP (ref 0–41)
ANION GAP SERPL CALC-SCNC: 13 MMOL/L — SIGNIFICANT CHANGE UP (ref 7–14)
ANION GAP SERPL CALC-SCNC: 14 MMOL/L — SIGNIFICANT CHANGE UP (ref 7–14)
AST SERPL-CCNC: 15 U/L — SIGNIFICANT CHANGE UP (ref 0–41)
BILIRUB SERPL-MCNC: 1 MG/DL — SIGNIFICANT CHANGE UP (ref 0.2–1.2)
BUN SERPL-MCNC: 13 MG/DL — SIGNIFICANT CHANGE UP (ref 10–20)
BUN SERPL-MCNC: 14 MG/DL — SIGNIFICANT CHANGE UP (ref 10–20)
CALCIUM SERPL-MCNC: 9.3 MG/DL — SIGNIFICANT CHANGE UP (ref 8.4–10.5)
CALCIUM SERPL-MCNC: 9.7 MG/DL — SIGNIFICANT CHANGE UP (ref 8.4–10.5)
CHLORIDE SERPL-SCNC: 90 MMOL/L — LOW (ref 98–110)
CHLORIDE SERPL-SCNC: 94 MMOL/L — LOW (ref 98–110)
CO2 SERPL-SCNC: 22 MMOL/L — SIGNIFICANT CHANGE UP (ref 17–32)
CO2 SERPL-SCNC: 24 MMOL/L — SIGNIFICANT CHANGE UP (ref 17–32)
CREAT SERPL-MCNC: 0.8 MG/DL — SIGNIFICANT CHANGE UP (ref 0.7–1.5)
CREAT SERPL-MCNC: 0.9 MG/DL — SIGNIFICANT CHANGE UP (ref 0.7–1.5)
EGFR: 66 ML/MIN/1.73M2 — SIGNIFICANT CHANGE UP
EGFR: 76 ML/MIN/1.73M2 — SIGNIFICANT CHANGE UP
ESTIMATED AVERAGE GLUCOSE: 169 MG/DL — HIGH (ref 68–114)
FERRITIN SERPL-MCNC: 15 NG/ML — SIGNIFICANT CHANGE UP (ref 13–330)
FOLATE SERPL-MCNC: 17 NG/ML — SIGNIFICANT CHANGE UP
GLUCOSE BLDC GLUCOMTR-MCNC: 116 MG/DL — HIGH (ref 70–99)
GLUCOSE BLDC GLUCOMTR-MCNC: 131 MG/DL — HIGH (ref 70–99)
GLUCOSE BLDC GLUCOMTR-MCNC: 146 MG/DL — HIGH (ref 70–99)
GLUCOSE SERPL-MCNC: 110 MG/DL — HIGH (ref 70–99)
GLUCOSE SERPL-MCNC: 126 MG/DL — HIGH (ref 70–99)
HAPTOGLOB SERPL-MCNC: 246 MG/DL — HIGH (ref 34–200)
HCT VFR BLD CALC: 27.5 % — LOW (ref 37–47)
HCT VFR BLD CALC: 28.7 % — LOW (ref 37–47)
HGB BLD-MCNC: 8.9 G/DL — LOW (ref 12–16)
HGB BLD-MCNC: 9.3 G/DL — LOW (ref 12–16)
IRON SATN MFR SERPL: 227 UG/DL — HIGH (ref 35–150)
IRON SATN MFR SERPL: 53 % — HIGH (ref 15–50)
MAGNESIUM SERPL-MCNC: 1.6 MG/DL — LOW (ref 1.8–2.4)
MAGNESIUM SERPL-MCNC: 1.7 MG/DL — LOW (ref 1.8–2.4)
MCHC RBC-ENTMCNC: 23.2 PG — LOW (ref 27–31)
MCHC RBC-ENTMCNC: 23.5 PG — LOW (ref 27–31)
MCHC RBC-ENTMCNC: 32.4 G/DL — SIGNIFICANT CHANGE UP (ref 32–37)
MCHC RBC-ENTMCNC: 32.4 G/DL — SIGNIFICANT CHANGE UP (ref 32–37)
MCV RBC AUTO: 71.8 FL — LOW (ref 81–99)
MCV RBC AUTO: 72.5 FL — LOW (ref 81–99)
NRBC # BLD: 0 /100 WBCS — SIGNIFICANT CHANGE UP (ref 0–0)
NRBC # BLD: 0 /100 WBCS — SIGNIFICANT CHANGE UP (ref 0–0)
PLATELET # BLD AUTO: 390 K/UL — SIGNIFICANT CHANGE UP (ref 130–400)
PLATELET # BLD AUTO: 414 K/UL — HIGH (ref 130–400)
PMV BLD: 9.1 FL — SIGNIFICANT CHANGE UP (ref 7.4–10.4)
PMV BLD: 9.2 FL — SIGNIFICANT CHANGE UP (ref 7.4–10.4)
POTASSIUM SERPL-MCNC: 4.7 MMOL/L — SIGNIFICANT CHANGE UP (ref 3.5–5)
POTASSIUM SERPL-MCNC: 4.7 MMOL/L — SIGNIFICANT CHANGE UP (ref 3.5–5)
POTASSIUM SERPL-SCNC: 4.7 MMOL/L — SIGNIFICANT CHANGE UP (ref 3.5–5)
POTASSIUM SERPL-SCNC: 4.7 MMOL/L — SIGNIFICANT CHANGE UP (ref 3.5–5)
PROT SERPL-MCNC: 6.7 G/DL — SIGNIFICANT CHANGE UP (ref 6–8)
RBC # BLD: 3.83 M/UL — LOW (ref 4.2–5.4)
RBC # BLD: 3.96 M/UL — LOW (ref 4.2–5.4)
RBC # FLD: 16 % — HIGH (ref 11.5–14.5)
RBC # FLD: 16.3 % — HIGH (ref 11.5–14.5)
SODIUM SERPL-SCNC: 127 MMOL/L — LOW (ref 135–146)
SODIUM SERPL-SCNC: 130 MMOL/L — LOW (ref 135–146)
TIBC SERPL-MCNC: 432 UG/DL — HIGH (ref 220–430)
UIBC SERPL-MCNC: 205 UG/DL — SIGNIFICANT CHANGE UP (ref 110–370)
VIT B12 SERPL-MCNC: 533 PG/ML — SIGNIFICANT CHANGE UP (ref 232–1245)
WBC # BLD: 6.89 K/UL — SIGNIFICANT CHANGE UP (ref 4.8–10.8)
WBC # BLD: 7.74 K/UL — SIGNIFICANT CHANGE UP (ref 4.8–10.8)
WBC # FLD AUTO: 6.89 K/UL — SIGNIFICANT CHANGE UP (ref 4.8–10.8)
WBC # FLD AUTO: 7.74 K/UL — SIGNIFICANT CHANGE UP (ref 4.8–10.8)

## 2023-07-27 PROCEDURE — 99223 1ST HOSP IP/OBS HIGH 75: CPT

## 2023-07-27 PROCEDURE — 99222 1ST HOSP IP/OBS MODERATE 55: CPT

## 2023-07-27 PROCEDURE — 99232 SBSQ HOSP IP/OBS MODERATE 35: CPT

## 2023-07-27 RX ORDER — SOD SULF/SODIUM/NAHCO3/KCL/PEG
4000 SOLUTION, RECONSTITUTED, ORAL ORAL ONCE
Refills: 0 | Status: COMPLETED | OUTPATIENT
Start: 2023-07-27 | End: 2023-07-27

## 2023-07-27 RX ORDER — ACETAMINOPHEN 500 MG
650 TABLET ORAL ONCE
Refills: 0 | Status: COMPLETED | OUTPATIENT
Start: 2023-07-27 | End: 2023-07-27

## 2023-07-27 RX ORDER — CALAMINE AND ZINC OXIDE AND PHENOL 160; 10 MG/ML; MG/ML
1 LOTION TOPICAL THREE TIMES A DAY
Refills: 0 | Status: DISCONTINUED | OUTPATIENT
Start: 2023-07-27 | End: 2023-07-30

## 2023-07-27 RX ADMIN — PANTOPRAZOLE SODIUM 40 MILLIGRAM(S): 20 TABLET, DELAYED RELEASE ORAL at 06:23

## 2023-07-27 RX ADMIN — CARBIDOPA AND LEVODOPA 1 TABLET(S): 25; 100 TABLET ORAL at 06:22

## 2023-07-27 RX ADMIN — Medication 650 MILLIGRAM(S): at 19:09

## 2023-07-27 RX ADMIN — SACUBITRIL AND VALSARTAN 1 TABLET(S): 24; 26 TABLET, FILM COATED ORAL at 18:27

## 2023-07-27 RX ADMIN — Medication 20 MILLIGRAM(S): at 06:22

## 2023-07-27 RX ADMIN — Medication 4000 MILLILITER(S): at 17:08

## 2023-07-27 RX ADMIN — SACUBITRIL AND VALSARTAN 1 TABLET(S): 24; 26 TABLET, FILM COATED ORAL at 06:22

## 2023-07-27 RX ADMIN — Medication 50 MILLIGRAM(S): at 06:23

## 2023-07-27 RX ADMIN — Medication 50 MILLIGRAM(S): at 18:27

## 2023-07-27 RX ADMIN — Medication 650 MILLIGRAM(S): at 19:39

## 2023-07-27 RX ADMIN — CARBIDOPA AND LEVODOPA 1 TABLET(S): 25; 100 TABLET ORAL at 18:27

## 2023-07-27 RX ADMIN — ATORVASTATIN CALCIUM 40 MILLIGRAM(S): 80 TABLET, FILM COATED ORAL at 21:57

## 2023-07-27 RX ADMIN — SERTRALINE 25 MILLIGRAM(S): 25 TABLET, FILM COATED ORAL at 12:27

## 2023-07-27 NOTE — PHYSICAL THERAPY INITIAL EVALUATION ADULT - ADDITIONAL COMMENTS
Pt lives with family/son with no stairs in the house. Pt was indep in ambulation and uses cane sometimes as per pt.

## 2023-07-27 NOTE — PHYSICAL THERAPY INITIAL EVALUATION ADULT - PERTINENT HX OF CURRENT PROBLEM, REHAB EVAL
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  States for the last few weeks has been having gradual weakness and shortness of breath at rest. Found to have Hg of 7 on admission, admitted for further management.      Anemia; Fluid overload

## 2023-07-27 NOTE — CONSULT NOTE ADULT - ATTENDING COMMENTS
agree w/ above - pt w/ symptomatic microcytic anemia in the setting of anticoagulation use for a fib.   no recent endoscopic eval in the past 10 y,   unsure of weight loss but denies any obvious source of blood loss.   will plan for EGD / colonoscopy in pt to r/o GI pathology.   rest of recs per above note.

## 2023-07-27 NOTE — PROGRESS NOTE ADULT - ATTENDING COMMENTS
77-year-old female Rwandan speaking (daughter in law at bedside) history of chronic A-fib on Xarelto status post ablation x2 and DVT cardioversion, HFmrEF (now HFpEF 50-55% in 2020), CAD status post 4 stents, hypertension, hyperlipidemia, diabetes and Parkinson brought in by daughter in law for evaluation.  States for the last few weeks has been having gradual weakness and shortness of breath at rest. Found to have Hg of 7 on admission, admitted for further management.     # Microcytic anemia 2/2 to possible occult GI bleed vs malignancy   - No sign of overt bleeding, denies melena  - Last colonoscopy more than 10 y ago  - Monitor CBC, keep active type and screen  - Keep Hg above 8  - S/p 1 u pRBC with response Hg now 9.3  - Appreciate GI recs - plan for EGD/colonoscopy tomorrow  - NPO at mn, golyetyl, dulcolax    #Hyponatremia:  - Chronic as per chart and PCP  - f/u urine studies     #HFpEF follows with Dr. Bhagat, on GDMT:  #HTN  - Echo 2020: EF 50-55% (Had recent echo as per family available at Dr. Bhagat's office )  - Appreciate cardiology recs  - Hold Xarelto for now  - restart lasix 20 mg po   - hold Entresto 97 mg-103 mg oral tablet: 1 orally 2 times a day    # Chronic Atrial fibrillation- rate controlled  - hx multiple ablations and DCCV  - Holding Xarelto 20mg OD  - c/w Metoprolol 50 BID    # HLD:  - c/w Atorvastatin 40 QD    # DM-II  - Home med: Lantus 20 u QD, Metformin 1000 BID and Ozempic weekly  - c/w Lantus 20 QD  - Sliding scale and adjust as needed     #Parkinson Disease:  - c/w Carbi/Levo 25/100 BID    #DVT ppx: Xarelto - holding  #Diet: DASH   #Activity: Increase as tolerated   #Dispo: From home  #Code status: DNR/DNI     #Progress Note Handoff  Pending (specify): Colonoscopy, EGD , monitor hgb, HTN med restart, urine studies, dc tele  Family discussion: spoke to daughter at bedside.   Disposition: Home___/SNF___/Other________/Unknown at this time________

## 2023-07-27 NOTE — PHYSICAL THERAPY INITIAL EVALUATION ADULT - GENERAL OBSERVATIONS, REHAB EVAL
3:15-3:43pm Pt encountered supine in bed, Pashto speaking, A & O x 3 in NAD, family(sister x 2) + live  presents at b/s. Pt reported she's been having on & off pain 6/10 to both legs even before admission. Pt declined pain medication but agrees to heating pad to put on both knees. Pt performed bed mobility with Min A and sit/stand transfer. Pt unable to ambulate at this time secondary to c/o lightheaded when standing. /86, HR 81 in sitting potision. Pt left in bed as found in NAD, RN Lionel made aware. Pt will benefit from skilled PT 3-5x/wk for thera ex, functional mobility training, balance and gait training.

## 2023-07-27 NOTE — CONSULT NOTE ADULT - SUBJECTIVE AND OBJECTIVE BOX
Date of Admission:    CHIEF COMPLAINT:    HISTORY OF PRESENT ILLNESS: 77yFemale with PMH below presented to the hospital for severe short of breath on mild exertion, got worse over the last few weeks  found to have severe anemia.    PAST MEDICAL & SURGICAL HISTORY:  Atrial fibrillation, unspecified type      HTN (hypertension)      High cholesterol      Diabetes mellitus      S/P ablation of atrial fibrillation      History of back surgery      Coronary stent patent        HEALTH ISSUES - PROBLEM Dx:        FAMILY HISTORY:  FH: coronary artery disease (Sibling)      Allergies    Milk (Diarrhea)  latex (Rash)  adhesives (Pruritus; Rash)  doxycycline (Other)    Intolerances    	  Home Medications:  atorvastatin 40 mg oral tablet: 1 orally once a day (26 Jul 2023 16:15)  carbidopa-levodopa 25 mg-100 mg oral tablet: 1 tab(s) orally 2 times a day (26 Jul 2023 16:15)  Entresto 97 mg-103 mg oral tablet: 1 orally 2 times a day (26 Jul 2023 16:15)  ergocalciferol 50,000 intl units (1.25 mg) oral capsule: 1 cap(s) orally once a week (26 Jul 2023 16:16)  furosemide 40 mg oral tablet: 1 orally once a day (26 Jul 2023 16:15)  Lantus 100 units/mL subcutaneous solution: 20 unit(s) subcutaneous once a day (26 Jul 2023 16:15)  metFORMIN 1000 mg oral tablet: 1 tab(s) orally 2 times a day (26 Jul 2023 16:16)  metoprolol tartrate 50 mg oral tablet: 1 orally 2 times a day (26 Jul 2023 16:44)  Ozempic (0.25 mg or 0.5 mg dose) 2 mg/1.5 mL subcutaneous solution: once a week on Tuesdays (26 Jul 2023 16:16)  Protonix 40 mg oral delayed release tablet: 1 orally once a day (26 Jul 2023 16:36)  Xarelto 20 mg oral tablet: 1 tab(s) orally once a day (in the evening) (26 Jul 2023 16:16)    MEDICATIONS  (STANDING):  atorvastatin 40 milliGRAM(s) Oral at bedtime  bisacodyl 5 milliGRAM(s) Oral at bedtime  carbidopa/levodopa  25/100 1 Tablet(s) Oral <User Schedule>  dextrose 5%. 1000 milliLiter(s) (50 mL/Hr) IV Continuous <Continuous>  dextrose 5%. 1000 milliLiter(s) (100 mL/Hr) IV Continuous <Continuous>  dextrose 50% Injectable 25 Gram(s) IV Push once  dextrose 50% Injectable 12.5 Gram(s) IV Push once  dextrose 50% Injectable 25 Gram(s) IV Push once  furosemide    Tablet 20 milliGRAM(s) Oral daily  glucagon  Injectable 1 milliGRAM(s) IntraMuscular once  insulin lispro (ADMELOG) corrective regimen sliding scale   SubCutaneous three times a day before meals  metoprolol tartrate 50 milliGRAM(s) Oral two times a day  pantoprazole    Tablet 40 milliGRAM(s) Oral before breakfast  polyethylene glycol/electrolyte Solution. 4000 milliLiter(s) Oral once  sacubitril 97 mG/valsartan 103 mG 1 Tablet(s) Oral two times a day  sertraline 25 milliGRAM(s) Oral daily    MEDICATIONS  (PRN):  dextrose Oral Gel 15 Gram(s) Oral once PRN Blood Glucose LESS THAN 70 milliGRAM(s)/deciliter              SOCIAL HISTORY:    [ ] Non-smoker  [ ] Smoker  [ ] Alcohol      REVIEW OF SYSTEMS:  CONSTITUTIONAL: No fever, weight loss, or fatigue  CARDIOLOGY: PAtient denies chest pain, shortness of breath or syncopal episodes.   RESPIRATORY: denies shortness of breath, wheezeing.   NEUROLOGICAL: NO weakness, no focal deficits to report.  ENDOCRINOLOGICAL: no recent change in diabetic medications.   GI: no BRBPR, no N,V,diarrhea.    PSYCHIATRY: normal mood and affect  HEENT: no nasal discharge, no ecchymosis  SKIN: no ecchymosis, no breakdown  MUSCULOSKELETAL: Full range of motion x4.      PHYSICAL EXAM:  T(C): 36.6 (07-27-23 @ 04:15), Max: 36.6 (07-27-23 @ 04:15)  HR: 81 (07-27-23 @ 04:15) (65 - 87)  BP: 184/80 (07-27-23 @ 04:15) (139/66 - 211/95)  RR: 18 (07-27-23 @ 04:15) (18 - 19)  SpO2: 99% (07-26-23 @ 21:15) (99% - 99%)  Wt(kg): --  I&O's Summary    26 Jul 2023 07:01  -  27 Jul 2023 07:00  --------------------------------------------------------  IN: 0 mL / OUT: 1200 mL / NET: -1200 mL      Daily     Daily     General Appearance: Normal	  Cardiovascular: Normal S1 S2, No JVD, No murmurs, No edema  Respiratory: Lungs clear to auscultation	  Psychiatry: A & O x 3, Mood & affect appropriate  Gastrointestinal:  Soft, Non-tender  Skin: No rashes, No ecchymoses, No cyanosis	  Neurologic: Non-focal  Extremities: Normal range of motion, No clubbing, cyanosis or edema  Vascular: Peripheral pulses palpable 2+ bilaterally        LABS:	 	                          9.3    6.89  )-----------( 414      ( 27 Jul 2023 08:09 )             28.7     07-27    130<L>  |  94<L>  |  13  ----------------------------<  126<H>  4.7   |  22  |  0.8    Ca    9.3      27 Jul 2023 08:09  Mg     1.7     07-27    TPro  6.7  /  Alb  4.4  /  TBili  1.0  /  DBili  x   /  AST  15  /  ALT  x   /  AlkPhos  102  07-27    CARDIAC MARKERS ( 26 Jul 2023 12:58 )  x     / <0.01 ng/mL / x     / x     / x          PT/INR - ( 26 Jul 2023 12:58 )   PT: 14.00 sec;   INR: 1.22 ratio         PTT - ( 26 Jul 2023 12:58 )  PTT:35.6 sec    proBNP:   Lipid Profile:   HgA1c:   TSH:       CARDIAC MARKERS:            TELEMETRY EVENTS: 	    ECG:  	  RADIOLOGY:  OTHER: 	    PREVIOUS DIAGNOSTIC TESTING:    [ ] Echocardiogram:  [ ]  Catheterization:  [ ] Stress Test:  	  	  ASSESSMENT/PLAN: 	    
Gastroenterology Consultation:    Patient is a 77y old  Female who presents with a chief complaint of Anemia (2023 10:49)      Admitted on: 23  HPI:  77-year-old female [place] speaking (daughter in law at bedside) history of A-fib on Xarelto status post ablation x2 and DVT cardioversion, CAD status post 4 stents, hypertension, hyperlipidemia, diabetes and Parkinson brought in by daughter in law for evaluation.  States for the last few weeks has been having gradual weakness and shortness of breath at rest that have been going for a while, states dyspnea is at rest, no associated sx.  Patient has been following cardiology Dr. Bhagat had recent lab work which showed hemoglobin 7.  Patient denies any bloody stools, melena, abdominal pain, chest pain, fever, cough, lower extremity pain or swelling, history of GI bleeding.    In the ED: BP: 139/66 mmHg, HR: 87 bpm, RR: 18, Temp: 97.6, SpO2: 99% on RA    Labs: WBC: 7.08, H.4, MCV: 73.5, Na: 128, K: 4.8, Trop: <0.01, BNP: 1070    Patient admitted to medicine for symptomatic anemia    (2023 16:17)    GI HPI:  [place]  used for translation. [employer] ID # [id number]  Patient reports she has been feeling unwell for the past few weeks, but symptoms are now improved s/p 1 units prBC transfusion. Patient is tolerating PO diet and having regularly formed bowel movements and passing gas. Denies any weight loss, nausea, vomiting, diarrhea, or constipation.     Prior records Reviewed (Y/N): Y  History obtained from person other than patient (Y/N): N    Prior EGD: N/A  Prior Colonoscopy: none in our system, reports more than 10 years ago      PAST MEDICAL & SURGICAL HISTORY:  Atrial fibrillation, unspecified type      HTN (hypertension)      High cholesterol      Diabetes mellitus      S/P ablation of atrial fibrillation      History of back surgery      Coronary stent patent          FAMILY HISTORY:  FH: coronary artery disease (Sibling)        Social History:  Tobacco: Denies use  Alcohol: Denies use  Drugs: Denies use    Home Medications:  atorvastatin 40 mg oral tablet: 1 orally once a day (2023 16:15)  carbidopa-levodopa 25 mg-100 mg oral tablet: 1 tab(s) orally 2 times a day (2023 16:15)  Entresto 97 mg-103 mg oral tablet: 1 orally 2 times a day (2023 16:15)  ergocalciferol 50,000 intl units (1.25 mg) oral capsule: 1 cap(s) orally once a week (2023 16:16)  furosemide 40 mg oral tablet: 1 orally once a day (2023 16:15)  Lantus 100 units/mL subcutaneous solution: 20 unit(s) subcutaneous once a day (2023 16:15)  metFORMIN 1000 mg oral tablet: 1 tab(s) orally 2 times a day (2023 16:16)  metoprolol tartrate 50 mg oral tablet: 1 orally 2 times a day (2023 16:44)  Ozempic (0.25 mg or 0.5 mg dose) 2 mg/1.5 mL subcutaneous solution: once a week on  (2023 16:16)  Protonix 40 mg oral delayed release tablet: 1 orally once a day (2023 16:36)  Xarelto 20 mg oral tablet: 1 tab(s) orally once a day (in the evening) (2023 16:16)    MEDICATIONS  (STANDING):  atorvastatin 40 milliGRAM(s) Oral at bedtime  bisacodyl 5 milliGRAM(s) Oral at bedtime  carbidopa/levodopa  25/100 1 Tablet(s) Oral <User Schedule>  dextrose 5%. 1000 milliLiter(s) (50 mL/Hr) IV Continuous <Continuous>  dextrose 5%. 1000 milliLiter(s) (100 mL/Hr) IV Continuous <Continuous>  dextrose 50% Injectable 12.5 Gram(s) IV Push once  dextrose 50% Injectable 25 Gram(s) IV Push once  dextrose 50% Injectable 25 Gram(s) IV Push once  furosemide    Tablet 20 milliGRAM(s) Oral daily  glucagon  Injectable 1 milliGRAM(s) IntraMuscular once  insulin lispro (ADMELOG) corrective regimen sliding scale   SubCutaneous three times a day before meals  metoprolol tartrate 50 milliGRAM(s) Oral two times a day  pantoprazole    Tablet 40 milliGRAM(s) Oral before breakfast  polyethylene glycol/electrolyte Solution. 4000 milliLiter(s) Oral once  sacubitril 97 mG/valsartan 103 mG 1 Tablet(s) Oral two times a day  sertraline 25 milliGRAM(s) Oral daily    MEDICATIONS  (PRN):  dextrose Oral Gel 15 Gram(s) Oral once PRN Blood Glucose LESS THAN 70 milliGRAM(s)/deciliter      Allergies  Milk (Diarrhea)  latex (Rash)  adhesives (Pruritus; Rash)  doxycycline (Other)      Review of Systems:   Constitutional:  No Fever, No Chills  ENT/Mouth:  No Hearing Changes,  No Difficulty Swallowing  Eyes:  No Eye Pain, No Vision Changes  Cardiovascular:  No Chest Pain, No Palpitations  Respiratory:  No Cough, No Dyspnea  Gastrointestinal:  As described in HPI  Musculoskeletal:  No Joint Swelling, No Back Pain  Skin:  No Skin Lesions, No Jaundice  Neuro:  No Syncope, No Dizziness  Heme/Lymph:  No Bruising, No Bleeding.          Physical Examination:  T(C): 36.1 (23 @ 12:05), Max: 36.6 (23 @ 04:15)  HR: 75 (23 @ 12:05) (65 - 83)  BP: 176/84 (23 @ 12:05) (173/65 - 211/95)  RR: 18 (23 @ 04:15) (18 - 19)  SpO2: 99% (23 @ 21:15) (99% - 99%)      23 @ 07:01  -  23 @ 07:00  --------------------------------------------------------  IN: 0 mL / OUT: 1200 mL / NET: -1200 mL        Constitutional: No acute distress.  Eyes:. Conjunctivae are clear, Sclera is non-icteric.  Ears Nose and Throat: The external ears are normal appearing,  Oral mucosa is pink and moist.  Respiratory:  No signs of respiratory distress. Lung sounds are clear bilaterally.  Cardiovascular:  S1 S2, Regular rate and rhythm.  GI: Abdomen is soft, symmetric, and non-tender without distention. Bowel sounds are present and normoactive in all four quadrants. No masses, hepatomegaly, or splenomegaly are noted.   Neuro: No Tremor, No involuntary movements  Skin: No rashes, No Jaundice.          Data: (reviewed by attending)                        9.3    6.89  )-----------( 414      ( 2023 08:09 )             28.7     Hgb Trend:  9.3  23 @ 08:09  7.4  23 @ 12:58            130<L>  |  94<L>  |  13  ----------------------------<  126<H>  4.7   |  22  |  0.8    Ca    9.3      2023 08:09  Mg     1.7         TPro  6.7  /  Alb  4.4  /  TBili  1.0  /  DBili  x   /  AST  15  /  ALT  <5  /  AlkPhos  102      Liver panel trend:  TBili 1.0   /   AST 15   /   ALT <5   /   AlkP 102   /   Tptn 6.7   /   Alb 4.4    /   DBili --        TBili 0.2   /   AST 15   /   ALT 9   /   AlkP 94   /   Tptn 7.1   /   Alb 4.5    /   DBili --            PT/INR - ( 2023 12:58 )   PT: 14.00 sec;   INR: 1.22 ratio         PTT - ( 2023 12:58 )  PTT:35.6 sec        Radiology:(reviewed by attending)  N/A

## 2023-07-27 NOTE — PROGRESS NOTE ADULT - ASSESSMENT
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  States for the last few weeks has been having gradual weakness and shortness of breath at rest. Found to have Hg of 7 on admission, admitted for further management.     # Microcytic anemia 2/2 to possible occult GI bleed vs malignancy   - Hg 10 in 2021  - No sign of overt bleeding, denies melena  - Last colonoscopy more than 10 y ago  - Monitor CBC, keep active type and screen  - Keep Hg above 8  - S/p 1 u pRBC   - f/u iron panel folate and B12, ordered after transfusion  - Appreciate GI recs - plan for EGD/colonoscopy tomorrow  - NPO at [place], [facility], dulcolax    #Hyponatremia:  - Chronic as per chart and PCP  - f/u urine studies     #HFpEF follows with Dr. Bhagat, on GDMT:  - Echo 2020: EF 50-55% (Had recent echo as per family available at Dr. Bhagat's office )  - Appreciate cardiology recs  - Hold Xarelto for now    # Atrial fibrillation- rate controlled  - hx multiple ablations and DCCV  - Holding Xarelto 20mg OD  - c/w Metoprolol 50 BID    # HLD:  - c/w Atorvastatin 40 QD    # DM-II  - Home med: Lantus 20 u QD, Metformin 1000 BID and Ozempic weekly  - c/w Lantus 20 QD  - Sliding scale and adjust as needed     #Parkinson Disease:  - c/w Carbi/Levo 25/100 BID    #DVT ppx: Xarelto - holding  #Diet: DASH   #Activity: Increase as tolerated   #Dispo: From home  #Code status: DNR/DNI    77-year-old female [place]uran speaking (daughter in law at bedside) history of A-fib on Xarelto status post ablation x2 and DVT cardioversion, 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  States for the last few weeks has been having gradual weakness and shortness of breath at rest. Found to have Hg of 7 on admission, admitted for further management.     # Microcytic anemia 2/2 to possible occult GI bleed vs malignancy   - Hg 10 in 2021  - No sign of overt bleeding, denies melena  - Last colonoscopy more than 10 y ago  - Monitor CBC, keep active type and screen  - Keep Hg above 8  - S/p 1 u pRBC with response Hg now 9.3  - f/u iron panel folate and B12, ordered after transfusion  - Appreciate GI recs - plan for EGD/colonoscopy tomorrow  - [facility]    #Hyponatremia:  - Chronic as per chart and PCP  - f/u urine studies     #HFpEF follows with Dr. Bhagat, on GDMT:  - Echo 2020: EF 50-55% (Had recent echo as per family available at Dr. Bhagat's office )  - Appreciate cardiology recs  - Hold Xarelto for now    # Atrial fibrillation- rate controlled  - hx multiple ablations and DCCV  - Holding Xarelto 20mg OD  - c/w Metoprolol 50 BID    # HLD:  - c/w Atorvastatin 40 QD    # DM-II  - Home med: Lantus 20 u QD, Metformin 1000 BID and Ozempic weekly  - c/w Lantus 20 QD  - Sliding scale and adjust as needed     #Parkinson Disease:  - c/w Carbi/Levo 25/100 BID    #DVT ppx: Xarelto - holding  #Diet: DASH   #Activity: Increase as tolerated   #Dispo: From home  #Code status: DNR/DNI    77-year-old female Gambian speaking (daughter in law at bedside) history of A-fib on Xarelto status post ablation x2 and DVT cardioversion, 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  States for the last few weeks has been having gradual weakness and shortness of breath at rest. Found to have Hg of 7 on admission, admitted for further management.     # Microcytic anemia 2/2 to possible occult GI bleed vs malignancy   - Hg 10 in 2021  - No sign of overt bleeding, denies melena  - Last colonoscopy more than 10 y ago  - Monitor CBC, keep active type and screen  - Keep Hg above 8  - S/p 1 u pRBC with response Hg now 9.3  - f/u iron panel folate and B12, ordered after transfusion  - Appreciate GI recs - plan for EGD/colonoscopy tomorrow  - [facility]    #Hyponatremia:  - Chronic as per chart and PCP  - f/u urine studies     #HFpEF follows with Dr. Bhagat, on GDMT:  - Echo 2020: EF 50-55% (Had recent echo as per family available at Dr. Bhagat's office )  - Appreciate cardiology recs  - Hold Xarelto for now    # Chronic Atrial fibrillation- rate controlled  - hx multiple ablations and DCCV  - Holding Xarelto 20mg OD  - c/w Metoprolol 50 BID    # HLD:  - c/w Atorvastatin 40 QD    # DM-II  - Home med: Lantus 20 u QD, Metformin 1000 BID and Ozempic weekly  - c/w Lantus 20 QD  - Sliding scale and adjust as needed     #Parkinson Disease:  - c/w Carbi/Levo 25/100 BID    #DVT ppx: Xarelto - holding  #Diet: DASH   #Activity: Increase as tolerated   #Dispo: From home  #Code status: DNR/DNI    77-year-old female Burkinan speaking (daughter in law at bedside) history of chronic A-fib on Xarelto status post ablation x2 and DVT cardioversion, HFmrEF (now HFpEF 50-55% in 2020), CAD status post 4 stents, hypertension, hyperlipidemia, diabetes and Parkinson brought in by daughter in law for evaluation.  States for the last few weeks has been having gradual weakness and shortness of breath at rest. Found to have Hg of 7 on admission, admitted for further management.     # Microcytic anemia 2/2 to possible occult GI bleed vs malignancy   - Hg 10 in 2021  - No sign of overt bleeding, denies melena  - Last colonoscopy more than 10 y ago  - Monitor CBC, keep active type and screen  - Keep Hg above 8  - S/p 1 u pRBC with response Hg now 9.3  - f/u iron panel folate and B12, ordered after transfusion  - Appreciate GI recs - plan for EGD/colonoscopy tomorrow  - [facility]    #Hyponatremia:  - Chronic as per chart and PCP  - f/u urine studies     #HFpEF follows with Dr. Bhagat, on GDMT:  - Echo 2020: EF 50-55% (Had recent echo as per family available at Dr. Bhagat's office )  - Appreciate cardiology recs  - Hold Xarelto for now    # Chronic Atrial fibrillation- rate controlled  - hx multiple ablations and DCCV  - Holding Xarelto 20mg OD  - c/w Metoprolol 50 BID    # HLD:  - c/w Atorvastatin 40 QD    # DM-II  - Home med: Lantus 20 u QD, Metformin 1000 BID and Ozempic weekly  - c/w Lantus 20 QD  - Sliding scale and adjust as needed     #Parkinson Disease:  - c/w Carbi/Levo 25/100 BID    #DVT ppx: Xarelto - holding  #Diet: DASH   #Activity: Increase as tolerated   #Dispo: From home  #Code status: DNR/DNI

## 2023-07-27 NOTE — PROGRESS NOTE ADULT - SUBJECTIVE AND OBJECTIVE BOX
Patient is a 77y old Female who presents with a chief complaint of Anemia (27 Jul 2023 09:43)  Currently admitted to medicine with the primary diagnosis of Anemia      Today is hospital day 1d, and this morning she is seen and examined at bedside. No major or acute overnight events. Reports some SOB and trouble sleeping but no bloody bowel movements. Patient denies fevers, chills, headache, chest pain, cough, nausea, vomiting, or abdominal pain.           OBJECTIVE  PAST MEDICAL & SURGICAL HISTORY  Atrial fibrillation, unspecified type    HTN (hypertension)    High cholesterol    Diabetes mellitus    S/P ablation of atrial fibrillation    History of back surgery    Coronary stent patent      ALLERGIES:  Milk (Diarrhea)  latex (Rash)  adhesives (Pruritus; Rash)  doxycycline (Other)    MEDICATIONS:  STANDING MEDICATIONS  atorvastatin 40 milliGRAM(s) Oral at bedtime  bisacodyl 5 milliGRAM(s) Oral at bedtime  carbidopa/levodopa  25/100 1 Tablet(s) Oral <User Schedule>  dextrose 5%. 1000 milliLiter(s) IV Continuous <Continuous>  dextrose 5%. 1000 milliLiter(s) IV Continuous <Continuous>  dextrose 50% Injectable 25 Gram(s) IV Push once  dextrose 50% Injectable 12.5 Gram(s) IV Push once  dextrose 50% Injectable 25 Gram(s) IV Push once  furosemide    Tablet 20 milliGRAM(s) Oral daily  glucagon  Injectable 1 milliGRAM(s) IntraMuscular once  insulin lispro (ADMELOG) corrective regimen sliding scale   SubCutaneous three times a day before meals  metoprolol tartrate 50 milliGRAM(s) Oral two times a day  pantoprazole    Tablet 40 milliGRAM(s) Oral before breakfast  polyethylene glycol/electrolyte Solution. 4000 milliLiter(s) Oral once  sacubitril 97 mG/valsartan 103 mG 1 Tablet(s) Oral two times a day  sertraline 25 milliGRAM(s) Oral daily    PRN MEDICATIONS  dextrose Oral Gel 15 Gram(s) Oral once PRN      VITAL SIGNS: Last 24 Hours  T(C): 36.6 (27 Jul 2023 04:15), Max: 36.6 (27 Jul 2023 04:15)  T(F): 97.8 (27 Jul 2023 04:15), Max: 97.8 (27 Jul 2023 04:15)  HR: 81 (27 Jul 2023 04:15) (65 - 87)  BP: 184/80 (27 Jul 2023 04:15) (139/66 - 211/95)  BP(mean): --  RR: 18 (27 Jul 2023 04:15) (18 - 19)  SpO2: 99% (26 Jul 2023 21:15) (99% - 99%)    LABS:                        9.3    6.89  )-----------( 414      ( 27 Jul 2023 08:09 )             28.7     07-27    130<L>  |  94<L>  |  13  ----------------------------<  126<H>  4.7   |  22  |  0.8    Ca    9.3      27 Jul 2023 08:09  Mg     1.7     07-27    TPro  6.7  /  Alb  4.4  /  TBili  1.0  /  DBili  x   /  AST  15  /  ALT  <5  /  AlkPhos  102  07-27    PT/INR - ( 26 Jul 2023 12:58 )   PT: 14.00 sec;   INR: 1.22 ratio         PTT - ( 26 Jul 2023 12:58 )  PTT:35.6 sec  Urinalysis Basic - ( 27 Jul 2023 08:09 )    Color: x / Appearance: x / SG: x / pH: x  Gluc: 126 mg/dL / Ketone: x  / Bili: x / Urobili: x   Blood: x / Protein: x / Nitrite: x   Leuk Esterase: x / RBC: x / WBC x   Sq Epi: x / Non Sq Epi: x / Bacteria: x        Troponin T, Serum: <0.01 ng/mL (07-26-23 @ 12:58)      CARDIAC MARKERS ( 26 Jul 2023 12:58 )  x     / <0.01 ng/mL / x     / x     / x          RADIOLOGY:  reviewed      PHYSICAL EXAM:    GENERAL: NAD  HEENT:  Atraumatic, Normocephalic. EOMI, PERRLA, conjunctiva and sclera clear, No JVD  PULMONARY: Clear to auscultation bilaterally; No wheeze  CARDIOVASCULAR: Regular rate and rhythm; No murmurs, rubs, or gallops  GASTROINTESTINAL: Soft, Nontender, Nondistended; Bowel sounds present  MUSCULOSKELETAL:  2+ Peripheral Pulses, No clubbing, cyanosis, or edema  NEUROLOGY: non-focal  SKIN: No rashes or lesions           Patient is a 77y old Female who presents with a chief complaint of Anemia (27 Jul 2023 09:43)  Currently admitted to medicine with the primary diagnosis of Anemia      Today is hospital day 1d, and this morning she is seen and examined at bedside. No major or acute overnight events. Reports some SOB and trouble sleeping but no bloody bowel movements. Patient denies fevers, chills, headache, chest pain, cough, nausea, vomiting, or abdominal pain.           OBJECTIVE  PAST MEDICAL & SURGICAL HISTORY  Atrial fibrillation, unspecified type    HTN (hypertension)    High cholesterol    Diabetes mellitus    S/P ablation of atrial fibrillation    History of back surgery    Coronary stent patent      ALLERGIES:  Milk (Diarrhea)  latex (Rash)  adhesives (Pruritus; Rash)  doxycycline (Other)    MEDICATIONS:  STANDING MEDICATIONS  atorvastatin 40 milliGRAM(s) Oral at bedtime  bisacodyl 5 milliGRAM(s) Oral at bedtime  carbidopa/levodopa  25/100 1 Tablet(s) Oral <User Schedule>  dextrose 5%. 1000 milliLiter(s) IV Continuous <Continuous>  dextrose 5%. 1000 milliLiter(s) IV Continuous <Continuous>  dextrose 50% Injectable 25 Gram(s) IV Push once  dextrose 50% Injectable 12.5 Gram(s) IV Push once  dextrose 50% Injectable 25 Gram(s) IV Push once  furosemide    Tablet 20 milliGRAM(s) Oral daily  glucagon  Injectable 1 milliGRAM(s) IntraMuscular once  insulin lispro (ADMELOG) corrective regimen sliding scale   SubCutaneous three times a day before meals  metoprolol tartrate 50 milliGRAM(s) Oral two times a day  pantoprazole    Tablet 40 milliGRAM(s) Oral before breakfast  polyethylene glycol/electrolyte Solution. 4000 milliLiter(s) Oral once  sacubitril 97 mG/valsartan 103 mG 1 Tablet(s) Oral two times a day  sertraline 25 milliGRAM(s) Oral daily    PRN MEDICATIONS  dextrose Oral Gel 15 Gram(s) Oral once PRN      VITAL SIGNS: Last 24 Hours  T(C): 36.6 (27 Jul 2023 04:15), Max: 36.6 (27 Jul 2023 04:15)  T(F): 97.8 (27 Jul 2023 04:15), Max: 97.8 (27 Jul 2023 04:15)  HR: 81 (27 Jul 2023 04:15) (65 - 87)  BP: 184/80 (27 Jul 2023 04:15) (139/66 - 211/95)  BP(mean): --  RR: 18 (27 Jul 2023 04:15) (18 - 19)  SpO2: 99% (26 Jul 2023 21:15) (99% - 99%)    LABS:                        9.3    6.89  )-----------( 414      ( 27 Jul 2023 08:09 )             28.7     07-27    130<L>  |  94<L>  |  13  ----------------------------<  126<H>  4.7   |  22  |  0.8    Ca    9.3      27 Jul 2023 08:09  Mg     1.7     07-27    TPro  6.7  /  Alb  4.4  /  TBili  1.0  /  DBili  x   /  AST  15  /  ALT  <5  /  AlkPhos  102  07-27    PT/INR - ( 26 Jul 2023 12:58 )   PT: 14.00 sec;   INR: 1.22 ratio         PTT - ( 26 Jul 2023 12:58 )  PTT:35.6 sec  Urinalysis Basic - ( 27 Jul 2023 08:09 )    Color: x / Appearance: x / SG: x / pH: x  Gluc: 126 mg/dL / Ketone: x  / Bili: x / Urobili: x   Blood: x / Protein: x / Nitrite: x   Leuk Esterase: x / RBC: x / WBC x   Sq Epi: x / Non Sq Epi: x / Bacteria: x        Troponin T, Serum: <0.01 ng/mL (07-26-23 @ 12:58)      CARDIAC MARKERS ( 26 Jul 2023 12:58 )  x     / <0.01 ng/mL / x     / x     / x          RADIOLOGY:  reviewed      PHYSICAL EXAM:    GENERAL: NAD, lying in bed comfortably  HEENT:  Atraumatic, Normocephalic. EOMI  PULMONARY: Clear to auscultation bilaterally; No wheeze  CARDIOVASCULAR: Regular rate and rhythm; No murmurs, rubs, or gallops  GASTROINTESTINAL: Soft, Nontender, Nondistended; Bowel sounds present  MUSCULOSKELETAL:  2+ Peripheral Pulses, No edema  NEUROLOGY: non-focal, A&Ox3  SKIN: No rashes or lesions           Patient is a 77y old Female who presents with a chief complaint of Anemia (27 Jul 2023 09:43)  Currently admitted to medicine with the primary diagnosis of Anemia      Today is hospital day 1d, and this morning she is seen and examined at bedside. No major or acute overnight events. Reports some SOB and trouble sleeping but no bloody bowel movements. Patient denies fevers, chills, headache, chest pain, cough, nausea, vomiting, or abdominal pain.           OBJECTIVE  PAST MEDICAL & SURGICAL HISTORY  Atrial fibrillation, chronic    HTN (hypertension)    High cholesterol    Diabetes mellitus    S/P ablation of atrial fibrillation    History of back surgery    Coronary stent patent      ALLERGIES:  Milk (Diarrhea)  latex (Rash)  adhesives (Pruritus; Rash)  doxycycline (Other)    MEDICATIONS:  STANDING MEDICATIONS  atorvastatin 40 milliGRAM(s) Oral at bedtime  bisacodyl 5 milliGRAM(s) Oral at bedtime  carbidopa/levodopa  25/100 1 Tablet(s) Oral <User Schedule>  dextrose 5%. 1000 milliLiter(s) IV Continuous <Continuous>  dextrose 5%. 1000 milliLiter(s) IV Continuous <Continuous>  dextrose 50% Injectable 25 Gram(s) IV Push once  dextrose 50% Injectable 12.5 Gram(s) IV Push once  dextrose 50% Injectable 25 Gram(s) IV Push once  furosemide    Tablet 20 milliGRAM(s) Oral daily  glucagon  Injectable 1 milliGRAM(s) IntraMuscular once  insulin lispro (ADMELOG) corrective regimen sliding scale   SubCutaneous three times a day before meals  metoprolol tartrate 50 milliGRAM(s) Oral two times a day  pantoprazole    Tablet 40 milliGRAM(s) Oral before breakfast  polyethylene glycol/electrolyte Solution. 4000 milliLiter(s) Oral once  sacubitril 97 mG/valsartan 103 mG 1 Tablet(s) Oral two times a day  sertraline 25 milliGRAM(s) Oral daily    PRN MEDICATIONS  dextrose Oral Gel 15 Gram(s) Oral once PRN      VITAL SIGNS: Last 24 Hours  T(C): 36.6 (27 Jul 2023 04:15), Max: 36.6 (27 Jul 2023 04:15)  T(F): 97.8 (27 Jul 2023 04:15), Max: 97.8 (27 Jul 2023 04:15)  HR: 81 (27 Jul 2023 04:15) (65 - 87)  BP: 184/80 (27 Jul 2023 04:15) (139/66 - 211/95)  BP(mean): --  RR: 18 (27 Jul 2023 04:15) (18 - 19)  SpO2: 99% (26 Jul 2023 21:15) (99% - 99%)    LABS:                        9.3    6.89  )-----------( 414      ( 27 Jul 2023 08:09 )             28.7     07-27    130<L>  |  94<L>  |  13  ----------------------------<  126<H>  4.7   |  22  |  0.8    Ca    9.3      27 Jul 2023 08:09  Mg     1.7     07-27    TPro  6.7  /  Alb  4.4  /  TBili  1.0  /  DBili  x   /  AST  15  /  ALT  <5  /  AlkPhos  102  07-27    PT/INR - ( 26 Jul 2023 12:58 )   PT: 14.00 sec;   INR: 1.22 ratio         PTT - ( 26 Jul 2023 12:58 )  PTT:35.6 sec  Urinalysis Basic - ( 27 Jul 2023 08:09 )    Color: x / Appearance: x / SG: x / pH: x  Gluc: 126 mg/dL / Ketone: x  / Bili: x / Urobili: x   Blood: x / Protein: x / Nitrite: x   Leuk Esterase: x / RBC: x / WBC x   Sq Epi: x / Non Sq Epi: x / Bacteria: x        Troponin T, Serum: <0.01 ng/mL (07-26-23 @ 12:58)      CARDIAC MARKERS ( 26 Jul 2023 12:58 )  x     / <0.01 ng/mL / x     / x     / x          RADIOLOGY:  reviewed      PHYSICAL EXAM:    GENERAL: NAD, lying in bed comfortably  HEENT:  Atraumatic, Normocephalic. EOMI  PULMONARY: Clear to auscultation bilaterally; No wheeze  CARDIOVASCULAR: Regular rate and rhythm; No murmurs, rubs, or gallops  GASTROINTESTINAL: Soft, Nontender, Nondistended; Bowel sounds present  MUSCULOSKELETAL:  2+ Peripheral Pulses, No edema  NEUROLOGY: non-focal, A&Ox3  SKIN: No rashes or lesions           Patient is a 77y old Female who presents with a chief complaint of Anemia (27 Jul 2023 09:43)  Currently admitted to medicine with the primary diagnosis of Anemia      Today is hospital day 1d, and this morning she is seen and examined at bedside. No major or acute overnight events. Reports some SOB and trouble sleeping but no bloody bowel movements. Patient denies fevers, chills, headache, chest pain, cough, nausea, vomiting, or abdominal pain.     OBJECTIVE  PAST MEDICAL & SURGICAL HISTORY  Atrial fibrillation, chronic    HTN (hypertension)    High cholesterol    Diabetes mellitus    S/P ablation of atrial fibrillation    History of back surgery    Coronary stent patent      ALLERGIES:  Milk (Diarrhea)  latex (Rash)  adhesives (Pruritus; Rash)  doxycycline (Other)    MEDICATIONS:  STANDING MEDICATIONS  atorvastatin 40 milliGRAM(s) Oral at bedtime  bisacodyl 5 milliGRAM(s) Oral at bedtime  carbidopa/levodopa  25/100 1 Tablet(s) Oral <User Schedule>  dextrose 5%. 1000 milliLiter(s) IV Continuous <Continuous>  dextrose 5%. 1000 milliLiter(s) IV Continuous <Continuous>  dextrose 50% Injectable 25 Gram(s) IV Push once  dextrose 50% Injectable 12.5 Gram(s) IV Push once  dextrose 50% Injectable 25 Gram(s) IV Push once  furosemide    Tablet 20 milliGRAM(s) Oral daily  glucagon  Injectable 1 milliGRAM(s) IntraMuscular once  insulin lispro (ADMELOG) corrective regimen sliding scale   SubCutaneous three times a day before meals  metoprolol tartrate 50 milliGRAM(s) Oral two times a day  pantoprazole    Tablet 40 milliGRAM(s) Oral before breakfast  polyethylene glycol/electrolyte Solution. 4000 milliLiter(s) Oral once  sacubitril 97 mG/valsartan 103 mG 1 Tablet(s) Oral two times a day  sertraline 25 milliGRAM(s) Oral daily    PRN MEDICATIONS  dextrose Oral Gel 15 Gram(s) Oral once PRN      VITAL SIGNS: Last 24 Hours  T(C): 36.6 (27 Jul 2023 04:15), Max: 36.6 (27 Jul 2023 04:15)  T(F): 97.8 (27 Jul 2023 04:15), Max: 97.8 (27 Jul 2023 04:15)  HR: 81 (27 Jul 2023 04:15) (65 - 87)  BP: 184/80 (27 Jul 2023 04:15) (139/66 - 211/95)  BP(mean): --  RR: 18 (27 Jul 2023 04:15) (18 - 19)  SpO2: 99% (26 Jul 2023 21:15) (99% - 99%)    LABS:                        9.3    6.89  )-----------( 414      ( 27 Jul 2023 08:09 )             28.7     07-27    130<L>  |  94<L>  |  13  ----------------------------<  126<H>  4.7   |  22  |  0.8    Ca    9.3      27 Jul 2023 08:09  Mg     1.7     07-27    TPro  6.7  /  Alb  4.4  /  TBili  1.0  /  DBili  x   /  AST  15  /  ALT  <5  /  AlkPhos  102  07-27    PT/INR - ( 26 Jul 2023 12:58 )   PT: 14.00 sec;   INR: 1.22 ratio         PTT - ( 26 Jul 2023 12:58 )  PTT:35.6 sec  Urinalysis Basic - ( 27 Jul 2023 08:09 )    Color: x / Appearance: x / SG: x / pH: x  Gluc: 126 mg/dL / Ketone: x  / Bili: x / Urobili: x   Blood: x / Protein: x / Nitrite: x   Leuk Esterase: x / RBC: x / WBC x   Sq Epi: x / Non Sq Epi: x / Bacteria: x        Troponin T, Serum: <0.01 ng/mL (07-26-23 @ 12:58)      CARDIAC MARKERS ( 26 Jul 2023 12:58 )  x     / <0.01 ng/mL / x     / x     / x          RADIOLOGY:  reviewed      PHYSICAL EXAM:    GENERAL: NAD, lying in bed comfortably  HEENT:  Atraumatic, Normocephalic. EOMI  PULMONARY: Clear to auscultation bilaterally; No wheeze  CARDIOVASCULAR: Regular rate and rhythm; No murmurs, rubs, or gallops  GASTROINTESTINAL: Soft, Nontender, Nondistended; Bowel sounds present  MUSCULOSKELETAL:  2+ Peripheral Pulses, No edema  NEUROLOGY: non-focal, A&Ox3  SKIN: No rashes or lesions

## 2023-07-27 NOTE — CONSULT NOTE ADULT - ASSESSMENT
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  States for the last few weeks has been having gradual weakness and shortness of breath at rest. Found to have Hg of 7 on admission, admitted for symptomatic anemia. GI consulted for anemia.    #Acute on Chronic Microcytic Anemia  - Hgb 7.4 on admission, baseline Hgb 11 in 2021  - s/p 1 unit pRBC transfusion, repeat Hgb 9.3  - on Xarelto for Afib, AC held and last dose taken prior to admission  - Iron studies (7/27/23) collected after blood transfusion  - last colonoscopy > 10 yrs ago  - JODIE brown stool, no blood    Recommendations:  - Clear liquid diet  - Order Golytely 4L to drink today  - dulcolax 4 tablets at bedtime  - Keep NPO after midnight  - Hold AC  - plan for EGD/colonoscopy tomorrow 78 y/o Nigerien speaking Female with PMHx CAD s/p 4 stents,  A-fib on Xarelto s/p ablation x2, 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  States for the last few weeks has been having gradual weakness and shortness of breath at rest. Found to have Hg of 7 on admission, admitted for symptomatic anemia. GI consulted for anemia.    #Acute on Chronic Microcytic Anemia, r/o GI bleed  - Hgb 7.4 on admission, baseline Hgb 11 in 2021  - s/p 1 unit pRBC transfusion, repeat Hgb 9.3  - on Xarelto for Afib, AC held and last dose taken prior to admission  - Iron studies (7/27/23) collected after blood transfusion  - last colonoscopy > 10 yrs ago, no complaint of melena/hematochezia, no other suspected sources of bleeding  - JODIE brown stool, no blood    Recommendations:  - Clear liquid diet  - Order Golytely 4L to drink today  - dulcolax 4 tablets at bedtime  - Keep NPO after midnight  - Hold AC  - plan for EGD/colonoscopy tomorrow 78 y/o Ivorian speaking Female with PMHx CAD s/p 4 stents, A-fib on Xarelto s/p ablation x2, 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  States for the last few weeks has been having gradual weakness and shortness of breath at rest. Found to have Hg of 7 on admission, admitted for symptomatic anemia. GI consulted for anemia.    #Acute on Chronic Microcytic Anemia, r/o GI bleed  - Hgb 7.4 on admission, baseline Hgb 11 in 2021  - s/p 1 unit pRBC transfusion, repeat Hgb 9.3  - on Xarelto for Afib, AC held and last dose taken prior to admission  - Iron studies (7/27/23) collected after blood transfusion  - last colonoscopy > 10 yrs ago, no complaint of melena/hematochezia, no other suspected sources of bleeding  - JODIE brown stool, no blood    Recommendations:  - Clear liquid diet  - Order Golytely 4L to drink today  - dulcolax 4 tablets at bedtime  - Keep NPO after midnight  - Hold AC  - plan for EGD/colonoscopy tomorrow

## 2023-07-27 NOTE — CONSULT NOTE ADULT - ASSESSMENT
Afib rate controlled on DOAcs  Hold Xarelto for now and transfuse till Hg over 9  HfpEF on GDMT  GI work up   discussed with family and resident

## 2023-07-28 ENCOUNTER — TRANSCRIPTION ENCOUNTER (OUTPATIENT)
Age: 78
End: 2023-07-28

## 2023-07-28 ENCOUNTER — RESULT REVIEW (OUTPATIENT)
Age: 78
End: 2023-07-28

## 2023-07-28 LAB
BASOPHILS # BLD AUTO: 0.06 K/UL — SIGNIFICANT CHANGE UP (ref 0–0.2)
BASOPHILS NFR BLD AUTO: 0.6 % — SIGNIFICANT CHANGE UP (ref 0–1)
BLD GP AB SCN SERPL QL: SIGNIFICANT CHANGE UP
CREAT ?TM UR-MCNC: 26 MG/DL — SIGNIFICANT CHANGE UP
EOSINOPHIL # BLD AUTO: 0.01 K/UL — SIGNIFICANT CHANGE UP (ref 0–0.7)
EOSINOPHIL NFR BLD AUTO: 0.1 % — SIGNIFICANT CHANGE UP (ref 0–8)
GLUCOSE BLDC GLUCOMTR-MCNC: 116 MG/DL — HIGH (ref 70–99)
GLUCOSE BLDC GLUCOMTR-MCNC: 133 MG/DL — HIGH (ref 70–99)
GLUCOSE BLDC GLUCOMTR-MCNC: 150 MG/DL — HIGH (ref 70–99)
GLUCOSE BLDC GLUCOMTR-MCNC: 152 MG/DL — HIGH (ref 70–99)
HCT VFR BLD CALC: 28.6 % — LOW (ref 37–47)
HGB BLD-MCNC: 9.5 G/DL — LOW (ref 12–16)
IMM GRANULOCYTES NFR BLD AUTO: 0.4 % — HIGH (ref 0.1–0.3)
LYMPHOCYTES # BLD AUTO: 1.53 K/UL — SIGNIFICANT CHANGE UP (ref 1.2–3.4)
LYMPHOCYTES # BLD AUTO: 14.8 % — LOW (ref 20.5–51.1)
MCHC RBC-ENTMCNC: 23.9 PG — LOW (ref 27–31)
MCHC RBC-ENTMCNC: 33.2 G/DL — SIGNIFICANT CHANGE UP (ref 32–37)
MCV RBC AUTO: 72 FL — LOW (ref 81–99)
MONOCYTES # BLD AUTO: 0.8 K/UL — HIGH (ref 0.1–0.6)
MONOCYTES NFR BLD AUTO: 7.7 % — SIGNIFICANT CHANGE UP (ref 1.7–9.3)
NEUTROPHILS # BLD AUTO: 7.91 K/UL — HIGH (ref 1.4–6.5)
NEUTROPHILS NFR BLD AUTO: 76.4 % — HIGH (ref 42.2–75.2)
NRBC # BLD: 0 /100 WBCS — SIGNIFICANT CHANGE UP (ref 0–0)
OSMOLALITY UR: 225 MOS/KG — SIGNIFICANT CHANGE UP (ref 50–1200)
PLATELET # BLD AUTO: 380 K/UL — SIGNIFICANT CHANGE UP (ref 130–400)
PMV BLD: 8.6 FL — SIGNIFICANT CHANGE UP (ref 7.4–10.4)
POTASSIUM UR-SCNC: 24 MMOL/L — SIGNIFICANT CHANGE UP
PROT ?TM UR-MCNC: 22 MG/DLG/24H — SIGNIFICANT CHANGE UP
PROT/CREAT UR-RTO: 0.8 RATIO — HIGH (ref 0–0.2)
RBC # BLD: 3.97 M/UL — LOW (ref 4.2–5.4)
RBC # FLD: 16.3 % — HIGH (ref 11.5–14.5)
SODIUM UR-SCNC: 68 MMOL/L — SIGNIFICANT CHANGE UP
UUN UR-MCNC: 160 MG/DL — SIGNIFICANT CHANGE UP
WBC # BLD: 10.35 K/UL — SIGNIFICANT CHANGE UP (ref 4.8–10.8)
WBC # FLD AUTO: 10.35 K/UL — SIGNIFICANT CHANGE UP (ref 4.8–10.8)

## 2023-07-28 PROCEDURE — 99233 SBSQ HOSP IP/OBS HIGH 50: CPT

## 2023-07-28 PROCEDURE — 43239 EGD BIOPSY SINGLE/MULTIPLE: CPT | Mod: XS

## 2023-07-28 PROCEDURE — 88312 SPECIAL STAINS GROUP 1: CPT | Mod: 26

## 2023-07-28 PROCEDURE — 45378 DIAGNOSTIC COLONOSCOPY: CPT

## 2023-07-28 PROCEDURE — 88305 TISSUE EXAM BY PATHOLOGIST: CPT | Mod: 26

## 2023-07-28 RX ORDER — NIFEDIPINE 30 MG
30 TABLET, EXTENDED RELEASE 24 HR ORAL EVERY 24 HOURS
Refills: 0 | Status: DISCONTINUED | OUTPATIENT
Start: 2023-07-28 | End: 2023-07-30

## 2023-07-28 RX ORDER — ACETAMINOPHEN 500 MG
650 TABLET ORAL EVERY 6 HOURS
Refills: 0 | Status: DISCONTINUED | OUTPATIENT
Start: 2023-07-28 | End: 2023-07-30

## 2023-07-28 RX ORDER — RIVAROXABAN 15 MG-20MG
20 KIT ORAL
Refills: 0 | Status: DISCONTINUED | OUTPATIENT
Start: 2023-07-28 | End: 2023-07-30

## 2023-07-28 RX ORDER — SIMETHICONE 80 MG/1
80 TABLET, CHEWABLE ORAL
Refills: 0 | Status: DISCONTINUED | OUTPATIENT
Start: 2023-07-28 | End: 2023-07-30

## 2023-07-28 RX ADMIN — Medication 50 MILLIGRAM(S): at 05:02

## 2023-07-28 RX ADMIN — Medication 650 MILLIGRAM(S): at 12:50

## 2023-07-28 RX ADMIN — CARBIDOPA AND LEVODOPA 1 TABLET(S): 25; 100 TABLET ORAL at 20:06

## 2023-07-28 RX ADMIN — CARBIDOPA AND LEVODOPA 1 TABLET(S): 25; 100 TABLET ORAL at 05:02

## 2023-07-28 RX ADMIN — SERTRALINE 25 MILLIGRAM(S): 25 TABLET, FILM COATED ORAL at 11:08

## 2023-07-28 RX ADMIN — Medication 20 MILLIGRAM(S): at 05:02

## 2023-07-28 RX ADMIN — ATORVASTATIN CALCIUM 40 MILLIGRAM(S): 80 TABLET, FILM COATED ORAL at 21:20

## 2023-07-28 RX ADMIN — CALAMINE AND ZINC OXIDE AND PHENOL 1 APPLICATION(S): 160; 10 LOTION TOPICAL at 05:03

## 2023-07-28 RX ADMIN — SACUBITRIL AND VALSARTAN 1 TABLET(S): 24; 26 TABLET, FILM COATED ORAL at 20:05

## 2023-07-28 RX ADMIN — Medication 650 MILLIGRAM(S): at 11:53

## 2023-07-28 RX ADMIN — PANTOPRAZOLE SODIUM 40 MILLIGRAM(S): 20 TABLET, DELAYED RELEASE ORAL at 06:59

## 2023-07-28 RX ADMIN — SACUBITRIL AND VALSARTAN 1 TABLET(S): 24; 26 TABLET, FILM COATED ORAL at 05:02

## 2023-07-28 RX ADMIN — Medication 50 MILLIGRAM(S): at 20:06

## 2023-07-28 RX ADMIN — CALAMINE AND ZINC OXIDE AND PHENOL 1 APPLICATION(S): 160; 10 LOTION TOPICAL at 01:19

## 2023-07-28 RX ADMIN — CALAMINE AND ZINC OXIDE AND PHENOL 1 APPLICATION(S): 160; 10 LOTION TOPICAL at 14:54

## 2023-07-28 RX ADMIN — CALAMINE AND ZINC OXIDE AND PHENOL 1 APPLICATION(S): 160; 10 LOTION TOPICAL at 21:19

## 2023-07-28 RX ADMIN — Medication 30 MILLIGRAM(S): at 14:54

## 2023-07-28 NOTE — PROGRESS NOTE ADULT - SUBJECTIVE AND OBJECTIVE BOX
----------Daily Progress Note----------    HISTORY OF PRESENT ILLNESS:  Patient is our 78 yo [place]ican speaking F w/PMH of Afib (on Xeralto) s/p ablation x2 and DVT cardioversion, CAD s/p 4 stent placements, HTN, HLD, diabetes, and Parkinson disease who presented to the ED w/gradual worsening weakness and dyspnea at rest w/recent blood work showing hgb 7, w/VSS in ED, CXR w/enlarged cardiac silhouette but no pulmonary congestion, EKG w/Afib, hgb 7.4, Na 128, K 4.8, trop WNL, and BNP 1070 now admitted for work up and management of symptomatic anemia.      Today is hospital day 2d.     ED course:  77-year-old female [place]ican speaking (daughter in law at bedside) history of A-fib on Xarelto status post ablation x2 and DVT cardioversion, CAD status post 4 stents, hypertension, hyperlipidemia, diabetes and Parkinson brought in by daughter in law for evaluation.  States for the last few weeks has been having gradual weakness and shortness of breath at rest that have been going for a while, states dyspnea is at rest, no associated sx.  Patient has been following cardiology Dr. Bhagat had recent lab work which showed hemoglobin 7.  Patient denies any bloody stools, melena, abdominal pain, chest pain, fever, cough, lower extremity pain or swelling, history of GI bleeding.    In the ED: BP: 139/66 mmHg, HR: 87 bpm, RR: 18, Temp: 97.6, SpO2: 99% on RA    Labs: WBC: 7.08, H.4, MCV: 73.5, Na: 128, K: 4.8, Trop: <0.01, BNP: 1070    Patient admitted to medicine for symptomatic anemia     INTERVAL HOSPITAL COURSE / OVERNIGHT EVENTS:    Patient was examined and seen at bedside. This morning she is resting comfortably in bed and reports no new issues or overnight events.     Review of Systems: Otherwise unremarkable     <<<<<PAST MEDICAL & SURGICAL HISTORY>>>>>  Atrial fibrillation, unspecified type    HTN (hypertension)    High cholesterol    Diabetes mellitus    S/P ablation of atrial fibrillation    History of back surgery    Coronary stent patent      ALLERGIES  Milk (Diarrhea)  latex (Rash)  adhesives (Pruritus; Rash)  doxycycline (Other)      Home Medications:  atorvastatin 40 mg oral tablet: 1 orally once a day (2023 16:15)  carbidopa-levodopa 25 mg-100 mg oral tablet: 1 tab(s) orally 2 times a day (2023 16:15)  Entresto 97 mg-103 mg oral tablet: 1 orally 2 times a day (2023 16:15)  ergocalciferol 50,000 intl units (1.25 mg) oral capsule: 1 cap(s) orally once a week (2023 16:16)  furosemide 40 mg oral tablet: 1 orally once a day (2023 16:15)  Lantus 100 units/mL subcutaneous solution: 20 unit(s) subcutaneous once a day (2023 16:15)  metFORMIN 1000 mg oral tablet: 1 tab(s) orally 2 times a day (2023 16:16)  metoprolol tartrate 50 mg oral tablet: 1 orally 2 times a day (2023 16:44)  Ozempic (0.25 mg or 0.5 mg dose) 2 mg/1.5 mL subcutaneous solution: once a week on  (2023 16:16)  Protonix 40 mg oral delayed release tablet: 1 orally once a day (2023 16:36)  Xarelto 20 mg oral tablet: 1 tab(s) orally once a day (in the evening) (2023 16:16)        MEDICATIONS  STANDING MEDICATIONS  atorvastatin 40 milliGRAM(s) Oral at bedtime  bisacodyl 20 milliGRAM(s) Oral once  calamine/zinc oxide Lotion 1 Application(s) Topical three times a day  carbidopa/levodopa  25/100 1 Tablet(s) Oral <User Schedule>  dextrose 5%. 1000 milliLiter(s) IV Continuous <Continuous>  dextrose 5%. 1000 milliLiter(s) IV Continuous <Continuous>  dextrose 50% Injectable 25 Gram(s) IV Push once  dextrose 50% Injectable 12.5 Gram(s) IV Push once  dextrose 50% Injectable 25 Gram(s) IV Push once  furosemide    Tablet 20 milliGRAM(s) Oral daily  glucagon  Injectable 1 milliGRAM(s) IntraMuscular once  insulin lispro (ADMELOG) corrective regimen sliding scale   SubCutaneous three times a day before meals  metoprolol tartrate 50 milliGRAM(s) Oral two times a day  pantoprazole    Tablet 40 milliGRAM(s) Oral before breakfast  sacubitril 97 mG/valsartan 103 mG 1 Tablet(s) Oral two times a day  sertraline 25 milliGRAM(s) Oral daily    PRN MEDICATIONS  dextrose Oral Gel 15 Gram(s) Oral once PRN    VITALS:  T(F): 97  HR: 84  BP: 161/84  RR: 18  SpO2: --    <<<<<LABS>>>>>                        8.9    7.74  )-----------( 390      ( 2023 21:01 )             27.5     07    127<L>  |  90<L>  |  14  ----------------------------<  110<H>  4.7   |  24  |  0.9    Ca    9.7      2023 21:01  Mg     1.6         TPro  6.7  /  Alb  4.4  /  TBili  1.0  /  DBili  x   /  AST  15  /  ALT  <5  /  AlkPhos  102      PT/INR - ( 2023 12:58 )   PT: 14.00 sec;   INR: 1.22 ratio         PTT - ( 2023 12:58 )  PTT:35.6 sec  Urinalysis Basic - ( 2023 21:01 )    Color: x / Appearance: x / SG: x / pH: x  Gluc: 110 mg/dL / Ketone: x  / Bili: x / Urobili: x   Blood: x / Protein: x / Nitrite: x   Leuk Esterase: x / RBC: x / WBC x   Sq Epi: x / Non Sq Epi: x / Bacteria: x          [id number]  CARDIAC MARKERS ( 2023 12:58 )  x     / <0.01 ng/mL / x     / x     / x            <<<<<RADIOLOGY>>>>>    <<<<<PHYSICAL EXAM>>>>>  GENERAL: Well developed, well nourished and in no acute distress. Resting comfortably in bed.  HEENT: Normocephalic, atraumatic, mucous membranes moist, EOMI, PERRLA, bilateral sclera anicteric, no conjunctival injection  Neck: Supple, non-tender, no lymphadenopathy.  PULMONARY: Clear to auscultation bilaterally. No rales, rhonchi, or wheezing.  CARDIOVASCULAR: Regular rate and rhythm, S1-S2, no murmurs  GASTROINTESTINAL: Soft, non-tender, non-distended, no guarding.  RENAL: No CVA tenderness.  SKIN/EXTREMITIES: No clubbing or edema  NEUROLOGIC/MUSCULOSKELETAL: AOx4, grossly moving all extremities, no focal deficits.      ----------------------------------------------------------------------------------------------------------------------------------------------------------------------------------------------- ----------Daily Progress Note----------    HISTORY OF PRESENT ILLNESS:  Patient is our 78 yo Fijian speaking F w/PMH of Afib (on Xeralto) s/p ablation x2 and DVT cardioversion, CAD s/p 4 stent placements, HTN, HLD, diabetes, and Parkinson disease who presented to the ED w/gradual worsening weakness and dyspnea at rest w/recent blood work showing hgb 7, w/VSS in ED, CXR w/enlarged cardiac silhouette but no pulmonary congestion, EKG w/Afib, hgb 7.4, Na 128, K 4.8, trop WNL, and BNP 1070 now admitted for work up and management of symptomatic anemia.      Today is hospital day 2d.     ED course:  77-year-old female Fijian speaking (daughter in law at bedside) history of A-fib on Xarelto status post ablation x2 and DVT cardioversion, CAD status post 4 stents, hypertension, hyperlipidemia, diabetes and Parkinson brought in by daughter in law for evaluation.  States for the last few weeks has been having gradual weakness and shortness of breath at rest that have been going for a while, states dyspnea is at rest, no associated sx.  Patient has been following cardiology Dr. Bhagat had recent lab work which showed hemoglobin 7.  Patient denies any bloody stools, melena, abdominal pain, chest pain, fever, cough, lower extremity pain or swelling, history of GI bleeding.    In the ED: BP: 139/66 mmHg, HR: 87 bpm, RR: 18, Temp: 97.6, SpO2: 99% on RA    Labs: WBC: 7.08, H.4, MCV: 73.5, Na: 128, K: 4.8, Trop: <0.01, BNP: 1070    Patient admitted to medicine for symptomatic anemia     INTERVAL HOSPITAL COURSE / OVERNIGHT EVENTS:    Patient was examined and seen at bedside. This morning she is resting comfortably in bed and reports no new issues or overnight events.     Review of Systems: Otherwise unremarkable     <<<<<PAST MEDICAL & SURGICAL HISTORY>>>>>  Atrial fibrillation, unspecified type    HTN (hypertension)    High cholesterol    Diabetes mellitus    S/P ablation of atrial fibrillation    History of back surgery    Coronary stent patent      ALLERGIES  Milk (Diarrhea)  latex (Rash)  adhesives (Pruritus; Rash)  doxycycline (Other)      Home Medications:  atorvastatin 40 mg oral tablet: 1 orally once a day (2023 16:15)  carbidopa-levodopa 25 mg-100 mg oral tablet: 1 tab(s) orally 2 times a day (2023 16:15)  Entresto 97 mg-103 mg oral tablet: 1 orally 2 times a day (2023 16:15)  ergocalciferol 50,000 intl units (1.25 mg) oral capsule: 1 cap(s) orally once a week (2023 16:16)  furosemide 40 mg oral tablet: 1 orally once a day (2023 16:15)  Lantus 100 units/mL subcutaneous solution: 20 unit(s) subcutaneous once a day (2023 16:15)  metFORMIN 1000 mg oral tablet: 1 tab(s) orally 2 times a day (2023 16:16)  metoprolol tartrate 50 mg oral tablet: 1 orally 2 times a day (2023 16:44)  Ozempic (0.25 mg or 0.5 mg dose) 2 mg/1.5 mL subcutaneous solution: once a week on  (2023 16:16)  Protonix 40 mg oral delayed release tablet: 1 orally once a day (2023 16:36)  Xarelto 20 mg oral tablet: 1 tab(s) orally once a day (in the evening) (2023 16:16)        MEDICATIONS  STANDING MEDICATIONS  atorvastatin 40 milliGRAM(s) Oral at bedtime  bisacodyl 20 milliGRAM(s) Oral once  calamine/zinc oxide Lotion 1 Application(s) Topical three times a day  carbidopa/levodopa  25/100 1 Tablet(s) Oral <User Schedule>  dextrose 5%. 1000 milliLiter(s) IV Continuous <Continuous>  dextrose 5%. 1000 milliLiter(s) IV Continuous <Continuous>  dextrose 50% Injectable 25 Gram(s) IV Push once  dextrose 50% Injectable 12.5 Gram(s) IV Push once  dextrose 50% Injectable 25 Gram(s) IV Push once  furosemide    Tablet 20 milliGRAM(s) Oral daily  glucagon  Injectable 1 milliGRAM(s) IntraMuscular once  insulin lispro (ADMELOG) corrective regimen sliding scale   SubCutaneous three times a day before meals  metoprolol tartrate 50 milliGRAM(s) Oral two times a day  pantoprazole    Tablet 40 milliGRAM(s) Oral before breakfast  sacubitril 97 mG/valsartan 103 mG 1 Tablet(s) Oral two times a day  sertraline 25 milliGRAM(s) Oral daily    PRN MEDICATIONS  dextrose Oral Gel 15 Gram(s) Oral once PRN    VITALS:  T(F): 97  HR: 84  BP: 161/84  RR: 18  SpO2: --    <<<<<PHYSICAL EXAM>>>>>  GENERAL: Elderly woman laying comfortably in bed in no apparent distress,   HEENT: Normocephalic, atraumatic, mucous membranes moist, EOMI, PERRLA, bilateral sclera anicteric, no conjunctival injection  Neck: Supple, non-tender, no lymphadenopathy.  PULMONARY: Clear to auscultation bilaterally. No rales, rhonchi, or wheezing.  CARDIOVASCULAR: Regular rate and rhythm, S1-S2, no murmurs  GASTROINTESTINAL: Soft, non-tender, non-distended, no guarding.  RENAL: No CVA tenderness.  SKIN/EXTREMITIES: No clubbing or edema  NEUROLOGIC/MUSCULOSKELETAL: AOx4, grossly moving all extremities, no focal deficits.    <<<<<LABS>>>>>                        8.9    7.74  )-----------( 390      ( 2023 21:01 )             27.5     07-27    127<L>  |  90<L>  |  14  ----------------------------<  110<H>  4.7   |  24  |  0.9    Ca    9.7      2023 21:01  Mg     1.6         TPro  6.7  /  Alb  4.4  /  TBili  1.0  /  DBili  x   /  AST  15  /  ALT  <5  /  AlkPhos  102  07-27    PT/INR - ( 2023 12:58 )   PT: 14.00 sec;   INR: 1.22 ratio         PTT - ( 2023 12:58 )  PTT:35.6 sec  Urinalysis Basic - ( 2023 21:01 )    Color: x / Appearance: x / SG: x / pH: x  Gluc: 110 mg/dL / Ketone: x  / Bili: x / Urobili: x   Blood: x / Protein: x / Nitrite: x   Leuk Esterase: x / RBC: x / WBC x   Sq Epi: x / Non Sq Epi: x / Bacteria: x          [id number]  CARDIAC MARKERS ( 2023 12:58 )  x     / <0.01 ng/mL / x     / x     / x            <<<<<RADIOLOGY>>>>>          ----------------------------------------------------------------------------------------------------------------------------------------------------------------------------------------------- ----------Daily Progress Note----------    HISTORY OF PRESENT ILLNESS:  Patient is our 78 yo Israeli speaking F w/PMH of Afib (on Xeralto) s/p ablation x2 and DVT cardioversion, CAD s/p 4 stent placements, HTN, HLD, diabetes, and Parkinson disease who presented to the ED w/gradual worsening weakness and dyspnea at rest w/recent blood work showing hgb 7, w/VSS in ED, CXR w/enlarged cardiac silhouette but no pulmonary congestion, EKG w/Afib, hgb 7.4, Na 128, K 4.8, trop WNL, and BNP 1070 now admitted for work up and management of symptomatic anemia.      Today is hospital day 2d.     ED course:  77-year-old female Israeli speaking (daughter in law at bedside) history of A-fib on Xarelto status post ablation x2 and DVT cardioversion, CAD status post 4 stents, hypertension, hyperlipidemia, diabetes and Parkinson brought in by daughter in law for evaluation.  States for the last few weeks has been having gradual weakness and shortness of breath at rest that have been going for a while, states dyspnea is at rest, no associated sx.  Patient has been following cardiology Dr. Bhagat had recent lab work which showed hemoglobin 7.  Patient denies any bloody stools, melena, abdominal pain, chest pain, fever, cough, lower extremity pain or swelling, history of GI bleeding.    In the ED: BP: 139/66 mmHg, HR: 87 bpm, RR: 18, Temp: 97.6, SpO2: 99% on RA    Labs: WBC: 7.08, H.4, MCV: 73.5, Na: 128, K: 4.8, Trop: <0.01, BNP: 1070    Patient admitted to medicine for symptomatic anemia     INTERVAL HOSPITAL COURSE / OVERNIGHT EVENTS:    Patient was examined and seen at bedside. This morning she is resting comfortably in bed and reports no new issues or overnight events.     Review of Systems: Otherwise unremarkable     <<<<<PAST MEDICAL & SURGICAL HISTORY>>>>>  Atrial fibrillation, unspecified type    HTN (hypertension)    High cholesterol    Diabetes mellitus    S/P ablation of atrial fibrillation    History of back surgery    Coronary stent patent      ALLERGIES  Milk (Diarrhea)  latex (Rash)  adhesives (Pruritus; Rash)  doxycycline (Other)      Home Medications:  atorvastatin 40 mg oral tablet: 1 orally once a day (2023 16:15)  carbidopa-levodopa 25 mg-100 mg oral tablet: 1 tab(s) orally 2 times a day (2023 16:15)  Entresto 97 mg-103 mg oral tablet: 1 orally 2 times a day (2023 16:15)  ergocalciferol 50,000 intl units (1.25 mg) oral capsule: 1 cap(s) orally once a week (2023 16:16)  furosemide 40 mg oral tablet: 1 orally once a day (2023 16:15)  Lantus 100 units/mL subcutaneous solution: 20 unit(s) subcutaneous once a day (2023 16:15)  metFORMIN 1000 mg oral tablet: 1 tab(s) orally 2 times a day (2023 16:16)  metoprolol tartrate 50 mg oral tablet: 1 orally 2 times a day (2023 16:44)  Ozempic (0.25 mg or 0.5 mg dose) 2 mg/1.5 mL subcutaneous solution: once a week on  (2023 16:16)  Protonix 40 mg oral delayed release tablet: 1 orally once a day (2023 16:36)  Xarelto 20 mg oral tablet: 1 tab(s) orally once a day (in the evening) (2023 16:16)        MEDICATIONS  STANDING MEDICATIONS  atorvastatin 40 milliGRAM(s) Oral at bedtime  bisacodyl 20 milliGRAM(s) Oral once  calamine/zinc oxide Lotion 1 Application(s) Topical three times a day  carbidopa/levodopa  25/100 1 Tablet(s) Oral <User Schedule>  dextrose 5%. 1000 milliLiter(s) IV Continuous <Continuous>  dextrose 5%. 1000 milliLiter(s) IV Continuous <Continuous>  dextrose 50% Injectable 25 Gram(s) IV Push once  dextrose 50% Injectable 12.5 Gram(s) IV Push once  dextrose 50% Injectable 25 Gram(s) IV Push once  furosemide    Tablet 20 milliGRAM(s) Oral daily  glucagon  Injectable 1 milliGRAM(s) IntraMuscular once  insulin lispro (ADMELOG) corrective regimen sliding scale   SubCutaneous three times a day before meals  metoprolol tartrate 50 milliGRAM(s) Oral two times a day  pantoprazole    Tablet 40 milliGRAM(s) Oral before breakfast  sacubitril 97 mG/valsartan 103 mG 1 Tablet(s) Oral two times a day  sertraline 25 milliGRAM(s) Oral daily    PRN MEDICATIONS  dextrose Oral Gel 15 Gram(s) Oral once PRN    VITALS:  T(F): 97  HR: 84  BP: 161/84  RR: 18  SpO2: --    <<<<<PHYSICAL EXAM>>>>>  GENERAL: Elderly woman laying comfortably in bed in no apparent distress  PULMONARY: Clear to auscultation bilaterally. No rales, rhonchi, or wheezing.  CARDIOVASCULAR: RRR, no M/R/G, S1/S2+  GASTROINTESTINAL: Soft, non-tender, non-distended, no guarding.  SKIN/EXTREMITIES: Warm, well perfused  NEUROLOGIC/MUSCULOSKELETAL: AOx4, grossly moving all extremities, no focal deficits.    <<<<<LABS>>>>>                        8.9    7.74  )-----------( 390      ( 2023 21:01 )             27.5     07-27    127<L>  |  90<L>  |  14  ----------------------------<  110<H>  4.7   |  24  |  0.9    Ca    9.7      2023 21:01  Mg     1.6         TPro  6.7  /  Alb  4.4  /  TBili  1.0  /  DBili  x   /  AST  15  /  ALT  <5  /  AlkPhos  102  07-27    PT/INR - ( 2023 12:58 )   PT: 14.00 sec;   INR: 1.22 ratio         PTT - ( 2023 12:58 )  PTT:35.6 sec  Urinalysis Basic - ( 2023 21:01 )    Color: x / Appearance: x / SG: x / pH: x  Gluc: 110 mg/dL / Ketone: x  / Bili: x / Urobili: x   Blood: x / Protein: x / Nitrite: x   Leuk Esterase: x / RBC: x / WBC x   Sq Epi: x / Non Sq Epi: x / Bacteria: x          [id number]  CARDIAC MARKERS ( 2023 12:58 )  x     / <0.01 ng/mL / x     / x     / x            <<<<<RADIOLOGY>>>>>          ----------------------------------------------------------------------------------------------------------------------------------------------------------------------------------------------- ----------Daily Progress Note----------    HISTORY OF PRESENT ILLNESS:  Patient is our 78 yo Trinidadian speaking F w/PMH of Afib (on Xeralto) s/p ablation x2 and DVT cardioversion, CAD s/p 4 stent placements, HTN, HLD, diabetes, and Parkinson disease who presented to the ED w/gradual worsening weakness and dyspnea at rest w/recent blood work showing hgb 7, w/VSS in ED, CXR w/enlarged cardiac silhouette but no pulmonary congestion, EKG w/Afib, hgb 7.4, Na 128, K 4.8, trop WNL, and BNP 1070 now admitted for work up and management of symptomatic anemia.      Today is hospital day 2d.     ED course:  77-year-old female Trinidadian speaking (daughter in law at bedside) history of A-fib on Xarelto status post ablation x2 and DVT cardioversion, CAD status post 4 stents, hypertension, hyperlipidemia, diabetes and Parkinson brought in by daughter in law for evaluation.  States for the last few weeks has been having gradual weakness and shortness of breath at rest that have been going for a while, states dyspnea is at rest, no associated sx.  Patient has been following cardiology Dr. Bhagat had recent lab work which showed hemoglobin 7.  Patient denies any bloody stools, melena, abdominal pain, chest pain, fever, cough, lower extremity pain or swelling, history of GI bleeding.    In the ED: BP: 139/66 mmHg, HR: 87 bpm, RR: 18, Temp: 97.6, SpO2: 99% on RA    Labs: WBC: 7.08, H.4, MCV: 73.5, Na: 128, K: 4.8, Trop: <0.01, BNP: 1070    Patient admitted to medicine for symptomatic anemia     INTERVAL HOSPITAL COURSE / OVERNIGHT EVENTS:  O/N:   Hypertensive to 238/96, given morning BP meds    24 hr event:  None    Patient was examined and seen at bedside.     Review of Systems: Otherwise unremarkable     <<<<<PAST MEDICAL & SURGICAL HISTORY>>>>>  Atrial fibrillation, unspecified type    HTN (hypertension)    High cholesterol    Diabetes mellitus    S/P ablation of atrial fibrillation    History of back surgery    Coronary stent patent      ALLERGIES  Milk (Diarrhea)  latex (Rash)  adhesives (Pruritus; Rash)  doxycycline (Other)      Home Medications:  atorvastatin 40 mg oral tablet: 1 orally once a day (2023 16:15)  carbidopa-levodopa 25 mg-100 mg oral tablet: 1 tab(s) orally 2 times a day (2023 16:15)  Entresto 97 mg-103 mg oral tablet: 1 orally 2 times a day (2023 16:15)  ergocalciferol 50,000 intl units (1.25 mg) oral capsule: 1 cap(s) orally once a week (2023 16:16)  furosemide 40 mg oral tablet: 1 orally once a day (2023 16:15)  Lantus 100 units/mL subcutaneous solution: 20 unit(s) subcutaneous once a day (2023 16:15)  metFORMIN 1000 mg oral tablet: 1 tab(s) orally 2 times a day (2023 16:16)  metoprolol tartrate 50 mg oral tablet: 1 orally 2 times a day (2023 16:44)  Ozempic (0.25 mg or 0.5 mg dose) 2 mg/1.5 mL subcutaneous solution: once a week on  (2023 16:16)  Protonix 40 mg oral delayed release tablet: 1 orally once a day (2023 16:36)  Xarelto 20 mg oral tablet: 1 tab(s) orally once a day (in the evening) (2023 16:16)        MEDICATIONS  STANDING MEDICATIONS  atorvastatin 40 milliGRAM(s) Oral at bedtime  bisacodyl 20 milliGRAM(s) Oral once  calamine/zinc oxide Lotion 1 Application(s) Topical three times a day  carbidopa/levodopa  25/100 1 Tablet(s) Oral <User Schedule>  dextrose 5%. 1000 milliLiter(s) IV Continuous <Continuous>  dextrose 5%. 1000 milliLiter(s) IV Continuous <Continuous>  dextrose 50% Injectable 25 Gram(s) IV Push once  dextrose 50% Injectable 12.5 Gram(s) IV Push once  dextrose 50% Injectable 25 Gram(s) IV Push once  furosemide    Tablet 20 milliGRAM(s) Oral daily  glucagon  Injectable 1 milliGRAM(s) IntraMuscular once  insulin lispro (ADMELOG) corrective regimen sliding scale   SubCutaneous three times a day before meals  metoprolol tartrate 50 milliGRAM(s) Oral two times a day  pantoprazole    Tablet 40 milliGRAM(s) Oral before breakfast  sacubitril 97 mG/valsartan 103 mG 1 Tablet(s) Oral two times a day  sertraline 25 milliGRAM(s) Oral daily    PRN MEDICATIONS  dextrose Oral Gel 15 Gram(s) Oral once PRN    VITALS:  T(F): 97  HR: 84  BP: 161/84  RR: 18  SpO2: --    <<<<<PHYSICAL EXAM>>>>>  GENERAL: Elderly woman laying comfortably in bed in no apparent distress  PULMONARY: Clear to auscultation bilaterally. No rales, rhonchi, or wheezing  CARDIOVASCULAR: RRR, no M/R/G, S1/S2+  GASTROINTESTINAL: Soft, non-tender, non-distended, no guarding  SKIN/EXTREMITIES: Warm, 2+ tibialis posterior pulses, no UE or LE edema  NEUROLOGIC/MUSCULOSKELETAL: AOx4, grossly moving all extremities, no focal deficits    <<<<<LABS>>>>>                        8.9    7.74  )-----------( 390      ( 2023 21:01 )             27.5     07    127<L>  |  90<L>  |  14  ----------------------------<  110<H>  4.7   |  24  |  0.9    Ca    9.7      2023 21:01  Mg     1.6         TPro  6.7  /  Alb  4.4  /  TBili  1.0  /  DBili  x   /  AST  15  /  ALT  <5  /  AlkPhos  102      PT/INR - ( 2023 12:58 )   PT: 14.00 sec;   INR: 1.22 ratio         PTT - ( 2023 12:58 )  PTT:35.6 sec  Urinalysis Basic - ( 2023 21:01 )    Color: x / Appearance: x / SG: x / pH: x  Gluc: 110 mg/dL / Ketone: x  / Bili: x / Urobili: x   Blood: x / Protein: x / Nitrite: x   Leuk Esterase: x / RBC: x / WBC x   Sq Epi: x / Non Sq Epi: x / Bacteria: x          [id number]  CARDIAC MARKERS ( 2023 12:58 )  x     / <0.01 ng/mL / x     / x     / x            <<<<<RADIOLOGY>>>>>          ----------------------------------------------------------------------------------------------------------------------------------------------------------------------------------------------- ----------Daily Progress Note----------    HISTORY OF PRESENT ILLNESS:  Patient is our 76 yo Montserratian speaking F w/PMH of Afib (on Xeralto) s/p ablation x2 and DVT cardioversion, CAD s/p 4 stent placements, HTN, HLD, diabetes, and Parkinson disease who presented to the ED w/gradual worsening weakness and dyspnea at rest w/recent blood work showing hgb 7, w/VSS in ED, CXR w/enlarged cardiac silhouette but no pulmonary congestion, EKG w/Afib, hgb 7.4, Na 128, K 4.8, trop WNL, and BNP 1070 now admitted for work up and management of symptomatic anemia.      Today is hospital day 2d.     ED course:  77-year-old female Montserratian speaking (daughter in law at bedside) history of A-fib on Xarelto status post ablation x2 and DVT cardioversion, CAD status post 4 stents, hypertension, hyperlipidemia, diabetes and Parkinson brought in by daughter in law for evaluation.  States for the last few weeks has been having gradual weakness and shortness of breath at rest that have been going for a while, states dyspnea is at rest, no associated sx.  Patient has been following cardiology Dr. Bhagat had recent lab work which showed hemoglobin 7.  Patient denies any bloody stools, melena, abdominal pain, chest pain, fever, cough, lower extremity pain or swelling, history of GI bleeding.    In the ED: BP: 139/66 mmHg, HR: 87 bpm, RR: 18, Temp: 97.6, SpO2: 99% on RA    Labs: WBC: 7.08, H.4, MCV: 73.5, Na: 128, K: 4.8, Trop: <0.01, BNP: 1070    Patient admitted to medicine for symptomatic anemia     INTERVAL HOSPITAL COURSE / OVERNIGHT EVENTS:  O/N:   Hypertensive to 238/96, given morning BP meds, BP went down to 161/84    24 hr event:  None    Patient was examined and seen at bedside. Patient reported some headache but was otherwise feeling fine, denied any pain or discomfort, denies any dyspnea while laying in bed    Review of Systems: Otherwise unremarkable     <<<<<PAST MEDICAL & SURGICAL HISTORY>>>>>  Atrial fibrillation, unspecified type    HTN (hypertension)    High cholesterol    Diabetes mellitus    S/P ablation of atrial fibrillation    History of back surgery    Coronary stent patent      ALLERGIES  Milk (Diarrhea)  latex (Rash)  adhesives (Pruritus; Rash)  doxycycline (Other)      Home Medications:  atorvastatin 40 mg oral tablet: 1 orally once a day (2023 16:15)  carbidopa-levodopa 25 mg-100 mg oral tablet: 1 tab(s) orally 2 times a day (2023 16:15)  Entresto 97 mg-103 mg oral tablet: 1 orally 2 times a day (2023 16:15)  ergocalciferol 50,000 intl units (1.25 mg) oral capsule: 1 cap(s) orally once a week (2023 16:16)  furosemide 40 mg oral tablet: 1 orally once a day (2023 16:15)  Lantus 100 units/mL subcutaneous solution: 20 unit(s) subcutaneous once a day (2023 16:15)  metFORMIN 1000 mg oral tablet: 1 tab(s) orally 2 times a day (2023 16:16)  metoprolol tartrate 50 mg oral tablet: 1 orally 2 times a day (2023 16:44)  Ozempic (0.25 mg or 0.5 mg dose) 2 mg/1.5 mL subcutaneous solution: once a week on  (2023 16:16)  Protonix 40 mg oral delayed release tablet: 1 orally once a day (2023 16:36)  Xarelto 20 mg oral tablet: 1 tab(s) orally once a day (in the evening) (2023 16:16)        MEDICATIONS  STANDING MEDICATIONS  atorvastatin 40 milliGRAM(s) Oral at bedtime  bisacodyl 20 milliGRAM(s) Oral once  calamine/zinc oxide Lotion 1 Application(s) Topical three times a day  carbidopa/levodopa  25/100 1 Tablet(s) Oral <User Schedule>  dextrose 5%. 1000 milliLiter(s) IV Continuous <Continuous>  dextrose 5%. 1000 milliLiter(s) IV Continuous <Continuous>  dextrose 50% Injectable 25 Gram(s) IV Push once  dextrose 50% Injectable 12.5 Gram(s) IV Push once  dextrose 50% Injectable 25 Gram(s) IV Push once  furosemide    Tablet 20 milliGRAM(s) Oral daily  glucagon  Injectable 1 milliGRAM(s) IntraMuscular once  insulin lispro (ADMELOG) corrective regimen sliding scale   SubCutaneous three times a day before meals  metoprolol tartrate 50 milliGRAM(s) Oral two times a day  pantoprazole    Tablet 40 milliGRAM(s) Oral before breakfast  sacubitril 97 mG/valsartan 103 mG 1 Tablet(s) Oral two times a day  sertraline 25 milliGRAM(s) Oral daily    PRN MEDICATIONS  dextrose Oral Gel 15 Gram(s) Oral once PRN    VITALS:  T(F): 97  HR: 84  BP: 161/84  RR: 18  SpO2: --    <<<<<PHYSICAL EXAM>>>>>  GENERAL: Elderly woman laying comfortably in bed in no apparent distress  PULMONARY: Clear to auscultation bilaterally. No rales, rhonchi, or wheezing  CARDIOVASCULAR: RRR, no M/R/G, S1/S2+  GASTROINTESTINAL: Soft, non-tender, non-distended, no guarding  SKIN/EXTREMITIES: Warm, 2+ tibialis posterior pulses, no UE or LE edema  NEUROLOGIC/MUSCULOSKELETAL: AOx4, grossly moving all extremities, no focal deficits    <<<<<LABS>>>>>                        8.9    7.74  )-----------( 390      ( 2023 21:01 )             27.5     07-27    127<L>  |  90<L>  |  14  ----------------------------<  110<H>  4.7   |  24  |  0.9    Ca    9.7      2023 21:01  Mg     1.6     -    TPro  6.7  /  Alb  4.4  /  TBili  1.0  /  DBili  x   /  AST  15  /  ALT  <5  /  AlkPhos  102  07-27    PT/INR - ( 2023 12:58 )   PT: 14.00 sec;   INR: 1.22 ratio         PTT - ( 2023 12:58 )  PTT:35.6 sec  Urinalysis Basic - ( 2023 21:01 )    Color: x / Appearance: x / SG: x / pH: x  Gluc: 110 mg/dL / Ketone: x  / Bili: x / Urobili: x   Blood: x / Protein: x / Nitrite: x   Leuk Esterase: x / RBC: x / WBC x   Sq Epi: x / Non Sq Epi: x / Bacteria: x          [id number]  CARDIAC MARKERS ( 2023 12:58 )  x     / <0.01 ng/mL / x     / x     / x            <<<<<RADIOLOGY>>>>>          ----------------------------------------------------------------------------------------------------------------------------------------------------------------------------------------------- ----------Daily Progress Note----------    HISTORY OF PRESENT ILLNESS:  Patient is our 78 yo Surinamese speaking F w/PMH of Afib (on Xeralto) s/p ablation x2 and DVT cardioversion, CAD s/p 4 stent placements, HTN, HLD, diabetes, and Parkinson disease who presented to the ED w/gradual worsening weakness and dyspnea at rest w/recent blood work showing hgb 7, w/VSS in ED, CXR w/enlarged cardiac silhouette but no pulmonary congestion, EKG w/Afib, hgb 7.4, Na 128, K 4.8, trop WNL, and BNP 1070 now admitted for work up and management of symptomatic anemia.      Today is hospital day 2d.     ED course:  77-year-old female Surinamese speaking (daughter in law at bedside) history of A-fib on Xarelto status post ablation x2 and DVT cardioversion, CAD status post 4 stents, hypertension, hyperlipidemia, diabetes and Parkinson brought in by daughter in law for evaluation.  States for the last few weeks has been having gradual weakness and shortness of breath at rest that have been going for a while, states dyspnea is at rest, no associated sx.  Patient has been following cardiology Dr. Bhagat had recent lab work which showed hemoglobin 7.  Patient denies any bloody stools, melena, abdominal pain, chest pain, fever, cough, lower extremity pain or swelling, history of GI bleeding.    In the ED: BP: 139/66 mmHg, HR: 87 bpm, RR: 18, Temp: 97.6, SpO2: 99% on RA    Labs: WBC: 7.08, H.4, MCV: 73.5, Na: 128, K: 4.8, Trop: <0.01, BNP: 1070    Patient admitted to medicine for symptomatic anemia     INTERVAL HOSPITAL COURSE / OVERNIGHT EVENTS:  O/N:   Hypertensive to 238/96, given morning BP meds, BP went down to 161/84    24 hr event:  None    Patient was examined and seen at bedside. Patient reported some headache but was otherwise feeling fine, denied any pain or discomfort, denies any dyspnea while laying in bed, reports good appetite, good urination without dysurea, BM not clear yet, bowel prep half finished    Review of Systems: Otherwise unremarkable     <<<<<PAST MEDICAL & SURGICAL HISTORY>>>>>  Atrial fibrillation, unspecified type    HTN (hypertension)    High cholesterol    Diabetes mellitus    S/P ablation of atrial fibrillation    History of back surgery    Coronary stent patent      ALLERGIES  Milk (Diarrhea)  latex (Rash)  adhesives (Pruritus; Rash)  doxycycline (Other)      Home Medications:  atorvastatin 40 mg oral tablet: 1 orally once a day (2023 16:15)  carbidopa-levodopa 25 mg-100 mg oral tablet: 1 tab(s) orally 2 times a day (2023 16:15)  Entresto 97 mg-103 mg oral tablet: 1 orally 2 times a day (2023 16:15)  ergocalciferol 50,000 intl units (1.25 mg) oral capsule: 1 cap(s) orally once a week (2023 16:16)  furosemide 40 mg oral tablet: 1 orally once a day (2023 16:15)  Lantus 100 units/mL subcutaneous solution: 20 unit(s) subcutaneous once a day (2023 16:15)  metFORMIN 1000 mg oral tablet: 1 tab(s) orally 2 times a day (2023 16:16)  metoprolol tartrate 50 mg oral tablet: 1 orally 2 times a day (2023 16:44)  Ozempic (0.25 mg or 0.5 mg dose) 2 mg/1.5 mL subcutaneous solution: once a week on  (2023 16:16)  Protonix 40 mg oral delayed release tablet: 1 orally once a day (2023 16:36)  Xarelto 20 mg oral tablet: 1 tab(s) orally once a day (in the evening) (2023 16:16)        MEDICATIONS  STANDING MEDICATIONS  atorvastatin 40 milliGRAM(s) Oral at bedtime  bisacodyl 20 milliGRAM(s) Oral once  calamine/zinc oxide Lotion 1 Application(s) Topical three times a day  carbidopa/levodopa  25/100 1 Tablet(s) Oral <User Schedule>  dextrose 5%. 1000 milliLiter(s) IV Continuous <Continuous>  dextrose 5%. 1000 milliLiter(s) IV Continuous <Continuous>  dextrose 50% Injectable 25 Gram(s) IV Push once  dextrose 50% Injectable 12.5 Gram(s) IV Push once  dextrose 50% Injectable 25 Gram(s) IV Push once  furosemide    Tablet 20 milliGRAM(s) Oral daily  glucagon  Injectable 1 milliGRAM(s) IntraMuscular once  insulin lispro (ADMELOG) corrective regimen sliding scale   SubCutaneous three times a day before meals  metoprolol tartrate 50 milliGRAM(s) Oral two times a day  pantoprazole    Tablet 40 milliGRAM(s) Oral before breakfast  sacubitril 97 mG/valsartan 103 mG 1 Tablet(s) Oral two times a day  sertraline 25 milliGRAM(s) Oral daily    PRN MEDICATIONS  dextrose Oral Gel 15 Gram(s) Oral once PRN    VITALS:  T(F): 97  HR: 84  BP: 161/84  RR: 18  SpO2: --    <<<<<PHYSICAL EXAM>>>>>  GENERAL: Elderly woman laying comfortably in bed in no apparent distress  PULMONARY: Clear to auscultation bilaterally. No rales, rhonchi, or wheezing  CARDIOVASCULAR: RRR, no M/R/G, S1/S2+  GASTROINTESTINAL: Soft, non-tender, non-distended, no guarding  SKIN/EXTREMITIES: Warm, 2+ tibialis posterior pulses, no UE or LE edema  NEUROLOGIC/MUSCULOSKELETAL: AOx4, grossly moving all extremities, no focal deficits    <<<<<LABS>>>>>                        8.9    7.74  )-----------( 390      ( 2023 21:01 )             27.5     07-27    127<L>  |  90<L>  |  14  ----------------------------<  110<H>  4.7   |  24  |  0.9    Ca    9.7      2023 21:01  Mg     1.6         TPro  6.7  /  Alb  4.4  /  TBili  1.0  /  DBili  x   /  AST  15  /  ALT  <5  /  AlkPhos  102  07-27    PT/INR - ( 2023 12:58 )   PT: 14.00 sec;   INR: 1.22 ratio         PTT - ( 2023 12:58 )  PTT:35.6 sec  Urinalysis Basic - ( 2023 21:01 )    Color: x / Appearance: x / SG: x / pH: x  Gluc: 110 mg/dL / Ketone: x  / Bili: x / Urobili: x   Blood: x / Protein: x / Nitrite: x   Leuk Esterase: x / RBC: x / WBC x   Sq Epi: x / Non Sq Epi: x / Bacteria: x          [id number]  CARDIAC MARKERS ( 2023 12:58 )  x     / <0.01 ng/mL / x     / x     / x            <<<<<RADIOLOGY>>>>>  CXR (): No focal pulmonary consolidation        -----------------------------------------------------------------------------------------------------------------------------------------------------------------------------------------------

## 2023-07-28 NOTE — PROGRESS NOTE ADULT - ASSESSMENT
Patient is our 76 yo Iranian speaking F w/PMH of Afib (on Xeralto) s/p ablation x2 and DVT cardioversion, CAD s/p 4 stent placements, HTN, HLD, diabetes, and Parkinson disease who presented to the ED w/gradual worsening weakness and dyspnea at rest w/recent blood work showing hgb 7, w/VSS in ED, CXR w/enlarged cardiac silhouette but no pulmonary congestion, EKG w/Afib, hgb 7.4, Na 128, K 4.8, trop WNL, and BNP 1070 now admitted for work up and management of symptomatic anemia.    # Microcytic anemia 2/2 to possible occult GI bleed vs malignancy   - Hg 10 in 2021  - No sign of overt bleeding, denies melena  - Last colonoscopy more than 10 y ago  - Monitor CBC, keep active type and screen  - Keep Hg above 8  - S/p 1 u pRBC with response Hg now 9.3  - f/u iron panel folate and B12, ordered after transfusion  - Appreciate GI recs - plan for EGD/colonoscopy tomorrow  - NPO at mn, golyetyl, dulcolax    #Hyponatremia:  - Chronic as per chart and PCP  - f/u urine studies     #HFpEF follows with Dr. Bhagat, on GDMT:  - Echo 2020: EF 50-55% (Had recent echo as per family available at Dr. Bhagat's office )  - Appreciate cardiology recs  - Hold Xarelto for now    # Chronic Atrial fibrillation- rate controlled  - hx multiple ablations and DCCV  - Holding Xarelto 20mg OD  - c/w Metoprolol 50 BID    # HLD:  - c/w Atorvastatin 40 QD    # DM-II  - Home med: Lantus 20 u QD, Metformin 1000 BID and Ozempic weekly  - c/w Lantus 20 QD  - Sliding scale and adjust as needed     #Parkinson Disease:  - c/w Carbi/Levo 25/100 BID    #DVT ppx: Xarelto - holding  #Diet: DASH   #Activity: Increase as tolerated   #Dispo: PT recommended subacute rehab  #Code status: DNR/DNI  Patient is our 76 yo Montenegrin speaking F w/PMH of Afib (on Xeralto) s/p ablation x2 and DVT cardioversion, CAD s/p 4 stent placements, HTN, HLD, diabetes, and Parkinson disease who presented to the ED w/gradual worsening weakness and dyspnea at rest w/recent blood work showing hgb 7, w/VSS in ED, CXR w/enlarged cardiac silhouette but no pulmonary congestion, EKG w/Afib, hgb 7.4, Na 128, K 4.8, trop WNL, and BNP 1070 now admitted for work up and management of symptomatic anemia.    #Hypertensive urgency episode  238/96 last night, corrected with BP meds, now 178/98  - C/w home metoprolol 50 mg bid  - C/w entresto 97/103 bid  - s/p 1x nifedipine 30 mg (7/28)  - Started nifedipine 30 mg daily, uptitrate as needed  - if patient remains hypertensive, give another nifedipine 30 mg    # Microcytic anemia 2/2 to possible occult GI bleed vs malignancy   - Hgb 10 in 2021  - Hgb 7.4 on admission, s/p 1 u pRBC with response Hg to 9.3  - Hgb 8.9 (7/27)  - No sign of overt bleeding, denies melena  - Last colonoscopy more than 10 y ago  - Monitor CBC, keep active type and screen  - goal hgb > 8, transfuse as needed  - iron 227, TIBC 432, Iron Sat 53%, Ferritin 15 (after transfusion)  - B12 (7/27) 533  - EGD/colonoscopy today    #Hyponatremia:  - Chronic as per chart and PCP  - f/u urine studies     #HFpEF follows with Dr. Bhagat, on GDMT:  - Echo 2020: EF 50-55% (Had recent echo as per family available at Dr. Bhagat's office )  - Appreciate cardiology recs  - Continue with entresto 97/103 bid  - Hold Xarelto for now    # Chronic Atrial fibrillation- rate controlled  - hx multiple ablations and DCCV  - Holding Xarelto 20mg OD  - c/w Metoprolol 50 BID    # HLD:  - c/w Atorvastatin 40 QD    # DM-II  - Home med: Lantus 20 u QD, Metformin 1000 BID and Ozempic weekly  - c/w Lantus 20 QD  - Sliding scale and adjust as needed     #Parkinson Disease:  - c/w Carbi/Levo 25/100 BID    #MISC  - DVT ppx: Xarelto - holding  - Diet: DASH   - Activity: Increase as tolerated   - Dispo: PT recommended subacute rehab  #Code status: DNR/DNI

## 2023-07-28 NOTE — CHART NOTE - NSCHARTNOTEFT_GEN_A_CORE
EGD Impressions:  	Esophageal hiatal hernia.  	Erosions in the stomach compatible with erosive gastritis. (Biopsy).  	Normal mucosa in the whole examined duodenum. (Biopsy).      Colonoscopy Impressions:  	Moderate diverticulosis of the the left side of the colon.  	External hemorrhoids.  	The colon was otherwise unremarkable.    Plan:	  Diet as tolerated  Await pathology results  Capsule endoscopy as an OP?  Can resume Anticoagulation   recall as needed

## 2023-07-28 NOTE — PROGRESS NOTE ADULT - SUBJECTIVE AND OBJECTIVE BOX
Pt seen and examined at bedside. Having BMs due to GoLytely.     VITAL SIGNS (Last 24 hrs):  T(C): 36.1 (07-28-23 @ 05:08), Max: 36.4 (07-27-23 @ 18:22)  HR: 89 (07-28-23 @ 09:02) (84 - 89)  BP: 144/74 (07-28-23 @ 09:02) (141/65 - 238/96)  RR: 18 (07-28-23 @ 09:02) (18 - 18)  SpO2: --  Wt(kg): --  Daily     Daily     I&O's Summary    27 Jul 2023 07:01  -  28 Jul 2023 07:00  --------------------------------------------------------  IN: 240 mL / OUT: 900 mL / NET: -660 mL        PHYSICAL EXAM:  GENERAL: NAD  HEAD:  Atraumatic, Normocephalic  EYES: conjunctiva and sclera clear  NECK: Supple, No JVD  CHEST/LUNG: Clear to auscultation bilaterally; No wheeze  HEART: Regular rate and rhythm; No murmurs, rubs, or gallops  ABDOMEN: Soft, Nontender, Nondistended; Bowel sounds present  EXTREMITIES:  2+ Peripheral Pulses, No clubbing, cyanosis, or edema  PSYCH: AAOx3  NEUROLOGY: non-focal  SKIN: No rashes or lesions    Labs Reviewed  Spoke to patient in regards to abnormal labs.    CBC Full  -  ( 27 Jul 2023 21:01 )  WBC Count : 7.74 K/uL  Hemoglobin : 8.9 g/dL  Hematocrit : 27.5 %  Platelet Count - Automated : 390 K/uL  Mean Cell Volume : 71.8 fL  Mean Cell Hemoglobin : 23.2 pg  Mean Cell Hemoglobin Concentration : 32.4 g/dL  Auto Neutrophil # : x  Auto Lymphocyte # : x  Auto Monocyte # : x  Auto Eosinophil # : x  Auto Basophil # : x  Auto Neutrophil % : x  Auto Lymphocyte % : x  Auto Monocyte % : x  Auto Eosinophil % : x  Auto Basophil % : x    BMP:    07-27 @ 21:01    Blood Urea Nitrogen - 14  Calcium - 9.7  Carbond Dioxide - 24  Chloride - 90  Creatinine - 0.9  Glucose - 110  Potassium - 4.7  Sodium - 127      Hemoglobin A1c -   PT/INR - ( 26 Jul 2023 12:58 )   PT: 14.00 sec;   INR: 1.22 ratio         PTT - ( 26 Jul 2023 12:58 )  PTT:35.6 sec  Urine Culture:            MEDICATIONS  (STANDING):  atorvastatin 40 milliGRAM(s) Oral at bedtime  bisacodyl 20 milliGRAM(s) Oral once  calamine/zinc oxide Lotion 1 Application(s) Topical three times a day  carbidopa/levodopa  25/100 1 Tablet(s) Oral <User Schedule>  dextrose 5%. 1000 milliLiter(s) (100 mL/Hr) IV Continuous <Continuous>  dextrose 5%. 1000 milliLiter(s) (50 mL/Hr) IV Continuous <Continuous>  dextrose 50% Injectable 25 Gram(s) IV Push once  dextrose 50% Injectable 25 Gram(s) IV Push once  dextrose 50% Injectable 12.5 Gram(s) IV Push once  furosemide    Tablet 20 milliGRAM(s) Oral daily  glucagon  Injectable 1 milliGRAM(s) IntraMuscular once  insulin lispro (ADMELOG) corrective regimen sliding scale   SubCutaneous three times a day before meals  metoprolol tartrate 50 milliGRAM(s) Oral two times a day  pantoprazole    Tablet 40 milliGRAM(s) Oral before breakfast  sacubitril 97 mG/valsartan 103 mG 1 Tablet(s) Oral two times a day  sertraline 25 milliGRAM(s) Oral daily    MEDICATIONS  (PRN):  acetaminophen     Tablet .. 650 milliGRAM(s) Oral every 6 hours PRN Temp greater or equal to 38C (100.4F), Mild Pain (1 - 3)  dextrose Oral Gel 15 Gram(s) Oral once PRN Blood Glucose LESS THAN 70 milliGRAM(s)/deciliter

## 2023-07-28 NOTE — CHART NOTE - NSCHARTNOTEFT_GEN_A_CORE
PACU ANESTHESIA ADMISSION NOTE        __x__  Patent Airway    __x__  Full return of protective reflexes    __x__  Full recovery from anesthesia / back to baseline status    Vitals:  T(C): 36.2 (07-28-23 @ 16:37), Max: 36.4 (07-27-23 @ 20:31)  HR: 90 (07-28-23 @ 16:37) (84 - 92)  BP: 178/98 (07-28-23 @ 16:37) (141/65 - 238/96)  RR: 18 (07-28-23 @ 16:37) (18 - 18)  SpO2: 99% (07-28-23 @ 16:37) (99% - 99%)    Mental Status:  __x__ Awake   ___x__ Alert   _____ Drowsy   _____ Sedated    Nausea/Vomiting:  __x__ NO  ______Yes,   See Post - Op Orders          Pain Scale (0-10):  _____    Treatment: ____ None    __x__ See Post - Op/PCA Orders    Post - Operative Fluids:   ____ Oral   __x__ See Post - Op Orders    Plan: Discharge:   ____Home       _x____Floor     _____Critical Care    _____  Other:_________________    Comments: Patient had smooth intraoperative event, no anesthesia complication.

## 2023-07-28 NOTE — PROGRESS NOTE ADULT - ASSESSMENT
77-year-old female British speaking (daughter in law at bedside) history of chronic A-fib on Xarelto status post ablation x2 and DVT cardioversion, HFmrEF (now HFpEF 50-55% in 2020), CAD status post 4 stents, hypertension, hyperlipidemia, diabetes and Parkinson brought in by daughter in law for evaluation.  States for the last few weeks has been having gradual weakness and shortness of breath at rest. Found to have Hg of 7 on admission, admitted for further management.         # Symptomatic Microcytic Anemia    # Suspect SIXTO 2/2 chronic blood loss anemia   - No sign of overt bleeding, denies melena  - Last colonoscopy more than 10 y ago  - Monitor CBC, keep active type and screen  - Keep Hg above 8  - S/p 1 u pRBC  - Appreciate GI recs - plan for EGD/colonoscopy today    #Hyponatremia, euvolemic   - hold Lasix   - f/u urine studies     #Chronic HFpEF   #HTN  - Echo 2020: EF 50-55% (Had recent echo as per family available at Dr. Bhagat's office )  - Appreciate cardiology recs  - Hold Xarelto for now  - Hold Lasix while NPO   - c/w Entresto 97 mg-103 mg oral tablet: 1 orally 2 times a day  - Start Nifedipine 30mg XL     # Chronic Atrial fibrillation  - hx multiple ablations and DCCV  - Holding Xarelto 20mg OD  - c/w Metoprolol 50 BID    # HLD:  - c/w Atorvastatin 40 QD    # DM-II  - Home med: Lantus 20 u QD, Metformin 1000 BID and Ozempic weekly  - Sliding scale and adjust as needed     #Parkinson Disease:  - c/w Carbi/Levo 25/100 BID    #DVT ppx: SCD       DNR/DNI     #Progress Note Handoff  Pending (specify): Colonoscopy, EGD today   Family discussion: spoke to daughter in law at bedside.   Dispo: Home

## 2023-07-29 LAB
ANION GAP SERPL CALC-SCNC: 16 MMOL/L — HIGH (ref 7–14)
BUN SERPL-MCNC: 13 MG/DL — SIGNIFICANT CHANGE UP (ref 10–20)
CALCIUM SERPL-MCNC: 9.4 MG/DL — SIGNIFICANT CHANGE UP (ref 8.4–10.5)
CHLORIDE SERPL-SCNC: 91 MMOL/L — LOW (ref 98–110)
CO2 SERPL-SCNC: 24 MMOL/L — SIGNIFICANT CHANGE UP (ref 17–32)
CREAT SERPL-MCNC: 0.8 MG/DL — SIGNIFICANT CHANGE UP (ref 0.7–1.5)
EGFR: 76 ML/MIN/1.73M2 — SIGNIFICANT CHANGE UP
GLUCOSE BLDC GLUCOMTR-MCNC: 142 MG/DL — HIGH (ref 70–99)
GLUCOSE BLDC GLUCOMTR-MCNC: 208 MG/DL — HIGH (ref 70–99)
GLUCOSE BLDC GLUCOMTR-MCNC: 229 MG/DL — HIGH (ref 70–99)
GLUCOSE BLDC GLUCOMTR-MCNC: 263 MG/DL — HIGH (ref 70–99)
GLUCOSE SERPL-MCNC: 137 MG/DL — HIGH (ref 70–99)
MAGNESIUM SERPL-MCNC: 1.5 MG/DL — LOW (ref 1.8–2.4)
POTASSIUM SERPL-MCNC: 4.1 MMOL/L — SIGNIFICANT CHANGE UP (ref 3.5–5)
POTASSIUM SERPL-SCNC: 4.1 MMOL/L — SIGNIFICANT CHANGE UP (ref 3.5–5)
SODIUM SERPL-SCNC: 131 MMOL/L — LOW (ref 135–146)

## 2023-07-29 PROCEDURE — 99232 SBSQ HOSP IP/OBS MODERATE 35: CPT

## 2023-07-29 RX ORDER — MAGNESIUM OXIDE 400 MG ORAL TABLET 241.3 MG
400 TABLET ORAL ONCE
Refills: 0 | Status: DISCONTINUED | OUTPATIENT
Start: 2023-07-29 | End: 2023-07-30

## 2023-07-29 RX ORDER — MAGNESIUM OXIDE 400 MG ORAL TABLET 241.3 MG
400 TABLET ORAL
Refills: 0 | Status: DISCONTINUED | OUTPATIENT
Start: 2023-07-30 | End: 2023-07-30

## 2023-07-29 RX ADMIN — ATORVASTATIN CALCIUM 40 MILLIGRAM(S): 80 TABLET, FILM COATED ORAL at 21:14

## 2023-07-29 RX ADMIN — CALAMINE AND ZINC OXIDE AND PHENOL 1 APPLICATION(S): 160; 10 LOTION TOPICAL at 05:41

## 2023-07-29 RX ADMIN — CARBIDOPA AND LEVODOPA 1 TABLET(S): 25; 100 TABLET ORAL at 17:05

## 2023-07-29 RX ADMIN — RIVAROXABAN 20 MILLIGRAM(S): KIT at 13:45

## 2023-07-29 RX ADMIN — CARBIDOPA AND LEVODOPA 1 TABLET(S): 25; 100 TABLET ORAL at 05:35

## 2023-07-29 RX ADMIN — SACUBITRIL AND VALSARTAN 1 TABLET(S): 24; 26 TABLET, FILM COATED ORAL at 05:35

## 2023-07-29 RX ADMIN — CALAMINE AND ZINC OXIDE AND PHENOL 1 APPLICATION(S): 160; 10 LOTION TOPICAL at 21:13

## 2023-07-29 RX ADMIN — SACUBITRIL AND VALSARTAN 1 TABLET(S): 24; 26 TABLET, FILM COATED ORAL at 17:05

## 2023-07-29 RX ADMIN — CALAMINE AND ZINC OXIDE AND PHENOL 1 APPLICATION(S): 160; 10 LOTION TOPICAL at 13:45

## 2023-07-29 RX ADMIN — Medication 50 MILLIGRAM(S): at 05:35

## 2023-07-29 RX ADMIN — Medication 30 MILLIGRAM(S): at 12:24

## 2023-07-29 RX ADMIN — Medication 50 MILLIGRAM(S): at 17:06

## 2023-07-29 RX ADMIN — Medication 4: at 17:46

## 2023-07-29 RX ADMIN — SERTRALINE 25 MILLIGRAM(S): 25 TABLET, FILM COATED ORAL at 12:24

## 2023-07-29 RX ADMIN — Medication 6: at 12:24

## 2023-07-29 RX ADMIN — PANTOPRAZOLE SODIUM 40 MILLIGRAM(S): 20 TABLET, DELAYED RELEASE ORAL at 05:35

## 2023-07-29 NOTE — PROGRESS NOTE ADULT - ASSESSMENT
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  States for the last few weeks has been having gradual weakness and shortness of breath at rest. Found to have Hg of 7 on admission, admitted for further management.         # Symptomatic Microcytic Anemia    # Suspect SIXTO 2/2 chronic blood loss anemia   - No sign of overt bleeding, denies melena  - s/p EGD + colonoscopy yesterday: esophageal hiatal hernia, erosive gastritis, moderate diverticulosis, hemorrhoids noted  - GI recommendations:   Diet as tolerated  Await pathology results  Capsule endoscopy as an OP?  Can resume Anticoagulation  - Diet advanced, resumed xarelto.  - Monitor CBC, keep active type and screen  - Keep Hg above 8  - S/p 1 u pRBC  - follow repeat cbc tomorrow.      #Hyponatremia, euvolemic   - hold Lasix   - urine studies noted. urine Na 68 but not reliable indicator as patient received lasix on same day.  - restrict free water intake.  - trend Na    #Chronic HFpEF   #HTN  - Echo 2020: EF 50-55% (Had recent echo as per family available at Dr. Bhagat's office )  - Appreciate cardiology recs  - Xarelto resumed today  - Hold Lasix while NPO   - c/w Entresto 97 mg-103 mg oral tablet: 1 orally 2 times a day  - Start Nifedipine 30mg XL     # Chronic Atrial fibrillation  - hx multiple ablations and DCCV  - Xarelto 20mg OD resumed today  - c/w Metoprolol 50 BID    # HLD:  - c/w Atorvastatin 40 QD    # DM-II  - Home med: Lantus 20 u QD, Metformin 1000 BID and Ozempic weekly  - Sliding scale and adjust as needed     #Parkinson Disease:  - c/w Carbi/Levo 25/100 BID    #DVT ppx: SCD       DNR/DNI     #Progress Note Handoff  Pending (specify): resumed xarelto, pending repeat CBC tomorrow.  Dispo: Home. anticipate for discharge.

## 2023-07-29 NOTE — PROGRESS NOTE ADULT - SUBJECTIVE AND OBJECTIVE BOX
Pt seen and examined at bedside. Having BMs due to GoLytely.     PHYSICAL EXAM:  GENERAL: NAD  EYES: conjunctiva and sclera clear  NECK: Supple, No JVD  CHEST/LUNG: Clear to auscultation bilaterally; No wheeze  HEART: Regular rate and rhythm; No murmurs, rubs, or gallops  ABDOMEN: Soft, Nontender, Nondistended; Bowel sounds present  EXTREMITIES:  2+ Peripheral Pulses, No clubbing, cyanosis, or edema  PSYCH: AAOx3  NEUROLOGY: non-focal  SKIN: No rashes or lesions      VITALS:   ICU Vital Signs Last 24 Hrs  T(C): 35.6 (29 Jul 2023 05:12), Max: 36.2 (28 Jul 2023 15:43)  T(F): 96 (29 Jul 2023 05:12), Max: 97.2 (28 Jul 2023 15:43)  HR: 97 (29 Jul 2023 05:12) (84 - 97)  BP: 124/73 (29 Jul 2023 05:12) (120/58 - 190/79)  BP(mean): 84 (28 Jul 2023 22:00) (84 - 84)  ABP: --  ABP(mean): --  RR: 18 (29 Jul 2023 05:12) (18 - 18)  SpO2: 99% (28 Jul 2023 20:00) (94% - 100%)      I&Os:    MEDICATIONS:  STANDING MEDICATIONS  atorvastatin 40 milliGRAM(s) Oral at bedtime  bisacodyl 20 milliGRAM(s) Oral once  calamine/zinc oxide Lotion 1 Application(s) Topical three times a day  carbidopa/levodopa  25/100 1 Tablet(s) Oral <User Schedule>  dextrose 5%. 1000 milliLiter(s) IV Continuous <Continuous>  dextrose 5%. 1000 milliLiter(s) IV Continuous <Continuous>  dextrose 50% Injectable 25 Gram(s) IV Push once  dextrose 50% Injectable 25 Gram(s) IV Push once  dextrose 50% Injectable 12.5 Gram(s) IV Push once  glucagon  Injectable 1 milliGRAM(s) IntraMuscular once  insulin lispro (ADMELOG) corrective regimen sliding scale   SubCutaneous three times a day before meals  metoprolol tartrate 50 milliGRAM(s) Oral two times a day  NIFEdipine XL 30 milliGRAM(s) Oral every 24 hours  pantoprazole    Tablet 40 milliGRAM(s) Oral before breakfast  rivaroxaban 20 milliGRAM(s) Oral with dinner  sacubitril 97 mG/valsartan 103 mG 1 Tablet(s) Oral two times a day  sertraline 25 milliGRAM(s) Oral daily    PRN MEDICATIONS  acetaminophen     Tablet .. 650 milliGRAM(s) Oral every 6 hours PRN  dextrose Oral Gel 15 Gram(s) Oral once PRN  simethicone 80 milliGRAM(s) Chew four times a day PRN      LABS:                        9.5    10.35 )-----------( 380      ( 28 Jul 2023 22:50 )             28.6     WBC trend: 10.35 <--, 7.74 <--, 6.89 <--, 7.08 <--  Hgb: 9.5 <--, 8.9 <--, 9.3 <--, 7.4 <--    07-28    131<L>  |  91<L>  |  13  ----------------------------<  137<H>  4.1   |  24  |  0.8    Ca    9.4      28 Jul 2023 22:50  Mg     1.5     07-28      Creatinine trend: 0.8<--, 0.9<--, 0.8<--, 1.0<--  SODIUM TREND: Sodium 131 [07-28 @ 22:50]<--, Sodium 127 [07-27 @ 21:01]<--, Sodium 130 [07-27 @ 08:09]<--, Sodium 128 [07-26 @ 12:58]<--    POC Glucose: 142 [07-29-23 @ 08:06]<--, 152 [07-28-23 @ 21:58]<--, 116 [07-28-23 @ 15:43]<--, 133 [07-28-23 @ 11:59]<--      Ferritin: 15 ng/mL (07-27-23 @ 08:09)      Urinalysis Basic - ( 28 Jul 2023 22:50 )    Color: x / Appearance: x / SG: x / pH: x  Gluc: 137 mg/dL / Ketone: x  / Bili: x / Urobili: x   Blood: x / Protein: x / Nitrite: x   Leuk Esterase: x / RBC: x / WBC x   Sq Epi: x / Non Sq Epi: x / Bacteria: x      RADIOLOGY:    < from: Xray Chest 1 View-PORTABLE IMMEDIATE (Xray Chest 1 View-PORTABLE IMMEDIATE .) (07.26.23 @ 13:43) >  No focal pulmonary consolidation    < end of copied text >

## 2023-07-30 ENCOUNTER — TRANSCRIPTION ENCOUNTER (OUTPATIENT)
Age: 78
End: 2023-07-30

## 2023-07-30 VITALS — DIASTOLIC BLOOD PRESSURE: 62 MMHG | SYSTOLIC BLOOD PRESSURE: 112 MMHG | HEART RATE: 83 BPM

## 2023-07-30 LAB
ALBUMIN SERPL ELPH-MCNC: 4.2 G/DL — SIGNIFICANT CHANGE UP (ref 3.5–5.2)
ALP SERPL-CCNC: 101 U/L — SIGNIFICANT CHANGE UP (ref 30–115)
ALT FLD-CCNC: <5 U/L — SIGNIFICANT CHANGE UP (ref 0–41)
ANION GAP SERPL CALC-SCNC: 16 MMOL/L — HIGH (ref 7–14)
AST SERPL-CCNC: 18 U/L — SIGNIFICANT CHANGE UP (ref 0–41)
BASOPHILS # BLD AUTO: 0.04 K/UL — SIGNIFICANT CHANGE UP (ref 0–0.2)
BASOPHILS NFR BLD AUTO: 0.4 % — SIGNIFICANT CHANGE UP (ref 0–1)
BILIRUB SERPL-MCNC: 0.6 MG/DL — SIGNIFICANT CHANGE UP (ref 0.2–1.2)
BLD GP AB SCN SERPL QL: SIGNIFICANT CHANGE UP
BUN SERPL-MCNC: 10 MG/DL — SIGNIFICANT CHANGE UP (ref 10–20)
CALCIUM SERPL-MCNC: 9.1 MG/DL — SIGNIFICANT CHANGE UP (ref 8.4–10.5)
CHLORIDE SERPL-SCNC: 90 MMOL/L — LOW (ref 98–110)
CO2 SERPL-SCNC: 21 MMOL/L — SIGNIFICANT CHANGE UP (ref 17–32)
CREAT SERPL-MCNC: 0.9 MG/DL — SIGNIFICANT CHANGE UP (ref 0.7–1.5)
EGFR: 66 ML/MIN/1.73M2 — SIGNIFICANT CHANGE UP
EOSINOPHIL # BLD AUTO: 0.11 K/UL — SIGNIFICANT CHANGE UP (ref 0–0.7)
EOSINOPHIL NFR BLD AUTO: 1.2 % — SIGNIFICANT CHANGE UP (ref 0–8)
GLUCOSE BLDC GLUCOMTR-MCNC: 165 MG/DL — HIGH (ref 70–99)
GLUCOSE BLDC GLUCOMTR-MCNC: 197 MG/DL — HIGH (ref 70–99)
GLUCOSE SERPL-MCNC: 202 MG/DL — HIGH (ref 70–99)
HCT VFR BLD CALC: 28.6 % — LOW (ref 37–47)
HGB BLD-MCNC: 9.2 G/DL — LOW (ref 12–16)
IMM GRANULOCYTES NFR BLD AUTO: 0.4 % — HIGH (ref 0.1–0.3)
LYMPHOCYTES # BLD AUTO: 1.66 K/UL — SIGNIFICANT CHANGE UP (ref 1.2–3.4)
LYMPHOCYTES # BLD AUTO: 17.4 % — LOW (ref 20.5–51.1)
MAGNESIUM SERPL-MCNC: 1.6 MG/DL — LOW (ref 1.8–2.4)
MCHC RBC-ENTMCNC: 23.5 PG — LOW (ref 27–31)
MCHC RBC-ENTMCNC: 32.2 G/DL — SIGNIFICANT CHANGE UP (ref 32–37)
MCV RBC AUTO: 73.1 FL — LOW (ref 81–99)
MONOCYTES # BLD AUTO: 1.08 K/UL — HIGH (ref 0.1–0.6)
MONOCYTES NFR BLD AUTO: 11.3 % — HIGH (ref 1.7–9.3)
NEUTROPHILS # BLD AUTO: 6.63 K/UL — HIGH (ref 1.4–6.5)
NEUTROPHILS NFR BLD AUTO: 69.3 % — SIGNIFICANT CHANGE UP (ref 42.2–75.2)
NRBC # BLD: 0 /100 WBCS — SIGNIFICANT CHANGE UP (ref 0–0)
PLATELET # BLD AUTO: 376 K/UL — SIGNIFICANT CHANGE UP (ref 130–400)
PMV BLD: 9.1 FL — SIGNIFICANT CHANGE UP (ref 7.4–10.4)
POTASSIUM SERPL-MCNC: 4.2 MMOL/L — SIGNIFICANT CHANGE UP (ref 3.5–5)
POTASSIUM SERPL-SCNC: 4.2 MMOL/L — SIGNIFICANT CHANGE UP (ref 3.5–5)
PROT SERPL-MCNC: 6.6 G/DL — SIGNIFICANT CHANGE UP (ref 6–8)
RBC # BLD: 3.91 M/UL — LOW (ref 4.2–5.4)
RBC # FLD: 16.6 % — HIGH (ref 11.5–14.5)
SODIUM SERPL-SCNC: 127 MMOL/L — LOW (ref 135–146)
WBC # BLD: 9.56 K/UL — SIGNIFICANT CHANGE UP (ref 4.8–10.8)
WBC # FLD AUTO: 9.56 K/UL — SIGNIFICANT CHANGE UP (ref 4.8–10.8)

## 2023-07-30 PROCEDURE — 99238 HOSP IP/OBS DSCHRG MGMT 30/<: CPT

## 2023-07-30 RX ORDER — SODIUM CHLORIDE 9 MG/ML
1 INJECTION INTRAMUSCULAR; INTRAVENOUS; SUBCUTANEOUS
Refills: 0 | Status: DISCONTINUED | OUTPATIENT
Start: 2023-07-30 | End: 2023-07-30

## 2023-07-30 RX ORDER — SODIUM CHLORIDE 9 MG/ML
1 INJECTION INTRAMUSCULAR; INTRAVENOUS; SUBCUTANEOUS
Qty: 4 | Refills: 0
Start: 2023-07-30 | End: 2023-07-31

## 2023-07-30 RX ORDER — MAGNESIUM OXIDE 400 MG ORAL TABLET 241.3 MG
1 TABLET ORAL
Qty: 14 | Refills: 0
Start: 2023-07-30 | End: 2023-08-05

## 2023-07-30 RX ORDER — MAGNESIUM SULFATE 500 MG/ML
2 VIAL (ML) INJECTION ONCE
Refills: 0 | Status: COMPLETED | OUTPATIENT
Start: 2023-07-30 | End: 2023-07-30

## 2023-07-30 RX ADMIN — SACUBITRIL AND VALSARTAN 1 TABLET(S): 24; 26 TABLET, FILM COATED ORAL at 06:11

## 2023-07-30 RX ADMIN — Medication 30 MILLIGRAM(S): at 11:46

## 2023-07-30 RX ADMIN — CALAMINE AND ZINC OXIDE AND PHENOL 1 APPLICATION(S): 160; 10 LOTION TOPICAL at 06:12

## 2023-07-30 RX ADMIN — PANTOPRAZOLE SODIUM 40 MILLIGRAM(S): 20 TABLET, DELAYED RELEASE ORAL at 06:10

## 2023-07-30 RX ADMIN — Medication 25 GRAM(S): at 11:46

## 2023-07-30 RX ADMIN — Medication 2: at 08:37

## 2023-07-30 RX ADMIN — SERTRALINE 25 MILLIGRAM(S): 25 TABLET, FILM COATED ORAL at 11:46

## 2023-07-30 RX ADMIN — Medication 50 MILLIGRAM(S): at 06:11

## 2023-07-30 RX ADMIN — Medication 650 MILLIGRAM(S): at 12:51

## 2023-07-30 RX ADMIN — SODIUM CHLORIDE 1 GRAM(S): 9 INJECTION INTRAMUSCULAR; INTRAVENOUS; SUBCUTANEOUS at 12:06

## 2023-07-30 RX ADMIN — MAGNESIUM OXIDE 400 MG ORAL TABLET 400 MILLIGRAM(S): 241.3 TABLET ORAL at 08:39

## 2023-07-30 RX ADMIN — CALAMINE AND ZINC OXIDE AND PHENOL 1 APPLICATION(S): 160; 10 LOTION TOPICAL at 13:10

## 2023-07-30 RX ADMIN — CARBIDOPA AND LEVODOPA 1 TABLET(S): 25; 100 TABLET ORAL at 06:11

## 2023-07-30 RX ADMIN — Medication 650 MILLIGRAM(S): at 12:15

## 2023-07-30 RX ADMIN — Medication 2: at 12:05

## 2023-07-30 RX ADMIN — RIVAROXABAN 20 MILLIGRAM(S): KIT at 12:51

## 2023-07-30 NOTE — DISCHARGE NOTE PROVIDER - NSDCCPCAREPLAN_GEN_ALL_CORE_FT
PRINCIPAL DISCHARGE DIAGNOSIS  Diagnosis: Anemia  Assessment and Plan of Treatment: you were evaluated for anemia in the hospital. You underwent EGD and Colonoscopy and found to have hiatal hernia, erosive gastritis, moderate diverticulosis and external hemorrhoids. You received blood transfusion in the hospital and your anticoafulation medication resumed after EGD/colonoscopy. Repeat hgb was stable after resuming anticoagulation.  Please take your medications as prescribed and follow up with gastroenterologist in 2-3 weeks as outpt.  Please seek immediate help if you notice any uncontroled bleeding from anywhere, dark stool, bloody vomiting, dizziness and/or dyspnea on exertion.      SECONDARY DISCHARGE DIAGNOSES  Diagnosis: Hyponatremia  Assessment and Plan of Treatment: You had low blood sodium level in the hospital. As per family, you have chronic history of low sodium. You are being discharged home on salt table for 2 days. please take your medications as prescribed. check your blood sodium level 3 days after discharge and call your PMD if repeat sodium level is below 130. Please come to the ED immediately if your repeat sodium level is below 125 or you develop change in mental status, confusion, worsening lethargy.

## 2023-07-30 NOTE — PROGRESS NOTE ADULT - ASSESSMENT
Patient is our 78 yo [place] Islander speaking F w/PMH of Afib (on Xeralto) s/p ablation x2 and DVT cardioversion, CAD s/p 4 stent placements, HTN, HLD, diabetes, and Parkinson disease who presented to the ED w/gradual worsening weakness and dyspnea at rest w/recent blood work showing hgb 7, w/VSS in ED, CXR w/enlarged cardiac silhouette but no pulmonary congestion, EKG w/Afib, hgb 7.4, Na 128, K 4.8, trop WNL, and BNP 1070 now admitted for work up and management of symptomatic anemia.    #Hypertensive urgency episode (resolved)  #History of HTN  BP now 112/62  - C/w home metoprolol 50 mg bid  - C/w entresto 97/103 bid  - s/p 1x nifedipine 30 mg (7/28)  - c/w nifedipine 30 mg daily    # Microcytic anemia 2/2 to possible occult GI bleed vs malignancy   - Hgb 10 in 2021  - Hgb 7.4 on admission, s/p 1 u pRBC with response Hg to 9.3  - Hgb 8.9 (7/27)  - No sign of overt bleeding, denies melena  - Last colonoscopy more than 10 y ago  - s/p EGD/colonoscopy, EGD w/erosive gastritis, esophageal hiatal hernia, colonoscopy w/ moderate diverticulosis of the left side of the colon, external hemorroids  - Monitor CBC, keep active type and screen  - goal hgb > 8, transfuse as needed  - iron 227, TIBC 432, Iron Sat 53%, Ferritin 15 (after transfusion)  - B12 (7/27) 533  - GI recommended capsule video endoscopy outpatient    #Hyponatremia:  - Chronic as per chart and PCP  - f/u urine studies     #HFpEF follows with Dr. Bhagat, on GDMT:  - Echo 2020: EF 50-55% (Had recent echo as per family available at Dr. Bhagat's office )  - Appreciate cardiology recs  - Continue with entresto 97/103 bid  - C/w home xeralto 20 mg OD    # Chronic Atrial fibrillation- rate controlled  - hx multiple ablations and DCCV  - C/w home Xarelto 20mg OD  - c/w Metoprolol 50 BID    # HLD:  - c/w Atorvastatin 40 QD    # DM-II  - Home med: Lantus 20 u QD, Metformin 1000 BID and Ozempic weekly  - c/w Lantus 20 QD  - Sliding scale and adjust as needed     #Parkinson Disease:  - c/w Carbi/Levo 25/100 BID    #MISC  - DVT ppx: Xarelto - holding  - Diet: DASH   - Activity: Increase as tolerated   - Dispo: PT recommended subacute rehab, patient discharged home  - Code status: DNR/DNI

## 2023-07-30 NOTE — DISCHARGE NOTE PROVIDER - PROVIDER TOKENS
PROVIDER:[TOKEN:[[id number]],FOLLOWUP:[1 week]],PROVIDER:[TOKEN:[[id number]],FOLLOWUP:[2 weeks]]

## 2023-07-30 NOTE — DISCHARGE NOTE PROVIDER - CARE PROVIDER_API CALL
Koko Bhagat  Cardiology  [address] [place] [address]  Phone: [phone]  Fax: [phone]  Follow Up Time: 1 week    Mansoor Loving  Gastroenterology  [address] [place] [address]  Phone: [phone]  Fax: [phone]  Follow Up Time: 2 weeks

## 2023-07-30 NOTE — PROGRESS NOTE ADULT - SUBJECTIVE AND OBJECTIVE BOX
----------Daily Progress Note----------    HISTORY OF PRESENT ILLNESS:  Patient is our 76 yo Venezuelan speaking F w/PMH of Afib (on Xeralto) s/p ablation x2 and DVT cardioversion, CAD s/p 4 stent placements, HTN, HLD, diabetes, and Parkinson disease who presented to the ED w/gradual worsening weakness and dyspnea at rest w/recent blood work showing hgb 7, w/VSS in ED, CXR w/enlarged cardiac silhouette but no pulmonary congestion, EKG w/Afib, hgb 7.4, Na 128, K 4.8, trop WNL, and BNP 1070 now admitted for work up and management of symptomatic anemia.      Today is hospital day 4d.     ED course:  77-year-old female Venezuelan speaking (daughter in law at bedside) history of A-fib on Xarelto status post ablation x2 and DVT cardioversion, CAD status post 4 stents, hypertension, hyperlipidemia, diabetes and Parkinson brought in by daughter in law for evaluation.  States for the last few weeks has been having gradual weakness and shortness of breath at rest that have been going for a while, states dyspnea is at rest, no associated sx.  Patient has been following cardiology Dr. Bhagat had recent lab work which showed hemoglobin 7.  Patient denies any bloody stools, melena, abdominal pain, chest pain, fever, cough, lower extremity pain or swelling, history of GI bleeding.    In the ED: BP: 139/66 mmHg, HR: 87 bpm, RR: 18, Temp: 97.6, SpO2: 99% on RA    Labs: WBC: 7.08, H.4, MCV: 73.5, Na: 128, K: 4.8, Trop: <0.01, BNP: 1070    Patient admitted to medicine for symptomatic anemia     INTERVAL HOSPITAL COURSE / OVERNIGHT EVENTS:  O/N:   Hypertensive to 238/96, given morning BP meds, BP went down to 161/84    24 hr event:  None    Patient was examined and seen at bedside. Patient reported feeling fine, denied any headaches, abdominal pain, dizziness. Reported urinating well, eating well, sleeping well, wanted to go home.    Review of Systems: Otherwise unremarkable     <<<<<PAST MEDICAL & SURGICAL HISTORY>>>>>  Atrial fibrillation, unspecified type    HTN (hypertension)    High cholesterol    Diabetes mellitus    S/P ablation of atrial fibrillation    History of back surgery    Coronary stent patent      ALLERGIES  Milk (Diarrhea)  latex (Rash)  adhesives (Pruritus; Rash)  doxycycline (Other)      Home Medications:  atorvastatin 40 mg oral tablet: 1 orally once a day (2023 16:15)  carbidopa-levodopa 25 mg-100 mg oral tablet: 1 tab(s) orally 2 times a day (2023 16:15)  Entresto 97 mg-103 mg oral tablet: 1 orally 2 times a day (2023 16:15)  ergocalciferol 50,000 intl units (1.25 mg) oral capsule: 1 cap(s) orally once a week (2023 16:16)  furosemide 40 mg oral tablet: 1 orally once a day (2023 16:15)  Lantus 100 units/mL subcutaneous solution: 20 unit(s) subcutaneous once a day (2023 16:15)  metFORMIN 1000 mg oral tablet: 1 tab(s) orally 2 times a day (2023 16:16)  metoprolol tartrate 50 mg oral tablet: 1 orally 2 times a day (2023 16:44)  Ozempic (0.25 mg or 0.5 mg dose) 2 mg/1.5 mL subcutaneous solution: once a week on  (2023 16:16)  Protonix 40 mg oral delayed release tablet: 1 orally once a day (2023 16:36)  Xarelto 20 mg oral tablet: 1 tab(s) orally once a day (in the evening) (2023 16:16)        MEDICATIONS  STANDING MEDICATIONS  atorvastatin 40 milliGRAM(s) Oral at bedtime  bisacodyl 20 milliGRAM(s) Oral once  calamine/zinc oxide Lotion 1 Application(s) Topical three times a day  carbidopa/levodopa  25/100 1 Tablet(s) Oral <User Schedule>  dextrose 5%. 1000 milliLiter(s) IV Continuous <Continuous>  dextrose 5%. 1000 milliLiter(s) IV Continuous <Continuous>  dextrose 50% Injectable 25 Gram(s) IV Push once  dextrose 50% Injectable 12.5 Gram(s) IV Push once  dextrose 50% Injectable 25 Gram(s) IV Push once  glucagon  Injectable 1 milliGRAM(s) IntraMuscular once  insulin lispro (ADMELOG) corrective regimen sliding scale   SubCutaneous three times a day before meals  magnesium oxide 400 milliGRAM(s) Oral two times a day with meals  magnesium oxide 400 milliGRAM(s) Oral once  metoprolol tartrate 50 milliGRAM(s) Oral two times a day  NIFEdipine XL 30 milliGRAM(s) Oral every 24 hours  pantoprazole    Tablet 40 milliGRAM(s) Oral before breakfast  rivaroxaban 20 milliGRAM(s) Oral with dinner  sacubitril 97 mG/valsartan 103 mG 1 Tablet(s) Oral two times a day  sertraline 25 milliGRAM(s) Oral daily  sodium chloride 1 Gram(s) Oral two times a day    PRN MEDICATIONS  acetaminophen     Tablet .. 650 milliGRAM(s) Oral every 6 hours PRN  dextrose Oral Gel 15 Gram(s) Oral once PRN  simethicone 80 milliGRAM(s) Chew four times a day PRN    VITALS:  T(F): 97.1  HR: 83  BP: 112/62  RR: 18  SpO2: --    <<<<<LABS>>>>>                        9.2    9.56  )-----------( 376      ( 2023 09:06 )             28.6     07-30    127<L>  |  90<L>  |  10  ----------------------------<  202<H>  4.2   |  21  |  0.9    Ca    9.1      2023 09:06  Mg     1.6     07-30    TPro  6.6  /  Alb  4.2  /  TBili  0.6  /  DBili  x   /  AST  18  /  ALT  <5  /  AlkPhos  101  07-30      Urinalysis Basic - ( 2023 09:06 )    Color: x / Appearance: x / SG: x / pH: x  Gluc: 202 mg/dL / Ketone: x  / Bili: x / Urobili: x   Blood: x / Protein: x / Nitrite: x   Leuk Esterase: x / RBC: x / WBC x   Sq Epi: x / Non Sq Epi: x / Bacteria: x          [id number]        <<<<<RADIOLOGY>>>>>    <<<<<PHYSICAL EXAM>>>>>  GENERAL: Well developed, well nourished and in no acute distress. Resting comfortably in bed.  HEENT: Normocephalic, atraumatic, mucous membranes moist, EOMI, PERRLA, bilateral sclera anicteric, no conjunctival injection  Neck: Supple, non-tender, no lymphadenopathy.  PULMONARY: Clear to auscultation bilaterally. No rales, rhonchi, or wheezing.  CARDIOVASCULAR: Regular rate and rhythm, S1-S2, no murmurs  GASTROINTESTINAL: Soft, non-tender, non-distended, no guarding.  RENAL: No CVA tenderness.  SKIN/EXTREMITIES: No clubbing or edema  NEUROLOGIC/MUSCULOSKELETAL: AOx4, grossly moving all extremities, no focal deficits.      ----------------------------------------------------------------------------------------------------------------------------------------------------------------------------------------------- ----------Daily Progress Note----------    HISTORY OF PRESENT ILLNESS:  Patient is our 78 yo Angolan speaking F w/PMH of Afib (on Xeralto) s/p ablation x2 and DVT cardioversion, CAD s/p 4 stent placements, HTN, HLD, diabetes, and Parkinson disease who presented to the ED w/gradual worsening weakness and dyspnea at rest w/recent blood work showing hgb 7, w/VSS in ED, CXR w/enlarged cardiac silhouette but no pulmonary congestion, EKG w/Afib, hgb 7.4, Na 128, K 4.8, trop WNL, and BNP 1070 now admitted for work up and management of symptomatic anemia.      Today is hospital day 4d.     ED course:  77-year-old female Angolan speaking (daughter in law at bedside) history of A-fib on Xarelto status post ablation x2 and DVT cardioversion, CAD status post 4 stents, hypertension, hyperlipidemia, diabetes and Parkinson brought in by daughter in law for evaluation.  States for the last few weeks has been having gradual weakness and shortness of breath at rest that have been going for a while, states dyspnea is at rest, no associated sx.  Patient has been following cardiology Dr. Bhagat had recent lab work which showed hemoglobin 7.  Patient denies any bloody stools, melena, abdominal pain, chest pain, fever, cough, lower extremity pain or swelling, history of GI bleeding.    In the ED: BP: 139/66 mmHg, HR: 87 bpm, RR: 18, Temp: 97.6, SpO2: 99% on RA    Labs: WBC: 7.08, H.4, MCV: 73.5, Na: 128, K: 4.8, Trop: <0.01, BNP: 1070    Patient admitted to medicine for symptomatic anemia     INTERVAL HOSPITAL COURSE / OVERNIGHT EVENTS:  O/N:   Hypertensive to 238/96, given morning BP meds, BP went down to 161/84    24 hr event:  None    Patient was examined and seen at bedside. Patient reported feeling fine, denied any headaches, abdominal pain, dizziness. Reported urinating well, eating well, sleeping well, wanted to go home.    Review of Systems: Otherwise unremarkable     <<<<<PAST MEDICAL & SURGICAL HISTORY>>>>>  Atrial fibrillation, unspecified type    HTN (hypertension)    High cholesterol    Diabetes mellitus    S/P ablation of atrial fibrillation    History of back surgery    Coronary stent patent      ALLERGIES  Milk (Diarrhea)  latex (Rash)  adhesives (Pruritus; Rash)  doxycycline (Other)      Home Medications:  atorvastatin 40 mg oral tablet: 1 orally once a day (2023 16:15)  carbidopa-levodopa 25 mg-100 mg oral tablet: 1 tab(s) orally 2 times a day (2023 16:15)  Entresto 97 mg-103 mg oral tablet: 1 orally 2 times a day (2023 16:15)  ergocalciferol 50,000 intl units (1.25 mg) oral capsule: 1 cap(s) orally once a week (2023 16:16)  furosemide 40 mg oral tablet: 1 orally once a day (2023 16:15)  Lantus 100 units/mL subcutaneous solution: 20 unit(s) subcutaneous once a day (2023 16:15)  metFORMIN 1000 mg oral tablet: 1 tab(s) orally 2 times a day (2023 16:16)  metoprolol tartrate 50 mg oral tablet: 1 orally 2 times a day (2023 16:44)  Ozempic (0.25 mg or 0.5 mg dose) 2 mg/1.5 mL subcutaneous solution: once a week on  (2023 16:16)  Protonix 40 mg oral delayed release tablet: 1 orally once a day (2023 16:36)  Xarelto 20 mg oral tablet: 1 tab(s) orally once a day (in the evening) (2023 16:16)        MEDICATIONS  STANDING MEDICATIONS  atorvastatin 40 milliGRAM(s) Oral at bedtime  bisacodyl 20 milliGRAM(s) Oral once  calamine/zinc oxide Lotion 1 Application(s) Topical three times a day  carbidopa/levodopa  25/100 1 Tablet(s) Oral <User Schedule>  dextrose 5%. 1000 milliLiter(s) IV Continuous <Continuous>  dextrose 5%. 1000 milliLiter(s) IV Continuous <Continuous>  dextrose 50% Injectable 25 Gram(s) IV Push once  dextrose 50% Injectable 12.5 Gram(s) IV Push once  dextrose 50% Injectable 25 Gram(s) IV Push once  glucagon  Injectable 1 milliGRAM(s) IntraMuscular once  insulin lispro (ADMELOG) corrective regimen sliding scale   SubCutaneous three times a day before meals  magnesium oxide 400 milliGRAM(s) Oral two times a day with meals  magnesium oxide 400 milliGRAM(s) Oral once  metoprolol tartrate 50 milliGRAM(s) Oral two times a day  NIFEdipine XL 30 milliGRAM(s) Oral every 24 hours  pantoprazole    Tablet 40 milliGRAM(s) Oral before breakfast  rivaroxaban 20 milliGRAM(s) Oral with dinner  sacubitril 97 mG/valsartan 103 mG 1 Tablet(s) Oral two times a day  sertraline 25 milliGRAM(s) Oral daily  sodium chloride 1 Gram(s) Oral two times a day    PRN MEDICATIONS  acetaminophen     Tablet .. 650 milliGRAM(s) Oral every 6 hours PRN  dextrose Oral Gel 15 Gram(s) Oral once PRN  simethicone 80 milliGRAM(s) Chew four times a day PRN    VITALS:  T(F): 97.1  HR: 83  BP: 112/62  RR: 18  SpO2: --    <<<<<PHYSICAL EXAM>>>>>  GENERAL: Elderly woman laying comfortably in bed in no apparent distress  PULMONARY: Clear to auscultation bilaterally. No rales, rhonchi, or wheezing  CARDIOVASCULAR: RRR, no M/R/G, S1/S2+  GASTROINTESTINAL: Soft, non-tender, non-distended, no guarding  SKIN/EXTREMITIES: Warm, 2+ tibialis posterior pulses, no UE or LE edema  NEUROLOGIC/MUSCULOSKELETAL: AOx4, grossly moving all extremities, no focal deficits      <<<<<LABS>>>>>                        9.2    9.56  )-----------( 376      ( 2023 09:06 )             28.6     07-30    127<L>  |  90<L>  |  10  ----------------------------<  202<H>  4.2   |  21  |  0.9    Ca    9.1      2023 09:06  Mg     1.6     07-    TPro  6.6  /  Alb  4.2  /  TBili  0.6  /  DBili  x   /  AST  18  /  ALT  <5  /  AlkPhos  101  07-      Urinalysis Basic - ( 2023 09:06 )    Color: x / Appearance: x / SG: x / pH: x  Gluc: 202 mg/dL / Ketone: x  / Bili: x / Urobili: x   Blood: x / Protein: x / Nitrite: x   Leuk Esterase: x / RBC: x / WBC x   Sq Epi: x / Non Sq Epi: x / Bacteria: x          [id number]        <<<<<RADIOLOGY>>>>>  CXR (): No focal pulmonary consolidation    -----------------------------------------------------------------------------------------------------------------------------------------------------------------------------------------------

## 2023-07-30 NOTE — DISCHARGE NOTE PROVIDER - HOSPITAL COURSE
77-year-old female Marshallese speaking (daughter in law at bedside) history of chronic A-fib on Xarelto status post ablation x2 and DVT cardioversion, HFmrEF (now HFpEF 50-55% in 2020), CAD status post 4 stents, hypertension, hyperlipidemia, diabetes and Parkinson brought in by daughter in law for evaluation.  States for the last few weeks has been having gradual weakness and shortness of breath at rest. Found to have Hg of 7 on admission, admitted for further management.         # Symptomatic Microcytic Anemia    # Suspect SIXTO 2/2 chronic blood loss anemia   - No sign of overt bleeding, denies melena  - s/p EGD + colonoscopy yesterday: esophageal hiatal hernia, erosive gastritis, moderate diverticulosis, hemorrhoids noted  - GI recommendations:   Diet as tolerated  Await pathology results  Capsule endoscopy as an OP?  Can resume Anticoagulation  - Diet advanced, resumed xarelto.  - repeat hgb stable  - Keep Hg above 8  - S/p 1 u pRBC  - follow repeat cbc tomorrow.      #Hyponatremia, euvolemic   #hypomagnesemia  - resume Lasix on d/c  - urine studies noted. urine Na 68 but not reliable indicator as patient received lasix on same day.  - restrict free water intake.  - start salt tablet 1 gm q 12 hr. d/c home on salt tablet for 2 days. repeat bmp outpt.  - s/p IV Mg supplement  - d/c home on po magnesium oxide 400 mg 3 times a day for 1 week. follow repeat Mag level as outpt    #Chronic HFpEF   #HTN  - Echo 2020: EF 50-55% (Had recent echo as per family available at Dr. Bhagat's office )  - Appreciate cardiology recs  - Xarelto resumed today  - Hold Lasix while NPO   - c/w Entresto 97 mg-103 mg oral tablet: 1 orally 2 times a day  - Start Nifedipine 30mg XL     # Chronic Atrial fibrillation  - hx multiple ablations and DCCV  - Xarelto 20mg OD resumed today  - c/w Metoprolol 50 BID    # HLD:  - c/w Atorvastatin 40 QD    # DM-II  - Home med: Lantus 20 u QD, Metformin 1000 BID and Ozempic weekly  - Sliding scale and adjust as needed     #Parkinson Disease:  - c/w Carbi/Levo 25/100 BID    #DVT ppx: SCD     DNR/DNI

## 2023-07-30 NOTE — DISCHARGE NOTE PROVIDER - NSDCMRMEDTOKEN_GEN_ALL_CORE_FT
atorvastatin 40 mg oral tablet: 1 orally once a day  carbidopa-levodopa 25 mg-100 mg oral tablet: 1 tab(s) orally 2 times a day  Entresto 97 mg-103 mg oral tablet: 1 orally 2 times a day  ergocalciferol 50,000 intl units (1.25 mg) oral capsule: 1 cap(s) orally once a week  furosemide 40 mg oral tablet: 1 orally once a day  Lantus 100 units/mL subcutaneous solution: 20 unit(s) subcutaneous once a day  Mag-Ox 400 oral tablet: 1 tab(s) orally 2 times a day (with meals)  metFORMIN 1000 mg oral tablet: 1 tab(s) orally 2 times a day  metoprolol tartrate 50 mg oral tablet: 1 orally 2 times a day  Ozempic (0.25 mg or 0.5 mg dose) 2 mg/1.5 mL subcutaneous solution: once a week on Tuesdays  Protonix 40 mg oral delayed release tablet: 1 orally once a day  sertraline 25 mg oral tablet: 1 tab(s) orally once a day  sodium chloride 1 g oral tablet: 1 tab(s) orally 2 times a day  Xarelto 20 mg oral tablet: 1 tab(s) orally once a day (in the evening)

## 2023-07-30 NOTE — DISCHARGE NOTE PROVIDER - ATTENDING DISCHARGE PHYSICAL EXAMINATION:
VITALS:  T(F): 97.1 (07-30-23 @ 05:55), Max: 98.5 (07-29-23 @ 17:06)  HR: 83 (07-30-23 @ 11:41) (81 - 94)  BP: 112/62 (07-30-23 @ 11:41) (112/62 - 136/60)  RR: 18 (07-30-23 @ 05:55) (18 - 18)  SpO2: --      PHYSICAL EXAM:  GENERAL: NAD, well-developed  CHEST/LUNG: CTAB; No wheeze  HEART: irregularly irregular rhythm; No murmurs, rubs, or gallops  ABDOMEN: Soft, NT/ND; BS present  EXTREMITIES:  No cyanosis, or edema  NEUROLOGY: AAOx3  SKIN: No rashes or lesions

## 2023-08-01 LAB
FIBRINOGEN AG PPP IA-MCNC: 418 MG/DL — SIGNIFICANT CHANGE UP (ref 233–496)
SURGICAL PATHOLOGY STUDY: SIGNIFICANT CHANGE UP

## 2023-08-01 NOTE — HISTORY OF PRESENT ILLNESS
[FreeTextEntry1] : Patient with history of HTN, DM, s/p AF ablation 2015, 2019 and was ding great. Now patient c/o tirness and palpitations and increase heart rate. Patient came for further evaluation. No bleeding \par \par Patient returns for further evaluation for AF. Pt fees much better once she is in NSR and see the difference\par \par Patient comes to the office complaining of some weakness and shortness of breath. \par \par \par EKG (06/30/2023): Atrial fibrillation at 82 bpm.\par \par INESSA - Summary:\par  1. Left ventricular ejection fraction, by visual estimation, is 45 to 50%.\par  2. Mildly decreased global left ventricular systolic function.\par  3. Moderately enlarged right atrium.\par  4. Successful synchronised direct current cardioversion at 200 J.\par  5. No left atrial appendage thrombus and normal left atrial appendage velocities.\par

## 2023-08-01 NOTE — ADDENDUM
[FreeTextEntry1] : IiMlady assisted in documentation on 07/03/2023   acting as a scribe for Dr. Elias Carter.\par

## 2023-08-01 NOTE — ASSESSMENT
[FreeTextEntry1] : AF \par - Continue Xarelto 20 mg once a day. \par - Discussed with patient and daughter the different options. After long discussion, will proceed with rate control. Therefore, will continue current medications.

## 2023-08-04 DIAGNOSIS — Z79.85 LONG-TERM (CURRENT) USE OF INJECTABLE NON-INSULIN ANTIDIABETIC DRUGS: ICD-10-CM

## 2023-08-04 DIAGNOSIS — Z86.718 PERSONAL HISTORY OF OTHER VENOUS THROMBOSIS AND EMBOLISM: ICD-10-CM

## 2023-08-04 DIAGNOSIS — E78.00 PURE HYPERCHOLESTEROLEMIA, UNSPECIFIED: ICD-10-CM

## 2023-08-04 DIAGNOSIS — T45.515A ADVERSE EFFECT OF ANTICOAGULANTS, INITIAL ENCOUNTER: ICD-10-CM

## 2023-08-04 DIAGNOSIS — I25.10 ATHEROSCLEROTIC HEART DISEASE OF NATIVE CORONARY ARTERY WITHOUT ANGINA PECTORIS: ICD-10-CM

## 2023-08-04 DIAGNOSIS — Z88.1 ALLERGY STATUS TO OTHER ANTIBIOTIC AGENTS STATUS: ICD-10-CM

## 2023-08-04 DIAGNOSIS — K44.9 DIAPHRAGMATIC HERNIA WITHOUT OBSTRUCTION OR GANGRENE: ICD-10-CM

## 2023-08-04 DIAGNOSIS — E11.9 TYPE 2 DIABETES MELLITUS WITHOUT COMPLICATIONS: ICD-10-CM

## 2023-08-04 DIAGNOSIS — I16.0 HYPERTENSIVE URGENCY: ICD-10-CM

## 2023-08-04 DIAGNOSIS — E87.1 HYPO-OSMOLALITY AND HYPONATREMIA: ICD-10-CM

## 2023-08-04 DIAGNOSIS — K64.4 RESIDUAL HEMORRHOIDAL SKIN TAGS: ICD-10-CM

## 2023-08-04 DIAGNOSIS — I50.32 CHRONIC DIASTOLIC (CONGESTIVE) HEART FAILURE: ICD-10-CM

## 2023-08-04 DIAGNOSIS — D68.32 HEMORRHAGIC DISORDER DUE TO EXTRINSIC CIRCULATING ANTICOAGULANTS: ICD-10-CM

## 2023-08-04 DIAGNOSIS — I48.20 CHRONIC ATRIAL FIBRILLATION, UNSPECIFIED: ICD-10-CM

## 2023-08-04 DIAGNOSIS — Z91.011 ALLERGY TO MILK PRODUCTS: ICD-10-CM

## 2023-08-04 DIAGNOSIS — Z91.040 LATEX ALLERGY STATUS: ICD-10-CM

## 2023-08-04 DIAGNOSIS — Z79.84 LONG TERM (CURRENT) USE OF ORAL HYPOGLYCEMIC DRUGS: ICD-10-CM

## 2023-08-04 DIAGNOSIS — Z91.048 OTHER NONMEDICINAL SUBSTANCE ALLERGY STATUS: ICD-10-CM

## 2023-08-04 DIAGNOSIS — Z95.5 PRESENCE OF CORONARY ANGIOPLASTY IMPLANT AND GRAFT: ICD-10-CM

## 2023-08-04 DIAGNOSIS — Z66 DO NOT RESUSCITATE: ICD-10-CM

## 2023-08-04 DIAGNOSIS — Z79.4 LONG TERM (CURRENT) USE OF INSULIN: ICD-10-CM

## 2023-08-04 DIAGNOSIS — G20 PARKINSON'S DISEASE: ICD-10-CM

## 2023-08-04 DIAGNOSIS — K29.51 UNSPECIFIED CHRONIC GASTRITIS WITH BLEEDING: ICD-10-CM

## 2023-08-04 DIAGNOSIS — I11.0 HYPERTENSIVE HEART DISEASE WITH HEART FAILURE: ICD-10-CM

## 2023-08-04 DIAGNOSIS — Z79.01 LONG TERM (CURRENT) USE OF ANTICOAGULANTS: ICD-10-CM

## 2023-08-04 DIAGNOSIS — K57.31 DIVERTICULOSIS OF LARGE INTESTINE WITHOUT PERFORATION OR ABSCESS WITH BLEEDING: ICD-10-CM

## 2023-08-04 DIAGNOSIS — E83.42 HYPOMAGNESEMIA: ICD-10-CM

## 2023-08-04 DIAGNOSIS — D50.0 IRON DEFICIENCY ANEMIA SECONDARY TO BLOOD LOSS (CHRONIC): ICD-10-CM

## 2023-08-10 ENCOUNTER — APPOINTMENT (OUTPATIENT)
Dept: GASTROENTEROLOGY | Facility: CLINIC | Age: 78
End: 2023-08-10
Payer: MEDICARE

## 2023-08-10 VITALS
SYSTOLIC BLOOD PRESSURE: 148 MMHG | DIASTOLIC BLOOD PRESSURE: 86 MMHG | HEIGHT: 65 IN | OXYGEN SATURATION: 98 % | BODY MASS INDEX: 28.82 KG/M2 | WEIGHT: 173 LBS | HEART RATE: 80 BPM

## 2023-08-10 DIAGNOSIS — D64.9 ANEMIA, UNSPECIFIED: ICD-10-CM

## 2023-08-10 DIAGNOSIS — D50.9 IRON DEFICIENCY ANEMIA, UNSPECIFIED: ICD-10-CM

## 2023-08-10 DIAGNOSIS — R12 HEARTBURN: ICD-10-CM

## 2023-08-10 PROCEDURE — 99214 OFFICE O/P EST MOD 30 MIN: CPT

## 2023-08-10 RX ORDER — POLYETHYLENE GLYCOL 3350 17 G/17G
17 POWDER, FOR SOLUTION ORAL AS DIRECTED
Qty: 8 | Refills: 0 | Status: ACTIVE | COMMUNITY
Start: 2023-08-10 | End: 1900-01-01

## 2023-08-10 RX ORDER — PANTOPRAZOLE 40 MG/1
40 TABLET, DELAYED RELEASE ORAL DAILY
Qty: 30 | Refills: 6 | Status: ACTIVE | COMMUNITY
Start: 2023-08-10

## 2023-08-10 RX ORDER — CHLORHEXIDINE GLUCONATE 4 %
325 (65 FE) LIQUID (ML) TOPICAL DAILY
Qty: 30 | Refills: 5 | Status: ACTIVE | COMMUNITY
Start: 2023-08-10 | End: 1900-01-01

## 2023-08-15 NOTE — REASON FOR VISIT
[Post Hospitalization] : a post hospitalization visit [Family Member] : family member [FreeTextEntry1] : NPA - HFU

## 2023-08-15 NOTE — ASSESSMENT
[FreeTextEntry1] : 78 yr old female with Atrial Fib (on xarelto), CAD (4 stents), HTN, DM, HLD, who was recently hospitalized with symptomatic iron def anemia, here for f/u.  7/28/23:  -EGD: Hiatal hernia, erosive gastritis and no bleeding was noted; pathology negative for H Pylori, IM, and dysplasia -Colonoscopy: Moderate diverticulosis, external hemorrhoids (non-bleeding), otherwise normal with no polyps, lesions, or bleeding noted  #Iron deficiency Anemia  - EGD and colonoscopy done were unrevealing as to an etiology of the anemia - Will schedule a VCE - F/U after VCE is done - Start Iron OD - Hematology referral   #Heartburn/Gastritis - Continue PPI OD - GERD measures and GERD diet recommended provided to pt - if no improvement w/ lifestyle and dietary recs, may increase PPI to q12h at next visit

## 2024-03-16 ENCOUNTER — EMERGENCY (EMERGENCY)
Facility: HOSPITAL | Age: 79
LOS: 0 days | Discharge: HOME | End: 2024-03-16
Attending: EMERGENCY MEDICINE
Payer: MEDICARE

## 2024-03-16 VITALS
RESPIRATION RATE: 16 BRPM | DIASTOLIC BLOOD PRESSURE: 86 MMHG | TEMPERATURE: 97 F | HEART RATE: 89 BPM | OXYGEN SATURATION: 99 % | WEIGHT: 184.97 LBS | SYSTOLIC BLOOD PRESSURE: 199 MMHG

## 2024-03-16 VITALS
OXYGEN SATURATION: 99 % | SYSTOLIC BLOOD PRESSURE: 164 MMHG | DIASTOLIC BLOOD PRESSURE: 72 MMHG | RESPIRATION RATE: 18 BRPM | HEART RATE: 72 BPM

## 2024-03-16 DIAGNOSIS — E11.9 TYPE 2 DIABETES MELLITUS WITHOUT COMPLICATIONS: ICD-10-CM

## 2024-03-16 DIAGNOSIS — Z79.01 LONG TERM (CURRENT) USE OF ANTICOAGULANTS: ICD-10-CM

## 2024-03-16 DIAGNOSIS — Z91.041 RADIOGRAPHIC DYE ALLERGY STATUS: ICD-10-CM

## 2024-03-16 DIAGNOSIS — I48.91 UNSPECIFIED ATRIAL FIBRILLATION: ICD-10-CM

## 2024-03-16 DIAGNOSIS — F03.90 UNSPECIFIED DEMENTIA, UNSPECIFIED SEVERITY, WITHOUT BEHAVIORAL DISTURBANCE, PSYCHOTIC DISTURBANCE, MOOD DISTURBANCE, AND ANXIETY: ICD-10-CM

## 2024-03-16 DIAGNOSIS — M79.602 PAIN IN LEFT ARM: ICD-10-CM

## 2024-03-16 DIAGNOSIS — M25.512 PAIN IN LEFT SHOULDER: ICD-10-CM

## 2024-03-16 DIAGNOSIS — Z98.890 OTHER SPECIFIED POSTPROCEDURAL STATES: Chronic | ICD-10-CM

## 2024-03-16 DIAGNOSIS — Z95.5 PRESENCE OF CORONARY ANGIOPLASTY IMPLANT AND GRAFT: ICD-10-CM

## 2024-03-16 DIAGNOSIS — Z95.5 PRESENCE OF CORONARY ANGIOPLASTY IMPLANT AND GRAFT: Chronic | ICD-10-CM

## 2024-03-16 DIAGNOSIS — Z88.1 ALLERGY STATUS TO OTHER ANTIBIOTIC AGENTS STATUS: ICD-10-CM

## 2024-03-16 DIAGNOSIS — I11.0 HYPERTENSIVE HEART DISEASE WITH HEART FAILURE: ICD-10-CM

## 2024-03-16 DIAGNOSIS — I50.9 HEART FAILURE, UNSPECIFIED: ICD-10-CM

## 2024-03-16 DIAGNOSIS — G20.A1 PARKINSON'S DISEASE WITHOUT DYSKINESIA, WITHOUT MENTION OF FLUCTUATIONS: ICD-10-CM

## 2024-03-16 DIAGNOSIS — E78.00 PURE HYPERCHOLESTEROLEMIA, UNSPECIFIED: ICD-10-CM

## 2024-03-16 DIAGNOSIS — Z91.011 ALLERGY TO MILK PRODUCTS: ICD-10-CM

## 2024-03-16 DIAGNOSIS — Z91.048 OTHER NONMEDICINAL SUBSTANCE ALLERGY STATUS: ICD-10-CM

## 2024-03-16 DIAGNOSIS — I25.10 ATHEROSCLEROTIC HEART DISEASE OF NATIVE CORONARY ARTERY WITHOUT ANGINA PECTORIS: ICD-10-CM

## 2024-03-16 PROCEDURE — 73030 X-RAY EXAM OF SHOULDER: CPT | Mod: 26,LT

## 2024-03-16 PROCEDURE — 73030 X-RAY EXAM OF SHOULDER: CPT | Mod: LT

## 2024-03-16 PROCEDURE — 99283 EMERGENCY DEPT VISIT LOW MDM: CPT | Mod: 25

## 2024-03-16 PROCEDURE — 99284 EMERGENCY DEPT VISIT MOD MDM: CPT

## 2024-03-16 RX ORDER — TRAMADOL HYDROCHLORIDE 50 MG/1
25 TABLET ORAL ONCE
Refills: 0 | Status: DISCONTINUED | OUTPATIENT
Start: 2024-03-16 | End: 2024-03-16

## 2024-03-16 RX ORDER — LIDOCAINE 4 G/100G
1 CREAM TOPICAL ONCE
Refills: 0 | Status: COMPLETED | OUTPATIENT
Start: 2024-03-16 | End: 2024-03-16

## 2024-03-16 RX ORDER — ACETAMINOPHEN 500 MG
975 TABLET ORAL ONCE
Refills: 0 | Status: COMPLETED | OUTPATIENT
Start: 2024-03-16 | End: 2024-03-16

## 2024-03-16 RX ORDER — LIDOCAINE 4 G/100G
1 CREAM TOPICAL
Qty: 1 | Refills: 0
Start: 2024-03-16 | End: 2024-03-20

## 2024-03-16 RX ORDER — KETOROLAC TROMETHAMINE 30 MG/ML
30 SYRINGE (ML) INJECTION ONCE
Refills: 0 | Status: DISCONTINUED | OUTPATIENT
Start: 2024-03-16 | End: 2024-03-16

## 2024-03-16 RX ORDER — METHOCARBAMOL 500 MG/1
1500 TABLET, FILM COATED ORAL ONCE
Refills: 0 | Status: DISCONTINUED | OUTPATIENT
Start: 2024-03-16 | End: 2024-03-16

## 2024-03-16 RX ADMIN — Medication 975 MILLIGRAM(S): at 14:12

## 2024-03-16 RX ADMIN — LIDOCAINE 1 PATCH: 4 CREAM TOPICAL at 13:10

## 2024-03-16 RX ADMIN — TRAMADOL HYDROCHLORIDE 25 MILLIGRAM(S): 50 TABLET ORAL at 13:11

## 2024-03-16 RX ADMIN — Medication 975 MILLIGRAM(S): at 13:11

## 2024-03-16 RX ADMIN — TRAMADOL HYDROCHLORIDE 25 MILLIGRAM(S): 50 TABLET ORAL at 14:12

## 2024-03-16 NOTE — ED PROVIDER NOTE - PATIENT PORTAL LINK FT
You can access the FollowMyHealth Patient Portal offered by [facility] by registering at the following website: [email or web address]. By joining [employer]’s FollowMyHealth portal, you will also be able to view your health information using other applications (apps) compatible with our system.

## 2024-03-16 NOTE — ED PROVIDER NOTE - NSFOLLOWUPINSTRUCTIONS_ED_ALL_ED_FT
Our Emergency Department Referral Coordinators will be reaching out to you in the next 24-48 hours from 9:00am to 5:00pm with a follow up appointment. Please expect a phone call from the hospital in that time frame. If you do not receive a call or if you have any questions or concerns, you can reach them at [phone] or [phone].  Shoulder Pain    Many things can cause shoulder pain, including:    An injury to the area.  Overuse of the shoulder.  Arthritis.    The source of the pain can be:    Inflammation.  An injury to the shoulder joint.  An injury to a tendon, ligament, or bone.    HOME CARE INSTRUCTIONS  Take these actions to help with your pain:     Squeeze a soft ball or a foam pad as much as possible. This helps to keep the shoulder from swelling. It also helps to strengthen the arm.  Take over-the-counter and prescription medicines only as told by your health care provider.  If directed, apply ice to the area:  Put ice in a plastic bag.  Place a towel between your skin and the bag.  Leave the ice on for 20 minutes, 2–3 times per day. Stop applying ice if it does not help with the pain.  If you were given a shoulder sling or immobilizer:  Wear it as told.  Remove it to shower or bathe.  Move your arm as little as possible, but keep your hand moving to prevent swelling.    SEEK MEDICAL CARE IF:  Your pain gets worse.  Your pain is not relieved with medicines.  New pain develops in your arm, hand, or fingers.    SEEK IMMEDIATE MEDICAL CARE IF:  Your arm, hand, or fingers:  Tingle.  Become numb.  Become swollen.  Become painful.  Turn white or blue.    ADDITIONAL NOTES AND INSTRUCTIONS    Please follow up with your Primary MD in 24-48 hr.  Seek immediate medical care for any new/worsening signs or symptoms.

## 2024-03-16 NOTE — ED PROVIDER NOTE - ATTENDING CONTRIBUTION TO CARE
78-year-old female past medical history of multiple medical problems including A-fib on Xarelto, diabetes, elevated cholesterol, hypertension, CAD/stent, dementia, arthritis presenting for evaluation of nontraumatic left shoulder pain for the past 3 days.  Pain is constant worse with movement and palpation, no associated focal weakness paresthesias, chest pain, shortness of breath, headache, change in skin color, rash or any other additional acute complaints.  Patient tried Motrin and Tylenol several times since symptoms started without any improvement.  No change in baseline mental status as reported by her daughter-in-law who is at the bedside. Did not take anything for pain today. Elderly female sitting on a wheelchair appears uncomfortable but in no acute distress, head atraumatic/normocephalic, no midline spine tenderness to palpation, there is tenderness to palpation over the left shoulder with severely limited range of motion due to pain, no shoulder deformities, no overlying skin changes, normal range of motion at the wrist/hand and elbow , skin normal color and temperature, distal pulses are intact, normal hand , speaking full sentences, lungs clear to auscultation.  Plan: X-ray, analgesia, reassess.

## 2024-03-16 NOTE — ED PROVIDER NOTE - CLINICAL SUMMARY MEDICAL DECISION MAKING FREE TEXT BOX
78-year-old female with nontraumatic left shoulder pain for the past several days.  No relief from Motrin and Tylenol at home.  Patient appears well though uncomfortable due to pain.  Vital signs reviewed and are normal.  Tenderness to palpation around the shoulder joint without obvious deformities or overlying skin changes, extremity is neurovascularly intact, dose of analgesia was given in ED, lidocaine patch was applied.  X-ray was independently interpreted by me, ED attending, as negative for acute bony process, DJD was noted.  Patient declined sling, advised to follow-up with orthopedist this coming week.  Prescription for Lidoderm patch was sent to the patient's pharmacy along with several tablets of tramadol.  Family was advised to give patient Tylenol for pain, tramadol only for severe pain to avoid sedation,polypharmacy/worsening of mental status/dementia. Strict return precautions given.  Pain likely due to DJD, possibly bursitis, tendinitis, less likely septic joint.

## 2024-03-16 NOTE — ED PROVIDER NOTE - PHYSICAL EXAMINATION
Constitutional: Well developed, well nourished, no acute distress  Head: Normocephalic, Atraumatic  Eyes: PERRLA, EOMI, conjunctiva and sclera WNL  ENT: Moist mucous membranes, no rhinorrhea,   Neck: Supple, Nontender,   Respiratory: Normal chest excursion with respiration; Breath sounds clear and equal B/L; No wheezes, rales, or rhonchi   Cardiovascular: RRR; Normal S1, S2; No murmurs, rubs or gallops   ABD/GI:  Nondistended; Nontender; No guarding, rigidity or rebound   EXT/MS: Moving all extremities; Distal pulses 2+ B/L; left anterior elbow tenderness with decrease rom due to pain, no pain at left wrist or elbow   Skin: Normal for age and race; Warm and dry; No rash

## 2024-03-16 NOTE — ED PROVIDER NOTE - OBJECTIVE STATEMENT
78-year-old female with past medical history of dementia, Parkinson's, A-fib status post ablation on Eliquis, diabetes, HDL, heart failure, HTN, CAD status post stent, anemia presenting with 3 days of left arm and shoulder pain.  Patient is poor historian and is accompanied by her daughter-in-law.  Patient reports pain in left shoulder rating down the arm.  Patient states whole left arm is painful.  Denies any trauma.  Has not taken any pain meds today but has been taking Motrin and Tylenol over the past couple days.  No new neck pain, back pain.  No headache dizziness lightheadedness chest pain shortness of breath, abdominal pain, diarrhea constipation medicines.  Patient denies any numbness or tingling in her hands.  Patient endorses decreased range of motion in left upper extremity due to pain.

## 2024-03-16 NOTE — ED ADULT NURSE NOTE - OBJECTIVE STATEMENT
78 yr old female, presenting to ED c/o arm pain. Pt c/o L arm pain x3 days, reports pain started after getting bloodwork. Denies n/v/d/fevers/chills.

## 2024-03-16 NOTE — ED PROVIDER NOTE - DIFFERENTIAL DIAGNOSIS
Cervical radiculopathy, tendinitis, bursitis, arthritis, less likely septic joint, DVT, arterial occlusion, fasciitis Differential Diagnosis

## 2024-03-17 ENCOUNTER — APPOINTMENT (OUTPATIENT)
Dept: ORTHOPEDIC SURGERY | Facility: CLINIC | Age: 79
End: 2024-03-17
Payer: MEDICARE

## 2024-03-17 VITALS — WEIGHT: 185 LBS | HEIGHT: 65 IN | BODY MASS INDEX: 30.82 KG/M2

## 2024-03-17 DIAGNOSIS — M25.512 PAIN IN LEFT SHOULDER: ICD-10-CM

## 2024-03-17 PROCEDURE — 99203 OFFICE O/P NEW LOW 30 MIN: CPT | Mod: 25

## 2024-03-17 PROCEDURE — 20610 DRAIN/INJ JOINT/BURSA W/O US: CPT | Mod: LT

## 2024-03-17 NOTE — HISTORY OF PRESENT ILLNESS
[de-identified] : The patient presents today for evaluation of her left shoulder.  She is here with her daughter who is help translating for her.  The patient states that 2 days ago, she developed severe and sudden discomfort along her left shoulder.  The daughter states that she is never had any pain or incident like this with her left shoulder.  She has no previous problems noted with the left shoulder.  There was no aggravating factors or inciting incident.  She states that the pain has been so severe over the past few days, that the patient has been refusing to move her arm.  She is having difficulty with routine activities of daily living, including sleeping, and dressing herself.  She was taken to [facility] yesterday, where she was provided with a lidocaine patch and tramadol.  She states that neither of those have provided her with relief.  She did have x-rays done which were read as negative.  She was then referred to orthopedics for further evaluation, and a possible cortisone injection.

## 2024-03-17 NOTE — DATA REVIEWED
[FreeTextEntry1] : 3 x-ray views were reviewed from [facility] of the patient's left shoulder.  I see no obvious evidence of acute fracture or dislocation.  There appears to be a spur noted on the humeral head.

## 2024-03-17 NOTE — DISCUSSION/SUMMARY
[de-identified] : I discussed treatment options with the patient and her daughter.  At this time, they are interested in trialing a cortisone injection.  The patient is a diabetic, but she states just saw her primary care this past week.  Her hemoglobin A1c is about 7.  And she states her sugars have been around 1:30 in the morning when she checks them.  I did advise her that there is a risk that the cortisone will raise her blood sugar levels for up to 72 hours and that they should be diligent with checking her sugars after the injection and consult with her primary care physician if her sugar rises.  They understand this and would still like to proceed with the injection.  Today under sterile technique, the patient was injected with a mixture of 3 cc of 1% lidocaine cocaine and 3 cc of dexamethasone 10 mg/mL into the left shoulder subacromial space.  She tolerated this well.  A Band-Aid was applied over the injection site.  She will continue using the lidocaine patch as needed.  She was also advised she can apply warm compresses to the area.  Will have her follow-up in our office in approximately a month's time for repeat assessment.  All of their questions were answered to their satisfaction.

## 2024-03-17 NOTE — PHYSICAL EXAM
[de-identified] : On examination of the patient's left shoulder, she is currently sitting in a wheelchair and appears to be very guarded and uncomfortable.  The skin along her left shoulder is intact.  There is no edema or ecchymosis noted.  No erythema.  She has tenderness to palpation all about the left shoulder that is generalized.  She has no specific tenderness along her cervical spine.  She has some mild tenderness along the left side of the trapezius.  When I attempt to passively range her shoulder, the patient is very guarded and uncomfortable and is unable to actively move her shoulder without severe discomfort.  Her exam was extremely limited given the level of discomfort and guarding.  She did not appear to have any specific tenderness or limitations with range of motion of the elbow and wrist.  The pain seems to be more focused to the shoulder area.  The remainder of her exam was deferred secondary to her guarding and pain.

## 2024-04-23 ENCOUNTER — APPOINTMENT (OUTPATIENT)
Dept: ORTHOPEDIC SURGERY | Facility: CLINIC | Age: 79
End: 2024-04-23

## 2025-01-03 ENCOUNTER — INPATIENT (INPATIENT)
Facility: HOSPITAL | Age: 80
LOS: 2 days | Discharge: HOME | DRG: 193 | End: 2025-01-06
Attending: INTERNAL MEDICINE | Admitting: STUDENT IN AN ORGANIZED HEALTH CARE EDUCATION/TRAINING PROGRAM
Payer: MEDICARE

## 2025-01-03 VITALS
TEMPERATURE: 97 F | HEART RATE: 90 BPM | SYSTOLIC BLOOD PRESSURE: 138 MMHG | DIASTOLIC BLOOD PRESSURE: 85 MMHG | WEIGHT: 175.05 LBS | OXYGEN SATURATION: 100 % | RESPIRATION RATE: 18 BRPM | HEIGHT: 63 IN

## 2025-01-03 DIAGNOSIS — J18.9 PNEUMONIA, UNSPECIFIED ORGANISM: ICD-10-CM

## 2025-01-03 DIAGNOSIS — Z95.5 PRESENCE OF CORONARY ANGIOPLASTY IMPLANT AND GRAFT: Chronic | ICD-10-CM

## 2025-01-03 DIAGNOSIS — Z98.890 OTHER SPECIFIED POSTPROCEDURAL STATES: Chronic | ICD-10-CM

## 2025-01-03 LAB
ACANTHOCYTES BLD QL SMEAR: SLIGHT — SIGNIFICANT CHANGE UP
ALBUMIN SERPL ELPH-MCNC: 3.9 G/DL — SIGNIFICANT CHANGE UP (ref 3.5–5.2)
ALP SERPL-CCNC: 80 U/L — SIGNIFICANT CHANGE UP (ref 30–115)
ALT FLD-CCNC: 9 U/L — SIGNIFICANT CHANGE UP (ref 0–41)
ANION GAP SERPL CALC-SCNC: 12 MMOL/L — SIGNIFICANT CHANGE UP (ref 7–14)
ANION GAP SERPL CALC-SCNC: 14 MMOL/L — SIGNIFICANT CHANGE UP (ref 7–14)
AST SERPL-CCNC: 19 U/L — SIGNIFICANT CHANGE UP (ref 0–41)
BASE EXCESS BLDV CALC-SCNC: -5.8 MMOL/L — LOW (ref -2–3)
BASOPHILS # BLD AUTO: 0 K/UL — SIGNIFICANT CHANGE UP (ref 0–0.2)
BASOPHILS NFR BLD AUTO: 0 % — SIGNIFICANT CHANGE UP (ref 0–1)
BILIRUB SERPL-MCNC: <0.2 MG/DL — SIGNIFICANT CHANGE UP (ref 0.2–1.2)
BUN SERPL-MCNC: 10 MG/DL — SIGNIFICANT CHANGE UP (ref 10–20)
BUN SERPL-MCNC: 11 MG/DL — SIGNIFICANT CHANGE UP (ref 10–20)
BURR CELLS BLD QL SMEAR: PRESENT — SIGNIFICANT CHANGE UP
CALCIUM SERPL-MCNC: 8.8 MG/DL — SIGNIFICANT CHANGE UP (ref 8.4–10.4)
CALCIUM SERPL-MCNC: 8.9 MG/DL — SIGNIFICANT CHANGE UP (ref 8.4–10.4)
CHLORIDE SERPL-SCNC: 87 MMOL/L — LOW (ref 98–110)
CHLORIDE SERPL-SCNC: 88 MMOL/L — LOW (ref 98–110)
CO2 SERPL-SCNC: 19 MMOL/L — SIGNIFICANT CHANGE UP (ref 17–32)
CO2 SERPL-SCNC: 19 MMOL/L — SIGNIFICANT CHANGE UP (ref 17–32)
CREAT SERPL-MCNC: 0.8 MG/DL — SIGNIFICANT CHANGE UP (ref 0.7–1.5)
CREAT SERPL-MCNC: 0.8 MG/DL — SIGNIFICANT CHANGE UP (ref 0.7–1.5)
EGFR: 75 ML/MIN/1.73M2 — SIGNIFICANT CHANGE UP
EGFR: 75 ML/MIN/1.73M2 — SIGNIFICANT CHANGE UP
EOSINOPHIL # BLD AUTO: 0 K/UL — SIGNIFICANT CHANGE UP (ref 0–0.7)
EOSINOPHIL NFR BLD AUTO: 0 % — SIGNIFICANT CHANGE UP (ref 0–8)
FLUAV AG NPH QL: SIGNIFICANT CHANGE UP
FLUBV AG NPH QL: SIGNIFICANT CHANGE UP
GAS PNL BLDV: SIGNIFICANT CHANGE UP
GIANT PLATELETS BLD QL SMEAR: PRESENT — SIGNIFICANT CHANGE UP
GLUCOSE SERPL-MCNC: 134 MG/DL — HIGH (ref 70–99)
GLUCOSE SERPL-MCNC: 181 MG/DL — HIGH (ref 70–99)
HCO3 BLDV-SCNC: 20 MMOL/L — LOW (ref 22–29)
HCT VFR BLD CALC: 21.7 % — LOW (ref 37–47)
HGB BLD-MCNC: 7 G/DL — LOW (ref 12–16)
HYPOCHROMIA BLD QL: SLIGHT — SIGNIFICANT CHANGE UP
LACTATE BLDV-MCNC: 3.4 MMOL/L — HIGH (ref 0.5–2)
LYMPHOCYTES # BLD AUTO: 0.91 K/UL — LOW (ref 1.2–3.4)
LYMPHOCYTES # BLD AUTO: 13.9 % — LOW (ref 20.5–51.1)
MCHC RBC-ENTMCNC: 30.2 PG — SIGNIFICANT CHANGE UP (ref 27–31)
MCHC RBC-ENTMCNC: 32.3 G/DL — SIGNIFICANT CHANGE UP (ref 32–37)
MCV RBC AUTO: 93.5 FL — SIGNIFICANT CHANGE UP (ref 81–99)
MONOCYTES # BLD AUTO: 0.97 K/UL — HIGH (ref 0.1–0.6)
MONOCYTES NFR BLD AUTO: 14.8 % — HIGH (ref 1.7–9.3)
NEUTROPHILS # BLD AUTO: 4.44 K/UL — SIGNIFICANT CHANGE UP (ref 1.4–6.5)
NEUTROPHILS NFR BLD AUTO: 67.8 % — SIGNIFICANT CHANGE UP (ref 42.2–75.2)
NT-PROBNP SERPL-SCNC: 2014 PG/ML — HIGH (ref 0–300)
PCO2 BLDV: 40 MMHG — SIGNIFICANT CHANGE UP (ref 39–42)
PH BLDV: 7.31 — LOW (ref 7.32–7.43)
PLAT MORPH BLD: ABNORMAL
PLATELET # BLD AUTO: 411 K/UL — HIGH (ref 130–400)
PMV BLD: 8.8 FL — SIGNIFICANT CHANGE UP (ref 7.4–10.4)
PO2 BLDV: 24 MMHG — LOW (ref 25–45)
POIKILOCYTOSIS BLD QL AUTO: SLIGHT — SIGNIFICANT CHANGE UP
POLYCHROMASIA BLD QL SMEAR: SLIGHT — SIGNIFICANT CHANGE UP
POTASSIUM SERPL-MCNC: 4.8 MMOL/L — SIGNIFICANT CHANGE UP (ref 3.5–5)
POTASSIUM SERPL-MCNC: 4.8 MMOL/L — SIGNIFICANT CHANGE UP (ref 3.5–5)
POTASSIUM SERPL-SCNC: 4.8 MMOL/L — SIGNIFICANT CHANGE UP (ref 3.5–5)
POTASSIUM SERPL-SCNC: 4.8 MMOL/L — SIGNIFICANT CHANGE UP (ref 3.5–5)
PROT SERPL-MCNC: 6.4 G/DL — SIGNIFICANT CHANGE UP (ref 6–8)
RBC # BLD: 2.32 M/UL — LOW (ref 4.2–5.4)
RBC # FLD: 14.6 % — HIGH (ref 11.5–14.5)
RBC BLD AUTO: ABNORMAL
RSV RNA NPH QL NAA+NON-PROBE: SIGNIFICANT CHANGE UP
SAO2 % BLDV: 28.7 % — LOW (ref 67–88)
SARS-COV-2 RNA SPEC QL NAA+PROBE: SIGNIFICANT CHANGE UP
SMUDGE CELLS # BLD: PRESENT — SIGNIFICANT CHANGE UP
SODIUM SERPL-SCNC: 120 MMOL/L — LOW (ref 135–146)
SODIUM SERPL-SCNC: 121 MMOL/L — LOW (ref 135–146)
TROPONIN T, HIGH SENSITIVITY RESULT: 18 NG/L — HIGH (ref 6–13)
VARIANT LYMPHS # BLD: 3.5 % — SIGNIFICANT CHANGE UP (ref 0–5)
WBC # BLD: 6.55 K/UL — SIGNIFICANT CHANGE UP (ref 4.8–10.8)
WBC # FLD AUTO: 6.55 K/UL — SIGNIFICANT CHANGE UP (ref 4.8–10.8)

## 2025-01-03 PROCEDURE — 85025 COMPLETE CBC W/AUTO DIFF WBC: CPT

## 2025-01-03 PROCEDURE — 83550 IRON BINDING TEST: CPT

## 2025-01-03 PROCEDURE — 83615 LACTATE (LD) (LDH) ENZYME: CPT

## 2025-01-03 PROCEDURE — 82746 ASSAY OF FOLIC ACID SERUM: CPT

## 2025-01-03 PROCEDURE — 87899 AGENT NOS ASSAY W/OPTIC: CPT

## 2025-01-03 PROCEDURE — 85045 AUTOMATED RETICULOCYTE COUNT: CPT

## 2025-01-03 PROCEDURE — 82962 GLUCOSE BLOOD TEST: CPT

## 2025-01-03 PROCEDURE — 86334 IMMUNOFIX E-PHORESIS SERUM: CPT

## 2025-01-03 PROCEDURE — 85730 THROMBOPLASTIN TIME PARTIAL: CPT

## 2025-01-03 PROCEDURE — 82570 ASSAY OF URINE CREATININE: CPT

## 2025-01-03 PROCEDURE — 83930 ASSAY OF BLOOD OSMOLALITY: CPT

## 2025-01-03 PROCEDURE — 82607 VITAMIN B-12: CPT

## 2025-01-03 PROCEDURE — 83036 HEMOGLOBIN GLYCOSYLATED A1C: CPT

## 2025-01-03 PROCEDURE — 84165 PROTEIN E-PHORESIS SERUM: CPT

## 2025-01-03 PROCEDURE — 84550 ASSAY OF BLOOD/URIC ACID: CPT

## 2025-01-03 PROCEDURE — 84155 ASSAY OF PROTEIN SERUM: CPT

## 2025-01-03 PROCEDURE — 83935 ASSAY OF URINE OSMOLALITY: CPT

## 2025-01-03 PROCEDURE — 85610 PROTHROMBIN TIME: CPT

## 2025-01-03 PROCEDURE — 99285 EMERGENCY DEPT VISIT HI MDM: CPT

## 2025-01-03 PROCEDURE — 82728 ASSAY OF FERRITIN: CPT

## 2025-01-03 PROCEDURE — P9016: CPT

## 2025-01-03 PROCEDURE — 83010 ASSAY OF HAPTOGLOBIN QUANT: CPT

## 2025-01-03 PROCEDURE — 84145 PROCALCITONIN (PCT): CPT

## 2025-01-03 PROCEDURE — 36430 TRANSFUSION BLD/BLD COMPNT: CPT

## 2025-01-03 PROCEDURE — 83540 ASSAY OF IRON: CPT

## 2025-01-03 PROCEDURE — 84540 ASSAY OF URINE/UREA-N: CPT

## 2025-01-03 PROCEDURE — 80053 COMPREHEN METABOLIC PANEL: CPT

## 2025-01-03 PROCEDURE — 84443 ASSAY THYROID STIM HORMONE: CPT

## 2025-01-03 PROCEDURE — 81003 URINALYSIS AUTO W/O SCOPE: CPT

## 2025-01-03 PROCEDURE — 84300 ASSAY OF URINE SODIUM: CPT

## 2025-01-03 PROCEDURE — 71250 CT THORAX DX C-: CPT | Mod: MC

## 2025-01-03 PROCEDURE — 84156 ASSAY OF PROTEIN URINE: CPT

## 2025-01-03 PROCEDURE — 71045 X-RAY EXAM CHEST 1 VIEW: CPT | Mod: 26

## 2025-01-03 PROCEDURE — 84133 ASSAY OF URINE POTASSIUM: CPT

## 2025-01-03 PROCEDURE — 99223 1ST HOSP IP/OBS HIGH 75: CPT

## 2025-01-03 PROCEDURE — 83735 ASSAY OF MAGNESIUM: CPT

## 2025-01-03 PROCEDURE — 87449 NOS EACH ORGANISM AG IA: CPT

## 2025-01-03 PROCEDURE — 80048 BASIC METABOLIC PNL TOTAL CA: CPT

## 2025-01-03 PROCEDURE — 85379 FIBRIN DEGRADATION QUANT: CPT

## 2025-01-03 PROCEDURE — 36415 COLL VENOUS BLD VENIPUNCTURE: CPT

## 2025-01-03 RX ORDER — CEFTRIAXONE SODIUM 1 G/1
1000 INJECTION, POWDER, FOR SOLUTION INTRAMUSCULAR; INTRAVENOUS EVERY 24 HOURS
Refills: 0 | Status: DISCONTINUED | OUTPATIENT
Start: 2025-01-03 | End: 2025-01-06

## 2025-01-03 RX ORDER — SODIUM CHLORIDE 9 MG/ML
1000 INJECTION, SOLUTION INTRAVENOUS
Refills: 0 | Status: DISCONTINUED | OUTPATIENT
Start: 2025-01-03 | End: 2025-01-06

## 2025-01-03 RX ORDER — ATORVASTATIN CALCIUM 40 MG/1
40 TABLET, FILM COATED ORAL AT BEDTIME
Refills: 0 | Status: DISCONTINUED | OUTPATIENT
Start: 2025-01-03 | End: 2025-01-06

## 2025-01-03 RX ORDER — ACETAMINOPHEN 80 MG/.8ML
650 SOLUTION/ DROPS ORAL EVERY 6 HOURS
Refills: 0 | Status: DISCONTINUED | OUTPATIENT
Start: 2025-01-03 | End: 2025-01-06

## 2025-01-03 RX ORDER — CEFTRIAXONE SODIUM 1 G/1
1000 INJECTION, POWDER, FOR SOLUTION INTRAMUSCULAR; INTRAVENOUS ONCE
Refills: 0 | Status: COMPLETED | OUTPATIENT
Start: 2025-01-03 | End: 2025-01-03

## 2025-01-03 RX ORDER — AZITHROMYCIN MONOHYDRATE 200 MG/5ML
500 POWDER, FOR SUSPENSION ORAL ONCE
Refills: 0 | Status: COMPLETED | OUTPATIENT
Start: 2025-01-03 | End: 2025-01-03

## 2025-01-03 RX ORDER — RIVAROXABAN 2.5 MG/1
20 TABLET, FILM COATED ORAL
Refills: 0 | Status: DISCONTINUED | OUTPATIENT
Start: 2025-01-04 | End: 2025-01-04

## 2025-01-03 RX ORDER — AZITHROMYCIN MONOHYDRATE 200 MG/5ML
500 POWDER, FOR SUSPENSION ORAL EVERY 24 HOURS
Refills: 0 | Status: DISCONTINUED | OUTPATIENT
Start: 2025-01-03 | End: 2025-01-06

## 2025-01-03 RX ORDER — DEXTROSE MONOHYDRATE 25 G/50ML
25 INJECTION, SOLUTION INTRAVENOUS ONCE
Refills: 0 | Status: DISCONTINUED | OUTPATIENT
Start: 2025-01-03 | End: 2025-01-06

## 2025-01-03 RX ORDER — RIVAROXABAN 2.5 MG/1
20 TABLET, FILM COATED ORAL
Refills: 0 | Status: DISCONTINUED | OUTPATIENT
Start: 2025-01-03 | End: 2025-01-03

## 2025-01-03 RX ORDER — DEXTROSE MONOHYDRATE 25 G/50ML
15 INJECTION, SOLUTION INTRAVENOUS ONCE
Refills: 0 | Status: DISCONTINUED | OUTPATIENT
Start: 2025-01-03 | End: 2025-01-06

## 2025-01-03 RX ORDER — LEVODOPA AND CARBIDOPA 145; 36.25 MG/1; MG/1
1 CAPSULE, EXTENDED RELEASE ORAL
Refills: 0 | Status: DISCONTINUED | OUTPATIENT
Start: 2025-01-03 | End: 2025-01-06

## 2025-01-03 RX ORDER — SODIUM CHLORIDE 9 MG/ML
1000 INJECTION, SOLUTION INTRAMUSCULAR; INTRAVENOUS; SUBCUTANEOUS
Refills: 0 | Status: DISCONTINUED | OUTPATIENT
Start: 2025-01-03 | End: 2025-01-03

## 2025-01-03 RX ORDER — PANTOPRAZOLE 40 MG/1
40 TABLET, DELAYED RELEASE ORAL
Refills: 0 | Status: DISCONTINUED | OUTPATIENT
Start: 2025-01-03 | End: 2025-01-06

## 2025-01-03 RX ORDER — METOPROLOL TARTRATE 50 MG
50 TABLET ORAL
Refills: 0 | Status: DISCONTINUED | OUTPATIENT
Start: 2025-01-03 | End: 2025-01-06

## 2025-01-03 RX ORDER — INSULIN LISPRO 100/ML
VIAL (ML) SUBCUTANEOUS
Refills: 0 | Status: DISCONTINUED | OUTPATIENT
Start: 2025-01-03 | End: 2025-01-06

## 2025-01-03 RX ORDER — DEXTROSE MONOHYDRATE 25 G/50ML
12.5 INJECTION, SOLUTION INTRAVENOUS ONCE
Refills: 0 | Status: DISCONTINUED | OUTPATIENT
Start: 2025-01-03 | End: 2025-01-06

## 2025-01-03 RX ORDER — NIFEDIPINE 60 MG/1
30 TABLET, EXTENDED RELEASE ORAL DAILY
Refills: 0 | Status: DISCONTINUED | OUTPATIENT
Start: 2025-01-03 | End: 2025-01-06

## 2025-01-03 RX ORDER — GINKGO BILOBA 40 MG
3 CAPSULE ORAL AT BEDTIME
Refills: 0 | Status: DISCONTINUED | OUTPATIENT
Start: 2025-01-03 | End: 2025-01-06

## 2025-01-03 RX ORDER — GLUCAGON INJECTION, SOLUTION 0.5 MG/.1ML
1 INJECTION, SOLUTION SUBCUTANEOUS ONCE
Refills: 0 | Status: DISCONTINUED | OUTPATIENT
Start: 2025-01-03 | End: 2025-01-06

## 2025-01-03 RX ORDER — SACUBITRIL AND VALSARTAN 24; 26 MG/1; MG/1
1 TABLET, FILM COATED ORAL
Refills: 0 | Status: DISCONTINUED | OUTPATIENT
Start: 2025-01-03 | End: 2025-01-06

## 2025-01-03 RX ADMIN — ACETAMINOPHEN 650 MILLIGRAM(S): 80 SOLUTION/ DROPS ORAL at 23:56

## 2025-01-03 RX ADMIN — CEFTRIAXONE SODIUM 100 MILLIGRAM(S): 1 INJECTION, POWDER, FOR SOLUTION INTRAMUSCULAR; INTRAVENOUS at 17:36

## 2025-01-03 RX ADMIN — AZITHROMYCIN MONOHYDRATE 255 MILLIGRAM(S): 200 POWDER, FOR SUSPENSION ORAL at 18:30

## 2025-01-03 NOTE — ED ADULT NURSE NOTE - NSFALLHARMRISKINTERV_ED_ALL_ED

## 2025-01-03 NOTE — ED PROVIDER NOTE - OBJECTIVE STATEMENT
79 year old male, pmhx of dementia, Parkinson's, A-fib status post ablation on Eliquis, diabetes, HDL, heart failure, HTN, CAD status post stent, anemia, comes in for couhg, congestion and chest pain x past few days. Patient Gambian speaking, grandson at bedside translating. States chest pain is b/l but nonradiating. Denies fevers, chills, N/V, abd pain, diarrhea.

## 2025-01-03 NOTE — ED PROVIDER NOTE - CLINICAL SUMMARY MEDICAL DECISION MAKING FREE TEXT BOX
79-year-old male presents to the ED for cough congestion and chest pain for the past few days.  Vitals were reviewed by me and noted to be within acceptable parameters.  Physical exam was unremarkable.  We obtained labs which revealed anemia at 7 for which we initiated PRBC transfusion.  Hyponatremia noted at 120.  Elevated BNP and a mixed acidosis.  Chest x-ray revealed bilateral infiltrates.  Will treat for bilateral pneumonia.  Held off on fluids due to heart failure.

## 2025-01-03 NOTE — ED ADULT NURSE NOTE - OBJECTIVE STATEMENT
pt presents to ED with c/o SOB. pts family member states for past few days pts been coughing and having difficulty breathing. c/o chest pain. Denies fever.

## 2025-01-03 NOTE — ED PROVIDER NOTE - CHRONIC CONDITION OTHER
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

## 2025-01-03 NOTE — H&P ADULT - HISTORY OF PRESENT ILLNESS
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 Patient was taken to Urgent Care 10 days ago, was found to be COVID+, treated with Paxlovid 5 day course. However her sxs remained persistent. She endorses sob and cp, which according to her is always present 2/2 afib. She denies any recent fevers, chills, n/v/c/d, abd pain, diarrhea, hematuria, dysuria, melena or hematochezia. There have been sick contacts in the house but believe she was the first to get sick.    Patient last admitted 07/26-07/30 of 2023 for weakness and sob, found to have acute on chronic anemia s/p EGD/[place] which revealed erosive gastritis, diverticulosis, hemorrhoids Course was complicated by euvolemic hypoNa treated with fluid restriction and salt tablets.    In ED, vitals- 138/85, 90, 18, 36.3, 100% on RA  Labs- wbc 6.55, Hb 7>>9.2, MCV 93.5, Na 120, trop 18, pro-BNP 2,014  VBG- 7.31, pO2 24, pCO2 40  EKG- afib  RVP negative  CXR- b/l interstitial opacities    Patient received rocephin and azithro IVPB x1, pRBC x1. She is being admitted for further management.

## 2025-01-03 NOTE — H&P ADULT - NSHPREVIEWOFSYSTEMS_GEN_ALL_CORE
REVIEW OF SYSTEMS:  CONSTITUTIONAL: + weakness, chills  EYES/ENT: No visual changes;  No vertigo or throat pain   NECK: No pain or stiffness  RESPIRATORY: +sob, cough  CARDIOVASCULAR: No chest pain or palpitations  GASTROINTESTINAL: No abdominal or epigastric pain. No nausea, vomiting, or hematemesis; No diarrhea or constipation. No melena or hematochezia.  GENITOURINARY: No dysuria, frequency or hematuria  NEUROLOGICAL: No numbness or weakness  SKIN: No itching, rashes

## 2025-01-03 NOTE — ED PROVIDER NOTE - EKG/XRAY ADDITIONAL INFORMATION
Independent interpretation of the EKG performed by Leon Crisostomo: A-fib, HR: 80s, CT:–, QTc: 450, ST-T: no ST-T wave changes

## 2025-01-03 NOTE — H&P ADULT - ASSESSMENT
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 Is being admitted for pna, acute on chronic anemia, hyponatremia.    #CAP, doubt postviral  #Sepsis NOT POA  #Recent Covid infection s/p Paxlovid course  - pt with non-resolving sxs despite paxlovid course, now with worsening cough, and increased malaise  - CXR with b/l interstitial opacities, no e/o necrotizing bronchopneumonia on my read, RVP neg  - f/u bcx, will send procal, urine strep and legionella, MRSA swab, sputum cx  - c/w rocephin and azithro for now  - supp O2, PRN, fluids PRB, bedrest, tylenol prn    #Acute on Chronic Normocytic Anemia, symptomatic  #Afib on Xarelto  - Hb previously 9.2 from last admission in 2023>> 7.0 today  - last admission included EGD/[place] which was negative for overt bleeding cause  - pt with sob and cp, says is always present 2/2 afib, may be worse in s/o symptomatic anemia  - s/p 1 unit of pRBC in ED>> keep active type and screen  - retic count, b12, folate, tsh, iron studies, SPEP  - if above w/o neg or Hb continues to drop>> would switch Xarelto to lovenox and obtain GI c/s    #Hyponatremia, severe  - last hospitalization c/b hyponatremia as well, euvolemic treated with fluid restriction and salt tablets  - daughter in law endorses pt has not even much, often does not drink much as well  - she appears dry on exam with poor skin turgor, dry MM and inc thirst  - will treat as hypotonic hypovolemic for now, asx as no AMS or HA or n/v, no seizures or signs of inc ICP  - for now will challenge with fluids>> NS @75cc/hr x 24 hours  - send serum osm, urine studies, serum/urine uric acid, tsh, AM cortisol  - will do BMP q8 for now, do not correct by more than 8-10 for 24-hr period    #Chronic HFpEF   #HTN  - Hold Lasix in s/o volume down  - c/w Entresto 97 mg-103 mg oral tablet: 1 orally 2 times a day w/arameters  - c/w procardia 30mg qd w/ parameters    # Chronic Atrial fibrillation  - hx multiple ablations and DCCV  - Xarelto 20mg OD resumed today  - change lopressor 50mg bid    # HLD:  - c/w Atorvastatin 40 QD    # DM-II  - Home med: Lantus 20 u QD, Metformin 1000 BID and Ozempic weekly  - Sliding scale and adjust as needed     #Parkinson Disease:  - c/w Carbi/Levo 25/100 BID      # Misc  - GI ppx: protonix  - DVT ppx: xarelto  - Diet: DASH/CC  - Activity: bedrest  - DISPO: admit to medicine    CODE STATUS: DNR/DNI (previous)     Patient is a 77-year-old female Telugu speaking (daughter in law at bedside) history of chronic A-fib on Xarelto status post ablation x2 and cardioversion, DVT, chronic anemia, 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 Is being admitted for pna, acute on chronic anemia, hyponatremia.    #CAP, doubt postviral  #Sepsis NOT POA  #Recent Covid infection s/p Paxlovid course  - pt with non-resolving sxs despite paxlovid course, now with worsening cough, and increased malaise  - CXR with b/l interstitial opacities, no e/o necrotizing bronchopneumonia on my read, RVP neg  - f/u bcx, will send procal, urine strep and legionella, MRSA swab, sputum cx  - c/w rocephin and azithro for now  - supp O2, PRN, fluids PRB, bedrest, tylenol prn    #Acute on Chronic Normocytic Anemia, symptomatic  #Afib on Xarelto  - Hb previously 9.2 from last admission in 2023>> 7.0 today  - last admission included EGD/[place] which was negative for overt bleeding cause  - pt with sob and cp, says is always present 2/2 afib, may be worse in s/o symptomatic anemia  - s/p 1 unit of pRBC in ED>> keep active type and screen  - f/u AM cbc, keep Hb >8 in s/o CAD  - retic count, b12, folate, tsh, iron studies, SPEP  - if above w/o neg or Hb continues to drop>> would hold xarelto (similar half life to lovenox) and GI c/s    #Hyponatremia, severe  - last hospitalization c/b hyponatremia as well, euvolemic treated with fluid restriction and salt tablets  - daughter in law endorses pt has not even much, often does not drink much as well  - she appears dry on exam with poor skin turgor, dry MM and inc thirst  - will treat as hypotonic hypovolemic for now, asx as no AMS or HA or n/v, no seizures or signs of inc ICP  - for now will challenge with fluids>> NS @75cc/hr x 24 hours  - send serum osm, urine studies, serum/urine uric acid, tsh, AM cortisol  - will do BMP q8 for now, do not correct by more than 8-10 for 24-hr period    #Chronic HFpEF   #HTN  - Hold Lasix in s/o volume down  - c/w Entresto 97 mg-103 mg oral tablet: 1 orally 2 times a day w/arameters  - c/w procardia 30mg qd w/ parameters    # Chronic Atrial fibrillation  - hx multiple ablations and DCCV  - Xarelto 20mg OD resumed today  - lopressor 50mg bid    # HLD:  - c/w Atorvastatin 40 QD    # DM-II  - Home med: Lantus 20 u QD, Metformin 1000 BID and Ozempic weekly  - Sliding scale and adjust as needed     #Parkinson Disease:  - c/w Carbi/Levo 25/100 BID      # Misc  - GI ppx: protonix  - DVT ppx: xarelto  - Diet: DASH/CC  - Activity: bedrest  - DISPO: admit to medicine    CODE STATUS: DNR/DNI (previous)     Patient is a 77-year-old female Turkmen speaking (daughter in law at bedside) history of chronic A-fib on Xarelto status post ablation x2 and cardioversion, DVT, chronic anemia, 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 Is being admitted for pna, acute on chronic anemia, hyponatremia.    #CAP, doubt postviral  #Sepsis NOT POA  #Recent Covid infection s/p Paxlovid course  - pt with non-resolving sxs despite paxlovid course, now with worsening cough, and increased malaise  - CXR with b/l interstitial opacities, no e/o necrotizing bronchopneumonia on my read, RVP neg  - f/u bcx, will send procal, urine strep and legionella, MRSA swab, sputum cx  - c/w rocephin and azithro for now  - supp O2, PRN, fluids PRB, bedrest, tylenol prn    #Acute on Chronic Normocytic Anemia, symptomatic  #Afib on Xarelto  - Hb previously 9.2 from last admission in 2023>> 7.0 today  - last admission included EGD/[place] which was negative for overt bleeding cause  - pt with sob and cp, says is always present 2/2 afib, may be worse in s/o symptomatic anemia  - s/p 1 unit of pRBC in ED>> keep active type and screen  - f/u AM cbc, keep Hb >8 in s/o CAD  - retic count, b12, folate, tsh, iron studies, SPEP  - hold xarelto for now>> resume once Hb stabilizes. AM coags    #Hyponatremia, severe  - last hospitalization c/b hyponatremia as well, euvolemic treated with fluid restriction and salt tablets  - daughter in law endorses pt has not even much, often does not drink much as well  - she appears dry on exam with poor skin turgor, dry MM and inc thirst  - will treat as hypotonic hypovolemic for now, asx as no AMS or HA or n/v, no seizures or signs of inc ICP  - for now will challenge with fluids>> NS @75cc/hr x 24 hours  - send serum osm, urine studies, serum/urine uric acid, tsh, AM cortisol  - will do BMP q8 for now, do not correct by more than 8-10 for 24-hr period    #Chronic HFpEF   #HTN  - Hold Lasix in s/o volume down  - c/w Entresto 97 mg-103 mg oral tablet: 1 orally 2 times a day w/ parameters  - c/w procardia 30mg qd w/ parameters    # Chronic Atrial fibrillation  - hx multiple ablations and DCCV  - Xarelto 20mg OD resumed today  - lopressor 50mg bid    # HLD:  - c/w Atorvastatin 40 QD    # DM-II  - Home med: Lantus 20 u QD, Metformin 1000 BID and Ozempic weekly  - Sliding scale and adjust as needed     #Parkinson Disease:  - c/w Carbi/Levo 25/100 BID      # Misc  - GI ppx: protonix  - DVT ppx: SCDs, holding xarelto  - Diet: DASH/CC  - Activity: bedrest  - DISPO: admit to medicine    CODE STATUS: DNR/DNI (previous)     Patient is a 77-year-old female Wolof speaking (daughter in law at bedside) history of chronic A-fib on Xarelto status post ablation x2 and cardioversion, DVT, chronic anemia, 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 Is being admitted for pna, acute on chronic anemia, hyponatremia.    #Acute Hypoxemic Resp Failure  #CAP, doubt postviral  #Sepsis NOT POA  #Recent Covid infection s/p Paxlovid course  - pt with non-resolving sxs despite paxlovid course, now with worsening cough, and increased malaise  - CXR with b/l interstitial opacities, no e/o necrotizing bronchopneumonia on my read, RVP neg  - f/u bcx, will send procal, urine strep and legionella, MRSA swab, sputum cx  - c/w rocephin and azithro for now  - supp O2, PRN, fluids PRB, bedrest, tylenol prn    #Acute on Chronic Normocytic Anemia, symptomatic  #Afib on Xarelto  - Hb previously 9.2 from last admission in 2023>> 7.0 today  - last admission included EGD/[place] which was negative for overt bleeding cause  - pt with sob and cp, says is always present 2/2 afib, may be worse in s/o symptomatic anemia  - s/p 1 unit of pRBC in ED>> keep active type and screen  - f/u AM cbc, keep Hb >8 in s/o CAD  - retic count, b12, folate, tsh, iron studies, SPEP  - hold xarelto for now>> resume once Hb stabilizes. AM coags    #Hyponatremia, severe  - last hospitalization c/b hyponatremia as well, euvolemic treated with fluid restriction and salt tablets  - daughter in law endorses pt has not even much, often does not drink much as well  - she appears dry on exam with poor skin turgor, dry MM and inc thirst  - will treat as hypotonic hypovolemic for now, asx as no AMS or HA or n/v, no seizures or signs of inc ICP  - for now will challenge with fluids>> NS @75cc/hr x 24 hours  - send serum osm, urine studies, serum/urine uric acid, tsh, AM cortisol  - will do BMP q8 for now, do not correct by more than 8-10 for 24-hr period    #Chronic HFpEF   #HTN  - Hold Lasix in s/o volume down  - c/w Entresto 97 mg-103 mg oral tablet: 1 orally 2 times a day w/ parameters  - c/w procardia 30mg qd w/ parameters    # Chronic Atrial fibrillation  - hx multiple ablations and DCCV  - Xarelto 20mg OD resumed today  - lopressor 50mg bid    # HLD:  - c/w Atorvastatin 40 QD    # DM-II  - Home med: Lantus 20 u QD, Metformin 1000 BID and Ozempic weekly  - Sliding scale and adjust as needed     #Parkinson Disease:  - c/w Carbi/Levo 25/100 BID      # Misc  - GI ppx: protonix  - DVT ppx: SCDs, holding xarelto  - Diet: DASH/CC  - Activity: bedrest  - DISPO: admit to medicine    CODE STATUS: DNR/DNI (previous)     Patient is a 77-year-old female Bengali speaking (daughter in law at bedside) history of chronic A-fib on Xarelto status post ablation x2 and cardioversion, DVT, chronic anemia, 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 Is being admitted for pna, acute on chronic anemia, hyponatremia.    #Acute Hypoxemic Resp Failure  #CAP, doubt postviral  #Sepsis NOT POA  #Recent Covid infection s/p Paxlovid course  - pt with non-resolving sxs despite paxlovid course, now with worsening cough, and increased malaise  - CXR with b/l interstitial opacities, no e/o necrotizing bronchopneumonia on my read, RVP neg  - f/u bcx, will send procal, urine strep and legionella, MRSA swab, sputum cx  - c/w rocephin and azithro for now  - supp O2, PRN, fluids PRB, bedrest, tylenol prn    #Acute on Chronic Normocytic Anemia, symptomatic  #Afib on Xarelto  - Hb previously 9.2 from last admission in 2023>> 7.0 today  - last admission included EGD/[place] which was negative for overt bleeding cause  - pt with sob and cp, says is always present 2/2 afib, may be worse in s/o symptomatic anemia  - s/p 1 unit of pRBC in ED>> keep active type and screen  - f/u AM cbc, keep Hb >8 in s/o CAD  - retic count, b12, folate, tsh, iron studies, SPEP  - hold xarelto for now>> resume once Hb stabilizes. AM coags    #Hyponatremia, severe  - last hospitalization c/b hyponatremia as well, euvolemic treated with fluid restriction and salt tablets  - daughter in law endorses pt has not even much, often does not drink much as well  - she appears dry on exam with poor skin turgor, dry MM and inc thirst  - suspect hypotonic hypovolemic, is asx as no AMS or HA or n/v, no seizures or signs of inc ICP  - hold off on fluid challenge for now, see if improves with holding lasix + unit of pRBC  - send serum osm, urine studies, serum/urine uric acid, tsh, AM cortisol  - will do BMP q8 for now, do not correct by more than 8-10 for 24-hr period    #Chronic HFpEF   #HTN  - Hold Lasix in s/o volume down  - c/w Entresto 97 mg-103 mg oral tablet: 1 orally 2 times a day w/ parameters  - c/w procardia 30mg qd w/ parameters    # Chronic Atrial fibrillation  - hx multiple ablations and DCCV  - Xarelto 20mg OD resumed today  - lopressor 50mg bid    # HLD:  - c/w Atorvastatin 40 QD    # DM-II  - Home med: Lantus 20 u QD, Metformin 1000 BID and Ozempic weekly  - Sliding scale and adjust as needed     #Parkinson Disease:  - c/w Carbi/Levo 25/100 BID      # Misc  - GI ppx: protonix  - DVT ppx: SCDs, holding xarelto  - Diet: DASH/CC  - Activity: bedrest  - DISPO: admit to medicine    CODE STATUS: DNR/DNI (previous)     Patient is a 77-year-old female Tajik speaking (daughter in law at bedside) history of chronic A-fib on Xarelto status post ablation x2 and cardioversion, DVT, chronic anemia, 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 Is being admitted for pna, acute on chronic anemia, hyponatremia.    #Acute Hypoxemic Resp Failure  #CAP, doubt postviral  #Sepsis NOT POA  #Recent Covid infection s/p Paxlovid course  - pt with non-resolving sxs despite paxlovid course, now with worsening cough, and increased malaise  - CXR with b/l interstitial opacities, no e/o necrotizing bronchopneumonia on my read, RVP neg  - f/u bcx, will send procal, urine strep and legionella, MRSA swab, sputum cx  - c/w rocephin and azithro for now  - supp O2, PRN, fluids PRB, bedrest, tylenol prn    #Acute on Chronic Normocytic Anemia, symptomatic  #Afib on Xarelto  - Hb previously 9.2 from last admission in 2023>> 7.0 today  - last admission included EGD/[place] which was negative for overt bleeding cause  - pt with sob and cp, says is always present 2/2 afib, may be worse in s/o symptomatic anemia  - s/p 1 unit of pRBC in ED>> keep active type and screen  - f/u AM cbc, keep Hb >8 in s/o CAD  - retic count, b12, folate, tsh, iron studies, SPEP  - c/w xarelto for now as pt has stents, is HDS. monitor cbc. AM coags    #Hyponatremia, severe  - last hospitalization c/b hyponatremia as well, euvolemic treated with fluid restriction and salt tablets  - daughter in law endorses pt has not even much, often does not drink much as well  - she appears dry on exam with poor skin turgor, dry MM and inc thirst  - suspect hypotonic hypovolemic, is asx as no AMS or HA or n/v, no seizures or signs of inc ICP  - hold off on fluid challenge for now, see if improves with holding lasix + unit of pRBC  - send serum osm, urine studies, serum/urine uric acid, tsh, AM cortisol  - will do BMP q8 for now, do not correct by more than 8-10 for 24-hr period    #Chronic HFpEF   #HTN  - Hold Lasix in s/o volume down  - c/w Entresto 97 mg-103 mg oral tablet: 1 orally 2 times a day w/ parameters  - c/w procardia 30mg qd w/ parameters    # Chronic Atrial fibrillation  - hx multiple ablations and DCCV  - Xarelto 20mg OD resumed today  - lopressor 50mg bid    # HLD:  - c/w Atorvastatin 40 QD    # DM-II  - Home med: Lantus 20 u QD, Metformin 1000 BID and Ozempic weekly  - Sliding scale and adjust as needed     #Parkinson Disease:  - c/w Carbi/Levo 25/100 BID      # Misc  - GI ppx: protonix  - DVT ppx: SCDs, holding xarelto  - Diet: DASH/CC  - Activity: bedrest  - DISPO: admit to medicine    CODE STATUS: DNR/DNI (previous)     Patient is a 77-year-old female Yakut speaking (daughter in law at bedside) history of chronic A-fib on Xarelto status post ablation x2 and cardioversion, DVT, chronic anemia, 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 Is being admitted for pna, acute on chronic anemia, hyponatremia.    #Acute Hypoxemic Resp Failure  #CAP, doubt postviral  #Sepsis NOT POA  #Recent Covid infection s/p Paxlovid course  - pt with non-resolving sxs despite paxlovid course, now with worsening cough, and increased malaise  - CXR with b/l interstitial opacities, no e/o necrotizing bronchopneumonia on my read, RVP neg  - f/u bcx, will send procal, urine strep and legionella, MRSA swab, sputum cx  - c/w rocephin and azithro for now  - supp O2, PRN, fluids PRB, bedrest, tylenol prn    #Acute on Chronic Normocytic Anemia, symptomatic  #Afib on Xarelto  - Hb previously 9.2 from last admission in 2023>> 7.0 today  - last admission included EGD/[place] which was negative for overt bleeding cause  - pt with sob and cp, says is always present 2/2 afib, may be worse in s/o symptomatic anemia  - s/p 1 unit of pRBC in ED>> keep active type and screen  - f/u AM cbc, keep Hb >8 in s/o CAD  - retic count, b12, folate, tsh, iron studies, SPEP  - c/w xarelto for now as pt has stents, is HDS. monitor cbc. AM coags    #Hyponatremia, severe  - last hospitalization c/b hyponatremia as well, euvolemic treated with fluid restriction and salt tablets  - daughter in law endorses pt has not even much, often does not drink much as well  - she appears dry on exam with poor skin turgor, dry MM and inc thirst  - suspect hypotonic hypovolemic, is asx as no AMS or HA or n/v, no seizures or signs of inc ICP  - hold off on fluid challenge for now, see if improves with holding lasix + unit of pRBC  - send serum osm, urine studies, serum/urine uric acid, tsh, AM cortisol  - will do BMP q8 for now, do not correct by more than 8-10 for 24-hr period    #Chronic HFpEF   #HTN  - Hold Lasix in s/o volume down  - c/w Entresto 97 mg-103 mg oral tablet: 1 orally 2 times a day w/ parameters  - c/w procardia 30mg qd w/ parameters    # Chronic Atrial fibrillation  - hx multiple ablations and DCCV  - Xarelto 20mg   - lopressor 50mg bid    # HLD:  - c/w Atorvastatin 40 QD    # DM-II  - Home med: Lantus 20 u QD, Metformin 1000 BID and Ozempic weekly  - Sliding scale and adjust as needed     #Parkinson Disease:  - c/w Carbi/Levo 25/100 BID      # Misc  - GI ppx: protonix  - DVT ppx: SCDs, holding xarelto  - Diet: DASH/CC  - Activity: bedrest  - DISPO: admit to medicine    CODE STATUS: DNR/DNI (previous)

## 2025-01-03 NOTE — ED PROVIDER NOTE - PHYSICAL EXAMINATION
GENERAL: Well-developed; well-nourished; in no acute distress. AO  SKIN: warm, well perfused  HEAD: Normocephalic; atraumatic.  EYES: no conjunctival erythema, ocular motions intact and appropriate  ENT: airway clear. coughing occasionally  CARD: Regular rate and rhythm.   RESP: LCTAB; No wheezes or crackles  ABD: soft, nontender, and nondistended  Upper EXT: Normal ROM. Rad pulses 2+ symm, sensation intact and symm  Lower EXT: moving b/l LEs, sensation intact and symm. no swelling

## 2025-01-03 NOTE — ED PROVIDER NOTE - CARE PLAN
Principal Discharge DX:	Pneumonia  Secondary Diagnosis:	Symptomatic anemia  Secondary Diagnosis:	Hyponatremia  Secondary Diagnosis:	Chest pain  Secondary Diagnosis:	Heart failure   1

## 2025-01-03 NOTE — H&P ADULT - NSHPPHYSICALEXAM_GEN_ALL_CORE
LOS:     VITALS:   T(C): 36.6 (01-03-25 @ 21:05), Max: 36.8 (01-03-25 @ 20:50)  HR: 82 (01-03-25 @ 21:05) (82 - 90)  BP: 133/85 (01-03-25 @ 21:05) (132/77 - 164/70)  RR: 18 (01-03-25 @ 21:05) (18 - 20)  SpO2: 100% (01-03-25 @ 21:05) (95% - 100%)    GENERAL: NAD, sitting up in bed comfortably  HEAD:  Atraumatic, Normocephalic  EYES: EOMI, PERRLA, conjunctiva and sclera clear  ENT: Moist mucous membranes  NECK: Supple, No JVD  CHEST/LUNG: Clear to auscultation bilaterally; No rales, rhonchi, wheezing, or rubs. Unlabored respirations  HEART: Regular rate and rhythm; No murmurs, rubs, or gallops  ABDOMEN: BSx4; Soft, nontender, nondistended  EXTREMITIES:  2+ Peripheral Pulses, brisk capillary refill. No clubbing, cyanosis, or edema  NERVOUS SYSTEM:  A&Ox3, no focal deficits   SKIN: No rashes or lesions

## 2025-01-03 NOTE — H&P ADULT - ATTENDING COMMENTS
Assessment    Recurrent cough / congestion possibly secondary to COVID rebound vs. bacterial superinfection  Acute on chronic anemia  Likely chronic hyponatremia  Afib on xarelto at home  CAD s/p stents  HFpEF  HLD  DM2  Parkinsons    Plan    - treat for presumed superinfection for now, c/w abx. f/u procal  - received one unit prbc, no sign of bleeding, recent GI workup done no sign of bleed  - c/w home metoprolol, as no sign of bleed and HD stable c/w xarelto for now, patient had stents as well and not on aspirin, monitor closely for bleeding, f/u repeat hgb  - hold lasix for now and trend sodium, patient is asymptomatic, hyponatremia possibility of being entresto related  - home insulin  - sinemet    Pending: trend hgb after one unit, improvement in URI symptoms    # DVT PPX: Xarelto    GENERAL: NAD, lying in bed comfortably  HEAD:  Atraumatic, normocephalic  NERVOUS SYSTEM:  A&Ox3, moving all extremities, no focal deficits   EYES: EOMI, PERRL  NECK: Supple, trachea midline, no JVD  HEART: Regular rate and rhythm  LUNGS: Clear to auscultation bilaterally, no crackles, wheezing, or rhonchi  ABDOMEN: Soft, nontender, nondistended, +BS  EXTREMITIES: 2+ peripheral pulses bilaterally. No clubbing, cyanosis, or edema    75 minutes spent on review of labs, imaging studies, old records, obtaining history, personally examining patient, counselling and communicating with patient/ family, entering orders for medications/tests/etc, discussions with other health care providers, documentation in electronic health records, independent interpretation of labs, imaging/procedure results and care coordination.

## 2025-01-04 LAB
A1C WITH ESTIMATED AVERAGE GLUCOSE RESULT: 6.4 % — HIGH (ref 4–5.6)
ALBUMIN SERPL ELPH-MCNC: 3.8 G/DL — SIGNIFICANT CHANGE UP (ref 3.5–5.2)
ALP SERPL-CCNC: 73 U/L — SIGNIFICANT CHANGE UP (ref 30–115)
ALT FLD-CCNC: 7 U/L — SIGNIFICANT CHANGE UP (ref 0–41)
ANION GAP SERPL CALC-SCNC: 14 MMOL/L — SIGNIFICANT CHANGE UP (ref 7–14)
APPEARANCE UR: CLEAR — SIGNIFICANT CHANGE UP
APTT BLD: 33.3 SEC — SIGNIFICANT CHANGE UP (ref 27–39.2)
AST SERPL-CCNC: 15 U/L — SIGNIFICANT CHANGE UP (ref 0–41)
BASOPHILS # BLD AUTO: 0.04 K/UL — SIGNIFICANT CHANGE UP (ref 0–0.2)
BASOPHILS NFR BLD AUTO: 0.7 % — SIGNIFICANT CHANGE UP (ref 0–1)
BILIRUB SERPL-MCNC: <0.2 MG/DL — SIGNIFICANT CHANGE UP (ref 0.2–1.2)
BILIRUB UR-MCNC: NEGATIVE — SIGNIFICANT CHANGE UP
BUN SERPL-MCNC: 9 MG/DL — LOW (ref 10–20)
CALCIUM SERPL-MCNC: 8.5 MG/DL — SIGNIFICANT CHANGE UP (ref 8.4–10.5)
CHLORIDE SERPL-SCNC: 93 MMOL/L — LOW (ref 98–110)
CO2 SERPL-SCNC: 18 MMOL/L — SIGNIFICANT CHANGE UP (ref 17–32)
COLOR SPEC: YELLOW — SIGNIFICANT CHANGE UP
CREAT ?TM UR-MCNC: 15 MG/DL — SIGNIFICANT CHANGE UP
CREAT SERPL-MCNC: 0.8 MG/DL — SIGNIFICANT CHANGE UP (ref 0.7–1.5)
D DIMER BLD IA.RAPID-MCNC: 175 NG/ML DDU — SIGNIFICANT CHANGE UP
DIFF PNL FLD: NEGATIVE — SIGNIFICANT CHANGE UP
EGFR: 75 ML/MIN/1.73M2 — SIGNIFICANT CHANGE UP
EOSINOPHIL # BLD AUTO: 0.05 K/UL — SIGNIFICANT CHANGE UP (ref 0–0.7)
EOSINOPHIL NFR BLD AUTO: 0.8 % — SIGNIFICANT CHANGE UP (ref 0–8)
ESTIMATED AVERAGE GLUCOSE: 137 MG/DL — HIGH (ref 68–114)
FERRITIN SERPL-MCNC: 35 NG/ML — SIGNIFICANT CHANGE UP (ref 13–330)
FOLATE SERPL-MCNC: 17.4 NG/ML — SIGNIFICANT CHANGE UP
GLUCOSE BLDC GLUCOMTR-MCNC: 141 MG/DL — HIGH (ref 70–99)
GLUCOSE BLDC GLUCOMTR-MCNC: 167 MG/DL — HIGH (ref 70–99)
GLUCOSE BLDC GLUCOMTR-MCNC: 184 MG/DL — HIGH (ref 70–99)
GLUCOSE BLDC GLUCOMTR-MCNC: 196 MG/DL — HIGH (ref 70–99)
GLUCOSE SERPL-MCNC: 101 MG/DL — HIGH (ref 70–99)
GLUCOSE UR QL: NEGATIVE MG/DL — SIGNIFICANT CHANGE UP
HCT VFR BLD CALC: 23 % — LOW (ref 37–47)
HGB BLD-MCNC: 7.6 G/DL — LOW (ref 12–16)
IMM GRANULOCYTES NFR BLD AUTO: 0.3 % — SIGNIFICANT CHANGE UP (ref 0.1–0.3)
INR BLD: 1.1 RATIO — SIGNIFICANT CHANGE UP (ref 0.65–1.3)
IRON SATN MFR SERPL: 20 UG/DL — LOW (ref 35–150)
IRON SATN MFR SERPL: 7 % — LOW (ref 15–50)
KETONES UR-MCNC: NEGATIVE MG/DL — SIGNIFICANT CHANGE UP
LDH SERPL L TO P-CCNC: 191 — SIGNIFICANT CHANGE UP (ref 50–242)
LEUKOCYTE ESTERASE UR-ACNC: NEGATIVE — SIGNIFICANT CHANGE UP
LYMPHOCYTES # BLD AUTO: 1.67 K/UL — SIGNIFICANT CHANGE UP (ref 1.2–3.4)
LYMPHOCYTES # BLD AUTO: 28.3 % — SIGNIFICANT CHANGE UP (ref 20.5–51.1)
MAGNESIUM SERPL-MCNC: 1.6 MG/DL — LOW (ref 1.8–2.4)
MCHC RBC-ENTMCNC: 29.9 PG — SIGNIFICANT CHANGE UP (ref 27–31)
MCHC RBC-ENTMCNC: 33 G/DL — SIGNIFICANT CHANGE UP (ref 32–37)
MCV RBC AUTO: 90.6 FL — SIGNIFICANT CHANGE UP (ref 81–99)
MONOCYTES # BLD AUTO: 0.97 K/UL — HIGH (ref 0.1–0.6)
MONOCYTES NFR BLD AUTO: 16.4 % — HIGH (ref 1.7–9.3)
NEUTROPHILS # BLD AUTO: 3.15 K/UL — SIGNIFICANT CHANGE UP (ref 1.4–6.5)
NEUTROPHILS NFR BLD AUTO: 53.5 % — SIGNIFICANT CHANGE UP (ref 42.2–75.2)
NITRITE UR-MCNC: NEGATIVE — SIGNIFICANT CHANGE UP
NRBC # BLD: 0 /100 WBCS — SIGNIFICANT CHANGE UP (ref 0–0)
OSMOLALITY SERPL: 256 MOS/KG — LOW (ref 280–301)
OSMOLALITY UR: 175 MOS/KG — SIGNIFICANT CHANGE UP (ref 50–1200)
PH UR: 7 — SIGNIFICANT CHANGE UP (ref 5–8)
PLATELET # BLD AUTO: 340 K/UL — SIGNIFICANT CHANGE UP (ref 130–400)
PMV BLD: 8.7 FL — SIGNIFICANT CHANGE UP (ref 7.4–10.4)
POTASSIUM SERPL-MCNC: 4.8 MMOL/L — SIGNIFICANT CHANGE UP (ref 3.5–5)
POTASSIUM SERPL-SCNC: 4.8 MMOL/L — SIGNIFICANT CHANGE UP (ref 3.5–5)
POTASSIUM UR-SCNC: 15 MMOL/L — SIGNIFICANT CHANGE UP
PROCALCITONIN SERPL-MCNC: 0.07 NG/ML — SIGNIFICANT CHANGE UP (ref 0.02–0.1)
PROT ?TM UR-MCNC: 14 MG/DLG/24H — SIGNIFICANT CHANGE UP
PROT SERPL-MCNC: 5.8 G/DL — LOW (ref 6–8)
PROT UR-MCNC: SIGNIFICANT CHANGE UP MG/DL
PROT/CREAT UR-RTO: 0.9 RATIO — HIGH (ref 0–0.2)
PROTHROM AB SERPL-ACNC: 13 SEC — HIGH (ref 9.95–12.87)
RBC # BLD: 2.54 M/UL — LOW (ref 4.2–5.4)
RBC # BLD: 2.54 M/UL — LOW (ref 4.2–5.4)
RBC # FLD: 15.2 % — HIGH (ref 11.5–14.5)
RETICS #: 107.2 K/UL — SIGNIFICANT CHANGE UP (ref 25–125)
RETICS/RBC NFR: 4.2 % — HIGH (ref 0.5–1.5)
SODIUM SERPL-SCNC: 125 MMOL/L — LOW (ref 135–146)
SODIUM UR-SCNC: 52 MMOL/L — SIGNIFICANT CHANGE UP
SP GR SPEC: 1.01 — SIGNIFICANT CHANGE UP (ref 1–1.03)
TIBC SERPL-MCNC: 278 UG/DL — SIGNIFICANT CHANGE UP (ref 220–430)
TSH SERPL-MCNC: 1.51 UIU/ML — SIGNIFICANT CHANGE UP (ref 0.27–4.2)
UIBC SERPL-MCNC: 258 UG/DL — SIGNIFICANT CHANGE UP (ref 110–370)
URATE SERPL-MCNC: 5.8 MG/DL — SIGNIFICANT CHANGE UP (ref 2.5–7)
UROBILINOGEN FLD QL: 0.2 MG/DL — SIGNIFICANT CHANGE UP (ref 0.2–1)
UUN UR-MCNC: <6 MG/DL — SIGNIFICANT CHANGE UP
VIT B12 SERPL-MCNC: 531 PG/ML — SIGNIFICANT CHANGE UP (ref 232–1245)
WBC # BLD: 5.9 K/UL — SIGNIFICANT CHANGE UP (ref 4.8–10.8)
WBC # FLD AUTO: 5.9 K/UL — SIGNIFICANT CHANGE UP (ref 4.8–10.8)

## 2025-01-04 PROCEDURE — 99233 SBSQ HOSP IP/OBS HIGH 50: CPT

## 2025-01-04 PROCEDURE — 71250 CT THORAX DX C-: CPT | Mod: 26

## 2025-01-04 RX ORDER — IRON SUCROSE 20 MG/ML
200 INJECTION, SOLUTION INTRAVENOUS EVERY 24 HOURS
Refills: 0 | Status: DISCONTINUED | OUTPATIENT
Start: 2025-01-04 | End: 2025-01-04

## 2025-01-04 RX ORDER — IRON SUCROSE 20 MG/ML
200 INJECTION, SOLUTION INTRAVENOUS EVERY 24 HOURS
Refills: 0 | Status: DISCONTINUED | OUTPATIENT
Start: 2025-01-04 | End: 2025-01-06

## 2025-01-04 RX ORDER — APIXABAN 5 MG/1
5 TABLET, FILM COATED ORAL EVERY 12 HOURS
Refills: 0 | Status: DISCONTINUED | OUTPATIENT
Start: 2025-01-04 | End: 2025-01-06

## 2025-01-04 RX ORDER — MAGNESIUM SULFATE 500 MG/ML
2 INJECTION, SOLUTION INTRAMUSCULAR; INTRAVENOUS ONCE
Refills: 0 | Status: COMPLETED | OUTPATIENT
Start: 2025-01-04 | End: 2025-01-04

## 2025-01-04 RX ADMIN — Medication 50 MILLIGRAM(S): at 06:03

## 2025-01-04 RX ADMIN — Medication 1: at 11:56

## 2025-01-04 RX ADMIN — LEVODOPA AND CARBIDOPA 1 TABLET(S): 145; 36.25 CAPSULE, EXTENDED RELEASE ORAL at 07:56

## 2025-01-04 RX ADMIN — Medication 50 MILLIGRAM(S): at 17:32

## 2025-01-04 RX ADMIN — Medication 1: at 16:50

## 2025-01-04 RX ADMIN — IRON SUCROSE 100 MILLIGRAM(S): 20 INJECTION, SOLUTION INTRAVENOUS at 16:51

## 2025-01-04 RX ADMIN — NIFEDIPINE 30 MILLIGRAM(S): 60 TABLET, EXTENDED RELEASE ORAL at 06:04

## 2025-01-04 RX ADMIN — PANTOPRAZOLE 40 MILLIGRAM(S): 40 TABLET, DELAYED RELEASE ORAL at 06:04

## 2025-01-04 RX ADMIN — MAGNESIUM SULFATE 25 GRAM(S): 500 INJECTION, SOLUTION INTRAMUSCULAR; INTRAVENOUS at 08:02

## 2025-01-04 RX ADMIN — CEFTRIAXONE SODIUM 100 MILLIGRAM(S): 1 INJECTION, POWDER, FOR SOLUTION INTRAMUSCULAR; INTRAVENOUS at 06:05

## 2025-01-04 RX ADMIN — APIXABAN 5 MILLIGRAM(S): 5 TABLET, FILM COATED ORAL at 18:28

## 2025-01-04 RX ADMIN — Medication 400 MILLIGRAM(S): at 07:56

## 2025-01-04 RX ADMIN — SACUBITRIL AND VALSARTAN 1 TABLET(S): 24; 26 TABLET, FILM COATED ORAL at 06:03

## 2025-01-04 RX ADMIN — Medication 1: at 07:55

## 2025-01-04 RX ADMIN — AZITHROMYCIN MONOHYDRATE 255 MILLIGRAM(S): 200 POWDER, FOR SUSPENSION ORAL at 06:05

## 2025-01-04 RX ADMIN — ACETAMINOPHEN 650 MILLIGRAM(S): 80 SOLUTION/ DROPS ORAL at 18:28

## 2025-01-04 RX ADMIN — LEVODOPA AND CARBIDOPA 1 TABLET(S): 145; 36.25 CAPSULE, EXTENDED RELEASE ORAL at 20:00

## 2025-01-04 RX ADMIN — SACUBITRIL AND VALSARTAN 1 TABLET(S): 24; 26 TABLET, FILM COATED ORAL at 17:33

## 2025-01-04 RX ADMIN — ATORVASTATIN CALCIUM 40 MILLIGRAM(S): 40 TABLET, FILM COATED ORAL at 21:11

## 2025-01-04 NOTE — PROGRESS NOTE ADULT - SUBJECTIVE AND OBJECTIVE BOX
SUBJECTIVE/OVERNIGHT EVENTS  Today is hospital day 1d. This morning patient was seen and examined at bedside, resting comfortably in bed. No acute or major events overnight.    MEDICATIONS  STANDING MEDICATIONS  atorvastatin 40 milliGRAM(s) Oral at bedtime  azithromycin  IVPB 500 milliGRAM(s) IV Intermittent every 24 hours  carbidopa/levodopa  25/100 1 Tablet(s) Oral <User Schedule>  cefTRIAXone   IVPB 1000 milliGRAM(s) IV Intermittent every 24 hours  dextrose 5%. 1000 milliLiter(s) IV Continuous <Continuous>  dextrose 5%. 1000 milliLiter(s) IV Continuous <Continuous>  dextrose 50% Injectable 25 Gram(s) IV Push once  dextrose 50% Injectable 12.5 Gram(s) IV Push once  dextrose 50% Injectable 25 Gram(s) IV Push once  glucagon  Injectable 1 milliGRAM(s) IntraMuscular once  insulin lispro (ADMELOG) corrective regimen sliding scale   SubCutaneous three times a day before meals  iron sucrose IVPB 200 milliGRAM(s) IV Intermittent every 24 hours  metoprolol tartrate 50 milliGRAM(s) Oral two times a day  NIFEdipine XL 30 milliGRAM(s) Oral daily  pantoprazole    Tablet 40 milliGRAM(s) Oral before breakfast  rivaroxaban 20 milliGRAM(s) Oral with dinner  sacubitril 97 mG/valsartan 103 mG 1 Tablet(s) Oral two times a day    PRN MEDICATIONS  acetaminophen     Tablet .. 650 milliGRAM(s) Oral every 6 hours PRN  dextrose Oral Gel 15 Gram(s) Oral once PRN  melatonin 3 milliGRAM(s) Oral at bedtime PRN    VITALS  T(F): 97.9 (01-04-25 @ 09:52), Max: 98.3 (01-03-25 @ 20:50)  HR: 63 (01-04-25 @ 09:52) (63 - 90)  BP: 121/73 (01-04-25 @ 09:52) (121/73 - 164/70)  RR: 18 (01-04-25 @ 09:52) (18 - 20)  SpO2: 97% (01-04-25 @ 09:52) (95% - 100%)  POCT Blood Glucose.: 184 mg/dL (01-04-25 @ 07:34)    PHYSICAL EXAM  GENERAL  ( X ) NAD, lying in bed comfortably     (  ) obtunded     (  ) lethargic     (  ) somnolent    HEAD  (  ) Atraumatic     (  ) hematoma     (  ) laceration (specify location:       )     NECK  (  ) Supple     (  ) neck stiffness     (  ) nuchal rigidity     (  )  no JVD     (  ) JVD present ( -- cm)    HEART  Rate -->  ( X ) normal rate    (  ) bradycardic    (  ) tachycardic  Rhythm -->  (  ) regular    (  ) regularly irregular    (  ) irregularly irregular  Murmurs -->  (  ) normal s1/s2    (  ) systolic murmur    (  ) diastolic murmur    (  ) continuous murmur     (  ) S3 present    (  ) S4 present    LUNGS  ( X ) Unlabored respirations     (  ) tachypnea  ( X ) B/L air entry     (  ) decreased breath sounds in:  (location     )    (  ) no adventitious sound     (  ) crackles     (  ) wheezing      (  ) rhonchi      (specify location:       )  (  ) chest wall tenderness (specify location:       )    ABDOMEN  ( X ) Soft     (  ) tense   |   (  ) nondistended     (  ) distended   |   (  ) +BS     (  ) hypoactive bowel sounds     (  ) hyperactive bowel sounds  (  ) nontender     (  ) RUQ tenderness     (  ) RLQ tenderness     (  ) LLQ tenderness     (  ) epigastric tenderness     (  ) diffuse tenderness  (  ) Splenomegaly      (  ) Hepatomegaly      (  ) Jaundice     (  ) ecchymosis     EXTREMITIES  (  ) Normal     (  ) Rash     (  ) ecchymosis     (  ) varicose veins      (  ) pitting edema     (  ) non-pitting edema   (  ) ulceration     (  ) gangrene:     (location:     )    NERVOUS SYSTEM  ( X ) A&Ox3     (  ) confused     (  ) lethargic  CN II-XII:     (  ) Intact     (  ) focal deficits  (Specify:     )   Upper extremities:     (  ) strength X/5     (  ) focal deficit (specify:    )  Lower extremities:     (  ) strength  X/5    (  ) focal deficit (specify:    )    SKIN  (  ) No rashes or lesions     (  ) maculopapular rash     (  ) pustules     (  ) vesicles     (  ) ulcer     (  ) ecchymosis     (specify location:     )    (  ) Indwelling Zambrano Catheter   Date insterted:    Reason (  ) Critical illness     (  ) urinary retention    (  ) Accurate Ins/Outs Monitoring     (  ) CMO patient      LABS             7.6    5.90  )-----------( 340      ( 01-04-25 @ 05:36 )             23.0     125  |  93  |  9   -------------------------<  101   01-04-25 @ 05:36  4.8  |  18  |  0.8    Ca      8.5     01-04-25 @ 05:36  Mg     1.6     01-04-25 @ 05:36    TPro  5.8  /  Alb  3.8  /  TBili  <0.2  /  DBili  x   /  AST  15  /  ALT  7   /  AlkPhos  73  /  GGT  x     01-04-25 @ 05:36    PT/INR - ( 01-04-25 @ 05:36 )   PT: 13.00 sec[H];   INR: 1.10 ratio  PTT - ( 01-04-25 @ 05:36 )  PTT:33.3 sec    Troponin T, High Sensitivity Result: 18 ng/L (01-03-25 @ 15:30)  Pro-Brain Natriuretic Peptide: 2014 pg/mL (01-03-25 @ 15:30)    Urinalysis Basic - ( 04 Jan 2025 05:36 )    Color: x / Appearance: x / SG: x / pH: x  Gluc: 101 mg/dL / Ketone: x  / Bili: x / Urobili: x   Blood: x / Protein: x / Nitrite: x   Leuk Esterase: x / RBC: x / WBC x   Sq Epi: x / Non Sq Epi: x / Bacteria: x          IMAGING

## 2025-01-04 NOTE — PATIENT PROFILE ADULT - FALL HARM RISK - HARM RISK INTERVENTIONS

## 2025-01-04 NOTE — PATIENT PROFILE ADULT - NSPROPOAURINARYCATHETER_GEN_A_NUR
[facility]   no

## 2025-01-04 NOTE — CONSULT NOTE ADULT - ATTENDING COMMENTS
Acute on chronic anemia. Not compliant with iron supplements at home and poor nutrition as per family. EGD/Colon negative in 7/2023. No overt bleeding currently. Rpt EGD/Colonoscopy if continued drop in H/H or overt bleeding. Otherwise can do VCE. Given no overt bleeding, C/w AC due to high risk cardiac status with close monitoring.

## 2025-01-04 NOTE — PROGRESS NOTE ADULT - CONVERSATION DETAILS
Prior MOLST in chart from 07/2023. Discussed patient's case and plan in detail with Dr. Ellis and Cayla  ID [id number]. Patient would like to remain as DNR/DNI. MOLST updated and in the chart.

## 2025-01-04 NOTE — PROGRESS NOTE ADULT - SUBJECTIVE AND OBJECTIVE BOX
KENA LOZADA  79y Female    CHIEF COMPLAINT:    Patient is a 79y old  Female who presents with a chief complaint of malaise, cough, weakness, sob (03 Jan 2025 21:40)      INTERVAL HPI/OVERNIGHT EVENTS:    Patient seen and examined.    ROS: All other systems are negative.    Vital Signs:    T(F): 98 (01-04-25 @ 04:56), Max: 98.3 (01-03-25 @ 20:50)  HR: 79 (01-04-25 @ 04:56) (79 - 90)  BP: 128/77 (01-04-25 @ 04:56) (128/77 - 164/70)  RR: 18 (01-04-25 @ 04:56) (18 - 20)  SpO2: 100% (01-04-25 @ 00:44) (95% - 100%)  I&O's Summary    Daily Height in cm: 160.02 (03 Jan 2025 13:26)    Daily   CAPILLARY BLOOD GLUCOSE      POCT Blood Glucose.: 184 mg/dL (04 Jan 2025 07:34)      PHYSICAL EXAM:    GENERAL:  NAD  SKIN: No rashes or lesions  HENT: Atraumatic. Normocephalic. PERRL. Moist membranes.  NECK: Supple, No JVD. No lymphadenopathy.  PULMONARY: CTA B/L. No wheezing. No rales  CVS: Normal S1, S2. Rate and Rhythm are regular. No murmurs.  ABDOMEN/GI: Soft, Nontender, Nondistended; BS present  EXTREMITIES: Peripheral pulses intact. No edema B/L LE.  NEUROLOGIC:  No motor or sensory deficit.  PSYCH: Alert & oriented x 3    Consultant(s) Notes Reviewed:  [x ] YES  [ ] NO  Care Discussed with Consultants/Other Providers [ x] YES  [ ] NO    EKG reviewed  Telemetry reviewed    LABS:                        7.6    5.90  )-----------( 340      ( 04 Jan 2025 05:36 )             23.0     01-04    125[L]  |  93[L]  |  9[L]  ----------------------------<  101[H]  4.8   |  18  |  0.8    Ca    8.5      04 Jan 2025 05:36  Mg     1.6     01-04    TPro  5.8[L]  /  Alb  3.8  /  TBili  <0.2  /  DBili  x   /  AST  15  /  ALT  7   /  AlkPhos  73  01-04    PT/INR - ( 04 Jan 2025 05:36 )   PT: 13.00 sec;   INR: 1.10 ratio         PTT - ( 04 Jan 2025 05:36 )  PTT:33.3 sec          RADIOLOGY & ADDITIONAL TESTS:    < from: US Abdomen Complete (03.25.22 @ 10:34) >    IMPRESSION:  Normal abdominal ultrasound.    < end of copied text >  < from: Xray Chest 1 View AP/PA (01.03.25 @ 15:43) >  IMPRESSION: Low lung volume.    Bilateralinterstitial opacities.      < end of copied text >    Imaging or report Personally Reviewed:  [x ] YES  [ ] NO    Medications:  Standing  atorvastatin 40 milliGRAM(s) Oral at bedtime  azithromycin  IVPB 500 milliGRAM(s) IV Intermittent every 24 hours  carbidopa/levodopa  25/100 1 Tablet(s) Oral <User Schedule>  cefTRIAXone   IVPB 1000 milliGRAM(s) IV Intermittent every 24 hours  dextrose 5%. 1000 milliLiter(s) IV Continuous <Continuous>  dextrose 5%. 1000 milliLiter(s) IV Continuous <Continuous>  dextrose 50% Injectable 25 Gram(s) IV Push once  dextrose 50% Injectable 12.5 Gram(s) IV Push once  dextrose 50% Injectable 25 Gram(s) IV Push once  glucagon  Injectable 1 milliGRAM(s) IntraMuscular once  insulin lispro (ADMELOG) corrective regimen sliding scale   SubCutaneous three times a day before meals  magnesium oxide 400 milliGRAM(s) Oral once  magnesium sulfate  IVPB 2 Gram(s) IV Intermittent once  metoprolol tartrate 50 milliGRAM(s) Oral two times a day  NIFEdipine XL 30 milliGRAM(s) Oral daily  pantoprazole    Tablet 40 milliGRAM(s) Oral before breakfast  rivaroxaban 20 milliGRAM(s) Oral with dinner  sacubitril 97 mG/valsartan 103 mG 1 Tablet(s) Oral two times a day    PRN Meds  acetaminophen     Tablet .. 650 milliGRAM(s) Oral every 6 hours PRN  dextrose Oral Gel 15 Gram(s) Oral once PRN  melatonin 3 milliGRAM(s) Oral at bedtime PRN      Case discussed with resident    Care discussed with pt/family           KENA LOZADA  79y Female    CHIEF COMPLAINT:    Patient is a 79y old  Female who presents with a chief complaint of malaise, cough, weakness, sob (03 Jan 2025 21:40)      INTERVAL HPI/OVERNIGHT EVENTS:    Patient seen and examined. Interviewed the pt with the help of . C/O cough and sob. No fever or chills. Denies any melena or rectal bleeding. No abdominal pain    ROS: All other systems are negative.    Vital Signs:    T(F): 98 (01-04-25 @ 04:56), Max: 98.3 (01-03-25 @ 20:50)  HR: 79 (01-04-25 @ 04:56) (79 - 90)  BP: 128/77 (01-04-25 @ 04:56) (128/77 - 164/70)  RR: 18 (01-04-25 @ 04:56) (18 - 20)  SpO2: 100% (01-04-25 @ 00:44) (95% - 100%)  I&O's Summary    Daily Height in cm: 160.02 (03 Jan 2025 13:26)    Daily   CAPILLARY BLOOD GLUCOSE      POCT Blood Glucose.: 184 mg/dL (04 Jan 2025 07:34)      PHYSICAL EXAM:    GENERAL:  NAD  SKIN: No rashes or lesions  HENT: Atraumatic. Normocephalic. PERRL. Moist membranes.  NECK: Supple, No JVD. No lymphadenopathy.  PULMONARY: CTA B/L. No wheezing. No rales  CVS: Normal S1, S2. Rate and Rhythm are regular. No murmurs.  ABDOMEN/GI: Soft, Nontender, Nondistended; BS present  EXTREMITIES: Peripheral pulses intact. No edema B/L LE.  NEUROLOGIC:  No motor or sensory deficit.  PSYCH: Alert & oriented x 3    Consultant(s) Notes Reviewed:  [x ] YES  [ ] NO  Care Discussed with Consultants/Other Providers [ x] YES  [ ] NO    EKG reviewed  Telemetry reviewed    LABS:                        7.6    5.90  )-----------( 340      ( 04 Jan 2025 05:36 )             23.0   Hemoglobin: 7.6 g/dL (01-04 @ 05:36)  Hemoglobin: 7.0 g/dL (01-03 @ 15:30)    01-04    125[L]  |  93[L]  |  9[L]  ----------------------------<  101[H]  4.8   |  18  |  0.8    Ca    8.5      04 Jan 2025 05:36  Mg     1.6     01-04    TPro  5.8[L]  /  Alb  3.8  /  TBili  <0.2  /  DBili  x   /  AST  15  /  ALT  7   /  AlkPhos  73  01-04    PT/INR - ( 04 Jan 2025 05:36 )   PT: 13.00 sec;   INR: 1.10 ratio         PTT - ( 04 Jan 2025 05:36 )  PTT:33.3 sec          RADIOLOGY & ADDITIONAL TESTS:    < from: US Abdomen Complete (03.25.22 @ 10:34) >    IMPRESSION:  Normal abdominal ultrasound.    < end of copied text >  < from: Xray Chest 1 View AP/PA (01.03.25 @ 15:43) >  IMPRESSION: Low lung volume.    Bilateralinterstitial opacities.      < end of copied text >    Imaging or report Personally Reviewed:  [x ] YES  [ ] NO    Medications:  Standing  atorvastatin 40 milliGRAM(s) Oral at bedtime  azithromycin  IVPB 500 milliGRAM(s) IV Intermittent every 24 hours  carbidopa/levodopa  25/100 1 Tablet(s) Oral <User Schedule>  cefTRIAXone   IVPB 1000 milliGRAM(s) IV Intermittent every 24 hours  dextrose 5%. 1000 milliLiter(s) IV Continuous <Continuous>  dextrose 5%. 1000 milliLiter(s) IV Continuous <Continuous>  dextrose 50% Injectable 25 Gram(s) IV Push once  dextrose 50% Injectable 12.5 Gram(s) IV Push once  dextrose 50% Injectable 25 Gram(s) IV Push once  glucagon  Injectable 1 milliGRAM(s) IntraMuscular once  insulin lispro (ADMELOG) corrective regimen sliding scale   SubCutaneous three times a day before meals  magnesium oxide 400 milliGRAM(s) Oral once  magnesium sulfate  IVPB 2 Gram(s) IV Intermittent once  metoprolol tartrate 50 milliGRAM(s) Oral two times a day  NIFEdipine XL 30 milliGRAM(s) Oral daily  pantoprazole    Tablet 40 milliGRAM(s) Oral before breakfast  rivaroxaban 20 milliGRAM(s) Oral with dinner  sacubitril 97 mG/valsartan 103 mG 1 Tablet(s) Oral two times a day    PRN Meds  acetaminophen     Tablet .. 650 milliGRAM(s) Oral every 6 hours PRN  dextrose Oral Gel 15 Gram(s) Oral once PRN  melatonin 3 milliGRAM(s) Oral at bedtime PRN      Case discussed with resident    Care discussed with pt/family

## 2025-01-04 NOTE — PROGRESS NOTE ADULT - ASSESSMENT
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  Patient was taken to Urgent Care 10 days ago, was found to be COVID+, treated with Paxlovid 5 day course. However her sxs remained persistent. 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  Patient was taken to Urgent Care 10 days ago, was found to be COVID+, treated with Paxlovid 5 day course. However her sxs remained persistent.      Cough and sob / Recent Covid-19 infection   Acute Hypoxemic/Hypercapinc Respiratory Failure  Hyponatremia  Iron deficiency anemia  Chronic A-fib on Xarelto  Chronic HFimpEF  H/O CAD s/p PCI  DM-2 / HTN / DL  Parkinson disease                        PLAN:    ·	Tele reviewed by me. A-fib  ·	EKG on admission: A-fib 87/min (Interpreted by me)  ·	CXR noted. B/L intersitial opacities.   ·	Pro BNP is 2014  ·	O/E pt is euvolemic  ·	On GDMT: Cont Entresto and Metoprolol  ·	Check serum procalcitonin  ·	Cont Ceftriaxone for now  ·	Will do CT chest NC  ·	Doubt PE as pt is on Xarelto. D-dimer is 175  ·	Serum iron is 20 and percent sat is 7. Retic cout is 4%. GI eval  ·	Hyponatremia: Serum osm is 256 and BUN is 9. Likely SIADH. Water restriction to 1.5 L/D  ·	On admission Hb was 7. S/P one unit of PRBC's.   ·	Transfuse one more unit of PRBC's. Keep Hb >8 as pt has h/o CAD  ·	Monitor FS. On no meds for DM-2    Progress Note Handoff    Pending (specify):  Consults_________, Tests__CT chest______, Test Results_______, Other_________  Family discussion:  Disposition: Home___/SNF___/Other________/Unknown at this time________    Silvio Ellis MD  Spectra: [phone]

## 2025-01-04 NOTE — CONSULT NOTE ADULT - SUBJECTIVE AND OBJECTIVE BOX
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 Patient was taken to Urgent Care 10 days ago, was found to be COVID+, treated with Paxlovid 5 day course. However her sxs remained persistent. She endorses sob and cp, which according to her is always present 2/2 afib. She denies any recent fevers, chills, n/v/c/d, abd pain, diarrhea, hematuria, dysuria, melena or hematochezia. There have been sick contacts in the house but believe she was the first to get sick. Patient last admitted 07/26-07/30 of 2023 for weakness and sob, found to have acute on chronic anemia s/p EGD/[facility] which revealed erosive gastritis, diverticulosis, hemorrhoids. HB was 7 at that time. Gi consulted again fro [facility]. Patient was seen outpatient after last hospitalization and recommended VCE. Daughter states patient did not get VCE done because she felt better. States patient has not had any bleeding since that time, no mleena, hematochezia. JODIE shows brown stool.       EGD Impressions:    Esophageal hiatal hernia.    Erosions in the stomach compatible with erosive gastritis. (Biopsy).    Normal mucosa in the whole examined duodenum. (Biopsy).    Colonoscopy Impressions:    Moderate diverticulosis of the the left side of the colon.    External hemorrhoids.    The colon was otherwise unremarkable.        PAST MEDICAL & SURGICAL HISTORY:  Atrial fibrillation, unspecified type      HTN (hypertension)      High cholesterol      Diabetes mellitus      S/P ablation of atrial fibrillation      History of back surgery      Coronary stent patent            FAMILY HISTORY:  FH: coronary artery disease (Sibling)        Social History:  Tobacco:denies  Alcohol:denies  Drugs:denies    Home Medications:  atorvastatin 40 mg oral tablet: 1 orally once a day (26 Jul 2023 16:15)  carbidopa-levodopa 25 mg-100 mg oral tablet: 1 tab(s) orally 2 times a day (26 Jul 2023 16:15)  Entresto 97 mg-103 mg oral tablet: 1 orally 2 times a day (26 Jul 2023 16:15)  ergocalciferol 50,000 intl units (1.25 mg) oral capsule: 1 cap(s) orally once a week (26 Jul 2023 16:16)  furosemide 40 mg oral tablet: 1 orally once a day (26 Jul 2023 16:15)  Lantus 100 units/mL subcutaneous solution: 20 unit(s) subcutaneous once a day (26 Jul 2023 16:15)  metFORMIN 1000 mg oral tablet: 1 tab(s) orally 2 times a day (26 Jul 2023 16:16)  metoprolol tartrate 50 mg oral tablet: 1 orally 2 times a day (26 Jul 2023 16:44)  Ozempic (0.25 mg or 0.5 mg dose) 2 mg/1.5 mL subcutaneous solution: once a week on Tuesdays (26 Jul 2023 16:16)  Protonix 40 mg oral delayed release tablet: 1 orally once a day (26 Jul 2023 16:36)  Xarelto 20 mg oral tablet: 1 tab(s) orally once a day (in the evening) (26 Jul 2023 16:16)        MEDICATIONS  (STANDING):  atorvastatin 40 milliGRAM(s) Oral at bedtime  azithromycin  IVPB 500 milliGRAM(s) IV Intermittent every 24 hours  carbidopa/levodopa  25/100 1 Tablet(s) Oral <User Schedule>  cefTRIAXone   IVPB 1000 milliGRAM(s) IV Intermittent every 24 hours  dextrose 5%. 1000 milliLiter(s) (50 mL/Hr) IV Continuous <Continuous>  dextrose 5%. 1000 milliLiter(s) (100 mL/Hr) IV Continuous <Continuous>  dextrose 50% Injectable 25 Gram(s) IV Push once  dextrose 50% Injectable 12.5 Gram(s) IV Push once  dextrose 50% Injectable 25 Gram(s) IV Push once  glucagon  Injectable 1 milliGRAM(s) IntraMuscular once  insulin lispro (ADMELOG) corrective regimen sliding scale   SubCutaneous three times a day before meals  iron sucrose IVPB 200 milliGRAM(s) IV Intermittent every 24 hours  metoprolol tartrate 50 milliGRAM(s) Oral two times a day  NIFEdipine XL 30 milliGRAM(s) Oral daily  pantoprazole    Tablet 40 milliGRAM(s) Oral before breakfast  rivaroxaban 20 milliGRAM(s) Oral with dinner  sacubitril 97 mG/valsartan 103 mG 1 Tablet(s) Oral two times a day    MEDICATIONS  (PRN):  acetaminophen     Tablet .. 650 milliGRAM(s) Oral every 6 hours PRN Temp greater or equal to 38C (100.4F), Mild Pain (1 - 3)  dextrose Oral Gel 15 Gram(s) Oral once PRN Blood Glucose LESS THAN 70 milliGRAM(s)/deciliter  melatonin 3 milliGRAM(s) Oral at bedtime PRN Insomnia      Allergies  Milk (Diarrhea)  latex (Rash)  doxycycline (Other)  adhesives (Pruritus; Rash)      Review of Systems:   Constitutional:  No Fever, No Chills  ENT/Mouth:  No Hearing Changes,  No Difficulty Swallowing  Eyes:  No Eye Pain, No Vision Changes  Cardiovascular:  No Chest Pain, No Palpitations  Respiratory:  No Cough, No Dyspnea  Gastrointestinal:  As described in HPI  Musculoskeletal:  No Joint Swelling, No Back Pain  Skin:  No Skin Lesions, No Jaundice  Neuro:  No Syncope, No Dizziness  Heme/Lymph:  No Bruising, No Bleeding.          Physical Examination:  T(C): 36.6 (01-04-25 @ 09:52), Max: 36.8 (01-03-25 @ 20:50)  HR: 63 (01-04-25 @ 09:52) (63 - 86)  BP: 121/73 (01-04-25 @ 09:52) (121/73 - 164/70)  RR: 18 (01-04-25 @ 09:52) (18 - 20)  SpO2: 97% (01-04-25 @ 09:52) (95% - 100%)      01-04-25 @ 07:01  -  01-04-25 @ 13:29  --------------------------------------------------------  IN: 384 mL / OUT: 0 mL / NET: 384 mL          GENERAL: AAOx3, no acute distress.  HEAD:  Atraumatic, Normocephalic  EYES: conjunctiva and sclera clear  NECK: Supple, no JVD or thyromegaly  CHEST/LUNG: Clear to auscultation bilaterally; No wheeze, rhonchi, or rales  HEART: Regular rate and rhythm; normal S1, S2, No murmurs.  ABDOMEN: Soft, nontender, nondistended; Bowel sounds present  NEUROLOGY: No asterixis or tremor.   SKIN: Intact, no jaundice        Data:                        7.6    5.90  )-----------( 340      ( 04 Jan 2025 05:36 )             23.0     Hgb Trend:  7.6  01-04-25 @ 05:36  7.0  01-03-25 @ 15:30        01-04    125[L]  |  93[L]  |  9[L]  ----------------------------<  101[H]  4.8   |  18  |  0.8    Ca    8.5      04 Jan 2025 05:36  Mg     1.6     01-04    TPro  5.8[L]  /  Alb  3.8  /  TBili  <0.2  /  DBili  x   /  AST  15  /  ALT  7   /  AlkPhos  73  01-04    Liver panel trend:  TBili <0.2   /   AST 15   /   ALT 7   /   AlkP 73   /   Tptn 5.8   /   Alb 3.8    /   DBili --      01-04  TBili <0.2   /   AST 19   /   ALT 9   /   AlkP 80   /   Tptn 6.4   /   Alb 3.9    /   DBili --      01-03      PT/INR - ( 04 Jan 2025 05:36 )   PT: 13.00 sec;   INR: 1.10 ratio         PTT - ( 04 Jan 2025 05:36 )  PTT:33.3 sec        Radiology:

## 2025-01-04 NOTE — PROGRESS NOTE ADULT - ASSESSMENT
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 Is being admitted for pna, acute on chronic anemia, hyponatremia.    #Acute Hypoxemic Resp Failure  #CAP, doubt postviral  #Sepsis NOT POA  #Recent Covid infection s/p Paxlovid course  - pt with non-resolving sxs despite paxlovid course, now with worsening cough, and increased malaise  - CXR with b/l interstitial opacities  - RVP neg  - c/w rocephin and azithro for now  - f/u bcx, procal, urine strep and legionella  - Also f/u D-dimer and CTA chest to r/o PE    #Acute on Chronic Normocytic Anemia, symptomatic  #Afib on Xarelto  - Hb previously 9.2 from last admission in 2023>> 7.0 on admission   - Last EGD/[facility] 07/2023 was negative for overt bleeding cause  - s/p 1 unit of pRBC in ED >> keep active type and screen  - keep Hb >8 in s/o CAD  - c/w xarelto for now as pt has stents, is HDS. monitor cbc.   - In S/O high reticulocyte count and SIXTO anemia, likely hemolysis or bleeding - send LDH, haptoglobin, GI consult  - IV venofer for now     #Hyponatremia, severe  - last hospitalization c/b hyponatremia as well, euvolemic treated with fluid restriction and salt tablets  - daughter in law endorses pt has not even much, often does not drink much as well  - suspect hypotonic hypovolemic, is asx as no AMS or HA or n/v, no seizures or signs of inc ICP  - send serum osm, urine studies,   - In S/O low BUN and hypoNa, likely dilution (SIADH) - fluid restriction to 1.5L    #Chronic HFpEF   #HTN  - Hold Lasix in s/o volume down  - c/w Entresto 97 mg-103 mg oral tablet: 1 orally 2 times a day w/ parameters  - c/w procardia 30mg qd w/ parameters    # Chronic Atrial fibrillation  - hx multiple ablations and DCCV  - Xarelto 20mg   - lopressor 50mg bid    # HLD:  - c/w Atorvastatin 40 QD    # DM-II  - Home med: Lantus 20 u QD, Metformin 1000 BID and Ozempic weekly  - Sliding scale and adjust as needed     #Parkinson Disease:  - c/w Carbi/Levo 25/100 BID    # Misc  - GI ppx: protonix  - DVT ppx: SCDs, holding xarelto  - Diet: DASH/CC  - Activity: bedrest  - DISPO: admit to medicine    Pending  GI consult  urine studies  monitor Hgb, keep >8  D-dimer, CTA chest  LDH, haptoglobin

## 2025-01-04 NOTE — CONSULT NOTE ADULT - ASSESSMENT
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 Gi consulted for SIXTO. HAd workup of SIXTO in 2023 with EGD/colonosocpy. Recommended VCE but did not obtain. NO bleeding. JODIE negative. HD stable    #SIXTO - no overt GI bleed   EGD/[facility] 07/2023: erosive gastritis, diverticulosis, hemorrhoids  HD stable and no overt bleeding  JODIE brown stool  Patient denies any blood per rectum  Recommended VCE but never obtained    Rec  Plan for VCE on Monday  If persistent drop in H/H or evidence of overt bleeding will consider repeating EGD/Colonoscopy  Iron supplementation per primary team (daughter states patient stopped taking iron supplementation)  Trend H&H   Target transfusion Hb >8  If any unstable bleed, please call GI stat

## 2025-01-05 LAB
ALBUMIN SERPL ELPH-MCNC: 3.8 G/DL — SIGNIFICANT CHANGE UP (ref 3.5–5.2)
ALBUMIN SERPL ELPH-MCNC: 3.8 G/DL — SIGNIFICANT CHANGE UP (ref 3.5–5.2)
ALP SERPL-CCNC: 79 U/L — SIGNIFICANT CHANGE UP (ref 30–115)
ALP SERPL-CCNC: 80 U/L — SIGNIFICANT CHANGE UP (ref 30–115)
ALT FLD-CCNC: 9 U/L — SIGNIFICANT CHANGE UP (ref 0–41)
ALT FLD-CCNC: <5 U/L — SIGNIFICANT CHANGE UP (ref 0–41)
ANION GAP SERPL CALC-SCNC: 12 MMOL/L — SIGNIFICANT CHANGE UP (ref 7–14)
ANION GAP SERPL CALC-SCNC: 15 MMOL/L — HIGH (ref 7–14)
APTT BLD: 39.8 SEC — HIGH (ref 27–39.2)
AST SERPL-CCNC: 19 U/L — SIGNIFICANT CHANGE UP (ref 0–41)
AST SERPL-CCNC: 20 U/L — SIGNIFICANT CHANGE UP (ref 0–41)
BASOPHILS # BLD AUTO: 0.05 K/UL — SIGNIFICANT CHANGE UP (ref 0–0.2)
BASOPHILS # BLD AUTO: 0.05 K/UL — SIGNIFICANT CHANGE UP (ref 0–0.2)
BASOPHILS NFR BLD AUTO: 0.8 % — SIGNIFICANT CHANGE UP (ref 0–1)
BASOPHILS NFR BLD AUTO: 1 % — SIGNIFICANT CHANGE UP (ref 0–1)
BILIRUB SERPL-MCNC: 0.2 MG/DL — SIGNIFICANT CHANGE UP (ref 0.2–1.2)
BILIRUB SERPL-MCNC: 0.2 MG/DL — SIGNIFICANT CHANGE UP (ref 0.2–1.2)
BUN SERPL-MCNC: 12 MG/DL — SIGNIFICANT CHANGE UP (ref 10–20)
BUN SERPL-MCNC: 8 MG/DL — LOW (ref 10–20)
CALCIUM SERPL-MCNC: 8.8 MG/DL — SIGNIFICANT CHANGE UP (ref 8.4–10.4)
CALCIUM SERPL-MCNC: 8.9 MG/DL — SIGNIFICANT CHANGE UP (ref 8.4–10.4)
CHLORIDE SERPL-SCNC: 94 MMOL/L — LOW (ref 98–110)
CHLORIDE SERPL-SCNC: 94 MMOL/L — LOW (ref 98–110)
CO2 SERPL-SCNC: 19 MMOL/L — SIGNIFICANT CHANGE UP (ref 17–32)
CO2 SERPL-SCNC: 21 MMOL/L — SIGNIFICANT CHANGE UP (ref 17–32)
CREAT SERPL-MCNC: 0.8 MG/DL — SIGNIFICANT CHANGE UP (ref 0.7–1.5)
CREAT SERPL-MCNC: 0.9 MG/DL — SIGNIFICANT CHANGE UP (ref 0.7–1.5)
EGFR: 65 ML/MIN/1.73M2 — SIGNIFICANT CHANGE UP
EGFR: 75 ML/MIN/1.73M2 — SIGNIFICANT CHANGE UP
EOSINOPHIL # BLD AUTO: 0.11 K/UL — SIGNIFICANT CHANGE UP (ref 0–0.7)
EOSINOPHIL # BLD AUTO: 0.17 K/UL — SIGNIFICANT CHANGE UP (ref 0–0.7)
EOSINOPHIL NFR BLD AUTO: 2.2 % — SIGNIFICANT CHANGE UP (ref 0–8)
EOSINOPHIL NFR BLD AUTO: 2.7 % — SIGNIFICANT CHANGE UP (ref 0–8)
GLUCOSE BLDC GLUCOMTR-MCNC: 163 MG/DL — HIGH (ref 70–99)
GLUCOSE BLDC GLUCOMTR-MCNC: 182 MG/DL — HIGH (ref 70–99)
GLUCOSE BLDC GLUCOMTR-MCNC: 201 MG/DL — HIGH (ref 70–99)
GLUCOSE BLDC GLUCOMTR-MCNC: 222 MG/DL — HIGH (ref 70–99)
GLUCOSE SERPL-MCNC: 121 MG/DL — HIGH (ref 70–99)
GLUCOSE SERPL-MCNC: 149 MG/DL — HIGH (ref 70–99)
HAPTOGLOB SERPL-MCNC: 269 MG/DL — HIGH (ref 34–200)
HCT VFR BLD CALC: 29.5 % — LOW (ref 37–47)
HCT VFR BLD CALC: 30.5 % — LOW (ref 37–47)
HGB BLD-MCNC: 10.3 G/DL — LOW (ref 12–16)
HGB BLD-MCNC: 9.8 G/DL — LOW (ref 12–16)
IMM GRANULOCYTES NFR BLD AUTO: 0.4 % — HIGH (ref 0.1–0.3)
IMM GRANULOCYTES NFR BLD AUTO: 0.5 % — HIGH (ref 0.1–0.3)
INR BLD: 1.2 RATIO — SIGNIFICANT CHANGE UP (ref 0.65–1.3)
LYMPHOCYTES # BLD AUTO: 1.47 K/UL — SIGNIFICANT CHANGE UP (ref 1.2–3.4)
LYMPHOCYTES # BLD AUTO: 2.22 K/UL — SIGNIFICANT CHANGE UP (ref 1.2–3.4)
LYMPHOCYTES # BLD AUTO: 29.3 % — SIGNIFICANT CHANGE UP (ref 20.5–51.1)
LYMPHOCYTES # BLD AUTO: 35.7 % — SIGNIFICANT CHANGE UP (ref 20.5–51.1)
MAGNESIUM SERPL-MCNC: 1.9 MG/DL — SIGNIFICANT CHANGE UP (ref 1.8–2.4)
MAGNESIUM SERPL-MCNC: 2 MG/DL — SIGNIFICANT CHANGE UP (ref 1.8–2.4)
MCHC RBC-ENTMCNC: 29.8 PG — SIGNIFICANT CHANGE UP (ref 27–31)
MCHC RBC-ENTMCNC: 30.3 PG — SIGNIFICANT CHANGE UP (ref 27–31)
MCHC RBC-ENTMCNC: 33.2 G/DL — SIGNIFICANT CHANGE UP (ref 32–37)
MCHC RBC-ENTMCNC: 33.8 G/DL — SIGNIFICANT CHANGE UP (ref 32–37)
MCV RBC AUTO: 89.7 FL — SIGNIFICANT CHANGE UP (ref 81–99)
MCV RBC AUTO: 89.7 FL — SIGNIFICANT CHANGE UP (ref 81–99)
MONOCYTES # BLD AUTO: 0.82 K/UL — HIGH (ref 0.1–0.6)
MONOCYTES # BLD AUTO: 1 K/UL — HIGH (ref 0.1–0.6)
MONOCYTES NFR BLD AUTO: 16.1 % — HIGH (ref 1.7–9.3)
MONOCYTES NFR BLD AUTO: 16.4 % — HIGH (ref 1.7–9.3)
NEUTROPHILS # BLD AUTO: 2.54 K/UL — SIGNIFICANT CHANGE UP (ref 1.4–6.5)
NEUTROPHILS # BLD AUTO: 2.75 K/UL — SIGNIFICANT CHANGE UP (ref 1.4–6.5)
NEUTROPHILS NFR BLD AUTO: 44.2 % — SIGNIFICANT CHANGE UP (ref 42.2–75.2)
NEUTROPHILS NFR BLD AUTO: 50.7 % — SIGNIFICANT CHANGE UP (ref 42.2–75.2)
NRBC # BLD: 0 /100 WBCS — SIGNIFICANT CHANGE UP (ref 0–0)
NRBC # BLD: 0 /100 WBCS — SIGNIFICANT CHANGE UP (ref 0–0)
PLATELET # BLD AUTO: 361 K/UL — SIGNIFICANT CHANGE UP (ref 130–400)
PLATELET # BLD AUTO: 370 K/UL — SIGNIFICANT CHANGE UP (ref 130–400)
PMV BLD: 8.4 FL — SIGNIFICANT CHANGE UP (ref 7.4–10.4)
PMV BLD: 8.6 FL — SIGNIFICANT CHANGE UP (ref 7.4–10.4)
POTASSIUM SERPL-MCNC: 4.8 MMOL/L — SIGNIFICANT CHANGE UP (ref 3.5–5)
POTASSIUM SERPL-MCNC: 5 MMOL/L — SIGNIFICANT CHANGE UP (ref 3.5–5)
POTASSIUM SERPL-SCNC: 4.8 MMOL/L — SIGNIFICANT CHANGE UP (ref 3.5–5)
POTASSIUM SERPL-SCNC: 5 MMOL/L — SIGNIFICANT CHANGE UP (ref 3.5–5)
PROT SERPL-MCNC: 6.1 G/DL — SIGNIFICANT CHANGE UP (ref 6–8)
PROT SERPL-MCNC: 6.2 G/DL — SIGNIFICANT CHANGE UP (ref 6–8)
PROTHROM AB SERPL-ACNC: 14.2 SEC — HIGH (ref 9.95–12.87)
RBC # BLD: 3.29 M/UL — LOW (ref 4.2–5.4)
RBC # BLD: 3.4 M/UL — LOW (ref 4.2–5.4)
RBC # FLD: 14.9 % — HIGH (ref 11.5–14.5)
RBC # FLD: 15.4 % — HIGH (ref 11.5–14.5)
SODIUM SERPL-SCNC: 127 MMOL/L — LOW (ref 135–146)
SODIUM SERPL-SCNC: 128 MMOL/L — LOW (ref 135–146)
WBC # BLD: 5.01 K/UL — SIGNIFICANT CHANGE UP (ref 4.8–10.8)
WBC # BLD: 6.22 K/UL — SIGNIFICANT CHANGE UP (ref 4.8–10.8)
WBC # FLD AUTO: 5.01 K/UL — SIGNIFICANT CHANGE UP (ref 4.8–10.8)
WBC # FLD AUTO: 6.22 K/UL — SIGNIFICANT CHANGE UP (ref 4.8–10.8)

## 2025-01-05 PROCEDURE — 99233 SBSQ HOSP IP/OBS HIGH 50: CPT

## 2025-01-05 RX ORDER — BISACODYL 5 MG
20 TABLET, DELAYED RELEASE (ENTERIC COATED) ORAL ONCE
Refills: 0 | Status: COMPLETED | OUTPATIENT
Start: 2025-01-05 | End: 2025-01-05

## 2025-01-05 RX ORDER — NYSTATIN TOPICAL POWDER 100000 U/G
1 POWDER TOPICAL
Refills: 0 | Status: DISCONTINUED | OUTPATIENT
Start: 2025-01-05 | End: 2025-01-06

## 2025-01-05 RX ORDER — POLYETHYLENE GLYCOL 3350, SODIUM SULFATE ANHYDROUS, SODIUM BICARBONATE, SODIUM CHLORIDE, POTASSIUM CHLORIDE 236; 22.74; 6.74; 5.86; 2.97 G/4L; G/4L; G/4L; G/4L; G/4L
2000 POWDER, FOR SOLUTION ORAL ONCE
Refills: 0 | Status: COMPLETED | OUTPATIENT
Start: 2025-01-05 | End: 2025-01-05

## 2025-01-05 RX ADMIN — ATORVASTATIN CALCIUM 40 MILLIGRAM(S): 40 TABLET, FILM COATED ORAL at 22:21

## 2025-01-05 RX ADMIN — Medication 50 MILLIGRAM(S): at 17:23

## 2025-01-05 RX ADMIN — CEFTRIAXONE SODIUM 100 MILLIGRAM(S): 1 INJECTION, POWDER, FOR SOLUTION INTRAMUSCULAR; INTRAVENOUS at 05:26

## 2025-01-05 RX ADMIN — NIFEDIPINE 30 MILLIGRAM(S): 60 TABLET, EXTENDED RELEASE ORAL at 05:27

## 2025-01-05 RX ADMIN — Medication 3 MILLIGRAM(S): at 22:28

## 2025-01-05 RX ADMIN — PANTOPRAZOLE 40 MILLIGRAM(S): 40 TABLET, DELAYED RELEASE ORAL at 05:29

## 2025-01-05 RX ADMIN — Medication 50 MILLIGRAM(S): at 05:29

## 2025-01-05 RX ADMIN — NYSTATIN TOPICAL POWDER 1 APPLICATION(S): 100000 POWDER TOPICAL at 17:27

## 2025-01-05 RX ADMIN — AZITHROMYCIN MONOHYDRATE 255 MILLIGRAM(S): 200 POWDER, FOR SUSPENSION ORAL at 05:27

## 2025-01-05 RX ADMIN — APIXABAN 5 MILLIGRAM(S): 5 TABLET, FILM COATED ORAL at 05:27

## 2025-01-05 RX ADMIN — SACUBITRIL AND VALSARTAN 1 TABLET(S): 24; 26 TABLET, FILM COATED ORAL at 17:23

## 2025-01-05 RX ADMIN — APIXABAN 5 MILLIGRAM(S): 5 TABLET, FILM COATED ORAL at 17:23

## 2025-01-05 RX ADMIN — LEVODOPA AND CARBIDOPA 1 TABLET(S): 145; 36.25 CAPSULE, EXTENDED RELEASE ORAL at 22:24

## 2025-01-05 RX ADMIN — Medication 2: at 07:57

## 2025-01-05 RX ADMIN — SACUBITRIL AND VALSARTAN 1 TABLET(S): 24; 26 TABLET, FILM COATED ORAL at 05:27

## 2025-01-05 RX ADMIN — Medication 2: at 11:44

## 2025-01-05 RX ADMIN — LEVODOPA AND CARBIDOPA 1 TABLET(S): 145; 36.25 CAPSULE, EXTENDED RELEASE ORAL at 08:16

## 2025-01-05 RX ADMIN — Medication 20 MILLIGRAM(S): at 22:29

## 2025-01-05 RX ADMIN — Medication 1: at 16:51

## 2025-01-05 RX ADMIN — IRON SUCROSE 100 MILLIGRAM(S): 20 INJECTION, SOLUTION INTRAVENOUS at 16:51

## 2025-01-05 NOTE — CHART NOTE - NSCHARTNOTEFT_GEN_A_CORE
- Plan for VCE tomorrow   - Clear liquid diet day before procedure, NPO after midnight  - Please start 1/2 Golytely @4PM and dulcolax 20mg @9PM evening before procedure   - Please obtain preop labs (coags, CBC, CMP, mag) night before procedure
Patient refusing to undergo VCE because of her aversion to undergoing the prep. Pt and family at bedside were counseled on the importance of the procedure to rule out sources of bleeding, lesions. Pt and family were offered to do miralax prep instead but they still refused. Communicated their choice to GI fellow. Will keep pt NPO after midnight in case there is possibility she may go without prep.
Per patient, have not been taking Xarelto, it was changed to Eliquis 5mg BID.  Called and confirmed with [facility] - Xarelto have not been filled, currently active on Eliquis 5mg BID

## 2025-01-05 NOTE — PROGRESS NOTE ADULT - ASSESSMENT
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  Patient was taken to Urgent Care 10 days ago, was found to be COVID+, treated with Paxlovid 5 day course. However her sxs remained persistent.      RUL Pneumonia   Recent Covid-19 infection   Acute Hypoxemic/Hypercapinc Respiratory Failure  Hyponatremia  Iron deficiency anemia  Chronic A-fib on Xarelto  Chronic HFimpEF  H/O CAD s/p PCI  DM-2 / HTN / DL  Parkinson disease                        PLAN:    ·	Tele reviewed by me. A-fib  ·	EKG on admission: A-fib 87/min (Interpreted by me)  ·	CXR noted. B/L intersitial opacities.   ·	CT chest NC noted. Small, patchy consolidations, most notable within the right upper lobe which could reflect early pneumonia  ·	Pro BNP is 2014  ·	O/E pt is euvolemic  ·	On GDMT: Cont Entresto and Metoprolol  ·	Cont Ceftriaxone and Azithromycin   ·	Doubt PE as pt is on Xarelto. D-dimer is 175  ·	Serum iron is 20 and percent sat is 7. Retic count is 4%.   ·	Old record reviewed. Pt had negative EGD/Colonoscopy in 7/2023  ·	Care d/w the GI. VCE in AM  ·	NPO post MN  ·	Hyponatremia: Serum osm is 256 and BUN is 9. Likely SIADH. Water restriction to 1.5 L/D  ·	On admission Hb was 7. S/P two units of PRBC's. Monitor H/H  ·	Keep Hb >8 as pt has h/o CAD  ·	Monitor FS. On no meds for DM-2    Progress Note Handoff    Pending (specify):  Consults_________, Tests________, Test Results_______, Other__VCE in AM_______  Family discussion:  Disposition: Home___/SNF___/Other________/Unknown at this time________    Silvio Ellis MD  Spectra: [phone]

## 2025-01-05 NOTE — PROGRESS NOTE ADULT - SUBJECTIVE AND OBJECTIVE BOX
KENA LOZADA  79y Female    CHIEF COMPLAINT:    Patient is a 79y old  Female who presents with a chief complaint of malaise, cough, weakness, sob (2025 13:20)      INTERVAL HPI/OVERNIGHT EVENTS:    Patient seen and examined. Interviewed the pt with the daughter in law on phone. Pt states that her cough and breathing has been improving. Scheduled for VCE in AM    ROS: All other systems are negative.    Vital Signs:    T(F): 98 (25 @ 05:00), Max: 98.3 (25 @ 19:43)  HR: 85 (25 @ 05:00) (66 - 85)  BP: 133/80 (25 @ 05:00) (133/80 - 145/78)  RR: 18 (25 @ 05:00) (18 - 18)  SpO2: 99% (25 @ 05:00) (96% - 99%)  I&O's Summary    2025 07:  -  2025 07:00  --------------------------------------------------------  IN: 1485 mL / OUT: 2200 mL / NET: -715 mL    2025 07:01  -  2025 10:48  --------------------------------------------------------  IN: 240 mL / OUT: 0 mL / NET: 240 mL      Daily     Daily Weight in k.5 (2025 05:55)  CAPILLARY BLOOD GLUCOSE      POCT Blood Glucose.: 201 mg/dL (2025 07:19)  POCT Blood Glucose.: 141 mg/dL (2025 21:12)  POCT Blood Glucose.: 196 mg/dL (2025 16:47)  POCT Blood Glucose.: 167 mg/dL (2025 11:08)      PHYSICAL EXAM:    GENERAL:  NAD  SKIN: No rashes or lesions  HENT: Atraumatic. Normocephalic. PERRL. Moist membranes.  NECK: Supple, No JVD. No lymphadenopathy.  PULMONARY: CTA B/L. No wheezing. No rales  CVS: Normal S1, S2. Rate and Rhythm are regular. No murmurs.  ABDOMEN/GI: Soft, Nontender, Nondistended; BS present  EXTREMITIES: Peripheral pulses intact. No edema B/L LE.  NEUROLOGIC:  No motor or sensory deficit.  PSYCH: Alert & oriented x 3    Consultant(s) Notes Reviewed:  [x ] YES  [ ] NO  Care Discussed with Consultants/Other Providers [ x] YES  [ ] NO    EKG reviewed  Telemetry reviewed    LABS:                        10.3   5.01  )-----------( 370      ( 2025 05:33 )             30.5     01-05    128[L]  |  94[L]  |  8[L]  ----------------------------<  121[H]  4.8   |  19  |  0.8    Ca    8.9      2025 05:33  Mg     2.0     -    TPro  6.2  /  Alb  3.8  /  TBili  0.2  /  DBili  x   /  AST  20  /  ALT  <5  /  AlkPhos  79  -05    PT/INR - ( 2025 05:36 )   PT: 13.00 sec;   INR: 1.10 ratio         PTT - ( 2025 05:36 )  PTT:33.3 sec        Culture - Blood (collected 2025 17:35)  Source: .Blood BLOOD  Preliminary Report (2025 01:02):    No growth at 24 hours    Culture - Blood (collected 2025 17:35)  Source: .Blood BLOOD  Preliminary Report (2025 01:02):    No growth at 24 hours        RADIOLOGY & ADDITIONAL TESTS:    < from: CT Chest No Cont (25 @ 16:03) >  IMPRESSION:  Motion limited examination.    Small, patchy consolidations, most notable within the right upper lobe   which could reflect early pneumonia    < end of copied text >    Imaging or report Personally Reviewed:  [x ] YES  [ ] NO    Medications:  Standing  apixaban 5 milliGRAM(s) Oral every 12 hours  atorvastatin 40 milliGRAM(s) Oral at bedtime  azithromycin  IVPB 500 milliGRAM(s) IV Intermittent every 24 hours  carbidopa/levodopa  25/100 1 Tablet(s) Oral <User Schedule>  cefTRIAXone   IVPB 1000 milliGRAM(s) IV Intermittent every 24 hours  dextrose 5%. 1000 milliLiter(s) IV Continuous <Continuous>  dextrose 5%. 1000 milliLiter(s) IV Continuous <Continuous>  dextrose 50% Injectable 25 Gram(s) IV Push once  dextrose 50% Injectable 12.5 Gram(s) IV Push once  dextrose 50% Injectable 25 Gram(s) IV Push once  glucagon  Injectable 1 milliGRAM(s) IntraMuscular once  insulin lispro (ADMELOG) corrective regimen sliding scale   SubCutaneous three times a day before meals  iron sucrose IVPB 200 milliGRAM(s) IV Intermittent every 24 hours  metoprolol tartrate 50 milliGRAM(s) Oral two times a day  NIFEdipine XL 30 milliGRAM(s) Oral daily  pantoprazole    Tablet 40 milliGRAM(s) Oral before breakfast  sacubitril 97 mG/valsartan 103 mG 1 Tablet(s) Oral two times a day    PRN Meds  acetaminophen     Tablet .. 650 milliGRAM(s) Oral every 6 hours PRN  dextrose Oral Gel 15 Gram(s) Oral once PRN  melatonin 3 milliGRAM(s) Oral at bedtime PRN      Case discussed with resident    Care discussed with pt/family

## 2025-01-06 ENCOUNTER — TRANSCRIPTION ENCOUNTER (OUTPATIENT)
Age: 80
End: 2025-01-06

## 2025-01-06 VITALS
RESPIRATION RATE: 18 BRPM | DIASTOLIC BLOOD PRESSURE: 77 MMHG | HEART RATE: 75 BPM | SYSTOLIC BLOOD PRESSURE: 135 MMHG | TEMPERATURE: 98 F

## 2025-01-06 LAB
ALBUMIN SERPL ELPH-MCNC: 3.9 G/DL — SIGNIFICANT CHANGE UP (ref 3.5–5.2)
ALP SERPL-CCNC: 75 U/L — SIGNIFICANT CHANGE UP (ref 30–115)
ALT FLD-CCNC: <5 U/L — SIGNIFICANT CHANGE UP (ref 0–41)
ANION GAP SERPL CALC-SCNC: 15 MMOL/L — HIGH (ref 7–14)
AST SERPL-CCNC: 18 U/L — SIGNIFICANT CHANGE UP (ref 0–41)
BASOPHILS # BLD AUTO: 0.06 K/UL — SIGNIFICANT CHANGE UP (ref 0–0.2)
BASOPHILS NFR BLD AUTO: 1 % — SIGNIFICANT CHANGE UP (ref 0–1)
BILIRUB SERPL-MCNC: <0.2 MG/DL — SIGNIFICANT CHANGE UP (ref 0.2–1.2)
BUN SERPL-MCNC: 11 MG/DL — SIGNIFICANT CHANGE UP (ref 10–20)
CALCIUM SERPL-MCNC: 8.7 MG/DL — SIGNIFICANT CHANGE UP (ref 8.4–10.5)
CHLORIDE SERPL-SCNC: 94 MMOL/L — LOW (ref 98–110)
CO2 SERPL-SCNC: 19 MMOL/L — SIGNIFICANT CHANGE UP (ref 17–32)
CREAT SERPL-MCNC: 0.8 MG/DL — SIGNIFICANT CHANGE UP (ref 0.7–1.5)
EGFR: 75 ML/MIN/1.73M2 — SIGNIFICANT CHANGE UP
EOSINOPHIL # BLD AUTO: 0.2 K/UL — SIGNIFICANT CHANGE UP (ref 0–0.7)
EOSINOPHIL NFR BLD AUTO: 3.2 % — SIGNIFICANT CHANGE UP (ref 0–8)
GLUCOSE BLDC GLUCOMTR-MCNC: 154 MG/DL — HIGH (ref 70–99)
GLUCOSE BLDC GLUCOMTR-MCNC: 186 MG/DL — HIGH (ref 70–99)
GLUCOSE SERPL-MCNC: 125 MG/DL — HIGH (ref 70–99)
HCT VFR BLD CALC: 31.1 % — LOW (ref 37–47)
HGB BLD-MCNC: 10.3 G/DL — LOW (ref 12–16)
IMM GRANULOCYTES NFR BLD AUTO: 0.2 % — SIGNIFICANT CHANGE UP (ref 0.1–0.3)
LYMPHOCYTES # BLD AUTO: 1.9 K/UL — SIGNIFICANT CHANGE UP (ref 1.2–3.4)
LYMPHOCYTES # BLD AUTO: 30.4 % — SIGNIFICANT CHANGE UP (ref 20.5–51.1)
MAGNESIUM SERPL-MCNC: 1.9 MG/DL — SIGNIFICANT CHANGE UP (ref 1.8–2.4)
MCHC RBC-ENTMCNC: 30.1 PG — SIGNIFICANT CHANGE UP (ref 27–31)
MCHC RBC-ENTMCNC: 33.1 G/DL — SIGNIFICANT CHANGE UP (ref 32–37)
MCV RBC AUTO: 90.9 FL — SIGNIFICANT CHANGE UP (ref 81–99)
MONOCYTES # BLD AUTO: 0.97 K/UL — HIGH (ref 0.1–0.6)
MONOCYTES NFR BLD AUTO: 15.5 % — HIGH (ref 1.7–9.3)
NEUTROPHILS # BLD AUTO: 3.11 K/UL — SIGNIFICANT CHANGE UP (ref 1.4–6.5)
NEUTROPHILS NFR BLD AUTO: 49.7 % — SIGNIFICANT CHANGE UP (ref 42.2–75.2)
NRBC # BLD: 0 /100 WBCS — SIGNIFICANT CHANGE UP (ref 0–0)
PLATELET # BLD AUTO: 382 K/UL — SIGNIFICANT CHANGE UP (ref 130–400)
PMV BLD: 8.8 FL — SIGNIFICANT CHANGE UP (ref 7.4–10.4)
POTASSIUM SERPL-MCNC: 4.9 MMOL/L — SIGNIFICANT CHANGE UP (ref 3.5–5)
POTASSIUM SERPL-SCNC: 4.9 MMOL/L — SIGNIFICANT CHANGE UP (ref 3.5–5)
PROCALCITONIN SERPL-MCNC: 0.07 NG/ML — SIGNIFICANT CHANGE UP (ref 0.02–0.1)
PROT SERPL-MCNC: 6 G/DL — SIGNIFICANT CHANGE UP (ref 6–8)
RBC # BLD: 3.42 M/UL — LOW (ref 4.2–5.4)
RBC # FLD: 15.1 % — HIGH (ref 11.5–14.5)
SODIUM SERPL-SCNC: 128 MMOL/L — LOW (ref 135–146)
WBC # BLD: 6.25 K/UL — SIGNIFICANT CHANGE UP (ref 4.8–10.8)
WBC # FLD AUTO: 6.25 K/UL — SIGNIFICANT CHANGE UP (ref 4.8–10.8)

## 2025-01-06 PROCEDURE — 99233 SBSQ HOSP IP/OBS HIGH 50: CPT

## 2025-01-06 PROCEDURE — 99239 HOSP IP/OBS DSCHRG MGMT >30: CPT

## 2025-01-06 RX ORDER — FUROSEMIDE 20 MG
1 TABLET ORAL
Qty: 0 | Refills: 0 | DISCHARGE

## 2025-01-06 RX ORDER — HYDROCORTISONE 1 %
1 CREAM (GRAM) TOPICAL THREE TIMES A DAY
Refills: 0 | Status: DISCONTINUED | OUTPATIENT
Start: 2025-01-06 | End: 2025-01-06

## 2025-01-06 RX ORDER — APIXABAN 5 MG/1
1 TABLET, FILM COATED ORAL
Refills: 0 | DISCHARGE

## 2025-01-06 RX ORDER — PREDNISONE 5 MG
1 TABLET ORAL
Qty: 21 | Refills: 0
Start: 2025-01-06 | End: 2025-01-14

## 2025-01-06 RX ORDER — HYDROCORTISONE 1 %
1 CREAM (GRAM) TOPICAL
Qty: 0 | Refills: 0 | DISCHARGE
Start: 2025-01-06

## 2025-01-06 RX ORDER — CEFPODOXIME PROXETIL 100 MG/5ML
1 GRANULE, FOR SUSPENSION ORAL
Qty: 6 | Refills: 0
Start: 2025-01-06 | End: 2025-01-08

## 2025-01-06 RX ORDER — NIFEDIPINE 60 MG/1
1 TABLET, EXTENDED RELEASE ORAL
Qty: 30 | Refills: 0
Start: 2025-01-06 | End: 2025-02-04

## 2025-01-06 RX ADMIN — AZITHROMYCIN MONOHYDRATE 255 MILLIGRAM(S): 200 POWDER, FOR SUSPENSION ORAL at 06:28

## 2025-01-06 RX ADMIN — LEVODOPA AND CARBIDOPA 1 TABLET(S): 145; 36.25 CAPSULE, EXTENDED RELEASE ORAL at 12:01

## 2025-01-06 RX ADMIN — NIFEDIPINE 30 MILLIGRAM(S): 60 TABLET, EXTENDED RELEASE ORAL at 05:06

## 2025-01-06 RX ADMIN — APIXABAN 5 MILLIGRAM(S): 5 TABLET, FILM COATED ORAL at 05:05

## 2025-01-06 RX ADMIN — NYSTATIN TOPICAL POWDER 1 APPLICATION(S): 100000 POWDER TOPICAL at 05:07

## 2025-01-06 RX ADMIN — SACUBITRIL AND VALSARTAN 1 TABLET(S): 24; 26 TABLET, FILM COATED ORAL at 05:05

## 2025-01-06 RX ADMIN — Medication 50 MILLIGRAM(S): at 05:05

## 2025-01-06 RX ADMIN — CEFTRIAXONE SODIUM 100 MILLIGRAM(S): 1 INJECTION, POWDER, FOR SOLUTION INTRAMUSCULAR; INTRAVENOUS at 06:28

## 2025-01-06 RX ADMIN — Medication 1: at 07:53

## 2025-01-06 RX ADMIN — PANTOPRAZOLE 40 MILLIGRAM(S): 40 TABLET, DELAYED RELEASE ORAL at 05:06

## 2025-01-06 NOTE — DISCHARGE NOTE PROVIDER - NSDCMRMEDTOKEN_GEN_ALL_CORE_FT
apixaban 5 mg oral tablet: 1 tab(s) orally every 12 hours  atorvastatin 40 mg oral tablet: 1 orally once a day  carbidopa-levodopa 25 mg-100 mg oral tablet: 1 tab(s) orally 2 times a day  cefpodoxime 200 mg oral tablet: 1 tab(s) orally 2 times a day  Entresto 97 mg-103 mg oral tablet: 1 orally 2 times a day  ergocalciferol 50,000 intl units (1.25 mg) oral capsule: 1 cap(s) orally once a week  Lantus 100 units/mL subcutaneous solution: 20 unit(s) subcutaneous once a day  Lasix 20 mg oral tablet: 1 tab(s) orally once a day as needed for  pedal edema  lidocaine 5% topical film: Apply topically to affected area once a day  Mag-Ox 400 oral tablet: 1 tab(s) orally 2 times a day (with meals)  metFORMIN 1000 mg oral tablet: 1 tab(s) orally 2 times a day  metoprolol tartrate 50 mg oral tablet: 1 orally 2 times a day  NIFEdipine 30 mg oral tablet, extended release: 1 tab(s) orally once a day  Ozempic (0.25 mg or 0.5 mg dose) 2 mg/1.5 mL subcutaneous solution: once a week on Tuesdays  predniSONE 10 mg oral tablet: 1 tab(s) orally once a day Please take 40mg (4 tabs) for 3 days then transition to 20mg (2tabs) for 3 days and finish the last 3 days with 10mg (1 tab).  Protonix 40 mg oral delayed release tablet: 1 orally once a day  sertraline 25 mg oral tablet: 1 tab(s) orally once a day

## 2025-01-06 NOTE — DISCHARGE NOTE NURSING/CASE MANAGEMENT/SOCIAL WORK - FINANCIAL ASSISTANCE
[facility] provides services at a reduced cost to those who are determined to be eligible through [facility]’s financial assistance program. Information regarding [facility]’s financial assistance program can be found by going to [email or web address] or by calling [phone].

## 2025-01-06 NOTE — PROGRESS NOTE ADULT - SUBJECTIVE AND OBJECTIVE BOX
KENA LOZADA  79y Female    CHIEF COMPLAINT:    Patient is a 79y old  Female who presents with a chief complaint of malaise, cough, weakness, sob (2025 08:45)      INTERVAL HPI/OVERNIGHT EVENTS:    Patient seen and examined. Feels better today. Cough and breathing has improved. Pt and her family refused VCE.     ROS: All other systems are negative.    Vital Signs:    T(F): 97.7 (25 @ 05:12), Max: 98.5 (25 @ 12:54)  HR: 88 (25 @ 05:12) (78 - 115)  BP: 137/78 (25 @ 05:12) (116/72 - 155/77)  RR: 18 (25 @ 05:12) (17 - 18)  SpO2: 97% (25 @ 08:02) (95% - 97%)  I&O's Summary    2025 07:01  -  2025 07:00  --------------------------------------------------------  IN: 1500 mL / OUT: 1450 mL / NET: 50 mL      Daily     Daily Weight in k.2 (2025 04:02)  CAPILLARY BLOOD GLUCOSE      POCT Blood Glucose.: 186 mg/dL (2025 07:21)  POCT Blood Glucose.: 163 mg/dL (2025 21:09)  POCT Blood Glucose.: 182 mg/dL (2025 16:29)  POCT Blood Glucose.: 222 mg/dL (2025 11:27)      PHYSICAL EXAM:    GENERAL:  NAD  SKIN: No rashes or lesions  HENT: Atraumatic. Normocephalic. PERRL. Moist membranes.  NECK: Supple, No JVD. No lymphadenopathy.  PULMONARY: Mild B/L. No rales  CVS: Normal S1, S2. Rate and Rhythm are regular. No murmurs.  ABDOMEN/GI: Soft, Nontender, Nondistended; BS present  EXTREMITIES: Peripheral pulses intact. No edema B/L LE.  NEUROLOGIC:  No motor or sensory deficit.  PSYCH: Alert & oriented x 3    Consultant(s) Notes Reviewed:  [x ] YES  [ ] NO  Care Discussed with Consultants/Other Providers [ x] YES  [ ] NO    EKG reviewed  Telemetry reviewed    LABS:                        10.3   6.25  )-----------( 382      ( 2025 04:28 )             31.1     01-06    128[L]  |  94[L]  |  11  ----------------------------<  125[H]  4.9   |  19  |  0.8    Ca    8.7      2025 04:28  Mg     1.9         TPro  6.0  /  Alb  3.9  /  TBili  <0.2  /  DBili  x   /  AST  18  /  ALT  <5  /  AlkPhos  75  01-06    PT/INR - ( 2025 21:27 )   PT: 14.20 sec;   INR: 1.20 ratio         PTT - ( 2025 21:27 )  PTT:39.8 sec        Culture - Blood (collected 2025 17:35)  Source: .Blood BLOOD  Preliminary Report (2025 01:01):    No growth at 48 Hours    Culture - Blood (collected 2025 17:35)  Source: .Blood BLOOD  Preliminary Report (2025 01:01):    No growth at 48 Hours        RADIOLOGY & ADDITIONAL TESTS:      Imaging or report Personally Reviewed:  [ ] YES  [ ] NO    Medications:  Standing  apixaban 5 milliGRAM(s) Oral every 12 hours  atorvastatin 40 milliGRAM(s) Oral at bedtime  azithromycin  IVPB 500 milliGRAM(s) IV Intermittent every 24 hours  carbidopa/levodopa  25/100 1 Tablet(s) Oral <User Schedule>  cefTRIAXone   IVPB 1000 milliGRAM(s) IV Intermittent every 24 hours  dextrose 5%. 1000 milliLiter(s) IV Continuous <Continuous>  dextrose 5%. 1000 milliLiter(s) IV Continuous <Continuous>  dextrose 50% Injectable 25 Gram(s) IV Push once  dextrose 50% Injectable 12.5 Gram(s) IV Push once  dextrose 50% Injectable 25 Gram(s) IV Push once  glucagon  Injectable 1 milliGRAM(s) IntraMuscular once  insulin lispro (ADMELOG) corrective regimen sliding scale   SubCutaneous three times a day before meals  iron sucrose IVPB 200 milliGRAM(s) IV Intermittent every 24 hours  metoprolol tartrate 50 milliGRAM(s) Oral two times a day  NIFEdipine XL 30 milliGRAM(s) Oral daily  nystatin Powder 1 Application(s) Topical two times a day  pantoprazole    Tablet 40 milliGRAM(s) Oral before breakfast  sacubitril 97 mG/valsartan 103 mG 1 Tablet(s) Oral two times a day    PRN Meds  acetaminophen     Tablet .. 650 milliGRAM(s) Oral every 6 hours PRN  dextrose Oral Gel 15 Gram(s) Oral once PRN  melatonin 3 milliGRAM(s) Oral at bedtime PRN      Case discussed with resident    Care discussed with pt/family

## 2025-01-06 NOTE — DISCHARGE NOTE NURSING/CASE MANAGEMENT/SOCIAL WORK - NSDCPEFALRISK_GEN_ALL_CORE
For information on Fall & Injury Prevention, visit: [email or web address] OR  [email or web address] OR  https://www.cdc.gov/steadi/patient.html

## 2025-01-06 NOTE — PROGRESS NOTE ADULT - ASSESSMENT
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  Patient was taken to Urgent Care 10 days ago, was found to be COVID+, treated with Paxlovid 5 day course. However her sxs remained persistent.      RUL Pneumonia   Recent Covid-19 infection   Acute Hypoxemic/Hypercapinc Respiratory Failure  Hyponatremia  Iron deficiency anemia  Chronic A-fib on Xarelto  Chronic HFimpEF  H/O CAD s/p PCI  DM-2 / HTN / DL  Parkinson disease                        PLAN:    ·	Tele reviewed by me. A-fib  ·	EKG on admission: A-fib 87/min (Interpreted by me)  ·	CXR noted. B/L intersitial opacities.   ·	CT chest NC noted. Small, patchy consolidations, most notable within the right upper lobe which could reflect early pneumonia  ·	Pro BNP is 2014  ·	O/E pt is euvolemic  ·	On GDMT: Cont Entresto and Metoprolol  ·	D/C Rocephin and Azithromycin. On d/c give her Cefpodoxime 200 mg po q 12h for three more days  ·	Tapering doses of Prednisone on d/c  ·	Doubt PE as pt is on Xarelto. D-dimer is 175  ·	Serum iron is 20 and percent sat is 7. Retic count is 4%.   ·	Old record reviewed. Pt had negative EGD/Colonoscopy in 7/2023  ·	Pt and her family refused VCE  ·	Hyponatremia: Serum osm is 256 and BUN is 9. Likely SIADH. Water restriction to 1.5 L/D  ·	On admission Hb was 7. S/P two units of PRBC's. H/H is stable  ·	Decrease Xarelto to 15 mg po daily. Dose adjust for creatinine clearance       * Med rec reviewed. Plan of care d/w the family on phone and bedside. Time spent 38 minutes.

## 2025-01-06 NOTE — DISCHARGE NOTE NURSING/CASE MANAGEMENT/SOCIAL WORK - PATIENT PORTAL LINK FT
You can access the FollowMyHealth Patient Portal offered by [facility] by registering at the following website: [email or web address]. By joining [facility]’s FollowMyHealth portal, you will also be able to view your health information using other applications (apps) compatible with our system.

## 2025-01-06 NOTE — PROGRESS NOTE ADULT - SUBJECTIVE AND OBJECTIVE BOX
Gastroenterology progress note:     Patient is a 79y old  Female who presents with a chief complaint of malaise, cough, weakness, sob (05 Jan 2025 10:48)       Admitted on: 01-03-25    We are following the patient for:   Anemia    Interval History:    No acute events overnight. Patient refused to prep for VCE. Will plan for outpatient VCE if patient agreeable.       PAST MEDICAL & SURGICAL HISTORY:  Atrial fibrillation, unspecified type      HTN (hypertension)      High cholesterol      Diabetes mellitus      S/P ablation of atrial fibrillation      History of back surgery      Coronary stent patent          MEDICATIONS  (STANDING):  apixaban 5 milliGRAM(s) Oral every 12 hours  atorvastatin 40 milliGRAM(s) Oral at bedtime  azithromycin  IVPB 500 milliGRAM(s) IV Intermittent every 24 hours  carbidopa/levodopa  25/100 1 Tablet(s) Oral <User Schedule>  cefTRIAXone   IVPB 1000 milliGRAM(s) IV Intermittent every 24 hours  dextrose 5%. 1000 milliLiter(s) (50 mL/Hr) IV Continuous <Continuous>  dextrose 5%. 1000 milliLiter(s) (100 mL/Hr) IV Continuous <Continuous>  dextrose 50% Injectable 25 Gram(s) IV Push once  dextrose 50% Injectable 12.5 Gram(s) IV Push once  dextrose 50% Injectable 25 Gram(s) IV Push once  glucagon  Injectable 1 milliGRAM(s) IntraMuscular once  insulin lispro (ADMELOG) corrective regimen sliding scale   SubCutaneous three times a day before meals  iron sucrose IVPB 200 milliGRAM(s) IV Intermittent every 24 hours  metoprolol tartrate 50 milliGRAM(s) Oral two times a day  NIFEdipine XL 30 milliGRAM(s) Oral daily  nystatin Powder 1 Application(s) Topical two times a day  pantoprazole    Tablet 40 milliGRAM(s) Oral before breakfast  sacubitril 97 mG/valsartan 103 mG 1 Tablet(s) Oral two times a day    MEDICATIONS  (PRN):  acetaminophen     Tablet .. 650 milliGRAM(s) Oral every 6 hours PRN Temp greater or equal to 38C (100.4F), Mild Pain (1 - 3)  dextrose Oral Gel 15 Gram(s) Oral once PRN Blood Glucose LESS THAN 70 milliGRAM(s)/deciliter  melatonin 3 milliGRAM(s) Oral at bedtime PRN Insomnia      Allergies  Milk (Diarrhea)  latex (Rash)  doxycycline (Other)  adhesives (Pruritus; Rash)      Review of Systems:   Cardiovascular:  No Chest Pain, No Palpitations  Respiratory:  No Cough, No Dyspnea  Gastrointestinal:  As described in HPI  Skin:  No Skin Lesions, No Jaundice  Neuro:  No Syncope, No Dizziness    Physical Examination:  T(C): 36.5 (01-06-25 @ 05:12), Max: 36.9 (01-05-25 @ 12:54)  HR: 88 (01-06-25 @ 05:12) (78 - 115)  BP: 137/78 (01-06-25 @ 05:12) (116/72 - 155/77)  RR: 18 (01-06-25 @ 05:12) (17 - 18)  SpO2: 97% (01-06-25 @ 08:02) (95% - 97%)      01-05-25 @ 07:01  -  01-06-25 @ 07:00  --------------------------------------------------------  IN: 1500 mL / OUT: 1450 mL / NET: 50 mL        GENERAL: AAOx3, no acute distress.  HEAD:  Atraumatic, Normocephalic  EYES: conjunctiva and sclera clear  NECK: Supple, no JVD or thyromegaly  CHEST/LUNG: Clear to auscultation bilaterally; No wheeze, rhonchi, or rales  HEART: Regular rate and rhythm; normal S1, S2, No murmurs.  ABDOMEN: Soft, nontender, nondistended; Bowel sounds present  NEUROLOGY: No asterixis or tremor.   SKIN: Intact, no jaundice     Data:                        10.3   6.25  )-----------( 382      ( 06 Jan 2025 04:28 )             31.1     Hgb trend:  10.3  01-06-25 @ 04:28  9.8  01-05-25 @ 21:27  10.3  01-05-25 @ 05:33  7.6  01-04-25 @ 05:36  7.0  01-03-25 @ 15:30        01-06    128[L]  |  94[L]  |  11  ----------------------------<  125[H]  4.9   |  19  |  0.8    Ca    8.7      06 Jan 2025 04:28  Mg     1.9     01-06    TPro  6.0  /  Alb  3.9  /  TBili  <0.2  /  DBili  x   /  AST  18  /  ALT  <5  /  AlkPhos  75  01-06    Liver panel trend:  TBili <0.2   /   AST 18   /   ALT <5   /   AlkP 75   /   Tptn 6.0   /   Alb 3.9    /   DBili --      01-06  TBili 0.2   /   AST 19   /   ALT 9   /   AlkP 80   /   Tptn 6.1   /   Alb 3.8    /   DBili --      01-05  TBili 0.2   /   AST 20   /   ALT <5   /   AlkP 79   /   Tptn 6.2   /   Alb 3.8    /   DBili --      01-05  TBili <0.2   /   AST 15   /   ALT 7   /   AlkP 73   /   Tptn 5.8   /   Alb 3.8    /   DBili --      01-04  TBili <0.2   /   AST 19   /   ALT 9   /   AlkP 80   /   Tptn 6.4   /   Alb 3.9    /   DBili --      01-03      PT/INR - ( 05 Jan 2025 21:27 )   PT: 14.20 sec;   INR: 1.20 ratio         PTT - ( 05 Jan 2025 21:27 )  PTT:39.8 sec    Culture - Blood (collected 03 Jan 2025 17:35)  Source: .Blood BLOOD  Preliminary Report (06 Jan 2025 01:01):    No growth at 48 Hours    Culture - Blood (collected 03 Jan 2025 17:35)  Source: .Blood BLOOD  Preliminary Report (06 Jan 2025 01:01):    No growth at 48 Hours         Radiology:

## 2025-01-06 NOTE — DISCHARGE NOTE PROVIDER - CARE PROVIDER_API CALL
Koko Bhagat  Interventional Cardiology  [address] [place] [id number]  Phone: [phone]  Fax: [phone]  Established Patient  Follow Up Time: 1 week

## 2025-01-06 NOTE — PROGRESS NOTE ADULT - ATTENDING COMMENTS
Acute on chronic anemia. Not compliant with iron supplements at home and poor nutrition as per family. EGD/Colon negative in 7/2023. No overt bleeding currently. C/w AC due to high risk cardiac status with close monitoring.  plan for OP VCE. Rest of recommendations per the note above.     Time-based billing (NON-critical care).   50 minutes spent on total encounter; more than 50% of the visit was spent counseling and / or coordinating care by the attending physician.  The necessity of the time spent during the encounter on this date of service was due to: Coordination of care.

## 2025-01-06 NOTE — PHARMACOTHERAPY INTERVENTION NOTE - COMMENTS
Consider ordering iron diluted in  ml than IV push 
Patient was started on azithromycin + ceftriaxone for community acquired pneumonia. Patient has received 5 days of azithromycin 500 mg IV no need to continue on discharge. Patient has received 4 days of ceftriaxone 1 g IV q24h switch to oral cefpodoxime 200 mg po q12h for 3 days for a total of 7 days of antibiotic treatment. Will also be discharged on a short course of steroids.

## 2025-01-06 NOTE — DISCHARGE NOTE PROVIDER - NSDCCPCAREPLAN_GEN_ALL_CORE_FT
PRINCIPAL DISCHARGE DIAGNOSIS  Diagnosis: Pneumonia  Assessment and Plan of Treatment: During your hospital stay, you were treated for right upper lobe pneumonia and respiratory issues following a recent COVID-19 infection. You were given antibiotics in the hospital and a short course of prednisone and antibiotics for discharge, which helped and will manage your symptoms. 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 Your chronic anemia was monitored, and you received blood transfusions to improve your hemoglobin levels.  Your existing medical conditions, including atrial fibrillation, heart failure, and diabetes, were managed with your regular medications. While you initially chose not to have the video capsule endoscopy to investigate your anemia further, this can be considered again as an outpatient if needed.   Please follow up closely with your primary care doctor upon discharge, and return to care urgently if your symptoms suddenly worsen.      SECONDARY DISCHARGE DIAGNOSES  Diagnosis: Symptomatic anemia  Assessment and Plan of Treatment:     Diagnosis: Hyponatremia  Assessment and Plan of Treatment:     Diagnosis: Chest pain  Assessment and Plan of Treatment:     Diagnosis: Heart failure  Assessment and Plan of Treatment:

## 2025-01-06 NOTE — PROGRESS NOTE ADULT - ASSESSMENT
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 Gi consulted for SIXTO. HAd workup of SIXTO in 2023 with EGD/colonosocpy. Recommended VCE but did not obtain. NO bleeding. JODIE negative. HD stable    #SIXTO - no overt GI bleed   EGD/[place] 07/2023: erosive gastritis, diverticulosis, hemorrhoids  HD stable and no overt bleeding  JODIE brown stool  Patient denies any blood per rectum  Recommended VCE but never obtained    Rec  Was planned for VCE today but patient refused to prep for VCE. Family aware. Will plan for outpatient VCE if patient agreeable.  If persistent drop in H/H or evidence of overt bleeding will consider repeating EGD/Colonoscopy  Iron supplementation per primary team (daughter states patient stopped taking iron supplementation)  Trend H&H   Target transfusion Hb >8  If any unstable bleed, please call GI stat  Please recall as needed.

## 2025-01-06 NOTE — PROGRESS NOTE ADULT - REASON FOR ADMISSION
malaise, cough, weakness, sob

## 2025-01-06 NOTE — DISCHARGE NOTE PROVIDER - HOSPITAL COURSE
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 She presented with two weeks of cough, congestion, and shortness of breath (SOB), and was found to have a recent COVID-19 infection, treated with Paxlovid for 5 days, however her symptoms remained persistent.    Upon admission, she was diagnosed with right upper lobe pneumonia, acute hypoxemic/hypercapnic respiratory failure, and hyponatremia. Initial management included antibiotics (Rocephin and Azithromycin, later switched to Cefpodoxime for D/C), iron supplementation for iron deficiency anemia, and water restriction for suspected SIADH-related hyponatremia. The patient received two units of packed red blood cells for anemia, improving hemoglobin levels from 7 to 9.2. Despite recommendation by the GI team for video capsule endoscopy (VCE) for iron deficiency anemia evaluation, the patient declined the procedure.    The pneumonia was managed successfully with antibiotics, and prednisone taper was ordered for discharge.    **Discharge Plan:**    1. **Medication Adjustments:**     - Continue home medications, including Entresto and Metoprolol for heart failure management.     - Discharge with Cefpodoxime 200 mg PO every 12 hours for three days for pneumonia.     - Tapering doses of Prednisone as prescribed on discharge.     - Continue Eliquis.    2. **Anemia Management:**     - Encourage adherence to iron supplementation.     - Monitor hemoglobin and hematocrit as outpatient; target transfusion threshold if hemoglobin drops below 8.     - Plan for outpatient VCE if the patient becomes agreeable, to further investigate iron deficiency anemia.    3. **Hyponatremia Management:**     - Continue fluid restriction of 1.5 L/day.     - Monitor serum sodium levels with primary care or nephrology.    4. **Follow-Up:**     - Patient should follow with her primary care physician, and cardiologist for atrial fibrillation and heart failure management.     - Patient should arrange for follow up with gastroenterology regarding VCE and further GI workup if she changes her mind and would like to pursue further testing.    5. **Patient and Family Education:**     - Educate the patient and family on signs of worsening respiratory status, anemia, and the importance of adherence to the fluid restriction and medication regimen.    On 1/6/25, the patient was deemed stable for  discharge by attending Dr. Ellis, and discharge approved. Dr. Ellis to approve [facility] prior to discharge, as well. 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 She presented with two weeks of cough, congestion, and shortness of breath (SOB), and was found to have a recent COVID-19 infection, treated with Paxlovid for 5 days, however her symptoms remained persistent.    Upon admission, she was diagnosed with right upper lobe pneumonia, acute hypoxemic/hypercapnic respiratory failure, and hyponatremia. Initial management included antibiotics (Rocephin and Azithromycin, later switched to Cefpodoxime for D/C), iron supplementation for iron deficiency anemia, and water restriction for suspected SIADH-related hyponatremia. The patient received two units of packed red blood cells for anemia, improving hemoglobin levels from 7 to 9.2. Despite recommendation by the GI team for video capsule endoscopy (VCE) for iron deficiency anemia evaluation, the patient declined the procedure.    The pneumonia was managed successfully with antibiotics, and prednisone taper was ordered for discharge.    **Discharge Plan:**    1. **Medication Adjustments:**     - Continue home medications, including Entresto and Metoprolol for heart failure management.     - Discharge with Cefpodoxime 200 mg PO every 12 hours for three days for pneumonia.     - Tapering doses of Prednisone as prescribed on discharge.     - Continue Eliquis.    2. **Anemia Management:**     - Encourage adherence to iron supplementation.     - Monitor hemoglobin and hematocrit as outpatient; target transfusion threshold if hemoglobin drops below 8.     - Plan for outpatient VCE if the patient becomes agreeable, to further investigate iron deficiency anemia.    3. **Hyponatremia Management:**     - Continue fluid restriction of 1.5 L/day.     - Monitor serum sodium levels with primary care or nephrology.    4. **Follow-Up:**     - Patient should follow with her primary care physician, and cardiologist for atrial fibrillation and heart failure management.     - Patient should arrange for follow up with gastroenterology regarding VCE and further GI workup if she changes her mind and would like to pursue further testing.    5. **Patient and Family Education:**     - Educate the patient and family on signs of worsening respiratory status, anemia, and the importance of adherence to the fluid restriction and medication regimen.    On 1/6/25, the patient was deemed stable for  discharge by attending Dr. Ellis, and discharge approved. Dr. Ellis to approve [facility] prior to discharge, as well.

## 2025-01-07 ENCOUNTER — TRANSCRIPTION ENCOUNTER (OUTPATIENT)
Age: 80
End: 2025-01-07

## 2025-01-07 LAB
LEGIONELLA AG UR QL: NEGATIVE — SIGNIFICANT CHANGE UP
S PNEUM AG UR QL: NEGATIVE — SIGNIFICANT CHANGE UP

## 2025-01-08 DIAGNOSIS — D50.9 IRON DEFICIENCY ANEMIA, UNSPECIFIED: ICD-10-CM

## 2025-01-08 DIAGNOSIS — J96.01 ACUTE RESPIRATORY FAILURE WITH HYPOXIA: ICD-10-CM

## 2025-01-08 DIAGNOSIS — E78.5 HYPERLIPIDEMIA, UNSPECIFIED: ICD-10-CM

## 2025-01-08 DIAGNOSIS — G20.A1 PARKINSON'S DISEASE WITHOUT DYSKINESIA, WITHOUT MENTION OF FLUCTUATIONS: ICD-10-CM

## 2025-01-08 DIAGNOSIS — J96.02 ACUTE RESPIRATORY FAILURE WITH HYPERCAPNIA: ICD-10-CM

## 2025-01-08 DIAGNOSIS — E22.2 SYNDROME OF INAPPROPRIATE SECRETION OF ANTIDIURETIC HORMONE: ICD-10-CM

## 2025-01-08 DIAGNOSIS — I48.20 CHRONIC ATRIAL FIBRILLATION, UNSPECIFIED: ICD-10-CM

## 2025-01-08 DIAGNOSIS — I50.32 CHRONIC DIASTOLIC (CONGESTIVE) HEART FAILURE: ICD-10-CM

## 2025-01-08 DIAGNOSIS — Z79.4 LONG TERM (CURRENT) USE OF INSULIN: ICD-10-CM

## 2025-01-08 DIAGNOSIS — Z86.16 PERSONAL HISTORY OF COVID-19: ICD-10-CM

## 2025-01-08 DIAGNOSIS — J18.9 PNEUMONIA, UNSPECIFIED ORGANISM: ICD-10-CM

## 2025-01-08 DIAGNOSIS — Z95.5 PRESENCE OF CORONARY ANGIOPLASTY IMPLANT AND GRAFT: ICD-10-CM

## 2025-01-08 DIAGNOSIS — E11.9 TYPE 2 DIABETES MELLITUS WITHOUT COMPLICATIONS: ICD-10-CM

## 2025-01-08 DIAGNOSIS — I25.10 ATHEROSCLEROTIC HEART DISEASE OF NATIVE CORONARY ARTERY WITHOUT ANGINA PECTORIS: ICD-10-CM

## 2025-01-08 DIAGNOSIS — I11.0 HYPERTENSIVE HEART DISEASE WITH HEART FAILURE: ICD-10-CM

## 2025-01-08 LAB — PROT SERPL-MCNC: 8.6 G/DL — HIGH (ref 6–8.3)

## 2025-01-09 LAB
% ALBUMIN: 56.8 % — SIGNIFICANT CHANGE UP
% ALPHA 1: 6.7 % — SIGNIFICANT CHANGE UP
% ALPHA 2: 15.5 % — SIGNIFICANT CHANGE UP
% BETA: 10.9 % — SIGNIFICANT CHANGE UP
% GAMMA: 10.1 % — SIGNIFICANT CHANGE UP
ALBUMIN SERPL ELPH-MCNC: 4.9 G/DL — SIGNIFICANT CHANGE UP (ref 3.6–5.5)
ALBUMIN/GLOB SERPL ELPH: 1.3 RATIO — SIGNIFICANT CHANGE UP
ALPHA1 GLOB SERPL ELPH-MCNC: 0.6 G/DL — HIGH (ref 0.1–0.4)
ALPHA2 GLOB SERPL ELPH-MCNC: 1.3 G/DL — HIGH (ref 0.5–1)
B-GLOBULIN SERPL ELPH-MCNC: 0.9 G/DL — SIGNIFICANT CHANGE UP (ref 0.5–1)
CULTURE RESULTS: SIGNIFICANT CHANGE UP
CULTURE RESULTS: SIGNIFICANT CHANGE UP
GAMMA GLOBULIN: 0.9 G/DL — SIGNIFICANT CHANGE UP (ref 0.6–1.6)
INTERPRETATION SERPL IFE-IMP: SIGNIFICANT CHANGE UP
PROT PATTERN SERPL ELPH-IMP: SIGNIFICANT CHANGE UP
PROT SERPL-MCNC: 8.6 G/DL — HIGH (ref 6–8.3)
SPECIMEN SOURCE: SIGNIFICANT CHANGE UP
SPECIMEN SOURCE: SIGNIFICANT CHANGE UP

## 2025-01-10 ENCOUNTER — TRANSCRIPTION ENCOUNTER (OUTPATIENT)
Age: 80
End: 2025-01-10

## 2025-01-15 ENCOUNTER — TRANSCRIPTION ENCOUNTER (OUTPATIENT)
Age: 80
End: 2025-01-15

## 2025-01-24 ENCOUNTER — TRANSCRIPTION ENCOUNTER (OUTPATIENT)
Age: 80
End: 2025-01-24

## 2025-02-28 ENCOUNTER — APPOINTMENT (OUTPATIENT)
Dept: GASTROENTEROLOGY | Facility: CLINIC | Age: 80
End: 2025-02-28

## 2025-03-05 ENCOUNTER — INPATIENT (INPATIENT)
Facility: HOSPITAL | Age: 80
LOS: 2 days | Discharge: HOME | DRG: 811 | End: 2025-03-08
Attending: INTERNAL MEDICINE | Admitting: STUDENT IN AN ORGANIZED HEALTH CARE EDUCATION/TRAINING PROGRAM
Payer: MEDICARE

## 2025-03-05 VITALS
HEART RATE: 86 BPM | OXYGEN SATURATION: 100 % | RESPIRATION RATE: 20 BRPM | SYSTOLIC BLOOD PRESSURE: 134 MMHG | WEIGHT: 160.06 LBS | TEMPERATURE: 98 F | DIASTOLIC BLOOD PRESSURE: 79 MMHG

## 2025-03-05 DIAGNOSIS — D64.9 ANEMIA, UNSPECIFIED: ICD-10-CM

## 2025-03-05 DIAGNOSIS — Z98.890 OTHER SPECIFIED POSTPROCEDURAL STATES: Chronic | ICD-10-CM

## 2025-03-05 DIAGNOSIS — Z95.5 PRESENCE OF CORONARY ANGIOPLASTY IMPLANT AND GRAFT: Chronic | ICD-10-CM

## 2025-03-05 LAB
ACANTHOCYTES BLD QL SMEAR: SLIGHT — SIGNIFICANT CHANGE UP
ALBUMIN SERPL ELPH-MCNC: 4.1 G/DL — SIGNIFICANT CHANGE UP (ref 3.5–5.2)
ALP SERPL-CCNC: 74 U/L — SIGNIFICANT CHANGE UP (ref 30–115)
ALT FLD-CCNC: 6 U/L — SIGNIFICANT CHANGE UP (ref 0–41)
ANION GAP SERPL CALC-SCNC: 14 MMOL/L — SIGNIFICANT CHANGE UP (ref 7–14)
APTT BLD: 34.4 SEC — SIGNIFICANT CHANGE UP (ref 27–39.2)
AST SERPL-CCNC: 15 U/L — SIGNIFICANT CHANGE UP (ref 0–41)
BASOPHILS # BLD AUTO: 0.08 K/UL — SIGNIFICANT CHANGE UP (ref 0–0.2)
BASOPHILS NFR BLD AUTO: 0.9 % — SIGNIFICANT CHANGE UP (ref 0–1)
BILIRUB SERPL-MCNC: <0.2 MG/DL — SIGNIFICANT CHANGE UP (ref 0.2–1.2)
BLD GP AB SCN SERPL QL: SIGNIFICANT CHANGE UP
BUN SERPL-MCNC: 21 MG/DL — HIGH (ref 10–20)
BURR CELLS BLD QL SMEAR: PRESENT — SIGNIFICANT CHANGE UP
CALCIUM SERPL-MCNC: 8.5 MG/DL — SIGNIFICANT CHANGE UP (ref 8.4–10.5)
CHLORIDE SERPL-SCNC: 99 MMOL/L — SIGNIFICANT CHANGE UP (ref 98–110)
CO2 SERPL-SCNC: 16 MMOL/L — LOW (ref 17–32)
CREAT SERPL-MCNC: 0.9 MG/DL — SIGNIFICANT CHANGE UP (ref 0.7–1.5)
EGFR: 65 ML/MIN/1.73M2 — SIGNIFICANT CHANGE UP
EGFR: 65 ML/MIN/1.73M2 — SIGNIFICANT CHANGE UP
EOSINOPHIL # BLD AUTO: 0.15 K/UL — SIGNIFICANT CHANGE UP (ref 0–0.7)
EOSINOPHIL NFR BLD AUTO: 1.7 % — SIGNIFICANT CHANGE UP (ref 0–8)
GIANT PLATELETS BLD QL SMEAR: PRESENT — SIGNIFICANT CHANGE UP
GLUCOSE SERPL-MCNC: 202 MG/DL — HIGH (ref 70–99)
HCT VFR BLD CALC: 17.2 % — LOW (ref 37–47)
HGB BLD-MCNC: 5.5 G/DL — CRITICAL LOW (ref 12–16)
INR BLD: 1.46 RATIO — HIGH (ref 0.65–1.3)
LIDOCAIN IGE QN: 41 U/L — SIGNIFICANT CHANGE UP (ref 7–60)
LYMPHOCYTES # BLD AUTO: 1.96 K/UL — SIGNIFICANT CHANGE UP (ref 1.2–3.4)
LYMPHOCYTES # BLD AUTO: 22.6 % — SIGNIFICANT CHANGE UP (ref 20.5–51.1)
MANUAL SMEAR VERIFICATION: SIGNIFICANT CHANGE UP
MCHC RBC-ENTMCNC: 30.7 PG — SIGNIFICANT CHANGE UP (ref 27–31)
MCHC RBC-ENTMCNC: 32 G/DL — SIGNIFICANT CHANGE UP (ref 32–37)
MCV RBC AUTO: 96.1 FL — SIGNIFICANT CHANGE UP (ref 81–99)
MONOCYTES # BLD AUTO: 0.61 K/UL — HIGH (ref 0.1–0.6)
MONOCYTES NFR BLD AUTO: 7 % — SIGNIFICANT CHANGE UP (ref 1.7–9.3)
NEUTROPHILS # BLD AUTO: 5.73 K/UL — SIGNIFICANT CHANGE UP (ref 1.4–6.5)
NEUTROPHILS NFR BLD AUTO: 66.1 % — SIGNIFICANT CHANGE UP (ref 42.2–75.2)
PLAT MORPH BLD: ABNORMAL
PLATELET # BLD AUTO: 295 K/UL — SIGNIFICANT CHANGE UP (ref 130–400)
PMV BLD: 8.9 FL — SIGNIFICANT CHANGE UP (ref 7.4–10.4)
POIKILOCYTOSIS BLD QL AUTO: SLIGHT — SIGNIFICANT CHANGE UP
POLYCHROMASIA BLD QL SMEAR: SLIGHT — SIGNIFICANT CHANGE UP
POTASSIUM SERPL-MCNC: 5.3 MMOL/L — HIGH (ref 3.5–5)
POTASSIUM SERPL-SCNC: 5.3 MMOL/L — HIGH (ref 3.5–5)
PROT SERPL-MCNC: 5.8 G/DL — LOW (ref 6–8)
PROTHROM AB SERPL-ACNC: 17.4 SEC — HIGH (ref 9.95–12.87)
RBC # BLD: 1.79 M/UL — LOW (ref 4.2–5.4)
RBC # FLD: 16.8 % — HIGH (ref 11.5–14.5)
RBC BLD AUTO: ABNORMAL
SODIUM SERPL-SCNC: 129 MMOL/L — LOW (ref 135–146)
TROPONIN T, HIGH SENSITIVITY RESULT: 23 NG/L — HIGH (ref 6–13)
VARIANT LYMPHS # BLD: 1.7 % — SIGNIFICANT CHANGE UP (ref 0–5)
VARIANT LYMPHS NFR BLD MANUAL: 1.7 % — SIGNIFICANT CHANGE UP (ref 0–5)
WBC # BLD: 8.67 K/UL — SIGNIFICANT CHANGE UP (ref 4.8–10.8)
WBC # FLD AUTO: 8.67 K/UL — SIGNIFICANT CHANGE UP (ref 4.8–10.8)

## 2025-03-05 PROCEDURE — 88313 SPECIAL STAINS GROUP 2: CPT

## 2025-03-05 PROCEDURE — 74177 CT ABD & PELVIS W/CONTRAST: CPT | Mod: 26

## 2025-03-05 PROCEDURE — 85025 COMPLETE CBC W/AUTO DIFF WBC: CPT

## 2025-03-05 PROCEDURE — 88312 SPECIAL STAINS GROUP 1: CPT

## 2025-03-05 PROCEDURE — 80053 COMPREHEN METABOLIC PANEL: CPT

## 2025-03-05 PROCEDURE — 80061 LIPID PANEL: CPT

## 2025-03-05 PROCEDURE — 83540 ASSAY OF IRON: CPT

## 2025-03-05 PROCEDURE — 83735 ASSAY OF MAGNESIUM: CPT

## 2025-03-05 PROCEDURE — 83036 HEMOGLOBIN GLYCOSYLATED A1C: CPT

## 2025-03-05 PROCEDURE — 85027 COMPLETE CBC AUTOMATED: CPT

## 2025-03-05 PROCEDURE — 88305 TISSUE EXAM BY PATHOLOGIST: CPT

## 2025-03-05 PROCEDURE — 82728 ASSAY OF FERRITIN: CPT

## 2025-03-05 PROCEDURE — 80048 BASIC METABOLIC PNL TOTAL CA: CPT

## 2025-03-05 PROCEDURE — 99223 1ST HOSP IP/OBS HIGH 75: CPT

## 2025-03-05 PROCEDURE — 71045 X-RAY EXAM CHEST 1 VIEW: CPT | Mod: 26

## 2025-03-05 PROCEDURE — 36415 COLL VENOUS BLD VENIPUNCTURE: CPT

## 2025-03-05 PROCEDURE — 83550 IRON BINDING TEST: CPT

## 2025-03-05 PROCEDURE — 82962 GLUCOSE BLOOD TEST: CPT

## 2025-03-05 PROCEDURE — 99285 EMERGENCY DEPT VISIT HI MDM: CPT | Mod: FS

## 2025-03-05 PROCEDURE — 84466 ASSAY OF TRANSFERRIN: CPT

## 2025-03-05 RX ORDER — SODIUM CHLORIDE 9 G/1000ML
1000 INJECTION, SOLUTION INTRAVENOUS
Refills: 0 | Status: DISCONTINUED | OUTPATIENT
Start: 2025-03-05 | End: 2025-03-08

## 2025-03-05 RX ORDER — MELATONIN 5 MG
3 TABLET ORAL AT BEDTIME
Refills: 0 | Status: DISCONTINUED | OUTPATIENT
Start: 2025-03-05 | End: 2025-03-08

## 2025-03-05 RX ORDER — GLUCAGON 3 MG/1
1 POWDER NASAL ONCE
Refills: 0 | Status: DISCONTINUED | OUTPATIENT
Start: 2025-03-05 | End: 2025-03-08

## 2025-03-05 RX ORDER — SACUBITRIL AND VALSARTAN 6; 6 MG/1; MG/1
1 PELLET ORAL
Refills: 0 | Status: DISCONTINUED | OUTPATIENT
Start: 2025-03-05 | End: 2025-03-08

## 2025-03-05 RX ORDER — INSULIN LISPRO 100 U/ML
INJECTION, SOLUTION INTRAVENOUS; SUBCUTANEOUS AT BEDTIME
Refills: 0 | Status: DISCONTINUED | OUTPATIENT
Start: 2025-03-05 | End: 2025-03-08

## 2025-03-05 RX ORDER — CARBIDOPA/LEVODOPA 25MG-100MG
1 TABLET ORAL
Refills: 0 | Status: DISCONTINUED | OUTPATIENT
Start: 2025-03-05 | End: 2025-03-08

## 2025-03-05 RX ORDER — SERTRALINE 100 MG/1
25 TABLET, FILM COATED ORAL DAILY
Refills: 0 | Status: DISCONTINUED | OUTPATIENT
Start: 2025-03-05 | End: 2025-03-08

## 2025-03-05 RX ORDER — FUROSEMIDE 10 MG/ML
40 INJECTION INTRAMUSCULAR; INTRAVENOUS ONCE
Refills: 0 | Status: COMPLETED | OUTPATIENT
Start: 2025-03-05 | End: 2025-03-05

## 2025-03-05 RX ORDER — METOPROLOL SUCCINATE 50 MG/1
50 TABLET, EXTENDED RELEASE ORAL
Refills: 0 | Status: DISCONTINUED | OUTPATIENT
Start: 2025-03-05 | End: 2025-03-08

## 2025-03-05 RX ORDER — ACETAMINOPHEN 500 MG/5ML
650 LIQUID (ML) ORAL EVERY 6 HOURS
Refills: 0 | Status: DISCONTINUED | OUTPATIENT
Start: 2025-03-05 | End: 2025-03-08

## 2025-03-05 RX ORDER — DEXTROSE 50 % IN WATER 50 %
25 SYRINGE (ML) INTRAVENOUS ONCE
Refills: 0 | Status: DISCONTINUED | OUTPATIENT
Start: 2025-03-05 | End: 2025-03-08

## 2025-03-05 RX ORDER — DEXTROSE 50 % IN WATER 50 %
12.5 SYRINGE (ML) INTRAVENOUS ONCE
Refills: 0 | Status: DISCONTINUED | OUTPATIENT
Start: 2025-03-05 | End: 2025-03-08

## 2025-03-05 RX ORDER — INSULIN LISPRO 100 U/ML
INJECTION, SOLUTION INTRAVENOUS; SUBCUTANEOUS
Refills: 0 | Status: DISCONTINUED | OUTPATIENT
Start: 2025-03-05 | End: 2025-03-08

## 2025-03-05 RX ORDER — METOPROLOL SUCCINATE 50 MG/1
50 TABLET, EXTENDED RELEASE ORAL ONCE
Refills: 0 | Status: COMPLETED | OUTPATIENT
Start: 2025-03-05 | End: 2025-03-05

## 2025-03-05 RX ORDER — ONDANSETRON HCL/PF 4 MG/2 ML
4 VIAL (ML) INJECTION EVERY 8 HOURS
Refills: 0 | Status: DISCONTINUED | OUTPATIENT
Start: 2025-03-05 | End: 2025-03-08

## 2025-03-05 RX ORDER — DEXTROSE 50 % IN WATER 50 %
15 SYRINGE (ML) INTRAVENOUS ONCE
Refills: 0 | Status: DISCONTINUED | OUTPATIENT
Start: 2025-03-05 | End: 2025-03-08

## 2025-03-05 RX ORDER — MAGNESIUM, ALUMINUM HYDROXIDE 200-200 MG
30 TABLET,CHEWABLE ORAL EVERY 4 HOURS
Refills: 0 | Status: DISCONTINUED | OUTPATIENT
Start: 2025-03-05 | End: 2025-03-08

## 2025-03-05 RX ORDER — NIFEDIPINE 30 MG
30 TABLET, EXTENDED RELEASE 24 HR ORAL DAILY
Refills: 0 | Status: DISCONTINUED | OUTPATIENT
Start: 2025-03-05 | End: 2025-03-08

## 2025-03-05 RX ORDER — SODIUM ZIRCONIUM CYCLOSILICATE 5 G/5G
10 POWDER, FOR SUSPENSION ORAL ONCE
Refills: 0 | Status: COMPLETED | OUTPATIENT
Start: 2025-03-05 | End: 2025-03-05

## 2025-03-05 RX ORDER — ATORVASTATIN CALCIUM 80 MG/1
40 TABLET, FILM COATED ORAL AT BEDTIME
Refills: 0 | Status: DISCONTINUED | OUTPATIENT
Start: 2025-03-05 | End: 2025-03-08

## 2025-03-05 RX ORDER — INSULIN GLARGINE-YFGN 100 [IU]/ML
20 INJECTION, SOLUTION SUBCUTANEOUS AT BEDTIME
Refills: 0 | Status: DISCONTINUED | OUTPATIENT
Start: 2025-03-05 | End: 2025-03-08

## 2025-03-05 RX ADMIN — Medication 80 MILLIGRAM(S): at 15:13

## 2025-03-05 RX ADMIN — METOPROLOL SUCCINATE 50 MILLIGRAM(S): 50 TABLET, EXTENDED RELEASE ORAL at 19:15

## 2025-03-06 ENCOUNTER — RESULT REVIEW (OUTPATIENT)
Age: 80
End: 2025-03-06

## 2025-03-06 LAB
A1C WITH ESTIMATED AVERAGE GLUCOSE RESULT: 6.1 % — HIGH (ref 4–5.6)
ALBUMIN SERPL ELPH-MCNC: 3.8 G/DL — SIGNIFICANT CHANGE UP (ref 3.5–5.2)
ALP SERPL-CCNC: 73 U/L — SIGNIFICANT CHANGE UP (ref 30–115)
ALT FLD-CCNC: 7 U/L — SIGNIFICANT CHANGE UP (ref 0–41)
ANION GAP SERPL CALC-SCNC: 14 MMOL/L — SIGNIFICANT CHANGE UP (ref 7–14)
AST SERPL-CCNC: 13 U/L — SIGNIFICANT CHANGE UP (ref 0–41)
BASOPHILS # BLD AUTO: 0.04 K/UL — SIGNIFICANT CHANGE UP (ref 0–0.2)
BASOPHILS NFR BLD AUTO: 0.4 % — SIGNIFICANT CHANGE UP (ref 0–1)
BILIRUB SERPL-MCNC: 0.5 MG/DL — SIGNIFICANT CHANGE UP (ref 0.2–1.2)
BUN SERPL-MCNC: 20 MG/DL — SIGNIFICANT CHANGE UP (ref 10–20)
CALCIUM SERPL-MCNC: 8.8 MG/DL — SIGNIFICANT CHANGE UP (ref 8.4–10.5)
CHLORIDE SERPL-SCNC: 96 MMOL/L — LOW (ref 98–110)
CHOLEST SERPL-MCNC: 118 MG/DL — SIGNIFICANT CHANGE UP
CO2 SERPL-SCNC: 19 MMOL/L — SIGNIFICANT CHANGE UP (ref 17–32)
CREAT SERPL-MCNC: 1 MG/DL — SIGNIFICANT CHANGE UP (ref 0.7–1.5)
EGFR: 58 ML/MIN/1.73M2 — LOW
EGFR: 58 ML/MIN/1.73M2 — LOW
EOSINOPHIL # BLD AUTO: 0.06 K/UL — SIGNIFICANT CHANGE UP (ref 0–0.7)
EOSINOPHIL NFR BLD AUTO: 0.6 % — SIGNIFICANT CHANGE UP (ref 0–8)
ESTIMATED AVERAGE GLUCOSE: 128 MG/DL — HIGH (ref 68–114)
FERRITIN SERPL-MCNC: 27 NG/ML — SIGNIFICANT CHANGE UP (ref 13–330)
GLUCOSE BLDC GLUCOMTR-MCNC: 116 MG/DL — HIGH (ref 70–99)
GLUCOSE BLDC GLUCOMTR-MCNC: 139 MG/DL — HIGH (ref 70–99)
GLUCOSE BLDC GLUCOMTR-MCNC: 150 MG/DL — HIGH (ref 70–99)
GLUCOSE BLDC GLUCOMTR-MCNC: 185 MG/DL — HIGH (ref 70–99)
GLUCOSE SERPL-MCNC: 104 MG/DL — HIGH (ref 70–99)
HCT VFR BLD CALC: 23.6 % — LOW (ref 37–47)
HCT VFR BLD CALC: 24.3 % — LOW (ref 37–47)
HDLC SERPL-MCNC: 44 MG/DL — LOW
HGB BLD-MCNC: 8 G/DL — LOW (ref 12–16)
HGB BLD-MCNC: 8.1 G/DL — LOW (ref 12–16)
IMM GRANULOCYTES NFR BLD AUTO: 0.5 % — HIGH (ref 0.1–0.3)
IRON SATN MFR SERPL: 27 % — SIGNIFICANT CHANGE UP (ref 15–50)
IRON SATN MFR SERPL: 69 UG/DL — SIGNIFICANT CHANGE UP (ref 35–150)
LDLC SERPL-MCNC: 46 MG/DL — SIGNIFICANT CHANGE UP
LIPID PNL WITH DIRECT LDL SERPL: 46 MG/DL — SIGNIFICANT CHANGE UP
LYMPHOCYTES # BLD AUTO: 1.83 K/UL — SIGNIFICANT CHANGE UP (ref 1.2–3.4)
LYMPHOCYTES # BLD AUTO: 17.1 % — LOW (ref 20.5–51.1)
MCHC RBC-ENTMCNC: 30.5 PG — SIGNIFICANT CHANGE UP (ref 27–31)
MCHC RBC-ENTMCNC: 30.9 PG — SIGNIFICANT CHANGE UP (ref 27–31)
MCHC RBC-ENTMCNC: 33.3 G/DL — SIGNIFICANT CHANGE UP (ref 32–37)
MCHC RBC-ENTMCNC: 33.9 G/DL — SIGNIFICANT CHANGE UP (ref 32–37)
MCV RBC AUTO: 91.1 FL — SIGNIFICANT CHANGE UP (ref 81–99)
MCV RBC AUTO: 91.4 FL — SIGNIFICANT CHANGE UP (ref 81–99)
MONOCYTES # BLD AUTO: 0.96 K/UL — HIGH (ref 0.1–0.6)
MONOCYTES NFR BLD AUTO: 9 % — SIGNIFICANT CHANGE UP (ref 1.7–9.3)
NEUTROPHILS # BLD AUTO: 7.75 K/UL — HIGH (ref 1.4–6.5)
NEUTROPHILS NFR BLD AUTO: 72.4 % — SIGNIFICANT CHANGE UP (ref 42.2–75.2)
NONHDLC SERPL-MCNC: 74 MG/DL — SIGNIFICANT CHANGE UP
NRBC BLD AUTO-RTO: 0 /100 WBCS — SIGNIFICANT CHANGE UP (ref 0–0)
NRBC BLD AUTO-RTO: 0 /100 WBCS — SIGNIFICANT CHANGE UP (ref 0–0)
PLATELET # BLD AUTO: 273 K/UL — SIGNIFICANT CHANGE UP (ref 130–400)
PLATELET # BLD AUTO: 289 K/UL — SIGNIFICANT CHANGE UP (ref 130–400)
PMV BLD: 9.4 FL — SIGNIFICANT CHANGE UP (ref 7.4–10.4)
PMV BLD: 9.4 FL — SIGNIFICANT CHANGE UP (ref 7.4–10.4)
POTASSIUM SERPL-MCNC: 4.3 MMOL/L — SIGNIFICANT CHANGE UP (ref 3.5–5)
POTASSIUM SERPL-SCNC: 4.3 MMOL/L — SIGNIFICANT CHANGE UP (ref 3.5–5)
PROT SERPL-MCNC: 5.6 G/DL — LOW (ref 6–8)
RBC # BLD: 2.59 M/UL — LOW (ref 4.2–5.4)
RBC # BLD: 2.66 M/UL — LOW (ref 4.2–5.4)
RBC # FLD: 15.3 % — HIGH (ref 11.5–14.5)
RBC # FLD: 15.8 % — HIGH (ref 11.5–14.5)
SODIUM SERPL-SCNC: 129 MMOL/L — LOW (ref 135–146)
TIBC SERPL-MCNC: 255 UG/DL — SIGNIFICANT CHANGE UP (ref 220–430)
TRANSFERRIN SERPL-MCNC: 220 MG/DL — SIGNIFICANT CHANGE UP (ref 200–360)
TRIGL SERPL-MCNC: 169 MG/DL — HIGH
UIBC SERPL-MCNC: 186 UG/DL — SIGNIFICANT CHANGE UP (ref 110–370)
WBC # BLD: 10.69 K/UL — SIGNIFICANT CHANGE UP (ref 4.8–10.8)
WBC # BLD: 9.21 K/UL — SIGNIFICANT CHANGE UP (ref 4.8–10.8)
WBC # FLD AUTO: 10.69 K/UL — SIGNIFICANT CHANGE UP (ref 4.8–10.8)
WBC # FLD AUTO: 9.21 K/UL — SIGNIFICANT CHANGE UP (ref 4.8–10.8)

## 2025-03-06 PROCEDURE — 43239 EGD BIOPSY SINGLE/MULTIPLE: CPT

## 2025-03-06 PROCEDURE — 88305 TISSUE EXAM BY PATHOLOGIST: CPT | Mod: 26

## 2025-03-06 PROCEDURE — 99232 SBSQ HOSP IP/OBS MODERATE 35: CPT

## 2025-03-06 PROCEDURE — 88312 SPECIAL STAINS GROUP 1: CPT | Mod: 26

## 2025-03-06 PROCEDURE — 88313 SPECIAL STAINS GROUP 2: CPT | Mod: 26

## 2025-03-06 PROCEDURE — 43255 EGD CONTROL BLEEDING ANY: CPT | Mod: XU

## 2025-03-06 PROCEDURE — 99223 1ST HOSP IP/OBS HIGH 75: CPT | Mod: 25

## 2025-03-06 RX ORDER — IRON SUCROSE 20 MG/ML
100 INJECTION, SOLUTION INTRAVENOUS EVERY 24 HOURS
Refills: 0 | Status: DISCONTINUED | OUTPATIENT
Start: 2025-03-06 | End: 2025-03-08

## 2025-03-06 RX ORDER — FOLIC ACID 1 MG/1
1 TABLET ORAL DAILY
Refills: 0 | Status: DISCONTINUED | OUTPATIENT
Start: 2025-03-06 | End: 2025-03-08

## 2025-03-06 RX ORDER — CYANOCOBALAMIN 1000 UG/ML
1000 INJECTION INTRAMUSCULAR; SUBCUTANEOUS ONCE
Refills: 0 | Status: DISCONTINUED | OUTPATIENT
Start: 2025-03-06 | End: 2025-03-08

## 2025-03-06 RX ADMIN — SERTRALINE 25 MILLIGRAM(S): 100 TABLET, FILM COATED ORAL at 11:00

## 2025-03-06 RX ADMIN — METOPROLOL SUCCINATE 50 MILLIGRAM(S): 50 TABLET, EXTENDED RELEASE ORAL at 06:05

## 2025-03-06 RX ADMIN — IRON SUCROSE 210 MILLIGRAM(S): 20 INJECTION, SOLUTION INTRAVENOUS at 18:23

## 2025-03-06 RX ADMIN — Medication 40 MILLIGRAM(S): at 06:05

## 2025-03-06 RX ADMIN — INSULIN GLARGINE-YFGN 20 UNIT(S): 100 INJECTION, SOLUTION SUBCUTANEOUS at 21:37

## 2025-03-06 RX ADMIN — FOLIC ACID 1 MILLIGRAM(S): 1 TABLET ORAL at 18:23

## 2025-03-06 RX ADMIN — Medication 650 MILLIGRAM(S): at 21:38

## 2025-03-06 RX ADMIN — METOPROLOL SUCCINATE 50 MILLIGRAM(S): 50 TABLET, EXTENDED RELEASE ORAL at 17:22

## 2025-03-06 RX ADMIN — ATORVASTATIN CALCIUM 40 MILLIGRAM(S): 80 TABLET, FILM COATED ORAL at 21:38

## 2025-03-06 RX ADMIN — SODIUM ZIRCONIUM CYCLOSILICATE 10 GRAM(S): 5 POWDER, FOR SUSPENSION ORAL at 01:45

## 2025-03-06 RX ADMIN — Medication 30 MILLIGRAM(S): at 06:05

## 2025-03-06 RX ADMIN — FUROSEMIDE 40 MILLIGRAM(S): 10 INJECTION INTRAMUSCULAR; INTRAVENOUS at 01:03

## 2025-03-06 RX ADMIN — INSULIN LISPRO 1: 100 INJECTION, SOLUTION INTRAVENOUS; SUBCUTANEOUS at 17:06

## 2025-03-06 RX ADMIN — Medication 1 TABLET(S): at 17:09

## 2025-03-06 RX ADMIN — Medication 40 MILLIGRAM(S): at 17:09

## 2025-03-06 RX ADMIN — SACUBITRIL AND VALSARTAN 1 TABLET(S): 6; 6 PELLET ORAL at 06:05

## 2025-03-06 RX ADMIN — Medication 1 TABLET(S): at 06:05

## 2025-03-06 RX ADMIN — Medication 3 MILLIGRAM(S): at 21:38

## 2025-03-06 RX ADMIN — SACUBITRIL AND VALSARTAN 1 TABLET(S): 6; 6 PELLET ORAL at 17:09

## 2025-03-07 LAB
ANION GAP SERPL CALC-SCNC: 15 MMOL/L — HIGH (ref 7–14)
BUN SERPL-MCNC: 13 MG/DL — SIGNIFICANT CHANGE UP (ref 10–20)
CALCIUM SERPL-MCNC: 8.6 MG/DL — SIGNIFICANT CHANGE UP (ref 8.4–10.5)
CHLORIDE SERPL-SCNC: 100 MMOL/L — SIGNIFICANT CHANGE UP (ref 98–110)
CO2 SERPL-SCNC: 22 MMOL/L — SIGNIFICANT CHANGE UP (ref 17–32)
CREAT SERPL-MCNC: 0.9 MG/DL — SIGNIFICANT CHANGE UP (ref 0.7–1.5)
EGFR: 65 ML/MIN/1.73M2 — SIGNIFICANT CHANGE UP
EGFR: 65 ML/MIN/1.73M2 — SIGNIFICANT CHANGE UP
GLUCOSE BLDC GLUCOMTR-MCNC: 125 MG/DL — HIGH (ref 70–99)
GLUCOSE BLDC GLUCOMTR-MCNC: 138 MG/DL — HIGH (ref 70–99)
GLUCOSE BLDC GLUCOMTR-MCNC: 191 MG/DL — HIGH (ref 70–99)
GLUCOSE BLDC GLUCOMTR-MCNC: 94 MG/DL — SIGNIFICANT CHANGE UP (ref 70–99)
GLUCOSE SERPL-MCNC: 69 MG/DL — LOW (ref 70–99)
HCT VFR BLD CALC: 24.4 % — LOW (ref 37–47)
HGB BLD-MCNC: 8.1 G/DL — LOW (ref 12–16)
MAGNESIUM SERPL-MCNC: 1.5 MG/DL — LOW (ref 1.8–2.4)
MCHC RBC-ENTMCNC: 30.6 PG — SIGNIFICANT CHANGE UP (ref 27–31)
MCHC RBC-ENTMCNC: 33.2 G/DL — SIGNIFICANT CHANGE UP (ref 32–37)
MCV RBC AUTO: 92.1 FL — SIGNIFICANT CHANGE UP (ref 81–99)
NRBC BLD AUTO-RTO: 0 /100 WBCS — SIGNIFICANT CHANGE UP (ref 0–0)
PLATELET # BLD AUTO: 294 K/UL — SIGNIFICANT CHANGE UP (ref 130–400)
PMV BLD: 9.8 FL — SIGNIFICANT CHANGE UP (ref 7.4–10.4)
POTASSIUM SERPL-MCNC: 3.9 MMOL/L — SIGNIFICANT CHANGE UP (ref 3.5–5)
POTASSIUM SERPL-SCNC: 3.9 MMOL/L — SIGNIFICANT CHANGE UP (ref 3.5–5)
RBC # BLD: 2.65 M/UL — LOW (ref 4.2–5.4)
RBC # FLD: 16.9 % — HIGH (ref 11.5–14.5)
SODIUM SERPL-SCNC: 137 MMOL/L — SIGNIFICANT CHANGE UP (ref 135–146)
WBC # BLD: 5.67 K/UL — SIGNIFICANT CHANGE UP (ref 4.8–10.8)
WBC # FLD AUTO: 5.67 K/UL — SIGNIFICANT CHANGE UP (ref 4.8–10.8)

## 2025-03-07 PROCEDURE — 99233 SBSQ HOSP IP/OBS HIGH 50: CPT

## 2025-03-07 RX ORDER — CALCIUM CARBONATE 750 MG/1
2 TABLET ORAL EVERY 6 HOURS
Refills: 0 | Status: DISCONTINUED | OUTPATIENT
Start: 2025-03-07 | End: 2025-03-08

## 2025-03-07 RX ORDER — ENOXAPARIN SODIUM 100 MG/ML
40 INJECTION SUBCUTANEOUS EVERY 24 HOURS
Refills: 0 | Status: DISCONTINUED | OUTPATIENT
Start: 2025-03-07 | End: 2025-03-08

## 2025-03-07 RX ORDER — MAGNESIUM SULFATE 500 MG/ML
2 SYRINGE (ML) INJECTION ONCE
Refills: 0 | Status: COMPLETED | OUTPATIENT
Start: 2025-03-07 | End: 2025-03-07

## 2025-03-07 RX ADMIN — Medication 1 TABLET(S): at 05:41

## 2025-03-07 RX ADMIN — Medication 25 GRAM(S): at 14:43

## 2025-03-07 RX ADMIN — Medication 40 MILLIGRAM(S): at 05:41

## 2025-03-07 RX ADMIN — METOPROLOL SUCCINATE 50 MILLIGRAM(S): 50 TABLET, EXTENDED RELEASE ORAL at 17:33

## 2025-03-07 RX ADMIN — Medication 1 TABLET(S): at 17:33

## 2025-03-07 RX ADMIN — SACUBITRIL AND VALSARTAN 1 TABLET(S): 6; 6 PELLET ORAL at 17:39

## 2025-03-07 RX ADMIN — IRON SUCROSE 210 MILLIGRAM(S): 20 INJECTION, SOLUTION INTRAVENOUS at 17:41

## 2025-03-07 RX ADMIN — SACUBITRIL AND VALSARTAN 1 TABLET(S): 6; 6 PELLET ORAL at 05:42

## 2025-03-07 RX ADMIN — ENOXAPARIN SODIUM 40 MILLIGRAM(S): 100 INJECTION SUBCUTANEOUS at 11:03

## 2025-03-07 RX ADMIN — Medication 30 MILLIGRAM(S): at 05:41

## 2025-03-07 RX ADMIN — SERTRALINE 25 MILLIGRAM(S): 100 TABLET, FILM COATED ORAL at 11:03

## 2025-03-07 RX ADMIN — CALCIUM CARBONATE 2 TABLET(S): 750 TABLET ORAL at 22:01

## 2025-03-07 RX ADMIN — INSULIN GLARGINE-YFGN 20 UNIT(S): 100 INJECTION, SOLUTION SUBCUTANEOUS at 22:02

## 2025-03-07 RX ADMIN — FOLIC ACID 1 MILLIGRAM(S): 1 TABLET ORAL at 11:03

## 2025-03-07 RX ADMIN — ATORVASTATIN CALCIUM 40 MILLIGRAM(S): 80 TABLET, FILM COATED ORAL at 22:02

## 2025-03-07 RX ADMIN — INSULIN LISPRO 1: 100 INJECTION, SOLUTION INTRAVENOUS; SUBCUTANEOUS at 17:40

## 2025-03-07 RX ADMIN — Medication 40 MILLIGRAM(S): at 17:33

## 2025-03-07 RX ADMIN — METOPROLOL SUCCINATE 50 MILLIGRAM(S): 50 TABLET, EXTENDED RELEASE ORAL at 05:41

## 2025-03-08 VITALS
HEART RATE: 78 BPM | DIASTOLIC BLOOD PRESSURE: 61 MMHG | SYSTOLIC BLOOD PRESSURE: 99 MMHG | OXYGEN SATURATION: 98 % | TEMPERATURE: 98 F | RESPIRATION RATE: 18 BRPM

## 2025-03-08 LAB
ANION GAP SERPL CALC-SCNC: 13 MMOL/L — SIGNIFICANT CHANGE UP (ref 7–14)
BUN SERPL-MCNC: 10 MG/DL — SIGNIFICANT CHANGE UP (ref 10–20)
CALCIUM SERPL-MCNC: 8.8 MG/DL — SIGNIFICANT CHANGE UP (ref 8.4–10.5)
CHLORIDE SERPL-SCNC: 99 MMOL/L — SIGNIFICANT CHANGE UP (ref 98–110)
CO2 SERPL-SCNC: 22 MMOL/L — SIGNIFICANT CHANGE UP (ref 17–32)
CREAT SERPL-MCNC: 1 MG/DL — SIGNIFICANT CHANGE UP (ref 0.7–1.5)
EGFR: 58 ML/MIN/1.73M2 — LOW
EGFR: 58 ML/MIN/1.73M2 — LOW
GLUCOSE BLDC GLUCOMTR-MCNC: 138 MG/DL — HIGH (ref 70–99)
GLUCOSE BLDC GLUCOMTR-MCNC: 152 MG/DL — HIGH (ref 70–99)
GLUCOSE BLDC GLUCOMTR-MCNC: 165 MG/DL — HIGH (ref 70–99)
GLUCOSE SERPL-MCNC: 120 MG/DL — HIGH (ref 70–99)
HCT VFR BLD CALC: 24.4 % — LOW (ref 37–47)
HGB BLD-MCNC: 8 G/DL — LOW (ref 12–16)
MAGNESIUM SERPL-MCNC: 2.1 MG/DL — SIGNIFICANT CHANGE UP (ref 1.8–2.4)
MCHC RBC-ENTMCNC: 30.8 PG — SIGNIFICANT CHANGE UP (ref 27–31)
MCHC RBC-ENTMCNC: 32.8 G/DL — SIGNIFICANT CHANGE UP (ref 32–37)
MCV RBC AUTO: 93.8 FL — SIGNIFICANT CHANGE UP (ref 81–99)
NRBC BLD AUTO-RTO: 0 /100 WBCS — SIGNIFICANT CHANGE UP (ref 0–0)
PLATELET # BLD AUTO: 332 K/UL — SIGNIFICANT CHANGE UP (ref 130–400)
PMV BLD: 9.4 FL — SIGNIFICANT CHANGE UP (ref 7.4–10.4)
POTASSIUM SERPL-MCNC: 4.4 MMOL/L — SIGNIFICANT CHANGE UP (ref 3.5–5)
POTASSIUM SERPL-SCNC: 4.4 MMOL/L — SIGNIFICANT CHANGE UP (ref 3.5–5)
RBC # BLD: 2.6 M/UL — LOW (ref 4.2–5.4)
RBC # FLD: 16.9 % — HIGH (ref 11.5–14.5)
SODIUM SERPL-SCNC: 134 MMOL/L — LOW (ref 135–146)
WBC # BLD: 6.12 K/UL — SIGNIFICANT CHANGE UP (ref 4.8–10.8)
WBC # FLD AUTO: 6.12 K/UL — SIGNIFICANT CHANGE UP (ref 4.8–10.8)

## 2025-03-08 RX ORDER — ENOXAPARIN SODIUM 100 MG/ML
70 INJECTION SUBCUTANEOUS EVERY 12 HOURS
Refills: 0 | Status: DISCONTINUED | OUTPATIENT
Start: 2025-03-08 | End: 2025-03-08

## 2025-03-08 RX ORDER — APIXABAN 2.5 MG/1
5 TABLET, FILM COATED ORAL EVERY 12 HOURS
Refills: 0 | Status: DISCONTINUED | OUTPATIENT
Start: 2025-03-08 | End: 2025-03-08

## 2025-03-08 RX ADMIN — INSULIN LISPRO 1: 100 INJECTION, SOLUTION INTRAVENOUS; SUBCUTANEOUS at 12:17

## 2025-03-08 RX ADMIN — IRON SUCROSE 210 MILLIGRAM(S): 20 INJECTION, SOLUTION INTRAVENOUS at 17:20

## 2025-03-08 RX ADMIN — METOPROLOL SUCCINATE 50 MILLIGRAM(S): 50 TABLET, EXTENDED RELEASE ORAL at 17:16

## 2025-03-08 RX ADMIN — Medication 30 MILLIGRAM(S): at 05:53

## 2025-03-08 RX ADMIN — Medication 40 MILLIGRAM(S): at 17:20

## 2025-03-08 RX ADMIN — METOPROLOL SUCCINATE 50 MILLIGRAM(S): 50 TABLET, EXTENDED RELEASE ORAL at 05:53

## 2025-03-08 RX ADMIN — Medication 1 TABLET(S): at 17:29

## 2025-03-08 RX ADMIN — Medication 1 TABLET(S): at 05:53

## 2025-03-08 RX ADMIN — FOLIC ACID 1 MILLIGRAM(S): 1 TABLET ORAL at 12:15

## 2025-03-08 RX ADMIN — SACUBITRIL AND VALSARTAN 1 TABLET(S): 6; 6 PELLET ORAL at 05:53

## 2025-03-08 RX ADMIN — APIXABAN 5 MILLIGRAM(S): 2.5 TABLET, FILM COATED ORAL at 17:15

## 2025-03-08 RX ADMIN — SERTRALINE 25 MILLIGRAM(S): 100 TABLET, FILM COATED ORAL at 12:15

## 2025-03-08 RX ADMIN — Medication 40 MILLIGRAM(S): at 05:53

## 2025-03-11 LAB — SURGICAL PATHOLOGY STUDY: SIGNIFICANT CHANGE UP

## 2025-03-15 DIAGNOSIS — G20.A1 PARKINSON'S DISEASE WITHOUT DYSKINESIA, WITHOUT MENTION OF FLUCTUATIONS: ICD-10-CM

## 2025-03-15 DIAGNOSIS — E78.5 HYPERLIPIDEMIA, UNSPECIFIED: ICD-10-CM

## 2025-03-15 DIAGNOSIS — E87.1 HYPO-OSMOLALITY AND HYPONATREMIA: ICD-10-CM

## 2025-03-15 DIAGNOSIS — K57.30 DIVERTICULOSIS OF LARGE INTESTINE WITHOUT PERFORATION OR ABSCESS WITHOUT BLEEDING: ICD-10-CM

## 2025-03-15 DIAGNOSIS — I50.30 UNSPECIFIED DIASTOLIC (CONGESTIVE) HEART FAILURE: ICD-10-CM

## 2025-03-15 DIAGNOSIS — G47.00 INSOMNIA, UNSPECIFIED: ICD-10-CM

## 2025-03-15 DIAGNOSIS — R06.00 DYSPNEA, UNSPECIFIED: ICD-10-CM

## 2025-03-15 DIAGNOSIS — Z79.4 LONG TERM (CURRENT) USE OF INSULIN: ICD-10-CM

## 2025-03-15 DIAGNOSIS — I11.0 HYPERTENSIVE HEART DISEASE WITH HEART FAILURE: ICD-10-CM

## 2025-03-15 DIAGNOSIS — I25.10 ATHEROSCLEROTIC HEART DISEASE OF NATIVE CORONARY ARTERY WITHOUT ANGINA PECTORIS: ICD-10-CM

## 2025-03-15 DIAGNOSIS — Z79.85 LONG-TERM (CURRENT) USE OF INJECTABLE NON-INSULIN ANTIDIABETIC DRUGS: ICD-10-CM

## 2025-03-15 DIAGNOSIS — Z79.01 LONG TERM (CURRENT) USE OF ANTICOAGULANTS: ICD-10-CM

## 2025-03-15 DIAGNOSIS — E11.9 TYPE 2 DIABETES MELLITUS WITHOUT COMPLICATIONS: ICD-10-CM

## 2025-03-15 DIAGNOSIS — Z91.040 LATEX ALLERGY STATUS: ICD-10-CM

## 2025-03-15 DIAGNOSIS — Z95.5 PRESENCE OF CORONARY ANGIOPLASTY IMPLANT AND GRAFT: ICD-10-CM

## 2025-03-15 DIAGNOSIS — K31.82 DIEULAFOY LESION (HEMORRHAGIC) OF STOMACH AND DUODENUM: ICD-10-CM

## 2025-03-15 DIAGNOSIS — Z91.011 ALLERGY TO MILK PRODUCTS: ICD-10-CM

## 2025-03-15 DIAGNOSIS — D62 ACUTE POSTHEMORRHAGIC ANEMIA: ICD-10-CM

## 2025-03-15 DIAGNOSIS — K44.9 DIAPHRAGMATIC HERNIA WITHOUT OBSTRUCTION OR GANGRENE: ICD-10-CM

## 2025-03-15 DIAGNOSIS — K25.9 GASTRIC ULCER, UNSPECIFIED AS ACUTE OR CHRONIC, WITHOUT HEMORRHAGE OR PERFORATION: ICD-10-CM

## 2025-03-21 ENCOUNTER — APPOINTMENT (OUTPATIENT)
Dept: GASTROENTEROLOGY | Facility: CLINIC | Age: 80
End: 2025-03-21
Payer: MEDICARE

## 2025-03-21 DIAGNOSIS — K20.80 OTHER ESOPHAGITIS WITHOUT BLEEDING: ICD-10-CM

## 2025-03-21 DIAGNOSIS — K25.9 GASTRIC ULCER, UNSPECIFIED AS ACUTE OR CHRONIC, W/OUT HEMORRHAGE OR PERFORATION: ICD-10-CM

## 2025-03-21 DIAGNOSIS — D50.9 IRON DEFICIENCY ANEMIA, UNSPECIFIED: ICD-10-CM

## 2025-03-21 PROCEDURE — 99213 OFFICE O/P EST LOW 20 MIN: CPT | Mod: 93

## 2025-05-10 ENCOUNTER — INPATIENT (INPATIENT)
Facility: HOSPITAL | Age: 80
LOS: 3 days | Discharge: HOME HEALTH | DRG: 378 | End: 2025-05-14
Attending: STUDENT IN AN ORGANIZED HEALTH CARE EDUCATION/TRAINING PROGRAM | Admitting: STUDENT IN AN ORGANIZED HEALTH CARE EDUCATION/TRAINING PROGRAM
Payer: MEDICARE

## 2025-05-10 VITALS
HEIGHT: 65 IN | HEART RATE: 83 BPM | DIASTOLIC BLOOD PRESSURE: 75 MMHG | RESPIRATION RATE: 18 BRPM | TEMPERATURE: 98 F | OXYGEN SATURATION: 100 % | SYSTOLIC BLOOD PRESSURE: 153 MMHG | WEIGHT: 184.09 LBS

## 2025-05-10 DIAGNOSIS — Z98.890 OTHER SPECIFIED POSTPROCEDURAL STATES: Chronic | ICD-10-CM

## 2025-05-10 DIAGNOSIS — D64.9 ANEMIA, UNSPECIFIED: ICD-10-CM

## 2025-05-10 DIAGNOSIS — Z95.5 PRESENCE OF CORONARY ANGIOPLASTY IMPLANT AND GRAFT: Chronic | ICD-10-CM

## 2025-05-10 LAB
ABO RH CONFIRMATION: SIGNIFICANT CHANGE UP
ALBUMIN SERPL ELPH-MCNC: 3.9 G/DL — SIGNIFICANT CHANGE UP (ref 3.5–5.2)
ALP SERPL-CCNC: 78 U/L — SIGNIFICANT CHANGE UP (ref 30–115)
ALT FLD-CCNC: 7 U/L — SIGNIFICANT CHANGE UP (ref 0–41)
ANION GAP SERPL CALC-SCNC: 15 MMOL/L — HIGH (ref 7–14)
APTT BLD: 34.5 SEC — SIGNIFICANT CHANGE UP (ref 27–39.2)
AST SERPL-CCNC: 15 U/L — SIGNIFICANT CHANGE UP (ref 0–41)
BASOPHILS # BLD AUTO: 0.12 K/UL — SIGNIFICANT CHANGE UP (ref 0–0.2)
BASOPHILS NFR BLD AUTO: 1.7 % — HIGH (ref 0–1)
BILIRUB SERPL-MCNC: <0.2 MG/DL — SIGNIFICANT CHANGE UP (ref 0.2–1.2)
BUN SERPL-MCNC: 15 MG/DL — SIGNIFICANT CHANGE UP (ref 10–20)
CALCIUM SERPL-MCNC: 8.6 MG/DL — SIGNIFICANT CHANGE UP (ref 8.4–10.5)
CHLORIDE SERPL-SCNC: 93 MMOL/L — LOW (ref 98–110)
CO2 SERPL-SCNC: 15 MMOL/L — LOW (ref 17–32)
CREAT SERPL-MCNC: 0.9 MG/DL — SIGNIFICANT CHANGE UP (ref 0.7–1.5)
EGFR: 65 ML/MIN/1.73M2 — SIGNIFICANT CHANGE UP
EGFR: 65 ML/MIN/1.73M2 — SIGNIFICANT CHANGE UP
EOSINOPHIL # BLD AUTO: 0.06 K/UL — SIGNIFICANT CHANGE UP (ref 0–0.7)
EOSINOPHIL NFR BLD AUTO: 0.9 % — SIGNIFICANT CHANGE UP (ref 0–8)
GAS PNL BLDV: SIGNIFICANT CHANGE UP
GLUCOSE BLDC GLUCOMTR-MCNC: 162 MG/DL — HIGH (ref 70–99)
GLUCOSE BLDC GLUCOMTR-MCNC: 191 MG/DL — HIGH (ref 70–99)
GLUCOSE BLDC GLUCOMTR-MCNC: 245 MG/DL — HIGH (ref 70–99)
GLUCOSE SERPL-MCNC: 220 MG/DL — HIGH (ref 70–99)
HCT VFR BLD CALC: 17.8 % — LOW (ref 37–47)
HGB BLD-MCNC: 5.8 G/DL — CRITICAL LOW (ref 12–16)
INR BLD: 1.38 RATIO — HIGH (ref 0.65–1.3)
LYMPHOCYTES # BLD AUTO: 1.08 K/UL — LOW (ref 1.2–3.4)
LYMPHOCYTES # BLD AUTO: 15.7 % — LOW (ref 20.5–51.1)
MCHC RBC-ENTMCNC: 29.4 PG — SIGNIFICANT CHANGE UP (ref 27–31)
MCHC RBC-ENTMCNC: 32.6 G/DL — SIGNIFICANT CHANGE UP (ref 32–37)
MCV RBC AUTO: 90.4 FL — SIGNIFICANT CHANGE UP (ref 81–99)
MONOCYTES # BLD AUTO: 0.29 K/UL — SIGNIFICANT CHANGE UP (ref 0.1–0.6)
MONOCYTES NFR BLD AUTO: 4.3 % — SIGNIFICANT CHANGE UP (ref 1.7–9.3)
NEUTROPHILS # BLD AUTO: 5.07 K/UL — SIGNIFICANT CHANGE UP (ref 1.4–6.5)
NEUTROPHILS NFR BLD AUTO: 73.9 % — SIGNIFICANT CHANGE UP (ref 42.2–75.2)
NT-PROBNP SERPL-SCNC: 1700 PG/ML — HIGH (ref 0–300)
PLATELET # BLD AUTO: 346 K/UL — SIGNIFICANT CHANGE UP (ref 130–400)
PMV BLD: 9 FL — SIGNIFICANT CHANGE UP (ref 7.4–10.4)
POTASSIUM SERPL-MCNC: 5.4 MMOL/L — HIGH (ref 3.5–5)
POTASSIUM SERPL-SCNC: 5.4 MMOL/L — HIGH (ref 3.5–5)
PROT SERPL-MCNC: 6.5 G/DL — SIGNIFICANT CHANGE UP (ref 6–8)
PROTHROM AB SERPL-ACNC: 16.4 SEC — HIGH (ref 9.95–12.87)
RBC # BLD: 1.97 M/UL — LOW (ref 4.2–5.4)
RBC # FLD: 15.9 % — HIGH (ref 11.5–14.5)
SODIUM SERPL-SCNC: 123 MMOL/L — LOW (ref 135–146)
WBC # BLD: 6.86 K/UL — SIGNIFICANT CHANGE UP (ref 4.8–10.8)
WBC # FLD AUTO: 6.86 K/UL — SIGNIFICANT CHANGE UP (ref 4.8–10.8)

## 2025-05-10 PROCEDURE — 83605 ASSAY OF LACTIC ACID: CPT

## 2025-05-10 PROCEDURE — 80048 BASIC METABOLIC PNL TOTAL CA: CPT

## 2025-05-10 PROCEDURE — 93306 TTE W/DOPPLER COMPLETE: CPT

## 2025-05-10 PROCEDURE — 97162 PT EVAL MOD COMPLEX 30 MIN: CPT | Mod: GP

## 2025-05-10 PROCEDURE — 99285 EMERGENCY DEPT VISIT HI MDM: CPT

## 2025-05-10 PROCEDURE — 99223 1ST HOSP IP/OBS HIGH 75: CPT

## 2025-05-10 PROCEDURE — 83036 HEMOGLOBIN GLYCOSYLATED A1C: CPT

## 2025-05-10 PROCEDURE — 85027 COMPLETE CBC AUTOMATED: CPT

## 2025-05-10 PROCEDURE — 85025 COMPLETE CBC W/AUTO DIFF WBC: CPT

## 2025-05-10 PROCEDURE — 88305 TISSUE EXAM BY PATHOLOGIST: CPT

## 2025-05-10 PROCEDURE — 36430 TRANSFUSION BLD/BLD COMPNT: CPT

## 2025-05-10 PROCEDURE — 71045 X-RAY EXAM CHEST 1 VIEW: CPT | Mod: 26

## 2025-05-10 PROCEDURE — 97161 PT EVAL LOW COMPLEX 20 MIN: CPT | Mod: GP

## 2025-05-10 PROCEDURE — P9016: CPT

## 2025-05-10 PROCEDURE — 84484 ASSAY OF TROPONIN QUANT: CPT

## 2025-05-10 PROCEDURE — 71045 X-RAY EXAM CHEST 1 VIEW: CPT

## 2025-05-10 PROCEDURE — 88312 SPECIAL STAINS GROUP 1: CPT

## 2025-05-10 PROCEDURE — 82962 GLUCOSE BLOOD TEST: CPT

## 2025-05-10 PROCEDURE — 80053 COMPREHEN METABOLIC PANEL: CPT

## 2025-05-10 PROCEDURE — 36415 COLL VENOUS BLD VENIPUNCTURE: CPT

## 2025-05-10 PROCEDURE — 93010 ELECTROCARDIOGRAM REPORT: CPT

## 2025-05-10 PROCEDURE — 83735 ASSAY OF MAGNESIUM: CPT

## 2025-05-10 RX ORDER — FUROSEMIDE 10 MG/ML
40 INJECTION INTRAMUSCULAR; INTRAVENOUS ONCE
Refills: 0 | Status: COMPLETED | OUTPATIENT
Start: 2025-05-10 | End: 2025-05-10

## 2025-05-10 RX ORDER — MAGNESIUM, ALUMINUM HYDROXIDE 200-200 MG
30 TABLET,CHEWABLE ORAL EVERY 4 HOURS
Refills: 0 | Status: DISCONTINUED | OUTPATIENT
Start: 2025-05-10 | End: 2025-05-14

## 2025-05-10 RX ORDER — SODIUM CHLORIDE 9 G/1000ML
1000 INJECTION, SOLUTION INTRAVENOUS
Refills: 0 | Status: DISCONTINUED | OUTPATIENT
Start: 2025-05-10 | End: 2025-05-14

## 2025-05-10 RX ORDER — METOPROLOL SUCCINATE 50 MG/1
50 TABLET, EXTENDED RELEASE ORAL
Refills: 0 | Status: DISCONTINUED | OUTPATIENT
Start: 2025-05-10 | End: 2025-05-14

## 2025-05-10 RX ORDER — NIFEDIPINE 30 MG
30 TABLET, EXTENDED RELEASE 24 HR ORAL DAILY
Refills: 0 | Status: DISCONTINUED | OUTPATIENT
Start: 2025-05-10 | End: 2025-05-14

## 2025-05-10 RX ORDER — CARBIDOPA/LEVODOPA 25MG-100MG
1 TABLET ORAL
Refills: 0 | Status: DISCONTINUED | OUTPATIENT
Start: 2025-05-10 | End: 2025-05-14

## 2025-05-10 RX ORDER — DEXTROSE 50 % IN WATER 50 %
25 SYRINGE (ML) INTRAVENOUS ONCE
Refills: 0 | Status: DISCONTINUED | OUTPATIENT
Start: 2025-05-10 | End: 2025-05-14

## 2025-05-10 RX ORDER — INSULIN GLARGINE-YFGN 100 [IU]/ML
10 INJECTION, SOLUTION SUBCUTANEOUS AT BEDTIME
Refills: 0 | Status: DISCONTINUED | OUTPATIENT
Start: 2025-05-10 | End: 2025-05-11

## 2025-05-10 RX ORDER — SACUBITRIL AND VALSARTAN 6; 6 MG/1; MG/1
1 PELLET ORAL
Refills: 0 | Status: DISCONTINUED | OUTPATIENT
Start: 2025-05-10 | End: 2025-05-14

## 2025-05-10 RX ORDER — DEXTROSE 50 % IN WATER 50 %
15 SYRINGE (ML) INTRAVENOUS ONCE
Refills: 0 | Status: DISCONTINUED | OUTPATIENT
Start: 2025-05-10 | End: 2025-05-14

## 2025-05-10 RX ORDER — INSULIN LISPRO 100 U/ML
INJECTION, SOLUTION INTRAVENOUS; SUBCUTANEOUS AT BEDTIME
Refills: 0 | Status: DISCONTINUED | OUTPATIENT
Start: 2025-05-10 | End: 2025-05-14

## 2025-05-10 RX ORDER — FUROSEMIDE 10 MG/ML
20 INJECTION INTRAMUSCULAR; INTRAVENOUS DAILY
Refills: 0 | Status: DISCONTINUED | OUTPATIENT
Start: 2025-05-10 | End: 2025-05-11

## 2025-05-10 RX ORDER — ATORVASTATIN CALCIUM 80 MG/1
40 TABLET, FILM COATED ORAL AT BEDTIME
Refills: 0 | Status: DISCONTINUED | OUTPATIENT
Start: 2025-05-10 | End: 2025-05-14

## 2025-05-10 RX ORDER — GLUCAGON 3 MG/1
1 POWDER NASAL ONCE
Refills: 0 | Status: DISCONTINUED | OUTPATIENT
Start: 2025-05-10 | End: 2025-05-14

## 2025-05-10 RX ORDER — ACETAMINOPHEN 500 MG/5ML
650 LIQUID (ML) ORAL EVERY 6 HOURS
Refills: 0 | Status: DISCONTINUED | OUTPATIENT
Start: 2025-05-10 | End: 2025-05-14

## 2025-05-10 RX ORDER — DEXTROSE 50 % IN WATER 50 %
12.5 SYRINGE (ML) INTRAVENOUS ONCE
Refills: 0 | Status: DISCONTINUED | OUTPATIENT
Start: 2025-05-10 | End: 2025-05-14

## 2025-05-10 RX ORDER — MELATONIN 5 MG
3 TABLET ORAL AT BEDTIME
Refills: 0 | Status: DISCONTINUED | OUTPATIENT
Start: 2025-05-10 | End: 2025-05-14

## 2025-05-10 RX ORDER — ONDANSETRON HCL/PF 4 MG/2 ML
4 VIAL (ML) INJECTION EVERY 8 HOURS
Refills: 0 | Status: DISCONTINUED | OUTPATIENT
Start: 2025-05-10 | End: 2025-05-14

## 2025-05-10 RX ORDER — INSULIN LISPRO 100 U/ML
INJECTION, SOLUTION INTRAVENOUS; SUBCUTANEOUS
Refills: 0 | Status: DISCONTINUED | OUTPATIENT
Start: 2025-05-10 | End: 2025-05-12

## 2025-05-10 RX ADMIN — Medication 650 MILLIGRAM(S): at 18:53

## 2025-05-10 RX ADMIN — Medication 1 TABLET(S): at 17:55

## 2025-05-10 RX ADMIN — FUROSEMIDE 40 MILLIGRAM(S): 10 INJECTION INTRAMUSCULAR; INTRAVENOUS at 16:55

## 2025-05-10 RX ADMIN — SACUBITRIL AND VALSARTAN 1 TABLET(S): 6; 6 PELLET ORAL at 22:27

## 2025-05-10 RX ADMIN — METOPROLOL SUCCINATE 50 MILLIGRAM(S): 50 TABLET, EXTENDED RELEASE ORAL at 17:55

## 2025-05-10 RX ADMIN — INSULIN GLARGINE-YFGN 10 UNIT(S): 100 INJECTION, SOLUTION SUBCUTANEOUS at 22:31

## 2025-05-10 RX ADMIN — ATORVASTATIN CALCIUM 40 MILLIGRAM(S): 80 TABLET, FILM COATED ORAL at 22:27

## 2025-05-10 RX ADMIN — Medication 40 MILLIGRAM(S): at 15:31

## 2025-05-10 NOTE — H&P ADULT - NSHPPHYSICALEXAM_GEN_ALL_CORE
VITALS:   T(C): 36.7 (05-10-25 @ 10:51), Max: 36.7 (05-10-25 @ 10:51)  HR: 83 (05-10-25 @ 10:51) (83 - 83)  BP: 153/75 (05-10-25 @ 10:51) (153/75 - 153/75)  RR: 18 (05-10-25 @ 10:51) (18 - 18)  SpO2: 100% (05-10-25 @ 10:51) (100% - 100%)    GENERAL: NAD, lying in bed   HEAD:  Atraumatic, normocephalic  EYES: EOMI, PERRLA, conjunctiva and sclera clear  ENT: Moist mucous membranes  NECK: Supple, no JVD  HEART: Regular rate and rhythm, no murmurs, rubs, or gallops  LUNGS: Unlabored respirations.  Clear to auscultation bilaterally, no crackles, wheezing, or rhonchi  ABDOMEN: Soft, Epigastric tenderness elicited on palpation;  nondistended, +BS. JODIE showing brown stool  EXTREMITIES: 2+ peripheral pulses bilaterally. No clubbing, cyanosis, or edema  NERVOUS SYSTEM:  A&Ox3, no focal deficits   SKIN: No rashes or lesions

## 2025-05-10 NOTE — ED PROVIDER NOTE - CLINICAL SUMMARY MEDICAL DECISION MAKING FREE TEXT BOX
Patient presented with worsening dyspnea, (+) hx anemia in the past with similar presentation requiring transfusion. Has also not been tolerating PO at home 2/2 generalized weakness. On arrival, patient otherwise HD stable, grossly neurovascularly intact, no acute respiratory distress at rest. EKG obtained and showed rate-controlled afib, non-specific st-t abnormalities but no STEMI. Obtained labs which revealed anemia to 5.8 (no evidence of bleeding on exam and abd completely non-tender with no reported pain), as well as marked hyponatremia to 123 and elevated troponin to 17. CXR showed b/l opacities, likely 2/2 fluid overload but no focal consolidations. Patient remained stable during ED course and was consented and transfused PRBCs. Patient will require admission for further management. Family agreeable with plan. HD stable at time of admission.

## 2025-05-10 NOTE — ED PROVIDER NOTE - NSICDXPASTMEDICALHX_GEN_ALL_CORE_FT
PAST MEDICAL HISTORY:  Atrial fibrillation, unspecified type     CAD (coronary artery disease)     Diabetes mellitus     Dieulafoy lesion of stomach     Gastric ulcer     H/O CHF     H/O Parkinson's disease     High cholesterol     HTN (hypertension)     S/P ablation of atrial fibrillation

## 2025-05-10 NOTE — PATIENT PROFILE ADULT - NSPROMEDSADMININFO_GEN_A_NUR
ERROL HERNANDES  : 1942 11:13:43  ACCOUNT:  [id number]  HOME PHONE:  [phone]  WORK PHONE:       See cardiology note written by Dr. Franklin, orders entered in Epic for SHAINA, CRP, RF, and ESR. Pt called and notified, will have blood drawn this week.      Dasha Coe RN     no concerns

## 2025-05-10 NOTE — ED PROVIDER NOTE - PROGRESS NOTE DETAILS
JV: Hgb 5.8. Discussed with patient and daughter results of labs and imaging. Consented for blood and two packs of rbcs ordered. MAR called and pt admitted to medicine team.

## 2025-05-10 NOTE — PATIENT PROFILE ADULT - LANGUAGE ASSISTANCE NEEDED
preferred family members. Family members were around. comfortable with family members/Yes-Patient/Caregiver accepts free interpretation services...

## 2025-05-10 NOTE — ED PROVIDER NOTE - DIFFERENTIAL DIAGNOSIS
Dyspnea, consider ACS vs PE vs dissection vs tamponade vs esophageal rupture vs pneumothorax vs pneumonia vs fluid overload vs anemia Differential Diagnosis

## 2025-05-10 NOTE — H&P ADULT - ASSESSMENT
Patient is a 79y-year-old Azerbaijani-speaking Female with a PMH s/f iron deficiency anemia secondary to dieulafoy lesion gastric ulcer (s/p endo clip 3/6/25) and atrial fibrillation (on Eliquis) status post ablation x2 and DCC as well as CHF, CAD status post 4 stents, hypertension, hyperlipidemia, diabetes and Parkinson disease who presents to the ED increased dyspnea on exertion and chest pain on exertion.    History obtained by healthcare proxy Mikhail Hughes who translated Azerbaijani. Patient was recently admitted 3/5-->3/8 for upper GI bleed where dieulafoy lesion/gastric ulcer were found on EGD and an endoclip was placed, she was given 2prbcs at the time. Since being discharged, patient has had intermittent shortness of breath; however for the last 5d the MONTES has been more persistent. Over the last 2 days prior to admission, her MONTES continued to worsen and was a/w with diffuse chest pain that resolved quickly with rest.    She vomited about a week ago nonbloody nonbilious fluid; the proxy reports she believes there may have been red stool, however she was not completely sure. Otherwise, patient denies any recent illnesses, fever, chills, nausea, no diarrhea.  She last took eliquis the evening of 5/9. Her cardiologist is Dr. Bhagat and her GI is Dr. Jha.      In the ED,  T(F): 98.1 (05-10 @ 10:51), Max: 98.1 (05-10 @ 10:51)  HR: 83 (05-10 @ 10:51) (83 - 83)  BP: 153/75 (05-10 @ 10:51) (153/75 - 153/75)  RR: 18 (05-10 @ 10:51) (18 - 18)  SpO2: 100% (05-10 @ 10:51) (100% - 100%)    Labs Significant for  Hgb 5.8; Hcrt 17.8; plt 346, PT 16, INR 1.38; ptt 34  vbgh 7.28, co2 35 hcor 16  Na 123 K 5.4  HCO3 15 AG 15 BUN 15    Trop 17; EKG shows afib with HR 90's, no ST elevations noted  JODIE negative for blood  2 units of blood were ordered for the patient    Patient is being admitted for suspected Upper GI bleed with chest pain secondary to symptomatic anemia   Patient is a 79y-year-old Paraguayan-speaking Female with a PMH s/f iron deficiency anemia secondary to dieulafoy lesion gastric ulcer (s/p endo clip 3/6/25) and atrial fibrillation (on Eliquis) status post ablation x2 and DCC as well as CHF, CAD status post 4 stents, hypertension, hyperlipidemia, diabetes and Parkinson disease who presents to the ED increased dyspnea on exertion and chest pain on exertion.    History obtained by healthcare proxy Mikhail Hughes who translated [place]ayan. Patient was recently admitted 3/5-->3/8 for upper GI bleed where dieulafoy lesion/gastric ulcer were found on EGD and an endoclip was placed, she was given 2prbcs at the time. Since being discharged, patient has had intermittent shortness of breath; however for the last 5d the MNOTES has been more persistent. Over the last 2 days prior to admission, her MONTES continued to worsen and was a/w with diffuse chest pain that resolved quickly with rest.    She vomited about a week ago nonbloody nonbilious fluid; the proxy reports she believes there may have been red stool, however she was not completely sure. Otherwise, patient denies any recent illnesses, fever, chills, nausea, no diarrhea.  She last took eliquis the evening of 5/9. Her cardiologist is Dr. Bhagat and her GI is Dr. Jha.    In the ED    Patient is being admitted for suspected Upper GI bleed with chest pain secondary to symptomatic anemia    #Acute blood loss anemia Suspected secondary to Upper GI bleed   #Chronic iron deficiency anemia  #Hx of dieulafoy lesion gastric ulcer (s/p endo clip 3/6/25)  - Hgb 5.8; Hcrt 17.8; plt 346, PT 16, INR 1.38; ptt 34   - JODIE negative for blood  - pt has epigastric pain; f/u lipase  - 2 units of blood were ordered for the patient  - keep type and screen active; keep Hgb >8  - clears for now, npo after midnight  - iv pantoprazole bid  - f/u GI recs    #chest pain secondary to symptomatic anemia  #Atrial fibrillation (on Eliquis) status post ablation x2 and DCC   #CAD status post 4 stents  #CHF,  - Trop 17; EKG shows afib with HR 90's, no ST elevations noted; bnp 1.7k  - CXR appears congested  - on exam, no crackles, no edema, saturating well on room air  - last tte in 2020  - repeat TTE  - one time 40 IV Lasix  - lower entresto to 24mg dose  - strict ins/outs,  AM CXR  - troponin 17 f/u repeat    #Hyponatremia  - Na 123  - likely due to volume overload vs siadh secondary to sertraline   - repeat bmp s/p fluids given in ED    #Hyperkalemia  #HAGMA   - K 5.4  - vbgh 7.28, co2 35 hco3 16  - repeat bmp after hydration     #hypertension,  - c/w home nifedipine  - keep parameters on meds and monitor closely for decompensation in s/o GI bleed    #hyperlipidemia  - c/w statin    #diabetes  - lantus 10 (half home dose while on clears)  - sliding scale    #Parkinson disease  - c/w home meds    -------------------------------------------------------------------------------------------------------------------------------------  #DVT PPX: hold eliquis (last dose 5/9 in evening)  #GI PPX: panto IV twice daily  #Diet: clears  #Code Status: restart DNR/DNI after possible endoscopy/colono   #Activity Order: aat  #Dispo/Needs: inpt    #Handoff  - f/u 8pm cbc, bmp, avoid overload, f/u GI final recs

## 2025-05-10 NOTE — H&P ADULT - ATTENDING COMMENTS
Patient is a 79y-year-old Sierra Leonean-speaking Female with a PMH s/f iron deficiency anemia secondary to dieulafoy lesion gastric ulcer (s/p endo clip 3/6/25) and atrial fibrillation (on Eliquis) status post ablation x2 and DCC as well as CHF, CAD status post 4 stents, hypertension, hyperlipidemia, diabetes and Parkinson disease who presents to the ED increased dyspnea on exertion and chest pain on exertion.    Vital Signs Last 24 Hrs  T(F): 98.1 (10 May 2025 10:51), Max: 98.1 (10 May 2025 10:51)  HR: 95 (10 May 2025 17:31) (83 - 95)  BP: 147/79 (10 May 2025 17:31) (147/79 - 153/75)  RR: 18 (10 May 2025 17:31) (18 - 18)  SpO2: 91% (10 May 2025 17:31) (91% - 100%)    PHYSICAL EXAM:  GENERAL: NAD, well-groomed, well-developed  HEAD:  Atraumatic, Normocephalic  EYES: EOMI, conjunctiva and sclera pale   ENMT: Moist mucous membranes, Good dentition, no thrush  NECK: Supple, No JVD  CHEST/LUNG: bilateral crackles, non-labored breathing  HEART: RRR; S1/S2, 2/6 murmur   ABDOMEN: Soft, Nontender, Nondistended; Bowel sounds present  VASCULAR: Normal pulses, Normal capillary refill  EXTREMITIES: No calf tenderness, No cyanosis, No edema  LYMPH: Normal; No lymphadenopathy noted  SKIN: Warm, Intact  PSYCH: Normal mood, Normal affect  NERVOUS SYSTEM:  A/O x3, Good concentration; CN 2-12 intact, No focal deficits    #Acute blood loss anemia Suspected secondary to Upper GI bleed   #Chronic iron deficiency anemia  #Hx of dieulafoy lesion gastric ulcer (s/p endo clip 3/6/25)  - Hgb 5.8; Hcrt 17.8; plt 346, PT 16, INR 1.38; ptt 34   - JODIE negative for blood  - pt has epigastric pain; f/u lipase  - 2 units of blood were ordered for the patient  - keep type and screen active; keep Hgb >8  - clears for now, npo after midnight  - iv pantoprazole bid  - f/u GI recs    #chest pain secondary to symptomatic anemia  #Atrial fibrillation (on Eliquis) status post ablation x2 and DCC   #CAD status post 4 stents  #CHF,  - Trop 17; EKG shows afib with HR 90's, no ST elevations noted; bnp 1.7k  - CXR appears congested  - on exam, no crackles, no edema, saturating well on room air  - last tte in 2020  - repeat TTE  - one time 40 IV Lasix  - lower entresto to 24mg dose  - strict ins/outs,  AM CXR  - troponin 17 f/u repeat  - Dr. Bhagat cardiologist consuled     #Hyponatremia  - Na 123  - likely due to volume overload vs siadh secondary to sertraline   - repeat bmp s/p fluids given in ED    #Hyperkalemia  #HAGMA   - K 5.4  - vbgh 7.28, co2 35 hco3 16  - repeat bmp after hydration     #hypertension,  - c/w home nifedipine  - keep parameters on meds and monitor closely for decompensation in s/o GI bleed    #hyperlipidemia  - c/w statin    #diabetes  - lantus 10 (half home dose while on clears)  - sliding scale    #Parkinson disease  - c/w home meds    -------------------------------------------------------------------------------------------------------------------------------------  #DVT PPX: hold eliquis (last dose 5/9 in evening)  #GI PPX: panto IV twice daily  #Diet: clears  #Code Status: full code   #Activity Order: aat  #Dispo/Needs: inpt  #FAmily: son updated beside 5/10  #Handoff  - f/u 8pm cbc, bmp, avoid overload, f/u GI final recs, cardio recs

## 2025-05-10 NOTE — ED PROVIDER NOTE - OBJECTIVE STATEMENT
79-year-old female with medical history A-fib status post ablation on Eliquis, hypertension, hyperlipidemia, diabetes, anemia with transfusions in the past presenting to the ED for shortness of breath.  Daughter says she noticed the shortness of breath 1 day ago and states that this is what happened a month ago when she was admitted to the hospital for anemia and had a hemoglobin of 5 at that time.  Denying any symptoms, denies nausea, vomiting, fever, chills, abdominal pain, blood in stool, urinary symptoms.

## 2025-05-10 NOTE — CONSULT NOTE ADULT - ATTENDING COMMENTS
Agree with above.  Melena with significant drop in H&H.  Likely upper GI bleed.  Previous history of gastric ulcer.  Significant cardiac history.  Will plan for upper endoscopy once medically optimized and based on clinical course.  On AC. Please obtain cardiac clearance.  Transfuse to keep hemoglobin greater than 8.  IV PPI twice daily.  NPO.  GI will continue to monitor.

## 2025-05-10 NOTE — ED ADULT TRIAGE NOTE - BP NONINVASIVE SYSTOLIC (MM HG)
It looks like he has one 8/16 which is not one month from his last visit , not sure how that happened but I will go ahead and send it 153

## 2025-05-10 NOTE — ED ADULT NURSE NOTE - NSFALLHARMRISKINTERV_ED_ALL_ED
Assistance OOB with selected safe patient handling equipment if applicable/Assistance with ambulation/Communicate risk of Fall with Harm to all staff, patient, and family/Monitor gait and stability/Provide visual cue: red socks, yellow wristband, yellow gown, etc/Reinforce activity limits and safety measures with patient and family/Bed in lowest position, wheels locked, appropriate side rails in place/Call bell, personal items and telephone in reach/Instruct patient to call for assistance before getting out of bed/chair/stretcher/Non-slip footwear applied when patient is off stretcher/[place] to call system/Physically safe environment - no spills, clutter or unnecessary equipment/Purposeful Proactive Rounding/Room/bathroom lighting operational, light cord in reach

## 2025-05-10 NOTE — H&P ADULT - CONVERSATION DETAILS
Introduced concept of goals of care with patient at bedside. Explained concept of "full code" including need for compressions if patient were to arrest and need for intubation if patient experiencing severe respiratory distress or inability to protect airway. Additionally, explained concepts of DNR and DNI.    At this time patient would like to be DNR and will allow intubation if needed for Gastrointestinal procedures. Introduced concept of goals of care with patient at bedside. Explained concept of "full code" including need for compressions if patient were to arrest and need for intubation if patient experiencing severe respiratory distress or inability to protect airway. Additionally, explained concepts of DNR and DNI.    At this time patient is okay with rescinding DNI to allow intubation if needed for Gastrointestinal procedures.

## 2025-05-10 NOTE — ED PROVIDER NOTE - CONSIDERATION OF ADMISSION OBSERVATION
Consideration of Admission/Observation Patient with (+) symptomatic anemia as well as hyponatremia. Will require admission

## 2025-05-10 NOTE — H&P ADULT - HISTORY OF PRESENT ILLNESS
Patient is a 79y-year-old [place]-speaking Female with a PMH s/f iron deficiency anemia secondary to dieulafoy lesion gastric ulcer (s/p endo clip 3/6/25) and atrial fibrillation (on Eliquis) status post ablation x2 and DCC as well as CHF, CAD status post 4 stents, hypertension, hyperlipidemia, diabetes and Parkinson disease who presents to the ED increased dyspnea on exertion and chest pain on exertion.    History obtained by healthcare proxy Mikhail Hughes who translated [place]. Patient was recently admitted 3/5-->3/8 for upper GI bleed where dieulafoy lesion/gastric ulcer were found on EGD and an endoclip was placed, she was given 2prbcs at the time. Since being discharged, patient has had intermittent shortness of breath; however for the last 5d the MONTES has been more persistent. Over the last 2 days prior to admission, her MONTES continued to worsen and was a/w with diffuse chest pain that resolved quickly with rest.    She vomited about a week ago nonbloody nonbilious fluid; the proxy reports she believes there may have been red stool, however she was not completely sure. Otherwise, patient denies any recent illnesses, fever, chills, nausea, no diarrhea.  She last took eliquis the evening of 5/9. Her cardiologist is Dr. Bhagat and her GI is Dr. Jha.      In the ED,  T(F): 98.1 (05-10 @ 10:51), Max: 98.1 (05-10 @ 10:51)  HR: 83 (05-10 @ 10:51) (83 - 83)  BP: 153/75 (05-10 @ 10:51) (153/75 - 153/75)  RR: 18 (05-10 @ 10:51) (18 - 18)  SpO2: 100% (05-10 @ 10:51) (100% - 100%)    Labs Significant for  Hgb 5.8; Hcrt 17.8; plt 346, PT 16, INR 1.38; ptt 34  vbgh 7.28, co2 35 hcor 16  Na 123 K 5.4  HCO3 15 AG 15 BUN 15    Trop 17; EKG shows afib with HR 90's, no ST elevations noted  JODIE negative for blood  2 units of blood were ordered for the patient    Patient is being admitted for suspected Upper GI bleed with chest pain secondary to symptomatic anemia

## 2025-05-10 NOTE — ED PROVIDER NOTE - PHYSICAL EXAMINATION
VITAL SIGNS: I have reviewed nursing notes and confirm.  CONSTITUTIONAL: increased WOB  SKIN: Warm dry, normal skin turgor  CARD: RRR, no murmurs, rubs or gallops  RESP: Clear to ausculation bilaterally.  No rales, rhonchi, or wheezing.  ABD: Soft, non-distended, non-tender, no rebound or guarding.

## 2025-05-11 LAB
ALBUMIN SERPL ELPH-MCNC: 4.3 G/DL — SIGNIFICANT CHANGE UP (ref 3.5–5.2)
ALP SERPL-CCNC: 87 U/L — SIGNIFICANT CHANGE UP (ref 30–115)
ALT FLD-CCNC: <5 U/L — SIGNIFICANT CHANGE UP (ref 0–41)
ANION GAP SERPL CALC-SCNC: 13 MMOL/L — SIGNIFICANT CHANGE UP (ref 7–14)
ANION GAP SERPL CALC-SCNC: 17 MMOL/L — HIGH (ref 7–14)
AST SERPL-CCNC: 13 U/L — SIGNIFICANT CHANGE UP (ref 0–41)
BASOPHILS # BLD AUTO: 0.05 K/UL — SIGNIFICANT CHANGE UP (ref 0–0.2)
BASOPHILS NFR BLD AUTO: 0.7 % — SIGNIFICANT CHANGE UP (ref 0–1)
BILIRUB SERPL-MCNC: 0.6 MG/DL — SIGNIFICANT CHANGE UP (ref 0.2–1.2)
BUN SERPL-MCNC: 12 MG/DL — SIGNIFICANT CHANGE UP (ref 10–20)
BUN SERPL-MCNC: 14 MG/DL — SIGNIFICANT CHANGE UP (ref 10–20)
CALCIUM SERPL-MCNC: 9.1 MG/DL — SIGNIFICANT CHANGE UP (ref 8.4–10.5)
CALCIUM SERPL-MCNC: 9.2 MG/DL — SIGNIFICANT CHANGE UP (ref 8.4–10.5)
CHLORIDE SERPL-SCNC: 92 MMOL/L — LOW (ref 98–110)
CHLORIDE SERPL-SCNC: 95 MMOL/L — LOW (ref 98–110)
CO2 SERPL-SCNC: 18 MMOL/L — SIGNIFICANT CHANGE UP (ref 17–32)
CO2 SERPL-SCNC: 20 MMOL/L — SIGNIFICANT CHANGE UP (ref 17–32)
CREAT SERPL-MCNC: 0.8 MG/DL — SIGNIFICANT CHANGE UP (ref 0.7–1.5)
CREAT SERPL-MCNC: 0.9 MG/DL — SIGNIFICANT CHANGE UP (ref 0.7–1.5)
EGFR: 65 ML/MIN/1.73M2 — SIGNIFICANT CHANGE UP
EGFR: 65 ML/MIN/1.73M2 — SIGNIFICANT CHANGE UP
EGFR: 75 ML/MIN/1.73M2 — SIGNIFICANT CHANGE UP
EGFR: 75 ML/MIN/1.73M2 — SIGNIFICANT CHANGE UP
EOSINOPHIL # BLD AUTO: 0.13 K/UL — SIGNIFICANT CHANGE UP (ref 0–0.7)
EOSINOPHIL NFR BLD AUTO: 1.8 % — SIGNIFICANT CHANGE UP (ref 0–8)
GLUCOSE BLDC GLUCOMTR-MCNC: 123 MG/DL — HIGH (ref 70–99)
GLUCOSE BLDC GLUCOMTR-MCNC: 127 MG/DL — HIGH (ref 70–99)
GLUCOSE BLDC GLUCOMTR-MCNC: 128 MG/DL — HIGH (ref 70–99)
GLUCOSE BLDC GLUCOMTR-MCNC: 132 MG/DL — HIGH (ref 70–99)
GLUCOSE BLDC GLUCOMTR-MCNC: 157 MG/DL — HIGH (ref 70–99)
GLUCOSE SERPL-MCNC: 166 MG/DL — HIGH (ref 70–99)
GLUCOSE SERPL-MCNC: 94 MG/DL — SIGNIFICANT CHANGE UP (ref 70–99)
HCT VFR BLD CALC: 24 % — LOW (ref 37–47)
HCT VFR BLD CALC: 26.4 % — LOW (ref 37–47)
HGB BLD-MCNC: 8.3 G/DL — LOW (ref 12–16)
HGB BLD-MCNC: 8.8 G/DL — LOW (ref 12–16)
IMM GRANULOCYTES NFR BLD AUTO: 0.5 % — HIGH (ref 0.1–0.3)
LACTATE SERPL-SCNC: 1.4 MMOL/L — SIGNIFICANT CHANGE UP (ref 0.7–2)
LYMPHOCYTES # BLD AUTO: 1.48 K/UL — SIGNIFICANT CHANGE UP (ref 1.2–3.4)
LYMPHOCYTES # BLD AUTO: 20 % — LOW (ref 20.5–51.1)
MAGNESIUM SERPL-MCNC: 1.5 MG/DL — LOW (ref 1.8–2.4)
MCHC RBC-ENTMCNC: 28.9 PG — SIGNIFICANT CHANGE UP (ref 27–31)
MCHC RBC-ENTMCNC: 30 PG — SIGNIFICANT CHANGE UP (ref 27–31)
MCHC RBC-ENTMCNC: 33.3 G/DL — SIGNIFICANT CHANGE UP (ref 32–37)
MCHC RBC-ENTMCNC: 34.6 G/DL — SIGNIFICANT CHANGE UP (ref 32–37)
MCV RBC AUTO: 86.6 FL — SIGNIFICANT CHANGE UP (ref 81–99)
MCV RBC AUTO: 86.8 FL — SIGNIFICANT CHANGE UP (ref 81–99)
MONOCYTES # BLD AUTO: 1.03 K/UL — HIGH (ref 0.1–0.6)
MONOCYTES NFR BLD AUTO: 13.9 % — HIGH (ref 1.7–9.3)
NEUTROPHILS # BLD AUTO: 4.66 K/UL — SIGNIFICANT CHANGE UP (ref 1.4–6.5)
NEUTROPHILS NFR BLD AUTO: 63.1 % — SIGNIFICANT CHANGE UP (ref 42.2–75.2)
NRBC BLD AUTO-RTO: 0 /100 WBCS — SIGNIFICANT CHANGE UP (ref 0–0)
NRBC BLD AUTO-RTO: 0 /100 WBCS — SIGNIFICANT CHANGE UP (ref 0–0)
PLATELET # BLD AUTO: 283 K/UL — SIGNIFICANT CHANGE UP (ref 130–400)
PLATELET # BLD AUTO: 332 K/UL — SIGNIFICANT CHANGE UP (ref 130–400)
PMV BLD: 8.6 FL — SIGNIFICANT CHANGE UP (ref 7.4–10.4)
PMV BLD: 9 FL — SIGNIFICANT CHANGE UP (ref 7.4–10.4)
POTASSIUM SERPL-MCNC: 4.4 MMOL/L — SIGNIFICANT CHANGE UP (ref 3.5–5)
POTASSIUM SERPL-MCNC: 4.5 MMOL/L — SIGNIFICANT CHANGE UP (ref 3.5–5)
POTASSIUM SERPL-SCNC: 4.4 MMOL/L — SIGNIFICANT CHANGE UP (ref 3.5–5)
POTASSIUM SERPL-SCNC: 4.5 MMOL/L — SIGNIFICANT CHANGE UP (ref 3.5–5)
PROT SERPL-MCNC: 6.5 G/DL — SIGNIFICANT CHANGE UP (ref 6–8)
RBC # BLD: 2.77 M/UL — LOW (ref 4.2–5.4)
RBC # BLD: 3.04 M/UL — LOW (ref 4.2–5.4)
RBC # FLD: 14.9 % — HIGH (ref 11.5–14.5)
RBC # FLD: 15.3 % — HIGH (ref 11.5–14.5)
SODIUM SERPL-SCNC: 125 MMOL/L — LOW (ref 135–146)
SODIUM SERPL-SCNC: 130 MMOL/L — LOW (ref 135–146)
TROPONIN T, HIGH SENSITIVITY RESULT: 18 NG/L — HIGH (ref 6–13)
WBC # BLD: 6.7 K/UL — SIGNIFICANT CHANGE UP (ref 4.8–10.8)
WBC # BLD: 7.39 K/UL — SIGNIFICANT CHANGE UP (ref 4.8–10.8)
WBC # FLD AUTO: 6.7 K/UL — SIGNIFICANT CHANGE UP (ref 4.8–10.8)
WBC # FLD AUTO: 7.39 K/UL — SIGNIFICANT CHANGE UP (ref 4.8–10.8)

## 2025-05-11 PROCEDURE — 71045 X-RAY EXAM CHEST 1 VIEW: CPT | Mod: 26

## 2025-05-11 PROCEDURE — 93306 TTE W/DOPPLER COMPLETE: CPT | Mod: 26

## 2025-05-11 PROCEDURE — 99233 SBSQ HOSP IP/OBS HIGH 50: CPT

## 2025-05-11 PROCEDURE — 99232 SBSQ HOSP IP/OBS MODERATE 35: CPT

## 2025-05-11 RX ORDER — INSULIN GLARGINE-YFGN 100 [IU]/ML
5 INJECTION, SOLUTION SUBCUTANEOUS AT BEDTIME
Refills: 0 | Status: DISCONTINUED | OUTPATIENT
Start: 2025-05-11 | End: 2025-05-14

## 2025-05-11 RX ORDER — SODIUM BICARBONATE 1 MEQ/ML
650 SYRINGE (ML) INTRAVENOUS THREE TIMES A DAY
Refills: 0 | Status: DISCONTINUED | OUTPATIENT
Start: 2025-05-11 | End: 2025-05-14

## 2025-05-11 RX ORDER — FUROSEMIDE 10 MG/ML
40 INJECTION INTRAMUSCULAR; INTRAVENOUS DAILY
Refills: 0 | Status: DISCONTINUED | OUTPATIENT
Start: 2025-05-11 | End: 2025-05-14

## 2025-05-11 RX ORDER — MAGNESIUM SULFATE 500 MG/ML
2 SYRINGE (ML) INJECTION ONCE
Refills: 0 | Status: COMPLETED | OUTPATIENT
Start: 2025-05-11 | End: 2025-05-11

## 2025-05-11 RX ADMIN — Medication 40 MILLIGRAM(S): at 06:05

## 2025-05-11 RX ADMIN — ATORVASTATIN CALCIUM 40 MILLIGRAM(S): 80 TABLET, FILM COATED ORAL at 22:20

## 2025-05-11 RX ADMIN — Medication 30 MILLIGRAM(S): at 06:06

## 2025-05-11 RX ADMIN — Medication 40 MILLIGRAM(S): at 18:17

## 2025-05-11 RX ADMIN — Medication 25 GRAM(S): at 13:33

## 2025-05-11 RX ADMIN — INSULIN GLARGINE-YFGN 5 UNIT(S): 100 INJECTION, SOLUTION SUBCUTANEOUS at 22:20

## 2025-05-11 RX ADMIN — FUROSEMIDE 40 MILLIGRAM(S): 10 INJECTION INTRAMUSCULAR; INTRAVENOUS at 13:33

## 2025-05-11 RX ADMIN — METOPROLOL SUCCINATE 50 MILLIGRAM(S): 50 TABLET, EXTENDED RELEASE ORAL at 06:06

## 2025-05-11 RX ADMIN — Medication 650 MILLIGRAM(S): at 13:33

## 2025-05-11 RX ADMIN — Medication 1 TABLET(S): at 06:06

## 2025-05-11 RX ADMIN — Medication 1 TABLET(S): at 18:17

## 2025-05-11 RX ADMIN — SACUBITRIL AND VALSARTAN 1 TABLET(S): 6; 6 PELLET ORAL at 18:17

## 2025-05-11 RX ADMIN — Medication 650 MILLIGRAM(S): at 22:20

## 2025-05-11 RX ADMIN — METOPROLOL SUCCINATE 50 MILLIGRAM(S): 50 TABLET, EXTENDED RELEASE ORAL at 18:17

## 2025-05-11 RX ADMIN — SACUBITRIL AND VALSARTAN 1 TABLET(S): 6; 6 PELLET ORAL at 06:06

## 2025-05-11 NOTE — PROGRESS NOTE ADULT - ASSESSMENT
79y-year-old Macedonian-speaking Female with a PMH s/f iron deficiency anemia secondary to dieulafoy lesion gastric ulcer (s/p endo clip 3/6/25) and atrial fibrillation (on Eliquis) status post ablation x2 and DCC as well as CHF, CAD status post 4 stents, hypertension, hyperlipidemia, diabetes and Parkinson disease who presents to the ED increased dyspnea on exertion and chest pain on exertion.    # Acute blood loss anemia secondary to Upper GI bleed   # H/o chronic iron deficiency anemia  # H/o  dieulafoy lesion gastric ulcer , s/p endo clip 3/6/25  - Hold eliquis  - JODIE negative for blood  - s/p 2  units PRBC  - protonix IV q12  - monitor cbc  - keep type and screen active; keep Hgb >8  - clears for now, npo after midnight  - GI EGD for evaluation of acute anemia and melena  - cardiac risk stratification     # Chest pain, MONTES  secondary to symptomatic anemia  # CAD status s/p 4 stents  # Chronic afib status post ablation x2 and DCC   # Chr systolic CHF  -  Xray Chest 1 View- PORTABLE-Routine (Xray Chest 1 View- PORTABLE-Routine in AM.) (05.11.25 @ 05:57) >Lung parenchyma/Pleura: Stable bilateral opacities. No definite pneumothorax.  - Troponin T, High Sensitivity Result: 1805.11.25 @ 01:30)  - c/w enresto, metoprolol, procardia, statin  - restart lasix  - eliquis held  - cardiology eval    # Hyponatremia  # Metabolic acidosis  # hypomagnesemia  - monitor BMP  - start Bicarb, replete mg    # DM type 2  - A1C with Estimated Average Glucose Result: 6.1. % (03.06.25 @ 05:31)  - monitor Fs  - c/w lantus, lispro    #Parkinson disease  - c/w home meds    # DNR/DNI    # Pending monitor cbc, BMP, cardiology eval, GI f/u  # Disposition: Home when stable

## 2025-05-11 NOTE — PROGRESS NOTE ADULT - ASSESSMENT
Patient is a 79y-year-old Kazakh-speaking Female with a PMH s/f iron deficiency anemia secondary to dieulafoy lesion gastric ulcer (s/p endo clip 3/6/25) and atrial fibrillation (on Eliquis) status post ablation x2 and DCC as well as CHF, CAD status post 4 stents, hypertension, hyperlipidemia, diabetes and Parkinson disease who presents to the ED increased dyspnea on exertion and chest pain on exertion. GI consulted for acute anemia and dark stools.     #Acute on chronic symptomatic anemia / dark stools - rule out upper GI bleed  # Hx Dieulafoy lesion of the pylorus and Fortino Class 3 ulcer on EGD 03/2025   - Hemodynamically stable & no active bleeding  - JODIE shows dark/marroon colored stool   - Baseline hemoglobin - 8  - Hemoglobin on admission - 5.8   - Coags - 1.38  - Last use of eliquis the evening of 5/9  - CATP negative for acute abdominal pathology   - Last EGD 03/2025: s/p EGD 3/6/25: Grade B esophagitis compatible with nonspecific erosive esophagitis. (Biopsy).  	Hiatal Hernia.  	Dieulafoy in the pylorus. (Endoclip, Thermal therapy).  	A 2mm clean based ulcer (Fortino Class III) was seen in the incisura.  	Erosions and ulceration in the stomach compatible with erosive gastritis.  	Normal mucosa in the whole examined duodenum.    #Rec  - CW pantoprazole 40 IV BID  - Keep clear liquid diet and NPO after midnight  - EGD tomorrow   - Please obtain cardiac risk stratification   - Maintain active Type and screen  - Trend H&H  - Please place x2 18G IVs  - Hold AC for now   - Please target Hb  >8  - Please avoid any NSAIDs  - NPO after midnight for procedure  - If any unstable bleed, please call GI stat

## 2025-05-11 NOTE — PROGRESS NOTE ADULT - ASSESSMENT
9y-year-old Uruguayan-speaking Female with a PMH s/f iron deficiency anemia secondary to dieulafoy lesion gastric ulcer (s/p endo clip 3/6/25) and atrial fibrillation (on Eliquis) status post ablation x2 and DCC as well as CHF, CAD status post 4 stents, hypertension, hyperlipidemia, diabetes and Parkinson disease who presents to the ED increased dyspnea on exertion and chest pain on exertion. Admitted for suspected Upper GI bleed with chest pain secondary to symptomatic anemia    #Acute blood loss anemia Suspected secondary to Upper GI bleed   #Chronic iron deficiency anemia  #Hx of dieulafoy lesion gastric ulcer (s/p endo clip 3/6/25)  - Hgb 5.8; Hcrt 17.8; plt 346, PT 16, INR 1.38; ptt 34   - JODIE negative for blood  - pt has epigastric pain;  lipase neg  - s/p 2 u pRBC  - keep type and screen active; keep Hgb >8  - clears for now, npo after midnight  - iv pantoprazole bid    #chest pain secondary to symptomatic anemia  #Atrial fibrillation (on Eliquis) status post ablation x2 and DCC   #CAD status post 4 stents  #CHF,  - Trop 17; EKG shows afib with HR 90's, no ST elevations noted; bnp 1.7k  - CXR appears congested  - on exam, no crackles, no edema, saturating well on room air  - last tte in 2020  - repeat TTE  - one time 40 IV Lasix  - lower entresto to 24mg dose  - strict ins/outs,  AM CXR  - troponin 17 repeat 18  - f/u cardio consult (also for preop risk stratification)    #Hyponatremia  - Na 123  - likely due to volume overload vs siadh secondary to sertraline   - repeat bmp s/p fluids 125    #Hyperkalemia  #HAGMA   - K 5.4  - vbgh 7.28, co2 35 hco3 16  - repeat bmp after hydration     #hypertension,  - c/w home nifedipine  - keep parameters on meds and monitor closely for decompensation in s/o GI bleed    #hyperlipidemia  - c/w statin    #diabetes  - lantus 10 (half home dose while on clears)  - sliding scale    #Parkinson disease  - c/w home meds    #MISC  DVT PPX: hold eliquis (last dose 5/9 in evening)  GI PPX: panto IV twice daily  Diet: clears  Code Status: restart DNR/DNI after possible endoscopy/colono       #Handoff  - f/u 8pm cbc, bmp, avoid overload, f/u GI final recs

## 2025-05-12 ENCOUNTER — RESULT REVIEW (OUTPATIENT)
Age: 80
End: 2025-05-12

## 2025-05-12 ENCOUNTER — TRANSCRIPTION ENCOUNTER (OUTPATIENT)
Age: 80
End: 2025-05-12

## 2025-05-12 LAB
A1C WITH ESTIMATED AVERAGE GLUCOSE RESULT: 5.5 % — SIGNIFICANT CHANGE UP (ref 4–5.6)
ALBUMIN SERPL ELPH-MCNC: 3.9 G/DL — SIGNIFICANT CHANGE UP (ref 3.5–5.2)
ALP SERPL-CCNC: 82 U/L — SIGNIFICANT CHANGE UP (ref 30–115)
ALT FLD-CCNC: <5 U/L — SIGNIFICANT CHANGE UP (ref 0–41)
ANION GAP SERPL CALC-SCNC: 18 MMOL/L — HIGH (ref 7–14)
AST SERPL-CCNC: 18 U/L — SIGNIFICANT CHANGE UP (ref 0–41)
BASOPHILS # BLD AUTO: 0.05 K/UL — SIGNIFICANT CHANGE UP (ref 0–0.2)
BASOPHILS NFR BLD AUTO: 0.7 % — SIGNIFICANT CHANGE UP (ref 0–1)
BILIRUB SERPL-MCNC: 0.4 MG/DL — SIGNIFICANT CHANGE UP (ref 0.2–1.2)
BUN SERPL-MCNC: 10 MG/DL — SIGNIFICANT CHANGE UP (ref 10–20)
CALCIUM SERPL-MCNC: 9.3 MG/DL — SIGNIFICANT CHANGE UP (ref 8.4–10.5)
CHLORIDE SERPL-SCNC: 99 MMOL/L — SIGNIFICANT CHANGE UP (ref 98–110)
CO2 SERPL-SCNC: 20 MMOL/L — SIGNIFICANT CHANGE UP (ref 17–32)
CREAT SERPL-MCNC: 1 MG/DL — SIGNIFICANT CHANGE UP (ref 0.7–1.5)
EGFR: 58 ML/MIN/1.73M2 — LOW
EGFR: 58 ML/MIN/1.73M2 — LOW
EOSINOPHIL # BLD AUTO: 0.23 K/UL — SIGNIFICANT CHANGE UP (ref 0–0.7)
EOSINOPHIL NFR BLD AUTO: 3.2 % — SIGNIFICANT CHANGE UP (ref 0–8)
ESTIMATED AVERAGE GLUCOSE: 111 MG/DL — SIGNIFICANT CHANGE UP (ref 68–114)
GLUCOSE BLDC GLUCOMTR-MCNC: 134 MG/DL — HIGH (ref 70–99)
GLUCOSE BLDC GLUCOMTR-MCNC: 154 MG/DL — HIGH (ref 70–99)
GLUCOSE BLDC GLUCOMTR-MCNC: 182 MG/DL — HIGH (ref 70–99)
GLUCOSE SERPL-MCNC: 99 MG/DL — SIGNIFICANT CHANGE UP (ref 70–99)
HCT VFR BLD CALC: 26.8 % — LOW (ref 37–47)
HGB BLD-MCNC: 8.9 G/DL — LOW (ref 12–16)
IMM GRANULOCYTES NFR BLD AUTO: 0.4 % — HIGH (ref 0.1–0.3)
LYMPHOCYTES # BLD AUTO: 1.87 K/UL — SIGNIFICANT CHANGE UP (ref 1.2–3.4)
LYMPHOCYTES # BLD AUTO: 26.4 % — SIGNIFICANT CHANGE UP (ref 20.5–51.1)
MAGNESIUM SERPL-MCNC: 1.8 MG/DL — SIGNIFICANT CHANGE UP (ref 1.8–2.4)
MCHC RBC-ENTMCNC: 29.3 PG — SIGNIFICANT CHANGE UP (ref 27–31)
MCHC RBC-ENTMCNC: 33.2 G/DL — SIGNIFICANT CHANGE UP (ref 32–37)
MCV RBC AUTO: 88.2 FL — SIGNIFICANT CHANGE UP (ref 81–99)
MONOCYTES # BLD AUTO: 1.06 K/UL — HIGH (ref 0.1–0.6)
MONOCYTES NFR BLD AUTO: 15 % — HIGH (ref 1.7–9.3)
NEUTROPHILS # BLD AUTO: 3.84 K/UL — SIGNIFICANT CHANGE UP (ref 1.4–6.5)
NEUTROPHILS NFR BLD AUTO: 54.3 % — SIGNIFICANT CHANGE UP (ref 42.2–75.2)
NRBC BLD AUTO-RTO: 0 /100 WBCS — SIGNIFICANT CHANGE UP (ref 0–0)
PLATELET # BLD AUTO: 211 K/UL — SIGNIFICANT CHANGE UP (ref 130–400)
PMV BLD: 10.1 FL — SIGNIFICANT CHANGE UP (ref 7.4–10.4)
POTASSIUM SERPL-MCNC: 4.4 MMOL/L — SIGNIFICANT CHANGE UP (ref 3.5–5)
POTASSIUM SERPL-SCNC: 4.4 MMOL/L — SIGNIFICANT CHANGE UP (ref 3.5–5)
PROT SERPL-MCNC: 6.1 G/DL — SIGNIFICANT CHANGE UP (ref 6–8)
RBC # BLD: 3.04 M/UL — LOW (ref 4.2–5.4)
RBC # FLD: 15.9 % — HIGH (ref 11.5–14.5)
SODIUM SERPL-SCNC: 137 MMOL/L — SIGNIFICANT CHANGE UP (ref 135–146)
WBC # BLD: 7.08 K/UL — SIGNIFICANT CHANGE UP (ref 4.8–10.8)
WBC # FLD AUTO: 7.08 K/UL — SIGNIFICANT CHANGE UP (ref 4.8–10.8)

## 2025-05-12 PROCEDURE — 43239 EGD BIOPSY SINGLE/MULTIPLE: CPT

## 2025-05-12 PROCEDURE — 88312 SPECIAL STAINS GROUP 1: CPT | Mod: 26

## 2025-05-12 PROCEDURE — 99233 SBSQ HOSP IP/OBS HIGH 50: CPT

## 2025-05-12 PROCEDURE — 71045 X-RAY EXAM CHEST 1 VIEW: CPT | Mod: 26

## 2025-05-12 PROCEDURE — 99223 1ST HOSP IP/OBS HIGH 75: CPT

## 2025-05-12 PROCEDURE — 88305 TISSUE EXAM BY PATHOLOGIST: CPT | Mod: 26

## 2025-05-12 RX ORDER — POLYETHYLENE GLYCOL-3350 AND ELECTROLYTES 236; 6.74; 5.86; 2.97; 22.74 G/274.31G; G/274.31G; G/274.31G; G/274.31G; G/274.31G
4000 POWDER, FOR SOLUTION ORAL ONCE
Refills: 0 | Status: COMPLETED | OUTPATIENT
Start: 2025-05-12 | End: 2025-05-12

## 2025-05-12 RX ORDER — BISACODYL 5 MG
10 TABLET, DELAYED RELEASE (ENTERIC COATED) ORAL AT BEDTIME
Refills: 0 | Status: COMPLETED | OUTPATIENT
Start: 2025-05-12 | End: 2025-05-12

## 2025-05-12 RX ADMIN — Medication 30 MILLIGRAM(S): at 05:57

## 2025-05-12 RX ADMIN — SACUBITRIL AND VALSARTAN 1 TABLET(S): 6; 6 PELLET ORAL at 06:01

## 2025-05-12 RX ADMIN — FUROSEMIDE 40 MILLIGRAM(S): 10 INJECTION INTRAMUSCULAR; INTRAVENOUS at 05:56

## 2025-05-12 RX ADMIN — METOPROLOL SUCCINATE 50 MILLIGRAM(S): 50 TABLET, EXTENDED RELEASE ORAL at 05:57

## 2025-05-12 RX ADMIN — POLYETHYLENE GLYCOL-3350 AND ELECTROLYTES 4000 MILLILITER(S): 236; 6.74; 5.86; 2.97; 22.74 POWDER, FOR SOLUTION ORAL at 20:18

## 2025-05-12 RX ADMIN — Medication 650 MILLIGRAM(S): at 21:36

## 2025-05-12 RX ADMIN — Medication 1 TABLET(S): at 18:40

## 2025-05-12 RX ADMIN — INSULIN LISPRO 2: 100 INJECTION, SOLUTION INTRAVENOUS; SUBCUTANEOUS at 08:24

## 2025-05-12 RX ADMIN — Medication 1 APPLICATION(S): at 21:50

## 2025-05-12 RX ADMIN — METOPROLOL SUCCINATE 50 MILLIGRAM(S): 50 TABLET, EXTENDED RELEASE ORAL at 18:40

## 2025-05-12 RX ADMIN — Medication 40 MILLIGRAM(S): at 18:40

## 2025-05-12 RX ADMIN — ATORVASTATIN CALCIUM 40 MILLIGRAM(S): 80 TABLET, FILM COATED ORAL at 21:33

## 2025-05-12 RX ADMIN — Medication 10 MILLIGRAM(S): at 21:34

## 2025-05-12 RX ADMIN — INSULIN GLARGINE-YFGN 5 UNIT(S): 100 INJECTION, SOLUTION SUBCUTANEOUS at 21:33

## 2025-05-12 RX ADMIN — Medication 650 MILLIGRAM(S): at 18:40

## 2025-05-12 RX ADMIN — Medication 1 TABLET(S): at 05:57

## 2025-05-12 RX ADMIN — Medication 40 MILLIGRAM(S): at 06:01

## 2025-05-12 RX ADMIN — Medication 650 MILLIGRAM(S): at 05:57

## 2025-05-12 NOTE — PROGRESS NOTE ADULT - ASSESSMENT
79y-year-old Vietnamese-speaking Female with a PMH s/f iron deficiency anemia secondary to dieulafoy lesion gastric ulcer (s/p endo clip 3/6/25) and atrial fibrillation (on Eliquis) status post ablation x2 and DCC as well as CHF, CAD status post 4 stents, hypertension, hyperlipidemia, diabetes and Parkinson disease who presents to the ED increased dyspnea on exertion and chest pain on exertion.    # Acute blood loss anemia secondary to Upper GI bleed   # H/o chronic iron deficiency anemia  # H/o  dieulafoy lesion gastric ulcer , s/p endo clip 3/6/25  - Hold eliquis  - JODIE negative for blood  - s/p 2  units PRBC  - protonix IV q12  - monitor cbc  - keep type and screen active; keep Hgb >8  -  NPO for procedure  - GI : EGD for evaluation of acute anemia and melena  - cardiac risk:  stratification  considered at low risk for the procedure    # Chest pain, MONTES  secondary to symptomatic anemia  # CAD status s/p 4 stents  # Chronic afib status post ablation x2 and DCC   # Chr systolic CHF  -  Xray Chest 1 View- PORTABLE-Routine (Xray Chest 1 View- PORTABLE-Routine in AM.) (05.11.25 @ 05:57) >Lung parenchyma/Pleura: Stable bilateral opacities. No definite pneumothorax.  - Troponin T, High Sensitivity Result: 1805.11.25 @ 01:30)  - c/w enresto, metoprolol, procardia, statin  - c/w  lasix  - eliquis held    # Hyponatremia- resolved  # Metabolic acidosis  # hypomagnesemia-resolved  - monitor BMP  - c/w  Bicarb  - monitor BMP    # DM type 2  - A1C with Estimated Average Glucose Result: 6.1. % (03.06.25 @ 05:31)  - monitor Fs  - c/w lantus, lispro    #Parkinson disease  - c/w home meds    # DNR/DNI    # Pending monitor cbc, BMP, EGD today, PT eval  # Disposition: Home when stable

## 2025-05-12 NOTE — PHYSICAL THERAPY INITIAL EVALUATION ADULT - GENERAL OBSERVATIONS, REHAB EVAL
[id number] pm. 78 y/o F received/left in bed, nad, + NGT, grandson present and translating t/o session. pt is A+Ox4 in [facility], following VC's, c/o fatigue. pt required CG for transfers and to ambulate 10 ft with RW (not able to ambulate further 2/2 fatigue and MONTES). vitals in standin/69 98% on RA, HR 90's - 130's (RN made aware).

## 2025-05-12 NOTE — PROCEDURE NOTE - ADDITIONAL PROCEDURE DETAILS
Pt requesting NGT for bowel prep for tomorrow colonoscopy. [employer]  ID#: [id number]    Urgent CXR ordered for placement confirmation

## 2025-05-12 NOTE — PHYSICAL THERAPY INITIAL EVALUATION ADULT - PERTINENT HX OF CURRENT PROBLEM, REHAB EVAL
79y-year-old Somali-speaking Female with a PMH s/f iron deficiency anemia secondary to dieulafoy lesion gastric ulcer (s/p endo clip 3/6/25) and atrial fibrillation (on Eliquis) status post ablation x2 and DCC as well as CHF, CAD status post 4 stents, hypertension, hyperlipidemia, diabetes and Parkinson disease who presents to the ED increased dyspnea on exertion and chest pain on exertion.

## 2025-05-12 NOTE — CONSULT NOTE ADULT - SUBJECTIVE AND OBJECTIVE BOX
Date of Admission:    CHIEF COMPLAINT:    HISTORY OF PRESENT ILLNESS: 79yFemale with PMH below presented to the hospital for worsening short of breath on mild exertion. found to me anemic and need EGD    PAST MEDICAL & SURGICAL HISTORY:  Atrial fibrillation, unspecified type      HTN (hypertension)      High cholesterol      Diabetes mellitus      S/P ablation of atrial fibrillation      Gastric ulcer      Dieulafoy lesion of stomach      CAD (coronary artery disease)      H/O CHF      H/O Parkinson's disease      History of back surgery      Coronary stent patent        HEALTH ISSUES - PROBLEM Dx:        FAMILY HISTORY:  FH: coronary artery disease (Sibling)      Allergies    Milk (Diarrhea)  doxycycline (Other)  adhesives (Pruritus; Rash)  latex (Rash)    Intolerances    	  Home Medications:  apixaban 5 mg oral tablet: 1 tab(s) orally every 12 hours (10 May 2025 15:05)  atorvastatin 40 mg oral tablet: 1 orally once a day (10 May 2025 15:05)  carbidopa-levodopa 25 mg-100 mg oral tablet: 1 tab(s) orally 2 times a day (10 May 2025 15:05)  Entresto 97 mg-103 mg oral tablet: 1 orally 2 times a day (10 May 2025 15:05)  ergocalciferol 50,000 intl units (1.25 mg) oral capsule: 1 cap(s) orally once a week (10 May 2025 15:05)  hydrocortisone 2.5% topical ointment: Apply topically to affected area 3 times a day Apply sparingly to the affected irritated areas of the chest where the telemetry pads were, 2-3 times a day for 4 days as needed. Do not occlude. (10 May 2025 15:05)  Lantus 100 units/mL subcutaneous solution: 20 unit(s) subcutaneous once a day (10 May 2025 15:05)  Lasix 20 mg oral tablet: 1 tab(s) orally once a day as needed for  pedal edema (10 May 2025 15:05)  metFORMIN 1000 mg oral tablet: 1 tab(s) orally 2 times a day (10 May 2025 15:05)  metoprolol tartrate 50 mg oral tablet: 1 orally 2 times a day (10 May 2025 15:05)  Ozempic (0.25 mg or 0.5 mg dose) 2 mg/1.5 mL subcutaneous solution: once a week on Tuesdays (10 May 2025 15:05)    MEDICATIONS  (STANDING):  atorvastatin 40 milliGRAM(s) Oral at bedtime  carbidopa/levodopa  25/100 1 Tablet(s) Oral <User Schedule>  chlorhexidine 2% Cloths 1 Application(s) Topical <User Schedule>  dextrose 5%. 1000 milliLiter(s) (100 mL/Hr) IV Continuous <Continuous>  dextrose 5%. 1000 milliLiter(s) (50 mL/Hr) IV Continuous <Continuous>  dextrose 50% Injectable 25 Gram(s) IV Push once  dextrose 50% Injectable 12.5 Gram(s) IV Push once  dextrose 50% Injectable 25 Gram(s) IV Push once  furosemide    Tablet 40 milliGRAM(s) Oral daily  glucagon  Injectable 1 milliGRAM(s) IntraMuscular once  insulin glargine Injectable (LANTUS) 5 Unit(s) SubCutaneous at bedtime  insulin lispro (ADMELOG) corrective regimen sliding scale   SubCutaneous three times a day before meals  insulin lispro (ADMELOG) corrective regimen sliding scale   SubCutaneous at bedtime  metoprolol tartrate 50 milliGRAM(s) Oral two times a day  NIFEdipine XL 30 milliGRAM(s) Oral daily  pantoprazole  Injectable 40 milliGRAM(s) IV Push every 12 hours  sacubitril 24 mG/valsartan 26 mG 1 Tablet(s) Oral two times a day  sodium bicarbonate 650 milliGRAM(s) Oral three times a day    MEDICATIONS  (PRN):  acetaminophen     Tablet .. 650 milliGRAM(s) Oral every 6 hours PRN Temp greater or equal to 38C (100.4F), Mild Pain (1 - 3)  aluminum hydroxide/magnesium hydroxide/simethicone Suspension 30 milliLiter(s) Oral every 4 hours PRN Dyspepsia  dextrose Oral Gel 15 Gram(s) Oral once PRN Blood Glucose LESS THAN 70 milliGRAM(s)/deciliter  melatonin 3 milliGRAM(s) Oral at bedtime PRN Insomnia  ondansetron Injectable 4 milliGRAM(s) IV Push every 8 hours PRN Nausea and/or Vomiting              SOCIAL HISTORY:    [ ] Non-smoker  [ ] Smoker  [ ] Alcohol      REVIEW OF SYSTEMS:  CONSTITUTIONAL: No fever, weight loss, or fatigue  CARDIOLOGY: PAtient denies chest pain, shortness of breath or syncopal episodes.   RESPIRATORY: denies shortness of breath, wheezeing.   NEUROLOGICAL: NO weakness, no focal deficits to report.  ENDOCRINOLOGICAL: no recent change in diabetic medications.   GI: no BRBPR, no N,V,diarrhea.    PSYCHIATRY: normal mood and affect  HEENT: no nasal discharge, no ecchymosis  SKIN: no ecchymosis, no breakdown  MUSCULOSKELETAL: Full range of motion x4.      PHYSICAL EXAM:  T(C): 36.9 (05-12-25 @ 07:30), Max: 36.9 (05-12-25 @ 07:30)  HR: 78 (05-12-25 @ 07:30) (78 - 84)  BP: 100/64 (05-12-25 @ 07:30) (100/64 - 153/78)  RR: 18 (05-12-25 @ 07:30) (18 - 18)  SpO2: 100% (05-12-25 @ 07:30) (95% - 100%)  Wt(kg): --  I&O's Summary    Daily Height/Length in cm: 165.1 (12 May 2025 06:41)    Daily     General Appearance: Normal	  Cardiovascular: Normal S1 S2, No JVD, No murmurs, No edema  Respiratory: Lungs clear to auscultation	  Psychiatry: A & O x 3, Mood & affect appropriate  Gastrointestinal:  Soft, Non-tender  Skin: No rashes, No ecchymoses, No cyanosis	  Neurologic: Non-focal  Extremities: Normal range of motion, No clubbing, cyanosis or edema  Vascular: Peripheral pulses palpable 2+ bilaterally        LABS:	 	                          8.9    7.08  )-----------( 211      ( 12 May 2025 04:22 )             26.8     05-12    137  |  99  |  10  ----------------------------<  99  4.4   |  20  |  1.0    Ca    9.3      12 May 2025 04:22  Mg     1.8     05-12    TPro  6.1  /  Alb  3.9  /  TBili  0.4  /  DBili  x   /  AST  18  /  ALT  <5  /  AlkPhos  82  05-12        PT/INR - ( 10 May 2025 11:40 )   PT: 16.40 sec;   INR: 1.38 ratio         PTT - ( 10 May 2025 11:40 )  PTT:34.5 sec    proBNP:   Lipid Profile:   HgA1c:   TSH:       CARDIAC MARKERS:            TELEMETRY EVENTS: 	    ECG:  	Afib  RADIOLOGY:  OTHER: 	    PREVIOUS DIAGNOSTIC TESTING:    [x ] Echocardiogram: EF55%  [ ]  Catheterization: Multiple PCI  [ ] Stress Test:  	  	  ASSESSMENT/PLAN: 	    
Gastroenterology Consultation:    Patient is a 79y old  Female who presents with a chief complaint of Dyspnea on exertion, Chest pain (10 May 2025 14:35)        Admitted on: 05-10-25      HPI:  Patient is a 79y-year-old Kittitian-speaking Female with a PMH s/f iron deficiency anemia secondary to dieulafoy lesion gastric ulcer (s/p endo clip 3/6/25) and atrial fibrillation (on Eliquis) status post ablation x2 and DCC as well as CHF, CAD status post 4 stents, hypertension, hyperlipidemia, diabetes and Parkinson disease who presents to the ED increased dyspnea on exertion and chest pain on exertion.    History obtained by healthcare proxy Mikhail Hughes who translated Kittitian. Patient was recently admitted 3/5-->3/8 for upper GI bleed where dieulafoy lesion/gastric ulcer were found on EGD and an endoclip was placed, she was given 2prbcs at the time. Since being discharged, patient has had intermittent shortness of breath; however for the last 5d the MONTES has been more persistent. Over the last 2 days prior to admission, her MONTES continued to worsen and was a/w with diffuse chest pain that resolved quickly with rest.    She vomited about a week ago nonbloody nonbilious fluid; the proxy reports she believes there may have been red stool, however she was not completely sure. Otherwise, patient denies any recent illnesses, fever, chills, nausea, no diarrhea.  She last took eliquis the evening of 5/9. Her cardiologist is Dr. Bhagat and her GI is Dr. Jha.    Labs Significant for  Hgb 5.8; Hcrt 17.8; plt 346, PT 16, INR 1.38; ptt 34  vbgh 7.28, co2 35 hcor 16  Na 123 K 5.4  HCO3 15 AG 15 BUN 15    Trop 17; EKG shows afib with HR 90's, no ST elevations noted  JODIE negative for blood  2 units of blood were ordered for the patient    Patient is being admitted for suspected Upper GI bleed with chest pain secondary to symptomatic anemia (10 May 2025 14:35)        Prior EGD:    Grade B esophagitis compatible with nonspecific erosive esophagitis. (Biopsy).      Hiatal Hernia.    Dieulafoy in the pylorus. (Endoclip).    A 2mm clean based ulcer (Fortino Class III) was seen in the incisura.    Erosions and ulceration in the stomach compatible with erosive gastritis.    Normal mucosa in the whole examined duodenum.    Using gastroscope, duodenum and proximal jejunum was examined with no active  bleeding.    Prior Colonoscopy 2023 :  Moderate diverticulosis of the the left side of the colon.    External hemorrhoids.    The colon was otherwise unremarkable.    PAST MEDICAL & SURGICAL HISTORY:  Atrial fibrillation, unspecified type      HTN (hypertension)      High cholesterol      Diabetes mellitus      S/P ablation of atrial fibrillation      Gastric ulcer      Dieulafoy lesion of stomach      CAD (coronary artery disease)      H/O CHF      H/O Parkinson's disease      History of back surgery      Coronary stent patent            FAMILY HISTORY:  FH: coronary artery disease (Sibling)        Social History:  Tobacco: denies  Alcohol: denies  Drugs: denies    Home Medications:  apixaban 5 mg oral tablet: 1 tab(s) orally every 12 hours (10 May 2025 15:05)  atorvastatin 40 mg oral tablet: 1 orally once a day (10 May 2025 15:05)  carbidopa-levodopa 25 mg-100 mg oral tablet: 1 tab(s) orally 2 times a day (10 May 2025 15:05)  Entresto 97 mg-103 mg oral tablet: 1 orally 2 times a day (10 May 2025 15:05)  ergocalciferol 50,000 intl units (1.25 mg) oral capsule: 1 cap(s) orally once a week (10 May 2025 15:05)  hydrocortisone 2.5% topical ointment: Apply topically to affected area 3 times a day Apply sparingly to the affected irritated areas of the chest where the telemetry pads were, 2-3 times a day for 4 days as needed. Do not occlude. (10 May 2025 15:05)  Lantus 100 units/mL subcutaneous solution: 20 unit(s) subcutaneous once a day (10 May 2025 15:05)  Lasix 20 mg oral tablet: 1 tab(s) orally once a day as needed for  pedal edema (10 May 2025 15:05)  metFORMIN 1000 mg oral tablet: 1 tab(s) orally 2 times a day (10 May 2025 15:05)  metoprolol tartrate 50 mg oral tablet: 1 orally 2 times a day (10 May 2025 15:05)  Ozempic (0.25 mg or 0.5 mg dose) 2 mg/1.5 mL subcutaneous solution: once a week on Tuesdays (10 May 2025 15:05)        MEDICATIONS  (STANDING):  atorvastatin 40 milliGRAM(s) Oral at bedtime  carbidopa/levodopa  25/100 1 Tablet(s) Oral <User Schedule>  dextrose 5%. 1000 milliLiter(s) (50 mL/Hr) IV Continuous <Continuous>  dextrose 5%. 1000 milliLiter(s) (100 mL/Hr) IV Continuous <Continuous>  dextrose 50% Injectable 25 Gram(s) IV Push once  dextrose 50% Injectable 12.5 Gram(s) IV Push once  dextrose 50% Injectable 25 Gram(s) IV Push once  glucagon  Injectable 1 milliGRAM(s) IntraMuscular once  insulin glargine Injectable (LANTUS) 10 Unit(s) SubCutaneous at bedtime  insulin lispro (ADMELOG) corrective regimen sliding scale   SubCutaneous three times a day before meals  insulin lispro (ADMELOG) corrective regimen sliding scale   SubCutaneous at bedtime  metoprolol tartrate 50 milliGRAM(s) Oral two times a day  NIFEdipine XL 30 milliGRAM(s) Oral daily  pantoprazole  Injectable 40 milliGRAM(s) IV Push every 12 hours  sacubitril 24 mG/valsartan 26 mG 1 Tablet(s) Oral two times a day    MEDICATIONS  (PRN):  acetaminophen     Tablet .. 650 milliGRAM(s) Oral every 6 hours PRN Temp greater or equal to 38C (100.4F), Mild Pain (1 - 3)  aluminum hydroxide/magnesium hydroxide/simethicone Suspension 30 milliLiter(s) Oral every 4 hours PRN Dyspepsia  dextrose Oral Gel 15 Gram(s) Oral once PRN Blood Glucose LESS THAN 70 milliGRAM(s)/deciliter  furosemide    Tablet 20 milliGRAM(s) Oral daily PRN for pedal edema  melatonin 3 milliGRAM(s) Oral at bedtime PRN Insomnia  ondansetron Injectable 4 milliGRAM(s) IV Push every 8 hours PRN Nausea and/or Vomiting      Allergies  Milk (Diarrhea)  doxycycline (Other)  adhesives (Pruritus; Rash)  latex (Rash)      Review of Systems:   Constitutional:  No Fever, No Chills  ENT/Mouth:  No Hearing Changes,  No Difficulty Swallowing  Eyes:  No Eye Pain, No Vision Changes  Cardiovascular:  No Chest Pain, No Palpitations  Respiratory:  No Cough, No Dyspnea  Gastrointestinal:  As described in HPI  Musculoskeletal:  No Joint Swelling, No Back Pain  Skin:  No Skin Lesions, No Jaundice  Neuro:  No Syncope, No Dizziness  Heme/Lymph:  No Bruising, No Bleeding.          Physical Examination:  T(C): 36.7 (05-10-25 @ 10:51), Max: 36.7 (05-10-25 @ 10:51)  HR: 95 (05-10-25 @ 17:31) (83 - 95)  BP: 147/79 (05-10-25 @ 17:31) (147/79 - 153/75)  RR: 19 (05-10-25 @ 18:15) (18 - 19)  SpO2: 94% (05-10-25 @ 18:15) (91% - 100%)  Height (cm): 165.1 (05-10-25 @ 10:51)  Weight (kg): 83.5 (05-10-25 @ 10:51)        GENERAL: AAOx3, no acute distress.  HEAD:  Atraumatic, Normocephalic  EYES: conjunctiva and sclera clear  NECK: Supple, no JVD or thyromegaly  CHEST/LUNG: Clear to auscultation bilaterally; No wheeze, rhonchi, or rales  HEART: Regular rate and rhythm; normal S1, S2, No murmurs.  ABDOMEN: Soft, nontender, nondistended; Bowel sounds present  NEUROLOGY: No asterixis or tremor.   SKIN: Intact, no jaundice        Data:                        5.8    6.86  )-----------( 346      ( 10 May 2025 11:30 )             17.8     Hgb Trend:  5.8  05-10-25 @ 11:30      05-10    123[L]  |  93[L]  |  15  ----------------------------<  220[H]  5.4[H]   |  15[L]  |  0.9    Ca    8.6      10 May 2025 11:30    TPro  6.5  /  Alb  3.9  /  TBili  <0.2  /  DBili  x   /  AST  15  /  ALT  7   /  AlkPhos  78  05-10    Liver panel trend:  TBili <0.2   /   AST 15   /   ALT 7   /   AlkP 78   /   Tptn 6.5   /   Alb 3.9    /   DBili --      05-10      PT/INR - ( 10 May 2025 11:40 )   PT: 16.40 sec;   INR: 1.38 ratio         PTT - ( 10 May 2025 11:40 )  PTT:34.5 sec        Radiology:

## 2025-05-12 NOTE — CHART NOTE - NSCHARTNOTEFT_GEN_A_CORE
PACU ANESTHESIA ADMISSION NOTE      Procedure:   Post op diagnosis:      ____  Intubated  TV:______       Rate: ______      FiO2: ______    _x___  Patent Airway    _x___  Full return of protective reflexes    _x___  Full recovery from anesthesia / back to baseline status    Vitals:  T(C): 36.9 (05-12-25 @ 15:43), Max: 37.1 (05-12-25 @ 13:44)  HR: 92 (05-12-25 @ 15:43) (70 - 93)  BP: 128/73 (05-12-25 @ 15:43) (100/64 - 153/78)  RR: 18 (05-12-25 @ 15:43) (18 - 18)  SpO2: 96% (05-12-25 @ 15:43) (96% - 100%)    Mental Status:  _x___ Awake   _____ Alert   _____ Drowsy   _____ Sedated    Nausea/Vomiting:  _x___  NO       ______Yes,   See Post - Op Orders         Pain Scale (0-10):  __0___    Treatment: _x___ None    ____ See Post - Op/PCA Orders    Post - Operative Fluids:   __x__ Oral   ____ See Post - Op Orders    Plan: Discharge:   _x___Home       _____Floor     _____Critical Care    _____  Other:_________________    Comments:  No anesthesia issues or complications noted.  Discharge when criteria met.

## 2025-05-12 NOTE — CONSULT NOTE ADULT - ASSESSMENT
Patient is a 79y-year-old [place]ian-speaking Female with a PMH s/f iron deficiency anemia secondary to dieulafoy lesion gastric ulcer (s/p endo clip 3/6/25) and atrial fibrillation (on Eliquis) status post ablation x2 and DCC as well as CHF, CAD status post 4 stents, hypertension, hyperlipidemia, diabetes and Parkinson disease who presents to the ED increased dyspnea on exertion and chest pain on exertion. GI consulted for acute anemia and dark stools.     #Acute on chronic symptomatic anemia / dark stools - rule out upper GI bleed  # Hx Dieulafoy lesion of the pylorus and Fortino Class 3 ulcer on EGD 03/2025   - Hemodynamically stable & no active bleeding  - JODIE shows dark/marroon colored stool   - Baseline hemoglobin - 8  - Hemoglobin on admission - 5.8   - Coags - 1.38  - Last use of eliquis the evening of 5/9  - CATP negative for acute abdominal pathology   - Last EGD 03/2025: s/p EGD 3/6/25: Grade B esophagitis compatible with nonspecific erosive esophagitis. (Biopsy).  	Hiatal Hernia.  	Dieulafoy in the pylorus. (Endoclip, Thermal therapy).  	A 2mm clean based ulcer (Fortino Class III) was seen in the incisura.  	Erosions and ulceration in the stomach compatible with erosive gastritis.  	Normal mucosa in the whole examined duodenum.    #Rec  - Please transfuse 2 unit PRBC   - Please start pantoprazole 40 IV BID  - Keep clear liquid diet  - Maintain active Type and screen  - Trend H&H BID  - Please place x2 18G IVs  - Patient will ideally benefit from EGD for evaluation of acute anemia and melena pending medical optimization  - Please obtain cardiac risk stratification   - Hold AC for now   - Please target Hb  >8  - Please avoid any NSAIDs  - NPO after midnight for procedure  - If any unstable bleed, please call GI stat    
Pre op evaluation  patient post EGD clip, still anemic and possible GI bleed  going for repeat EGD, Eliquis on Hold  She is considered at low risk for the procedure    HFpEf with chronic Afib  looks euvolemic  continue with GDMT

## 2025-05-12 NOTE — PROGRESS NOTE ADULT - ASSESSMENT
79y-year-old Welsh-speaking Female with a PMH s/f iron deficiency anemia secondary to dieulafoy lesion gastric ulcer (s/p endo clip 3/6/25) and atrial fibrillation (on Eliquis) status post ablation x2 and DCC as well as CHF, CAD status post 4 stents, hypertension, hyperlipidemia, diabetes and Parkinson disease who presents to the ED increased dyspnea on exertion and chest pain on exertion.    #Acute blood loss anemia secondary to Upper GI bleed   #H/o chronic iron deficiency anemia  #H/o dieulafoy lesion gastric ulcer , s/p endo clip 3/6/25  - eliquis on HOLD  - JODIE negative for blood  - s/p 2 units PRBC  - protonix IV q12  - monitor cbc daily  - keep type and screen active; keep Hgb >8 - today @ 8.9  - NPO after MN yesterday  - GI EGD today for evaluation of acute anemia and melena  - obtain cardiac risk stratification     #Chest pain, MONTES 2/2 symptomatic anemia  #CAD status s/p 4 stents  #Chronic afib status post ablation x2 and DCC   #Chronic systolic CHF  - Xray Chest 1 View- PORTABLE-Routine (Xray Chest 1 View- PORTABLE-Routine in AM.) (05.11.25 @ 05:57) >Lung parenchyma/Pleura: Stable bilateral opacities. No definite pneumothorax.  - Troponin T, High Sensitivity Result: 1805.11.25 @ 01:30)  - c/w enresto, metoprolol, procardia, statin  - resumed lasix  - eliquis held  - cardiology eval    #Hyponatremia, improved - 137 today  #Metabolic acidosis  #Hypomagnesemia, improved - 1.8 today  - monitor BMP  - started Bicarb, repleted mg    #DM type 2  - A1C with Estimated Average Glucose Result: 6.1. % (03.06.25 @ 05:31)  - monitor FS  - c/w lantus, lispro    #Parkinson disease  - c/w home meds    #DNR/DNI    #Dispo: cardio clearance; EGD today per GI; monitor CBC daily; GI f/u

## 2025-05-13 ENCOUNTER — TRANSCRIPTION ENCOUNTER (OUTPATIENT)
Age: 80
End: 2025-05-13

## 2025-05-13 ENCOUNTER — RESULT REVIEW (OUTPATIENT)
Age: 80
End: 2025-05-13

## 2025-05-13 LAB
BASOPHILS # BLD AUTO: 0.07 K/UL — SIGNIFICANT CHANGE UP (ref 0–0.2)
BASOPHILS NFR BLD AUTO: 0.8 % — SIGNIFICANT CHANGE UP (ref 0–1)
EOSINOPHIL # BLD AUTO: 0.18 K/UL — SIGNIFICANT CHANGE UP (ref 0–0.7)
EOSINOPHIL NFR BLD AUTO: 2.1 % — SIGNIFICANT CHANGE UP (ref 0–8)
GLUCOSE BLDC GLUCOMTR-MCNC: 131 MG/DL — HIGH (ref 70–99)
GLUCOSE BLDC GLUCOMTR-MCNC: 157 MG/DL — HIGH (ref 70–99)
GLUCOSE BLDC GLUCOMTR-MCNC: 169 MG/DL — HIGH (ref 70–99)
GLUCOSE BLDC GLUCOMTR-MCNC: 195 MG/DL — HIGH (ref 70–99)
GLUCOSE BLDC GLUCOMTR-MCNC: 198 MG/DL — HIGH (ref 70–99)
HCT VFR BLD CALC: 27.1 % — LOW (ref 37–47)
HGB BLD-MCNC: 9.1 G/DL — LOW (ref 12–16)
IMM GRANULOCYTES NFR BLD AUTO: 0.4 % — HIGH (ref 0.1–0.3)
LYMPHOCYTES # BLD AUTO: 1.53 K/UL — SIGNIFICANT CHANGE UP (ref 1.2–3.4)
LYMPHOCYTES # BLD AUTO: 17.9 % — LOW (ref 20.5–51.1)
MCHC RBC-ENTMCNC: 29.6 PG — SIGNIFICANT CHANGE UP (ref 27–31)
MCHC RBC-ENTMCNC: 33.6 G/DL — SIGNIFICANT CHANGE UP (ref 32–37)
MCV RBC AUTO: 88.3 FL — SIGNIFICANT CHANGE UP (ref 81–99)
MONOCYTES # BLD AUTO: 1.14 K/UL — HIGH (ref 0.1–0.6)
MONOCYTES NFR BLD AUTO: 13.4 % — HIGH (ref 1.7–9.3)
NEUTROPHILS # BLD AUTO: 5.58 K/UL — SIGNIFICANT CHANGE UP (ref 1.4–6.5)
NEUTROPHILS NFR BLD AUTO: 65.4 % — SIGNIFICANT CHANGE UP (ref 42.2–75.2)
NRBC BLD AUTO-RTO: 0 /100 WBCS — SIGNIFICANT CHANGE UP (ref 0–0)
PLATELET # BLD AUTO: 288 K/UL — SIGNIFICANT CHANGE UP (ref 130–400)
PMV BLD: 10.2 FL — SIGNIFICANT CHANGE UP (ref 7.4–10.4)
RBC # BLD: 3.07 M/UL — LOW (ref 4.2–5.4)
RBC # FLD: 16 % — HIGH (ref 11.5–14.5)
WBC # BLD: 8.53 K/UL — SIGNIFICANT CHANGE UP (ref 4.8–10.8)
WBC # FLD AUTO: 8.53 K/UL — SIGNIFICANT CHANGE UP (ref 4.8–10.8)

## 2025-05-13 PROCEDURE — 99233 SBSQ HOSP IP/OBS HIGH 50: CPT

## 2025-05-13 PROCEDURE — 99497 ADVNCD CARE PLAN 30 MIN: CPT | Mod: 25

## 2025-05-13 PROCEDURE — 45380 COLONOSCOPY AND BIOPSY: CPT

## 2025-05-13 RX ORDER — SODIUM CHLORIDE 9 G/1000ML
1000 INJECTION, SOLUTION INTRAVENOUS
Refills: 0 | Status: DISCONTINUED | OUTPATIENT
Start: 2025-05-13 | End: 2025-05-14

## 2025-05-13 RX ORDER — APIXABAN 2.5 MG/1
5 TABLET, FILM COATED ORAL EVERY 12 HOURS
Refills: 0 | Status: DISCONTINUED | OUTPATIENT
Start: 2025-05-14 | End: 2025-05-14

## 2025-05-13 RX ADMIN — Medication 650 MILLIGRAM(S): at 19:50

## 2025-05-13 RX ADMIN — METOPROLOL SUCCINATE 50 MILLIGRAM(S): 50 TABLET, EXTENDED RELEASE ORAL at 19:52

## 2025-05-13 RX ADMIN — INSULIN GLARGINE-YFGN 5 UNIT(S): 100 INJECTION, SOLUTION SUBCUTANEOUS at 22:30

## 2025-05-13 RX ADMIN — Medication 30 MILLIGRAM(S): at 06:18

## 2025-05-13 RX ADMIN — Medication 650 MILLIGRAM(S): at 22:30

## 2025-05-13 RX ADMIN — FUROSEMIDE 40 MILLIGRAM(S): 10 INJECTION INTRAMUSCULAR; INTRAVENOUS at 06:20

## 2025-05-13 RX ADMIN — Medication 650 MILLIGRAM(S): at 06:18

## 2025-05-13 RX ADMIN — SACUBITRIL AND VALSARTAN 1 TABLET(S): 6; 6 PELLET ORAL at 06:18

## 2025-05-13 RX ADMIN — Medication 1 TABLET(S): at 06:15

## 2025-05-13 RX ADMIN — Medication 40 MILLIGRAM(S): at 06:18

## 2025-05-13 RX ADMIN — Medication 1 TABLET(S): at 19:50

## 2025-05-13 RX ADMIN — Medication 40 MILLIGRAM(S): at 19:51

## 2025-05-13 RX ADMIN — Medication 1 APPLICATION(S): at 06:19

## 2025-05-13 RX ADMIN — ATORVASTATIN CALCIUM 40 MILLIGRAM(S): 80 TABLET, FILM COATED ORAL at 22:30

## 2025-05-13 RX ADMIN — METOPROLOL SUCCINATE 50 MILLIGRAM(S): 50 TABLET, EXTENDED RELEASE ORAL at 06:18

## 2025-05-13 RX ADMIN — SODIUM CHLORIDE 60 MILLILITER(S): 9 INJECTION, SOLUTION INTRAVENOUS at 11:53

## 2025-05-13 NOTE — DIETITIAN INITIAL EVALUATION ADULT - OTHER INFO
#Acute blood loss anemia 2/2 Upper GI bleed   #H/o chronic iron deficiency anemia  #H/o dieulafoy lesion gastric ulcer , s/p endo clip 3/6/25  #Hyponatremia, improved  #Metabolic acidosis  #Hypomagnesemia, improved  #Parkinson disease  #DM type 2

## 2025-05-13 NOTE — CHART NOTE - NSCHARTNOTEFT_GEN_A_CORE
PACU ANESTHESIA ADMISSION NOTE      Procedure:   Post op diagnosis:      ____  Intubated  TV:______       Rate: ______      FiO2: ______    __x__  Patent Airway    __x__  Full return of protective reflexes    __x__  Full recovery from anesthesia / back to baseline status    Vitals:  T(C): 36.8 (05-13-25 @ 13:21), Max: 36.9 (05-12-25 @ 15:43)  HR: 90 (05-13-25 @ 13:21) (73 - 92)  BP: 130/84 (05-13-25 @ 13:21) (119/61 - 188/72)  RR: 18 (05-13-25 @ 13:21) (18 - 18)  SpO2: 100% (05-13-25 @ 13:21) (96% - 100%)    Mental Status:  __x__ Awake   ___x__ Alert   _____ Drowsy   _____ Sedated    Nausea/Vomiting:  __x__ NO  ______Yes,   See Post - Op Orders          Pain Scale (0-10):  __0___    Treatment: ____ None    __x__ See Post - Op/PCA Orders    Post - Operative Fluids:   ____ Oral   __x__ See Post - Op Orders    Plan: Discharge:   ____Home       ___x__Floor     _____Critical Care    _____  Other:_________________    Comments: Patient had smooth intraoperative event, no anesthesia complication.  PACU Vital signs: HR:  89           BP:      135  / 75         RR:     14        O2 Sat:     100  %     Temp 97.5f

## 2025-05-13 NOTE — PRE-ANESTHESIA EVALUATION ADULT - MALLAMPATI CLASS
Class III - visualization of the soft palate and the base of the uvula
upper and lower dentures/Class II - visualization of the soft palate, fauces, and uvula

## 2025-05-13 NOTE — PRE-OP CHECKLIST - HEIGHT IN CM
165.1
165.1
Modify Regimen: Acne was clear at last visit and at goal\\nCurrently flaring over last month\\nPt has not followed through with maintenance regimen aside from using soap and cleanser\\n\\nRecommend tret 0.1 and azelaic acid for hyperpigmentation\\nDiscussed importance of maintenance regimen
Detail Level: Zone
Render In Strict Bullet Format?: No

## 2025-05-13 NOTE — PROGRESS NOTE ADULT - ASSESSMENT
79y-year-old Maltese-speaking Female with a PMH s/f iron deficiency anemia secondary to dieulafoy lesion gastric ulcer (s/p endo clip 3/6/25) and atrial fibrillation (on Eliquis) status post ablation x2 and DCC as well as CHF, CAD status post 4 stents, hypertension, hyperlipidemia, diabetes and Parkinson disease who presents to the ED increased dyspnea on exertion and chest pain on exertion.    #Acute blood loss anemia 2/2 Upper GI bleed   #H/o chronic iron deficiency anemia  #H/o dieulafoy lesion gastric ulcer , s/p endo clip 3/6/25  - eliquis on HOLD  - JODIE negative for blood  - s/p 2 units PRBC  - protonix IV q12  - monitor cbc daily  - keep type and screen active; keep Hgb >8 - today @ 9.1  - s/p EGD (05.12.25 @ 11:00): Normal mucosa in the whole esophagus. Hiatal Hernia. Erythema in the stomach compatible with non-erosive gastritis. (Biopsy). Normal mucosa in the whole examined duodenum.  - GI f/u (5/12): Await pathology; Will plan for colonoscopy as inpatient; Clear liquid diet and NPO after midnight  - NPO for colonoscopy today  - f/u GI    #Chest pain, MONTES 2/2 symptomatic anemia  #CAD status s/p 4 stents  #Chronic afib status post ablation x2 and DCC   #Chronic systolic CHF  - Xray Chest 1 View- PORTABLE-Routine (Xray Chest 1 View- PORTABLE-Routine in AM.) (05.11.25 @ 05:57) >Lung parenchyma/Pleura: Stable bilateral opacities. No definite pneumothorax.  - Troponin T, High Sensitivity Result: 1805.11.25 @ 01:30)  - c/w enresto, metoprolol, procardia, statin  - c/w lasix  - eliquis held    #Hyponatremia, improved  #Metabolic acidosis  #Hypomagnesemia, improved   - monitor BMP  - c/w Bicarb    #DM type 2  - A1C with Estimated Average Glucose Result: 6.1. % (03.06.25 @ 05:31)  - monitor FS  - c/w lantus, lispro    #Parkinson disease  - c/w home meds    #DNR/DNI    #Dispo: colonoscopy today; f/u GI; monitor CBC daily

## 2025-05-13 NOTE — DIETITIAN INITIAL EVALUATION ADULT - ORAL NUTRITION SUPPLEMENTS
Ensure Clear 3x/day to provide 180 kcal + 8g protein / carton. and when diet advanced, Add Glucerna QDAY to provide 220 kcal + 10 grams protein /carton.

## 2025-05-13 NOTE — DIETITIAN INITIAL EVALUATION ADULT - NUTRITIONGOAL OUTCOME1
when medically feasible advance diet order to Carbsteady diet for pt to meet 75%-100% of EER in 3-4 days.

## 2025-05-13 NOTE — PROGRESS NOTE ADULT - ASSESSMENT
79y-year-old Amharic-speaking Female with a PMH s/f iron deficiency anemia secondary to dieulafoy lesion gastric ulcer (s/p endo clip 3/6/25) and atrial fibrillation (on Eliquis) status post ablation x2 and DCC as well as CHF, CAD status post 4 stents, hypertension, hyperlipidemia, diabetes and Parkinson disease who presents to the ED increased dyspnea on exertion and chest pain on exertion.    # Acute blood loss anemia secondary to Upper GI bleed   # Non erosive gastritis  #  Hiatal hernia  # H/o chronic iron deficiency anemia  # H/o  dieulafoy lesion gastric ulcer , s/p endo clip 3/6/25  - Hold eliquis  - JODIE negative for blood  - s/p 2  units PRBC  - protonix IV q12  - monitor cbc  - keep type and screen active; keep Hgb >8  -  NPO for procedure  -  EGD (05.12.25 @ 11:00) >Hiatal Hernia. Erythema in the stomach compatible with non-erosive gastritis. (Biopsy).Await pathology  - colonoscopy today    # Chest pain, MONTES  secondary to symptomatic anemia  # CAD status s/p 4 stents  # Chronic afib status post ablation x2 and DCC   # Chr systolic CHF  -  Xray Chest 1 View- PORTABLE-Routine (Xray Chest 1 View- PORTABLE-Routine in AM.) (05.11.25 @ 05:57) >Lung parenchyma/Pleura: Stable bilateral opacities. No definite pneumothorax.  - Troponin T, High Sensitivity Result: 1805.11.25 @ 01:30)  - c/w enresto, metoprolol, procardia, statin  - c/w  lasix  - eliquis held    # Hyponatremia- resolved  # Metabolic acidosis  # hypomagnesemia-resolved  - monitor BMP  - c/w  Bicarb  - monitor BMP    # DM type 2  - A1C with Estimated Average Glucose Result: 6.1. % (03.06.25 @ 05:31)  - monitor Fs  - c/w lantus, lispro    #Parkinson disease  - c/w home meds    # DNR/DNI    # Pending monitor cbc, BMP, colonoscopy today  # Disposition: Home when stable 79y-year-old Wolof-speaking Female with a PMH s/f iron deficiency anemia secondary to dieulafoy lesion gastric ulcer (s/p endo clip 3/6/25) and atrial fibrillation (on Eliquis) status post ablation x2 and DCC as well as CHF, CAD status post 4 stents, hypertension, hyperlipidemia, diabetes and Parkinson disease who presents to the ED increased dyspnea on exertion and chest pain on exertion.    # Acute blood loss anemia secondary to Upper GI bleed   # Non erosive gastritis  #  Hiatal hernia  # H/o chronic iron deficiency anemia  # H/o  dieulafoy lesion gastric ulcer , s/p endo clip 3/6/25  - Hold eliquis  - JODIE negative for blood  - s/p 2  units PRBC  - protonix IV q12  - monitor cbc  - keep type and screen active; keep Hgb >8  -  NPO for procedure  -  EGD (05.12.25 @ 11:00) >Hiatal Hernia. Erythema in the stomach compatible with non-erosive gastritis. (Biopsy).Await pathology  - colonoscopy today    # Chest pain, MONTES  secondary to symptomatic anemia  # CAD status s/p 4 stents  # Chronic afib status post ablation x2 and DCC   # Chr systolic CHF  -  Xray Chest 1 View- PORTABLE-Routine (Xray Chest 1 View- PORTABLE-Routine in AM.) (05.11.25 @ 05:57) >Lung parenchyma/Pleura: Stable bilateral opacities. No definite pneumothorax.  - Troponin T, High Sensitivity Result: 1805.11.25 @ 01:30)  - c/w enresto, metoprolol, procardia, statin  - c/w  lasix  - eliquis held    # Hyponatremia- resolved  # Metabolic acidosis  # hypomagnesemia-resolved  - monitor BMP  - c/w  Bicarb  - monitor BMP    # DM type 2  - A1C with Estimated Average Glucose Result: 6.1. % (03.06.25 @ 05:31)  - monitor Fs  - c/w lantus, lispro    #Parkinson disease  - c/w home meds    # Full code    # Pending monitor cbc, BMP, colonoscopy today  # Disposition: Home when stable

## 2025-05-13 NOTE — DIETITIAN INITIAL EVALUATION ADULT - PERTINENT LABORATORY DATA
05-12    137  |  99  |  10  ----------------------------<  99  4.4   |  20  |  1.0    Ca    9.3      12 May 2025 04:22  Mg     1.8     05-12    TPro  6.1  /  Alb  3.9  /  TBili  0.4  /  DBili  x   /  AST  18  /  ALT  <5  /  AlkPhos  82  05-12  POCT Blood Glucose.: 131 mg/dL (05-13-25 @ 10:58)  A1C with Estimated Average Glucose Result: 5.5 % (05-11-25 @ 05:56)  A1C with Estimated Average Glucose Result: 6.1 % (03-06-25 @ 05:31)  A1C with Estimated Average Glucose Result: 6.4 % (01-04-25 @ 05:36)

## 2025-05-13 NOTE — GOALS OF CARE CONVERSATION - ADVANCED CARE PLANNING - CONVERSATION DETAILS
[employer]  ID#: [id number]    Discussed clinical status, GI results and plans, with patient and son at bedside  Discussed code status. Pt requesting to remain full code.

## 2025-05-13 NOTE — DIETITIAN INITIAL EVALUATION ADULT - OTHER CALCULATIONS
Energy: 20-30 kcal/kg ABW = 9886-6795 kcal  Protein: 0.8 -1.0gm/kg ABW = 67-83 grams  Fluid: 1 ml/kcal or per MD recommendations    Estimated energy and protein needs with consideration for weight maintenance/gain, BMI, age, mobility, Parkinson's Disease, and comorbidities.

## 2025-05-13 NOTE — DIETITIAN INITIAL EVALUATION ADULT - NAME AND PHONE
Arlette Lepe x [phone] or [employer] (preferred)  High risk follow up      Monitoring/Evaluating: PO intake, Labs, Lytes, Wts,  Diet and texture tolerance, NFPF, GI S/S, BM.

## 2025-05-13 NOTE — PRE-OP CHECKLIST - SELECT TESTS ORDERED
CBC/CMP/PT/PTT/INR/Type and Screen/EKG/CXR/POCT Blood Glucose
CBC/CMP/PT/PTT/INR/Type and Screen/EKG/CXR/POCT Blood Glucose

## 2025-05-13 NOTE — DIETITIAN INITIAL EVALUATION ADULT - PERTINENT MEDS FT
MEDICATIONS  (STANDING):  atorvastatin 40 milliGRAM(s) Oral at bedtime  carbidopa/levodopa  25/100 1 Tablet(s) Oral <User Schedule>  chlorhexidine 2% Cloths 1 Application(s) Topical <User Schedule>  dextrose 5%. 1000 milliLiter(s) (100 mL/Hr) IV Continuous <Continuous>  dextrose 5%. 1000 milliLiter(s) (50 mL/Hr) IV Continuous <Continuous>  dextrose 50% Injectable 25 Gram(s) IV Push once  dextrose 50% Injectable 12.5 Gram(s) IV Push once  dextrose 50% Injectable 25 Gram(s) IV Push once  furosemide    Tablet 40 milliGRAM(s) Oral daily  glucagon  Injectable 1 milliGRAM(s) IntraMuscular once  insulin glargine Injectable (LANTUS) 5 Unit(s) SubCutaneous at bedtime  insulin lispro (ADMELOG) corrective regimen sliding scale   SubCutaneous at bedtime  lactated ringers. 1000 milliLiter(s) (60 mL/Hr) IV Continuous <Continuous>  metoprolol tartrate 50 milliGRAM(s) Oral two times a day  NIFEdipine XL 30 milliGRAM(s) Oral daily  pantoprazole  Injectable 40 milliGRAM(s) IV Push every 12 hours  sacubitril 24 mG/valsartan 26 mG 1 Tablet(s) Oral two times a day  sodium bicarbonate 650 milliGRAM(s) Oral three times a day    MEDICATIONS  (PRN):  acetaminophen     Tablet .. 650 milliGRAM(s) Oral every 6 hours PRN Temp greater or equal to 38C (100.4F), Mild Pain (1 - 3)  aluminum hydroxide/magnesium hydroxide/simethicone Suspension 30 milliLiter(s) Oral every 4 hours PRN Dyspepsia  dextrose Oral Gel 15 Gram(s) Oral once PRN Blood Glucose LESS THAN 70 milliGRAM(s)/deciliter  melatonin 3 milliGRAM(s) Oral at bedtime PRN Insomnia  ondansetron Injectable 4 milliGRAM(s) IV Push every 8 hours PRN Nausea and/or Vomiting

## 2025-05-13 NOTE — DIETITIAN INITIAL EVALUATION ADULT - ORAL INTAKE PTA/DIET HISTORY
Pt not in room, in for colonoscopy today. RD called daughter in law [phone] to obtain nutr hx. Endores no pork diet, follows Carb Steady diet at home. Drinks 2-3 protein shakes /week. Milk allergy causes diarrhea. UBW per HEI: 79.4 kg, CBW 83.5 kg. ~5% wt gain in 5 months. Pt eats 2-3meals and snacks throughout the day.     Pt has been on clear liquid diet + NPO since admission completing 26-50% of meals. Last BM 5/12 per EMR. denies N/V  x 24 hours.

## 2025-05-14 ENCOUNTER — TRANSCRIPTION ENCOUNTER (OUTPATIENT)
Age: 80
End: 2025-05-14

## 2025-05-14 VITALS
SYSTOLIC BLOOD PRESSURE: 107 MMHG | DIASTOLIC BLOOD PRESSURE: 68 MMHG | TEMPERATURE: 99 F | OXYGEN SATURATION: 97 % | HEART RATE: 86 BPM | RESPIRATION RATE: 18 BRPM

## 2025-05-14 LAB
ALBUMIN SERPL ELPH-MCNC: 3.8 G/DL — SIGNIFICANT CHANGE UP (ref 3.5–5.2)
ALP SERPL-CCNC: 88 U/L — SIGNIFICANT CHANGE UP (ref 30–115)
ALT FLD-CCNC: <5 U/L — SIGNIFICANT CHANGE UP (ref 0–41)
ANION GAP SERPL CALC-SCNC: 14 MMOL/L — SIGNIFICANT CHANGE UP (ref 7–14)
AST SERPL-CCNC: 16 U/L — SIGNIFICANT CHANGE UP (ref 0–41)
BASOPHILS # BLD AUTO: 0.06 K/UL — SIGNIFICANT CHANGE UP (ref 0–0.2)
BASOPHILS NFR BLD AUTO: 0.7 % — SIGNIFICANT CHANGE UP (ref 0–1)
BILIRUB SERPL-MCNC: 0.3 MG/DL — SIGNIFICANT CHANGE UP (ref 0.2–1.2)
BUN SERPL-MCNC: 10 MG/DL — SIGNIFICANT CHANGE UP (ref 10–20)
CALCIUM SERPL-MCNC: 8.8 MG/DL — SIGNIFICANT CHANGE UP (ref 8.4–10.5)
CHLORIDE SERPL-SCNC: 97 MMOL/L — LOW (ref 98–110)
CO2 SERPL-SCNC: 22 MMOL/L — SIGNIFICANT CHANGE UP (ref 17–32)
CREAT SERPL-MCNC: 1.1 MG/DL — SIGNIFICANT CHANGE UP (ref 0.7–1.5)
EGFR: 51 ML/MIN/1.73M2 — LOW
EGFR: 51 ML/MIN/1.73M2 — LOW
EOSINOPHIL # BLD AUTO: 0.21 K/UL — SIGNIFICANT CHANGE UP (ref 0–0.7)
EOSINOPHIL NFR BLD AUTO: 2.5 % — SIGNIFICANT CHANGE UP (ref 0–8)
GLUCOSE BLDC GLUCOMTR-MCNC: 142 MG/DL — HIGH (ref 70–99)
GLUCOSE BLDC GLUCOMTR-MCNC: 147 MG/DL — HIGH (ref 70–99)
GLUCOSE BLDC GLUCOMTR-MCNC: 152 MG/DL — HIGH (ref 70–99)
GLUCOSE SERPL-MCNC: 123 MG/DL — HIGH (ref 70–99)
HCT VFR BLD CALC: 27.7 % — LOW (ref 37–47)
HGB BLD-MCNC: 9 G/DL — LOW (ref 12–16)
IMM GRANULOCYTES NFR BLD AUTO: 0.4 % — HIGH (ref 0.1–0.3)
LYMPHOCYTES # BLD AUTO: 1.82 K/UL — SIGNIFICANT CHANGE UP (ref 1.2–3.4)
LYMPHOCYTES # BLD AUTO: 21.5 % — SIGNIFICANT CHANGE UP (ref 20.5–51.1)
MAGNESIUM SERPL-MCNC: 1.7 MG/DL — LOW (ref 1.8–2.4)
MCHC RBC-ENTMCNC: 28.7 PG — SIGNIFICANT CHANGE UP (ref 27–31)
MCHC RBC-ENTMCNC: 32.5 G/DL — SIGNIFICANT CHANGE UP (ref 32–37)
MCV RBC AUTO: 88.2 FL — SIGNIFICANT CHANGE UP (ref 81–99)
MONOCYTES # BLD AUTO: 1.3 K/UL — HIGH (ref 0.1–0.6)
MONOCYTES NFR BLD AUTO: 15.3 % — HIGH (ref 1.7–9.3)
NEUTROPHILS # BLD AUTO: 5.06 K/UL — SIGNIFICANT CHANGE UP (ref 1.4–6.5)
NEUTROPHILS NFR BLD AUTO: 59.6 % — SIGNIFICANT CHANGE UP (ref 42.2–75.2)
NRBC BLD AUTO-RTO: 0 /100 WBCS — SIGNIFICANT CHANGE UP (ref 0–0)
PLATELET # BLD AUTO: 340 K/UL — SIGNIFICANT CHANGE UP (ref 130–400)
PMV BLD: 8.5 FL — SIGNIFICANT CHANGE UP (ref 7.4–10.4)
POTASSIUM SERPL-MCNC: 4.6 MMOL/L — SIGNIFICANT CHANGE UP (ref 3.5–5)
POTASSIUM SERPL-SCNC: 4.6 MMOL/L — SIGNIFICANT CHANGE UP (ref 3.5–5)
PROT SERPL-MCNC: 6.1 G/DL — SIGNIFICANT CHANGE UP (ref 6–8)
RBC # BLD: 3.14 M/UL — LOW (ref 4.2–5.4)
RBC # FLD: 15.7 % — HIGH (ref 11.5–14.5)
SODIUM SERPL-SCNC: 133 MMOL/L — LOW (ref 135–146)
SURGICAL PATHOLOGY STUDY: SIGNIFICANT CHANGE UP
WBC # BLD: 8.48 K/UL — SIGNIFICANT CHANGE UP (ref 4.8–10.8)
WBC # FLD AUTO: 8.48 K/UL — SIGNIFICANT CHANGE UP (ref 4.8–10.8)

## 2025-05-14 PROCEDURE — 99232 SBSQ HOSP IP/OBS MODERATE 35: CPT

## 2025-05-14 RX ORDER — SACUBITRIL AND VALSARTAN 6; 6 MG/1; MG/1
1 PELLET ORAL
Qty: 60 | Refills: 2
Start: 2025-05-14 | End: 2025-08-11

## 2025-05-14 RX ORDER — MAGNESIUM SULFATE 500 MG/ML
2 SYRINGE (ML) INJECTION ONCE
Refills: 0 | Status: COMPLETED | OUTPATIENT
Start: 2025-05-14 | End: 2025-05-14

## 2025-05-14 RX ORDER — FERROUS SULFATE 137(45) MG
325 TABLET, EXTENDED RELEASE ORAL DAILY
Refills: 0 | Status: DISCONTINUED | OUTPATIENT
Start: 2025-05-14 | End: 2025-05-14

## 2025-05-14 RX ORDER — FUROSEMIDE 10 MG/ML
1 INJECTION INTRAMUSCULAR; INTRAVENOUS
Qty: 30 | Refills: 0
Start: 2025-05-14 | End: 2025-06-12

## 2025-05-14 RX ORDER — SACUBITRIL AND VALSARTAN 6; 6 MG/1; MG/1
1 PELLET ORAL
Qty: 0 | Refills: 0 | DISCHARGE
Start: 2025-05-14

## 2025-05-14 RX ORDER — FUROSEMIDE 10 MG/ML
1 INJECTION INTRAMUSCULAR; INTRAVENOUS
Qty: 0 | Refills: 0 | DISCHARGE
Start: 2025-05-14

## 2025-05-14 RX ORDER — FERROUS SULFATE 137(45) MG
1 TABLET, EXTENDED RELEASE ORAL
Qty: 15 | Refills: 2
Start: 2025-05-14 | End: 2025-08-11

## 2025-05-14 RX ADMIN — APIXABAN 5 MILLIGRAM(S): 2.5 TABLET, FILM COATED ORAL at 06:44

## 2025-05-14 RX ADMIN — METOPROLOL SUCCINATE 50 MILLIGRAM(S): 50 TABLET, EXTENDED RELEASE ORAL at 06:43

## 2025-05-14 RX ADMIN — Medication 650 MILLIGRAM(S): at 06:43

## 2025-05-14 RX ADMIN — Medication 40 MILLIGRAM(S): at 06:43

## 2025-05-14 RX ADMIN — Medication 1 TABLET(S): at 06:42

## 2025-05-14 RX ADMIN — Medication 30 MILLIGRAM(S): at 06:43

## 2025-05-14 RX ADMIN — Medication 25 GRAM(S): at 09:46

## 2025-05-14 RX ADMIN — SACUBITRIL AND VALSARTAN 1 TABLET(S): 6; 6 PELLET ORAL at 06:42

## 2025-05-14 RX ADMIN — FUROSEMIDE 40 MILLIGRAM(S): 10 INJECTION INTRAMUSCULAR; INTRAVENOUS at 06:42

## 2025-05-14 RX ADMIN — Medication 1 APPLICATION(S): at 06:44

## 2025-05-14 NOTE — DISCHARGE NOTE PROVIDER - NSDCCPCAREPLAN_GEN_ALL_CORE_FT
PRINCIPAL DISCHARGE DIAGNOSIS  Diagnosis: Anemia  Assessment and Plan of Treatment: You came to the hospital because you had symptoms from anemia. You were given blood and got an endoscopy + colonoscopy. No source for your anemia was identified.  Your hemoglobin has been stable and you can follow up in GI clinic for a video capsule evaluation.  Please continue to take pantoprazole twice a day.  If you experience recurrent symptoms of trouble breathing on exertion, or black/bloody stools please return to the ER.     PRINCIPAL DISCHARGE DIAGNOSIS  Diagnosis: Anemia  Assessment and Plan of Treatment: You came to the hospital because you had symptoms from anemia. You were given blood and got an endoscopy + colonoscopy. No source for your anemia was identified.  Your hemoglobin has been stable and you can follow up in GI clinic for a video capsule evaluation.  Please continue to take pantoprazole twice a day.  If you experience recurrent symptoms of trouble breathing on exertion, or black/bloody stools please return to the ER.  Please note we decreased your entresto because your blood pressure did not require such a  high dose. We also increased your lasix because your legs were retaining fluid possibly causing lower sodium levels. Your sertraline was held  because of the low sodium but please resume it now on discharge.  Please follow up with your PCP regarding your sodium levels and continuing your lasix and sertraline.

## 2025-05-14 NOTE — DISCHARGE NOTE NURSING/CASE MANAGEMENT/SOCIAL WORK - NSDCPEFALRISK_GEN_ALL_CORE
For information on Fall & Injury Prevention, visit: [email or web address] [place]  [email or web address] [place]  https://www.cdc.gov/steadi/patient.html

## 2025-05-14 NOTE — PROGRESS NOTE ADULT - ASSESSMENT
79y-year-old Turkmen-speaking Female with a PMH s/f iron deficiency anemia secondary to dieulafoy lesion gastric ulcer (s/p endo clip 3/6/25) and atrial fibrillation (on Eliquis) status post ablation x2 and DCC as well as CHF, CAD status post 4 stents, hypertension, hyperlipidemia, diabetes and Parkinson disease who presents to the ED increased dyspnea on exertion and chest pain on exertion.    #Acute blood loss anemia 2/2 Upper GI bleed   #H/o chronic iron deficiency anemia  #H/o dieulafoy lesion gastric ulcer , s/p endo clip 3/6/25  - eliquis on HOLD  - JODIE negative for blood  - s/p 2 units PRBC  - protonix IV q12  - monitor cbc daily  - keep type and screen active; keep Hgb >8 - today @ 9.0  - s/p EGD (05.12.25 @ 11:00): Normal mucosa in the whole esophagus. Hiatal Hernia. Erythema in the stomach compatible with non-erosive gastritis. (Biopsy). Normal mucosa in the whole examined duodenum.  - GI f/u (5/12): Await pathology; Will plan for colonoscopy as inpatient; Clear liquid diet and NPO after midnight  - NPO for colonoscopy today  - f/u GI    #Chest pain, MNOTES 2/2 symptomatic anemia  #CAD status s/p 4 stents  #Chronic afib status post ablation x2 and DCC   #Chronic systolic CHF  - Xray Chest 1 View- PORTABLE-Routine (Xray Chest 1 View- PORTABLE-Routine in AM.) (05.11.25 @ 05:57) >Lung parenchyma/Pleura: Stable bilateral opacities. No definite pneumothorax.  - Troponin T, High Sensitivity Result: 1805.11.25 @ 01:30)  - c/w enresto, metoprolol, procardia, statin  - c/w lasix  - eliquis held    #Hyponatremia, improved  #Metabolic acidosis  #Hypomagnesemia, improved   - monitor BMP  - c/w Bicarb    #DM type 2  - A1C with Estimated Average Glucose Result: 6.1. % (03.06.25 @ 05:31)  - monitor FS  - c/w lantus, lispro    #Parkinson disease  - c/w home meds    #DNR/DNI    #Dispo: colonoscopy today; f/u GI; monitor CBC daily   79y-year-old Vietnamese-speaking Female with a PMH s/f iron deficiency anemia secondary to dieulafoy lesion gastric ulcer (s/p endo clip 3/6/25) and atrial fibrillation (on Eliquis) status post ablation x2 and DCC as well as CHF, CAD status post 4 stents, hypertension, hyperlipidemia, diabetes and Parkinson disease who presents to the ED increased dyspnea on exertion and chest pain on exertion.    #Acute blood loss anemia 2/2 Upper GI bleed   #Non-erosive gastritis  #Hiatal hernia  #H/o chronic iron deficiency anemia  #H/o dieulafoy lesion gastric ulcer , s/p endo clip 3/6/25  - eliquis resumed  - JODIE negative for blood  - s/p 2 units PRBC  - protonix IV q12  - monitor cbc daily  - keep type and screen active; keep Hgb >8 - today @ 9.0  - s/p EGD (05.12.25 @ 11:00): Normal mucosa in the whole esophagus. Hiatal Hernia. Erythema in the stomach compatible with non-erosive gastritis. (Biopsy). Normal mucosa in the whole examined duodenum.  - s/p Colonoscopy (5/13): < from: Colonoscopy (05.13.25 @ 14:00) >Internal and external hemorrhoids. The colon was otherwiseunremarkable (Biopsy).  - GI recs (5/13): await pathology; can advance diet as tolerated; monitor for overt bleeding or drop in H/H; plan for VCE as outpt; repeat colonoscopy in 10 yrs if within GOC; f/u in GI MAP clinic  - Nutrition eval: consistent carb (evening snack); soft and bite sized; ensure 3x/day; when diet advanced, add glucerna QD  - advance diet    #Chest pain, MONTES 2/2 symptomatic anemia  #CAD status s/p 4 stents  #Chronic afib status post ablation x2 and DCC   #Chronic systolic CHF  - Xray Chest 1 View- PORTABLE-Routine (Xray Chest 1 View- PORTABLE-Routine in AM.) (05.11.25 @ 05:57) >Lung parenchyma/Pleura: Stable bilateral opacities. No definite pneumothorax.  - Troponin T, High Sensitivity Result: 1805.11.25 @ 01:30)  - c/w enresto, metoprolol, procardia, statin  - c/w lasix  - eliquis resumed     #Hyponatremia, improved  #Metabolic acidosis  #Hypomagnesemia, improved   - monitor BMP  - c/w Bicarb    #DM type 2  - A1C with Estimated Average Glucose Result: 6.1. % (03.06.25 @ 05:31)  - monitor FS  - c/w lantus, lispro    #Parkinson disease  - c/w home meds    #FULL CODE    #Dispo: advance diet; monitor CBC; VCE outpatient; f/u GI MAP clinic; anticipated d/c tomorrow, 5/15

## 2025-05-14 NOTE — DISCHARGE NOTE PROVIDER - PROVIDER TOKENS
PROVIDER:[TOKEN:[[id number]],FOLLOWUP:[1 month]],PROVIDER:[TOKEN:[[id number]],FOLLOWUP:[1 month]]

## 2025-05-14 NOTE — DISCHARGE NOTE PROVIDER - NSDCFUSCHEDAPPT_GEN_ALL_CORE_FT
Mansoor Loving  [facility]  GASTRO Doc Off [phone]  Scheduled Appointment: 06/13/2025

## 2025-05-14 NOTE — DISCHARGE NOTE PROVIDER - NSDCMRMEDTOKEN_GEN_ALL_CORE_FT
apixaban 5 mg oral tablet: 1 tab(s) orally every 12 hours  atorvastatin 40 mg oral tablet: 1 orally once a day  carbidopa-levodopa 25 mg-100 mg oral tablet: 1 tab(s) orally 2 times a day  ergocalciferol 50,000 intl units (1.25 mg) oral capsule: 1 cap(s) orally once a week  Lantus 100 units/mL subcutaneous solution: 20 unit(s) subcutaneous once a day  Lasix 20 mg oral tablet: 1 tab(s) orally once a day as needed for  pedal edema  lidocaine 5% topical film: Apply topically to affected area once a day  Mag-Ox 400 oral tablet: 1 tab(s) orally 2 times a day (with meals)  metFORMIN 1000 mg oral tablet: 1 tab(s) orally 2 times a day  metoprolol tartrate 50 mg oral tablet: 1 orally 2 times a day  NIFEdipine 30 mg oral tablet, extended release: 1 tab(s) orally once a day  Ozempic (0.25 mg or 0.5 mg dose) 2 mg/1.5 mL subcutaneous solution: once a week on Tuesdays  sertraline 25 mg oral tablet: 1 tab(s) orally once a day   apixaban 5 mg oral tablet: 1 tab(s) orally every 12 hours  atorvastatin 40 mg oral tablet: 1 orally once a day  carbidopa-levodopa 25 mg-100 mg oral tablet: 1 tab(s) orally 2 times a day  Entresto 24 mg-26 mg oral tablet: 1 tab(s) orally 2 times a day  ergocalciferol 50,000 intl units (1.25 mg) oral capsule: 1 cap(s) orally once a week  furosemide 40 mg oral tablet: 1 tab(s) orally once a day  Lantus 100 units/mL subcutaneous solution: 20 unit(s) subcutaneous once a day  lidocaine 5% topical film: Apply topically to affected area once a day  Mag-Ox 400 oral tablet: 1 tab(s) orally 2 times a day (with meals)  metFORMIN 1000 mg oral tablet: 1 tab(s) orally 2 times a day  metoprolol tartrate 50 mg oral tablet: 1 orally 2 times a day  NIFEdipine 30 mg oral tablet, extended release: 1 tab(s) orally once a day  Ozempic (0.25 mg or 0.5 mg dose) 2 mg/1.5 mL subcutaneous solution: once a week on Tuesdays  pantoprazole 40 mg oral delayed release tablet: 1 tab(s) orally 2 times a day  sertraline 25 mg oral tablet: 1 tab(s) orally once a day

## 2025-05-14 NOTE — DISCHARGE NOTE NURSING/CASE MANAGEMENT/SOCIAL WORK - PATIENT PORTAL LINK FT
You can access the FollowMyHealth Patient Portal offered by [facility] by registering at the following website: [email or web address]. By joining [employer]’s FollowMyHealth portal, you will also be able to view your health information using other applications (apps) compatible with our system.

## 2025-05-14 NOTE — PROGRESS NOTE ADULT - PROVIDER SPECIALTY LIST ADULT
Internal Medicine
Gastroenterology
Hospitalist
Gastroenterology
Internal Medicine

## 2025-05-14 NOTE — PROGRESS NOTE ADULT - ASSESSMENT
79y-year-old Irish-speaking Female with a PMH s/f iron deficiency anemia secondary to dieulafoy lesion gastric ulcer (s/p endo clip 3/6/25) and atrial fibrillation (on Eliquis) status post ablation x2 and DCC as well as CHF, CAD status post 4 stents, hypertension, hyperlipidemia, diabetes and Parkinson disease who presents to the ED increased dyspnea on exertion and chest pain on exertion. GI consulted for acute anemia and dark stools.     #Acute on chronic symptomatic anemia / dark stools - rule out upper GI bleed  # Hx Dieulafoy lesion of the pylorus and Fortino Class 3 ulcer on EGD 03/2025   - Hemodynamically stable & no active bleeding  - JODIE shows dark/marroon colored stool   - Baseline hemoglobin - 8  - Hemoglobin on admission - 5.8   - Coags - 1.38  - Last use of eliquis the evening of 5/9  - CATP negative for acute abdominal pathology   - Last EGD 03/2025: s/p EGD 3/6/25: Grade B esophagitis compatible with nonspecific erosive esophagitis. (Biopsy).  	Hiatal Hernia.  	Dieulafoy in the pylorus. (Endoclip, Thermal therapy).  	A 2mm clean based ulcer (Fortino Class III) was seen in the incisura.  	Erosions and ulceration in the stomach compatible with erosive gastritis.  	Normal mucosa in the whole examined duodenum.    EGD 05/2025: Normal esophagus.Hiatal Hernia. non-erosive gastritis. (Biopsy). Normal  duodenum.  Colonoscopy 05/2025: Internal and external hemorrhoids. The colon was otherwiseunremarkable (Biopsy).    Hb remains stable and no bloody BM    Rec  Diet as tolerated  Resume necessary AC  VCE as outpatient   TRend H/H and monitor for overt bleeding   FOllow up in GI MAP clinic

## 2025-05-14 NOTE — DISCHARGE NOTE PROVIDER - CARE PROVIDER_API CALL
Merlin Jha  Gastroenterology  [address] [place] [address]  Phone: [phone]  Fax: [phone]  Follow Up Time: 1 month    Koko Bhagat  Interventional Cardiology  [address] [place] [address]  Phone: [phone]  Fax: [phone]  Follow Up Time: 1 month

## 2025-05-14 NOTE — PROGRESS NOTE ADULT - ATTENDING COMMENTS
I edited the note
***My note supersedes any discrepancies that may be above in the resident's note***    79y-year-old Persian-speaking Female with a PMH s/f iron deficiency anemia secondary to dieulafoy lesion gastric ulcer (s/p endo clip 3/6/25) and atrial fibrillation (on Eliquis) status post ablation x2 and DCC as well as CHF, CAD status post 4 stents, hypertension, hyperlipidemia, diabetes and Parkinson disease who presents to the ED increased dyspnea on exertion and chest pain on exertion.    #Acute blood loss anemia 2/2 Upper GI bleed   #Non-erosive gastritis  #Hiatal hernia  #H/o chronic iron deficiency anemia  #H/o dieulafoy lesion gastric ulcer , s/p endo clip 3/6/25  - hgb of 5.8-->s/p 2u of PRBC-->9 and stable  - JODIE negative for blood  - s/p EGD (05.12.25 @ 11:00): Normal mucosa in the whole esophagus. Hiatal Hernia. Erythema in the stomach compatible with non-erosive gastritis. (Biopsy). Normal mucosa in the whole examined duodenum.  - s/p Colonoscopy (5/13): < from: Colonoscopy (05.13.25 @ 14:00) >Internal and external hemorrhoids. The colon was otherwise unremarkable (Biopsy).  - GI recs (5/13): await pathology; can advance diet as tolerated; monitor for overt bleeding or drop in H/H; plan for VCE as outpt; repeat colonoscopy in 10 yrs if within GOC; f/u in GI MAP clinic  - protonix IV q12  - monitor cbc daily  - keep type and screen active; keep Hgb >8   - eliquis resumed  - Nutrition eval: consistent carb (evening snack); soft and bite sized; ensure 3x/day; when diet advanced, add glucerna QD  - advance diet    #Chest pain, MONTES 2/2 symptomatic anemia  #CAD status s/p 4 stents  #Chronic afib status post ablation x2 and DCC   #Chronic systolic CHF  - Xray Chest 1 View- PORTABLE-Routine (Xray Chest 1 View- PORTABLE-Routine in AM.) (05.11.25 @ 05:57) >Lung parenchyma/Pleura: Stable bilateral opacities. No definite pneumothorax.  - Troponin T, High Sensitivity Result: 1805.11.25 @ 01:30)  - c/w enresto, metoprolol, procardia, statin  - c/w lasix  - eliquis resumed     #Hyponatremia, improved  #Metabolic acidosis  #Hypomagnesemia, improved   - monitor BMP  - c/w Bicarb    #DM type 2  - A1C with Estimated Average Glucose Result: 6.1. % (03.06.25 @ 05:31)  - monitor FS  - c/w lantus, lispro    #Parkinson disease  - c/w home meds    #FULL CODE    #Progress Note Handoff  Pending (specify):   tolerating diet  Family discussion: updated  Disposition:  Unknown at this time________
Agree with above.  Hemoglobin stable status post transfusion.  No further overt GI bleeding at this time.  Will plan for EGD tomorrow.  Continue with rest of the management.

## 2025-05-14 NOTE — DISCHARGE NOTE NURSING/CASE MANAGEMENT/SOCIAL WORK - FINANCIAL ASSISTANCE
[facility] provides services at a reduced cost to those who are determined to be eligible through [facility]’s financial assistance program. Information regarding [facility]’s financial assistance program can be found by going to [email or web address] or by calling [phone].

## 2025-05-14 NOTE — DISCHARGE NOTE PROVIDER - CARE PROVIDERS DIRECT ADDRESSES
[email or web address]

## 2025-05-14 NOTE — PROGRESS NOTE ADULT - TIME BILLING
Reviewing chart, imaging, physical examination, formulating plan and care coordination.
Direct patient care. Discussed with Housestaff
Coordination of care

## 2025-05-14 NOTE — DISCHARGE NOTE PROVIDER - HOSPITAL COURSE
79y-year-old Northern Irish-speaking Female with a PMH s/f iron deficiency anemia secondary to dieulafoy lesion gastric ulcer (s/p endo clip 3/6/25) and atrial fibrillation (on Eliquis) status post ablation x2 and DCC as well as CHF, CAD status post 4 stents, hypertension, hyperlipidemia, diabetes and Parkinson disease who presents to the ED increased dyspnea on exertion and chest pain on exertion.    Found to have acute anemia with Hb drop to 5.8 from bl of 8. Pt received 2u pRBCs.  Admitted for symptomatic anemia 2/2 concern for recurrent UGIB.  EGD/Colono unremarkeable.    EGD: Hiatal Hernia. Erythema in the stomach compatible with non-erosive gastritis. (Biopsy).Await pathology  Colono: Internal and external hemorrhoids. The colon was otherwiseunremarkable (Biopsy).    Pt's Hb remained stable at 8-9. Eliquis resumed. Diet advanced.  Pt ready for discharge with GI OP f/u for VCE and pathology results.

## 2025-05-14 NOTE — PROGRESS NOTE ADULT - SUBJECTIVE AND OBJECTIVE BOX
Cayla  ID: [id number]    SUBJECTIVE/OVERNIGHT EVENTS  Today is hospital day 4d. This morning patient was seen and examined at bedside, resting comfortably in bed. No acute or major events overnight. Patient is s/p EGD on 5/12 and colonoscopy on 5/13. Denies N/V, SOB, chest pain.      MEDICATIONS  MEDICATIONS  (STANDING):  apixaban 5 milliGRAM(s) Oral every 12 hours  atorvastatin 40 milliGRAM(s) Oral at bedtime  carbidopa/levodopa  25/100 1 Tablet(s) Oral <User Schedule>  chlorhexidine 2% Cloths 1 Application(s) Topical <User Schedule>  dextrose 5%. 1000 milliLiter(s) (100 mL/Hr) IV Continuous <Continuous>  dextrose 5%. 1000 milliLiter(s) (50 mL/Hr) IV Continuous <Continuous>  dextrose 50% Injectable 25 Gram(s) IV Push once  dextrose 50% Injectable 12.5 Gram(s) IV Push once  dextrose 50% Injectable 25 Gram(s) IV Push once  furosemide    Tablet 40 milliGRAM(s) Oral daily  glucagon  Injectable 1 milliGRAM(s) IntraMuscular once  insulin glargine Injectable (LANTUS) 5 Unit(s) SubCutaneous at bedtime  insulin lispro (ADMELOG) corrective regimen sliding scale   SubCutaneous at bedtime  lactated ringers. 1000 milliLiter(s) (60 mL/Hr) IV Continuous <Continuous>  metoprolol tartrate 50 milliGRAM(s) Oral two times a day  NIFEdipine XL 30 milliGRAM(s) Oral daily  pantoprazole  Injectable 40 milliGRAM(s) IV Push every 12 hours  sacubitril 24 mG/valsartan 26 mG 1 Tablet(s) Oral two times a day  sodium bicarbonate 650 milliGRAM(s) Oral three times a day    MEDICATIONS  (PRN):  acetaminophen     Tablet .. 650 milliGRAM(s) Oral every 6 hours PRN Temp greater or equal to 38C (100.4F), Mild Pain (1 - 3)  aluminum hydroxide/magnesium hydroxide/simethicone Suspension 30 milliLiter(s) Oral every 4 hours PRN Dyspepsia  dextrose Oral Gel 15 Gram(s) Oral once PRN Blood Glucose LESS THAN 70 milliGRAM(s)/deciliter  melatonin 3 milliGRAM(s) Oral at bedtime PRN Insomnia  ondansetron Injectable 4 milliGRAM(s) IV Push every 8 hours PRN Nausea and/or Vomiting        VITALS  T(C): 36.9 (05-14-25 @ 07:30), Max: 36.9 (05-14-25 @ 07:30)  T(F): 98.5 (05-14-25 @ 07:30), Max: 98.5 (05-14-25 @ 07:30)  HR: 91 (05-14-25 @ 07:30) (84 - 96)  BP: 130/82 (05-14-25 @ 07:30) (121/71 - 136/62)  RR: 17 (05-14-25 @ 07:30) (16 - 18)  SpO2: 96% (05-14-25 @ 07:30) (95% - 100%)        PHYSICAL EXAM  General: NAD, well-appearing, sitting comfortably  HEENT: EOMI, moist mucus membranes, conjunctivae clear  Cardio: RRR, no murmurs, normal S1 and S2  Resp: decreased breath sounds, no wheezing/rhonchi/rales  Abd: abdomen soft, tender in the b/l epigastric region and RLQ, nondistended, no rebound or guarding  MSK: no lower extremity edema, 2+ pedal pulses, moves all extremities   Neuro: A&Ox3, motor and sensory functions grossly intact  Skin: warm and dry      LABS               9.0    8.48  )-----------( 340      ( 14 May 2025 05:42 )             27.7       05-14    133[L]  |  97[L]  |  10  ----------------------------<  123[H]  4.6   |  22  |  1.1    Ca    8.8      14 May 2025 05:42  Mg     1.7     05-14    TPro  6.1  /  Alb  3.8  /  TBili  0.3  /  DBili  x   /  AST  16  /  ALT  <5  /  AlkPhos  88  05-14              IMAGING  < from: Xray Chest 1 View-PORTABLE IMMEDIATE (Xray Chest 1 View-PORTABLE IMMEDIATE .) (05.12.25 @ 16:29) >    IMPRESSION:    Cardiomegaly with CHF. Support devices as described.    < end of copied text >      < from: Xray Chest 1 View- PORTABLE-Urgent (Xray Chest 1 View- PORTABLE-Urgent .) (05.12.25 @ 18:20) >    IMPRESSION:    Mild CHF. Enteric catheter in appropriate positioning.    < end of copied text >  
Cayla  ID: [id number]    SUBJECTIVE/OVERNIGHT EVENTS  Today is hospital day 3d. This morning patient was seen and examined at bedside, resting comfortably in bed. No acute or major events overnight. Had a BM last night. S/p EGD, currently NPO for colonoscopy today. Denies N/V, SOB, chest pain.      MEDICATIONS  MEDICATIONS  (STANDING):  atorvastatin 40 milliGRAM(s) Oral at bedtime  carbidopa/levodopa  25/100 1 Tablet(s) Oral <User Schedule>  chlorhexidine 2% Cloths 1 Application(s) Topical <User Schedule>  dextrose 5%. 1000 milliLiter(s) (100 mL/Hr) IV Continuous <Continuous>  dextrose 5%. 1000 milliLiter(s) (50 mL/Hr) IV Continuous <Continuous>  dextrose 50% Injectable 25 Gram(s) IV Push once  dextrose 50% Injectable 12.5 Gram(s) IV Push once  dextrose 50% Injectable 25 Gram(s) IV Push once  furosemide    Tablet 40 milliGRAM(s) Oral daily  glucagon  Injectable 1 milliGRAM(s) IntraMuscular once  insulin glargine Injectable (LANTUS) 5 Unit(s) SubCutaneous at bedtime  insulin lispro (ADMELOG) corrective regimen sliding scale   SubCutaneous at bedtime  metoprolol tartrate 50 milliGRAM(s) Oral two times a day  NIFEdipine XL 30 milliGRAM(s) Oral daily  pantoprazole  Injectable 40 milliGRAM(s) IV Push every 12 hours  sacubitril 24 mG/valsartan 26 mG 1 Tablet(s) Oral two times a day  sodium bicarbonate 650 milliGRAM(s) Oral three times a day    MEDICATIONS  (PRN):  acetaminophen     Tablet .. 650 milliGRAM(s) Oral every 6 hours PRN Temp greater or equal to 38C (100.4F), Mild Pain (1 - 3)  aluminum hydroxide/magnesium hydroxide/simethicone Suspension 30 milliLiter(s) Oral every 4 hours PRN Dyspepsia  dextrose Oral Gel 15 Gram(s) Oral once PRN Blood Glucose LESS THAN 70 milliGRAM(s)/deciliter  melatonin 3 milliGRAM(s) Oral at bedtime PRN Insomnia  ondansetron Injectable 4 milliGRAM(s) IV Push every 8 hours PRN Nausea and/or Vomiting      VITALS  T(C): 36.7 (05-13-25 @ 07:30), Max: 37.1 (05-12-25 @ 13:44)  T(F): 98 (05-13-25 @ 07:30), Max: 98.8 (05-12-25 @ 13:44)  HR: 79 (05-13-25 @ 07:30) (70 - 93)  BP: 119/61 (05-13-25 @ 07:30) (105/69 - 188/72)  RR: 18 (05-13-25 @ 07:30) (18 - 18)  SpO2: 97% (05-13-25 @ 07:30) (96% - 100%)        PHYSICAL EXAM  General: NAD, well-appearing, sitting comfortably  HEENT: EOMI, moist mucus membranes, conjunctivae clear  Cardio: RRR, no murmurs, normal S1 and S2  Resp: decreased breath sounds, no wheezing/rhonchi/rales  Abd: abdomen soft, tender in the b/l epigastric region, nondistended, no rebound or guarding  MSK: no lower extremity edema, 2+ pedal pulses  Neuro: A&Ox3, motor and sensory functions grossly intact  Skin: warm and dry      LABS               9.1    8.53  )-----------( 288      ( 13 May 2025 04:30 )             27.1       05-12    137  |  99  |  10  ----------------------------<  99  4.4   |  20  |  1.0    Ca    9.3      12 May 2025 04:22  Mg     1.8     05-12    TPro  6.1  /  Alb  3.9  /  TBili  0.4  /  DBili  x   /  AST  18  /  ALT  <5  /  AlkPhos  82  05-12            IMAGING  < from: Xray Chest 1 View-PORTABLE IMMEDIATE (Xray Chest 1 View-PORTABLE IMMEDIATE .) (05.12.25 @ 16:29) >    IMPRESSION:    Cardiomegaly with CHF. Support devices as described.    < end of copied text >      < from: Xray Chest 1 View- PORTABLE-Urgent (Xray Chest 1 View- PORTABLE-Urgent .) (05.12.25 @ 18:20) >    IMPRESSION:    Mild CHF. Enteric catheter in appropriate positioning.    < end of copied text >  
Patient is a 79y old  Female who presents with a chief complaint of Dyspnea on exertion, Chest pain (11 May 2025 02:29)    Patient was seen and examined.  No further episodes of bleeding  Denies chest pain , sob  t  All systems reviewed .    PAST MEDICAL & SURGICAL HISTORY:  Atrial fibrillation, unspecified type  HTN (hypertension)  High cholesterol  Diabetes mellitus  S/P ablation of atrial fibrillation  Gastric ulcer  Dieulafoy lesion of stomach  CAD (coronary artery disease)  H/O CHF  H/O Parkinson's disease  History of back surgery  Coronary stent patent    Allergies  Milk (Diarrhea)  doxycycline (Other)  adhesives (Pruritus; Rash)  latex (Rash)    Intolerances    Home Medications:  apixaban 5 mg oral tablet: 1 tab(s) orally every 12 hours (10 May 2025 15:05)  atorvastatin 40 mg oral tablet: 1 orally once a day (10 May 2025 15:05)  carbidopa-levodopa 25 mg-100 mg oral tablet: 1 tab(s) orally 2 times a day (10 May 2025 15:05)  Entresto 97 mg-103 mg oral tablet: 1 orally 2 times a day (10 May 2025 15:05)  ergocalciferol 50,000 intl units (1.25 mg) oral capsule: 1 cap(s) orally once a week (10 May 2025 15:05)  hydrocortisone 2.5% topical ointment: Apply topically to affected area 3 times a day Apply sparingly to the affected irritated areas of the chest where the telemetry pads were, 2-3 times a day for 4 days as needed. Do not occlude. (10 May 2025 15:05)  Lantus 100 units/mL subcutaneous solution: 20 unit(s) subcutaneous once a day (10 May 2025 15:05)  Lasix 20 mg oral tablet: 1 tab(s) orally once a day as needed for  pedal edema (10 May 2025 15:05)  metFORMIN 1000 mg oral tablet: 1 tab(s) orally 2 times a day (10 May 2025 15:05)  metoprolol tartrate 50 mg oral tablet: 1 orally 2 times a day (10 May 2025 15:05)  Ozempic (0.25 mg or 0.5 mg dose) 2 mg/1.5 mL subcutaneous solution: once a week on Tuesdays (10 May 2025 15:05)    MEDICATIONS  (STANDING):  atorvastatin 40 milliGRAM(s) Oral at bedtime  carbidopa/levodopa  25/100 1 Tablet(s) Oral <User Schedule>  insulin glargine Injectable (LANTUS) 5 Unit(s) SubCutaneous at bedtime  insulin lispro (ADMELOG) corrective regimen sliding scale   SubCutaneous at bedtime  metoprolol tartrate 50 milliGRAM(s) Oral two times a day  NIFEdipine XL 30 milliGRAM(s) Oral daily  pantoprazole  Injectable 40 milliGRAM(s) IV Push every 12 hours  sacubitril 24 mG/valsartan 26 mG 1 Tablet(s) Oral two times a day  sodium bicarbonate 650 milliGRAM(s) Oral three times a day    MEDICATIONS  (PRN):  acetaminophen     Tablet .. 650 milliGRAM(s) Oral every 6 hours PRN Temp greater or equal to 38C (100.4F), Mild Pain (1 - 3)  aluminum hydroxide/magnesium hydroxide/simethicone Suspension 30 milliLiter(s) Oral every 4 hours PRN Dyspepsia  dextrose Oral Gel 15 Gram(s) Oral once PRN Blood Glucose LESS THAN 70 milliGRAM(s)/deciliter  melatonin 3 milliGRAM(s) Oral at bedtime PRN Insomnia  ondansetron Injectable 4 milliGRAM(s) IV Push every 8 hours PRN Nausea and/or Vomiting    T(C): 36.3 (05-12-25 @ 11:08), Max: 36.9 (05-12-25 @ 07:30)  HR: 87 (05-12-25 @ 11:08) (78 - 87)  BP: 105/69 (05-12-25 @ 11:08) (100/64 - 153/78)  RR: 18 (05-12-25 @ 11:08) (18 - 18)  SpO2: 100% (05-12-25 @ 11:08) (95% - 100%)    O/E:  Awake, alert, not in distress.  HEENT: atraumatic, EOMI.  Chest: decreased breath sounds at bases  CVS: SIS2 +, irregular  no murmur.  P/A: Soft, BS+  CNS: awake, alert  Ext: no edema feet.  All systems reviewed positive findings as above.                          8.9[L]  7.08  )-----------( 211      ( 12 May 2025 04:22 )             26.8[L]                        8.8[L]  7.39  )-----------( 332      ( 11 May 2025 05:56 )             26.4[L]    05-12    137  |  99  |  10  ----------------------------<  99  4.4   |  20  |  1.0  05-11    130[L]  |  95[L]  |  12  ----------------------------<  94  4.4   |  18  |  0.9    Ca    9.3      12 May 2025 04:22  Ca    9.2      11 May 2025 05:56  Ca    9.1      11 May 2025 01:30  Mg     1.8     05-12    TPro  6.1  /  Alb  3.9  /  TBili  0.4  /  DBili  x   /  AST  18  /  ALT  <5  /  AlkPhos  82  05-12  TPro  6.5  /  Alb  4.3  /  TBili  0.6  /  DBili  x   /  AST  13  /  ALT  <5  /  AlkPhos  87  05-11  
Cayla  ID: [id number]    SUBJECTIVE/OVERNIGHT EVENTS  Today is hospital day 2d. This morning patient was seen and examined at bedside, resting comfortably in bed. No acute or major events overnight. Had a BM last night. Currently NPO for EGD today. Admits to recurrent SOB, but denies N/V, chest pain.      MEDICATIONS  STANDING MEDICATIONS  atorvastatin 40 milliGRAM(s) Oral at bedtime  carbidopa/levodopa  25/100 1 Tablet(s) Oral <User Schedule>  chlorhexidine 2% Cloths 1 Application(s) Topical <User Schedule>  dextrose 5%. 1000 milliLiter(s) IV Continuous <Continuous>  dextrose 5%. 1000 milliLiter(s) IV Continuous <Continuous>  dextrose 50% Injectable 25 Gram(s) IV Push once  dextrose 50% Injectable 12.5 Gram(s) IV Push once  dextrose 50% Injectable 25 Gram(s) IV Push once  furosemide    Tablet 40 milliGRAM(s) Oral daily  glucagon  Injectable 1 milliGRAM(s) IntraMuscular once  insulin glargine Injectable (LANTUS) 5 Unit(s) SubCutaneous at bedtime  insulin lispro (ADMELOG) corrective regimen sliding scale   SubCutaneous three times a day before meals  insulin lispro (ADMELOG) corrective regimen sliding scale   SubCutaneous at bedtime  metoprolol tartrate 50 milliGRAM(s) Oral two times a day  NIFEdipine XL 30 milliGRAM(s) Oral daily  pantoprazole  Injectable 40 milliGRAM(s) IV Push every 12 hours  sacubitril 24 mG/valsartan 26 mG 1 Tablet(s) Oral two times a day  sodium bicarbonate 650 milliGRAM(s) Oral three times a day    PRN MEDICATIONS  acetaminophen     Tablet .. 650 milliGRAM(s) Oral every 6 hours PRN  aluminum hydroxide/magnesium hydroxide/simethicone Suspension 30 milliLiter(s) Oral every 4 hours PRN  dextrose Oral Gel 15 Gram(s) Oral once PRN  melatonin 3 milliGRAM(s) Oral at bedtime PRN  ondansetron Injectable 4 milliGRAM(s) IV Push every 8 hours PRN    VITALS  T(F): 98.4 (05-12-25 @ 07:30), Max: 98.4 (05-12-25 @ 07:30)  HR: 78 (05-12-25 @ 07:30) (78 - 84)  BP: 100/64 (05-12-25 @ 07:30) (100/64 - 153/78)  RR: 18 (05-12-25 @ 07:30) (18 - 18)  SpO2: 100% (05-12-25 @ 07:30) (95% - 100%)  POCT Blood Glucose.: 154 mg/dL (05-12-25 @ 07:54)  POCT Blood Glucose.: 157 mg/dL (05-11-25 @ 22:15)  POCT Blood Glucose.: 132 mg/dL (05-11-25 @ 20:43)  POCT Blood Glucose.: 128 mg/dL (05-11-25 @ 17:07)  POCT Blood Glucose.: 123 mg/dL (05-11-25 @ 11:27)      Physical Exam  General: NAD, well-appearing, sitting comfortably  HEENT: EOMI, moist mucus membranes, conjunctivae clear  Cardio: RRR, no murmurs, normal S1 and S2  Resp: decreased breath sounds, no wheezing/rhonchi/rales  Abd: abdomen soft, tender in the b/l epigastric region, nondistended, no rebound or guarding  MSK: no lower extremity edema, 2+ pedal pulses  Neuro: A&Ox3, motor and sensory functions grossly intact  Skin: warm and dry      LABS             8.9    7.08  )-----------( 211      ( 05-12-25 @ 04:22 )             26.8     137  |  99  |  10  -------------------------<  99   05-12-25 @ 04:22  4.4  |  20  |  1.0    Ca      9.3     05-12-25 @ 04:22  Mg     1.8     05-12-25 @ 04:22    TPro  6.1  /  Alb  3.9  /  TBili  0.4  /  DBili  x   /  AST  18  /  ALT  <5  /  AlkPhos  82  /  GGT  x     05-12-25 @ 04:22    PT/INR - ( 05-10-25 @ 11:40 )   PT: 16.40 sec[H];   INR: 1.38 ratio[H]  PTT - ( 05-10-25 @ 11:40 )  PTT:34.5 sec    Troponin T, High Sensitivity Result: 18 ng/L (05-11-25 @ 01:30)  Pro-Brain Natriuretic Peptide: 1700 pg/mL (05-10-25 @ 11:30)  Troponin T, High Sensitivity Result: 17 ng/L (05-10-25 @ 11:30)    Urinalysis Basic - ( 12 May 2025 04:22 )    Color: x / Appearance: x / SG: x / pH: x  Gluc: 99 mg/dL / Ketone: x  / Bili: x / Urobili: x   Blood: x / Protein: x / Nitrite: x   Leuk Esterase: x / RBC: x / WBC x   Sq Epi: x / Non Sq Epi: x / Bacteria: x          IMAGING
Patient is a 79y old  Female who presents with a chief complaint of Dyspnea on exertion, Chest pain (11 May 2025 02:29)    Patient was seen and examined.  No further episodes of bleeding  Denies chest pain , sob  today  All systems reviewed .    PAST MEDICAL & SURGICAL HISTORY:  Atrial fibrillation, unspecified type  HTN (hypertension)  High cholesterol  Diabetes mellitus  S/P ablation of atrial fibrillation  Gastric ulcer  Dieulafoy lesion of stomach  CAD (coronary artery disease)  H/O CHF  H/O Parkinson's disease  History of back surgery  Coronary stent patent    Allergies  Milk (Diarrhea)  doxycycline (Other)  adhesives (Pruritus; Rash)  latex (Rash)    Intolerances    Home Medications:  apixaban 5 mg oral tablet: 1 tab(s) orally every 12 hours (10 May 2025 15:05)  atorvastatin 40 mg oral tablet: 1 orally once a day (10 May 2025 15:05)  carbidopa-levodopa 25 mg-100 mg oral tablet: 1 tab(s) orally 2 times a day (10 May 2025 15:05)  Entresto 97 mg-103 mg oral tablet: 1 orally 2 times a day (10 May 2025 15:05)  ergocalciferol 50,000 intl units (1.25 mg) oral capsule: 1 cap(s) orally once a week (10 May 2025 15:05)  hydrocortisone 2.5% topical ointment: Apply topically to affected area 3 times a day Apply sparingly to the affected irritated areas of the chest where the telemetry pads were, 2-3 times a day for 4 days as needed. Do not occlude. (10 May 2025 15:05)  Lantus 100 units/mL subcutaneous solution: 20 unit(s) subcutaneous once a day (10 May 2025 15:05)  Lasix 20 mg oral tablet: 1 tab(s) orally once a day as needed for  pedal edema (10 May 2025 15:05)  metFORMIN 1000 mg oral tablet: 1 tab(s) orally 2 times a day (10 May 2025 15:05)  metoprolol tartrate 50 mg oral tablet: 1 orally 2 times a day (10 May 2025 15:05)  Ozempic (0.25 mg or 0.5 mg dose) 2 mg/1.5 mL subcutaneous solution: once a week on Tuesdays (10 May 2025 15:05)      MEDICATIONS  (STANDING):  atorvastatin 40 milliGRAM(s) Oral at bedtime  carbidopa/levodopa  25/100 1 Tablet(s) Oral <User Schedule>  insulin glargine Injectable (LANTUS) 10 Unit(s) SubCutaneous at bedtime  insulin lispro (ADMELOG) corrective regimen sliding scale   SubCutaneous three times a day before meals  insulin lispro (ADMELOG) corrective regimen sliding scale   SubCutaneous at bedtime  magnesium sulfate  IVPB 2 Gram(s) IV Intermittent once  metoprolol tartrate 50 milliGRAM(s) Oral two times a day  NIFEdipine XL 30 milliGRAM(s) Oral daily  pantoprazole  Injectable 40 milliGRAM(s) IV Push every 12 hours  sacubitril 24 mG/valsartan 26 mG 1 Tablet(s) Oral two times a day    MEDICATIONS  (PRN):  acetaminophen     Tablet .. 650 milliGRAM(s) Oral every 6 hours PRN Temp greater or equal to 38C (100.4F), Mild Pain (1 - 3)  aluminum hydroxide/magnesium hydroxide/simethicone Suspension 30 milliLiter(s) Oral every 4 hours PRN Dyspepsia  dextrose Oral Gel 15 Gram(s) Oral once PRN Blood Glucose LESS THAN 70 milliGRAM(s)/deciliter  furosemide    Tablet 20 milliGRAM(s) Oral daily PRN for pedal edema  melatonin 3 milliGRAM(s) Oral at bedtime PRN Insomnia  ondansetron Injectable 4 milliGRAM(s) IV Push every 8 hours PRN Nausea and/or Vomiting    Vital Signs Last 24 Hrs  T(C): 36.4  T(F): 97.6  HR: 80  BP: 150/84  BP(mean): --  RR: 18  SpO2: 96%    O/E:  Awake, alert, not in distress.  HEENT: atraumatic, EOMI.  Chest: decreased breath sounds at bases  CVS: SIS2 +, irregular  no murmur.  P/A: Soft, BS+  CNS: awake, alert  Ext: no edema feet.  All systems reviewed positive findings as above.    POCT Blood Glucose.: 123 mg/dL (11 May 2025 11:27)  POCT Blood Glucose.: 127 mg/dL (11 May 2025 07:57)  POCT Blood Glucose.: 191 mg/dL (10 May 2025 22:30)  POCT Blood Glucose.: 162 mg/dL (10 May 2025 21:16)  POCT Blood Glucose.: 245 mg/dL (10 May 2025 17:19)                        8.8[L]  7.39  )-----------( 332      ( 11 May 2025 05:56 )             26.4[L]                        8.3[L]  6.70  )-----------( 283      ( 11 May 2025 01:30 )             24.0[L]    05-11    130[L]  |  95[L]  |  12  ----------------------------<  94  4.4   |  18  |  0.9  05-11    125[L]  |  92[L]  |  14  ----------------------------<  166[H]  4.5   |  20  |  0.8    Ca    9.2      11 May 2025 05:56  Ca    9.1      11 May 2025 01:30  Ca    8.6      10 May 2025 11:30  Mg     1.5     05-11    TPro  6.5  /  Alb  4.3  /  TBili  0.6  /  DBili  x   /  AST  13  /  ALT  <5  /  AlkPhos  87  05-11  TPro  6.5  /  Alb  3.9  /  TBili  <0.2  /  DBili  x   /  AST  15  /  ALT  7   /  AlkPhos  78  05-10          PT/INR - ( 10 May 2025 11:40 )   PT: 16.40 sec;   INR: 1.38 ratio         PTT - ( 10 May 2025 11:40 )  PTT:34.5 sec  Urinalysis Basic - ( 11 May 2025 05:56 )    Color: x / Appearance: x / SG: x / pH: x  Gluc: 94 mg/dL / Ketone: x  / Bili: x / Urobili: x   Blood: x / Protein: x / Nitrite: x   Leuk Esterase: x / RBC: x / WBC x   Sq Epi: x / Non Sq Epi: x / Bacteria: x          
Gastroenterology progress note:     Patient is a 79y old  Female who presents with a chief complaint of Dyspnea on exertion, Chest pain (11 May 2025 12:04)       Admitted on: 05-10-25    We are following the patient for:  melena    Interval History:    No acute events overnight.   Responded to blood transfusion  NO active bleeding  HD stable      PAST MEDICAL & SURGICAL HISTORY:  Atrial fibrillation, unspecified type      HTN (hypertension)      High cholesterol      Diabetes mellitus      S/P ablation of atrial fibrillation      Gastric ulcer      Dieulafoy lesion of stomach      CAD (coronary artery disease)      H/O CHF      H/O Parkinson's disease      History of back surgery      Coronary stent patent          MEDICATIONS  (STANDING):  atorvastatin 40 milliGRAM(s) Oral at bedtime  carbidopa/levodopa  25/100 1 Tablet(s) Oral <User Schedule>  dextrose 5%. 1000 milliLiter(s) (50 mL/Hr) IV Continuous <Continuous>  dextrose 5%. 1000 milliLiter(s) (100 mL/Hr) IV Continuous <Continuous>  dextrose 50% Injectable 25 Gram(s) IV Push once  dextrose 50% Injectable 12.5 Gram(s) IV Push once  dextrose 50% Injectable 25 Gram(s) IV Push once  furosemide    Tablet 40 milliGRAM(s) Oral daily  glucagon  Injectable 1 milliGRAM(s) IntraMuscular once  insulin glargine Injectable (LANTUS) 5 Unit(s) SubCutaneous at bedtime  insulin lispro (ADMELOG) corrective regimen sliding scale   SubCutaneous three times a day before meals  insulin lispro (ADMELOG) corrective regimen sliding scale   SubCutaneous at bedtime  metoprolol tartrate 50 milliGRAM(s) Oral two times a day  NIFEdipine XL 30 milliGRAM(s) Oral daily  pantoprazole  Injectable 40 milliGRAM(s) IV Push every 12 hours  sacubitril 24 mG/valsartan 26 mG 1 Tablet(s) Oral two times a day  sodium bicarbonate 650 milliGRAM(s) Oral three times a day    MEDICATIONS  (PRN):  acetaminophen     Tablet .. 650 milliGRAM(s) Oral every 6 hours PRN Temp greater or equal to 38C (100.4F), Mild Pain (1 - 3)  aluminum hydroxide/magnesium hydroxide/simethicone Suspension 30 milliLiter(s) Oral every 4 hours PRN Dyspepsia  dextrose Oral Gel 15 Gram(s) Oral once PRN Blood Glucose LESS THAN 70 milliGRAM(s)/deciliter  melatonin 3 milliGRAM(s) Oral at bedtime PRN Insomnia  ondansetron Injectable 4 milliGRAM(s) IV Push every 8 hours PRN Nausea and/or Vomiting      Allergies  Milk (Diarrhea)  doxycycline (Other)  adhesives (Pruritus; Rash)  latex (Rash)      Review of Systems:   Cardiovascular:  No Chest Pain, No Palpitations  Respiratory:  No Cough, No Dyspnea  Gastrointestinal:  As described in HPI  Skin:  No Skin Lesions, No Jaundice  Neuro:  No Syncope, No Dizziness    Physical Examination:  T(C): 36.6 (05-11-25 @ 15:55), Max: 36.6 (05-11-25 @ 15:55)  HR: 78 (05-11-25 @ 15:55) (68 - 90)  BP: 132/71 (05-11-25 @ 15:55) (126/76 - 159/80)  RR: 18 (05-11-25 @ 15:55) (18 - 18)  SpO2: 95% (05-11-25 @ 15:55) (95% - 96%)  Weight (kg): 83.5 (05-11-25 @ 06:41)    05-10-25 @ 07:01  -  05-11-25 @ 07:00  --------------------------------------------------------  IN: 0 mL / OUT: 1400 mL / NET: -1400 mL        GENERAL: AAOx3, no acute distress.  HEAD:  Atraumatic, Normocephalic  EYES: conjunctiva and sclera clear  NECK: Supple, no JVD or thyromegaly  CHEST/LUNG: Clear to auscultation bilaterally; No wheeze, rhonchi, or rales  HEART: Regular rate and rhythm; normal S1, S2, No murmurs.  ABDOMEN: Soft, nontender, nondistended; Bowel sounds present  NEUROLOGY: No asterixis or tremor.   SKIN: Intact, no jaundice     Data:                        8.8    7.39  )-----------( 332      ( 11 May 2025 05:56 )             26.4     Hgb trend:  8.8  05-11-25 @ 05:56  8.3  05-11-25 @ 01:30  5.8  05-10-25 @ 11:30        05-11    130[L]  |  95[L]  |  12  ----------------------------<  94  4.4   |  18  |  0.9    Ca    9.2      11 May 2025 05:56  Mg     1.5     05-11    TPro  6.5  /  Alb  4.3  /  TBili  0.6  /  DBili  x   /  AST  13  /  ALT  <5  /  AlkPhos  87  05-11    Liver panel trend:  TBili 0.6   /   AST 13   /   ALT <5   /   AlkP 87   /   Tptn 6.5   /   Alb 4.3    /   DBili --      05-11  TBili <0.2   /   AST 15   /   ALT 7   /   AlkP 78   /   Tptn 6.5   /   Alb 3.9    /   DBili --      05-10      PT/INR - ( 10 May 2025 11:40 )   PT: 16.40 sec;   INR: 1.38 ratio         PTT - ( 10 May 2025 11:40 )  PTT:34.5 sec       Radiology:      
Gastroenterology progress note:     Patient is a 79y old  Female who presents with a chief complaint of Dyspnea on exertion, Chest pain (14 May 2025 09:50)       Admitted on: 05-10-25    We are following the patient for:  anemia    Interval History:    No acute events overnight.   Hb stable no bloody bm  S/p colonosocpy yesterday unrevealing    PAST MEDICAL & SURGICAL HISTORY:  Atrial fibrillation, unspecified type      HTN (hypertension)      High cholesterol      Diabetes mellitus      S/P ablation of atrial fibrillation      Gastric ulcer      Dieulafoy lesion of stomach      CAD (coronary artery disease)      H/O CHF      H/O Parkinson's disease      History of back surgery      Coronary stent patent          MEDICATIONS  (STANDING):  apixaban 5 milliGRAM(s) Oral every 12 hours  atorvastatin 40 milliGRAM(s) Oral at bedtime  carbidopa/levodopa  25/100 1 Tablet(s) Oral <User Schedule>  chlorhexidine 2% Cloths 1 Application(s) Topical <User Schedule>  dextrose 5%. 1000 milliLiter(s) (100 mL/Hr) IV Continuous <Continuous>  dextrose 5%. 1000 milliLiter(s) (50 mL/Hr) IV Continuous <Continuous>  dextrose 50% Injectable 25 Gram(s) IV Push once  dextrose 50% Injectable 12.5 Gram(s) IV Push once  dextrose 50% Injectable 25 Gram(s) IV Push once  ferrous    sulfate 325 milliGRAM(s) Oral daily  furosemide    Tablet 40 milliGRAM(s) Oral daily  glucagon  Injectable 1 milliGRAM(s) IntraMuscular once  insulin glargine Injectable (LANTUS) 5 Unit(s) SubCutaneous at bedtime  insulin lispro (ADMELOG) corrective regimen sliding scale   SubCutaneous at bedtime  lactated ringers. 1000 milliLiter(s) (60 mL/Hr) IV Continuous <Continuous>  metoprolol tartrate 50 milliGRAM(s) Oral two times a day  NIFEdipine XL 30 milliGRAM(s) Oral daily  pantoprazole  Injectable 40 milliGRAM(s) IV Push every 12 hours  sacubitril 24 mG/valsartan 26 mG 1 Tablet(s) Oral two times a day  sodium bicarbonate 650 milliGRAM(s) Oral three times a day    MEDICATIONS  (PRN):  acetaminophen     Tablet .. 650 milliGRAM(s) Oral every 6 hours PRN Temp greater or equal to 38C (100.4F), Mild Pain (1 - 3)  aluminum hydroxide/magnesium hydroxide/simethicone Suspension 30 milliLiter(s) Oral every 4 hours PRN Dyspepsia  dextrose Oral Gel 15 Gram(s) Oral once PRN Blood Glucose LESS THAN 70 milliGRAM(s)/deciliter  melatonin 3 milliGRAM(s) Oral at bedtime PRN Insomnia  ondansetron Injectable 4 milliGRAM(s) IV Push every 8 hours PRN Nausea and/or Vomiting      Allergies  Milk (Diarrhea)  doxycycline (Other)  adhesives (Pruritus; Rash)  latex (Rash)      Review of Systems:   Cardiovascular:  No Chest Pain, No Palpitations  Respiratory:  No Cough, No Dyspnea  Gastrointestinal:  As described in HPI  Skin:  No Skin Lesions, No Jaundice  Neuro:  No Syncope, No Dizziness    Physical Examination:  T(C): 36.9 (05-14-25 @ 07:30), Max: 36.9 (05-14-25 @ 07:30)  HR: 91 (05-14-25 @ 07:30) (84 - 96)  BP: 130/82 (05-14-25 @ 07:30) (121/71 - 130/82)  RR: 17 (05-14-25 @ 07:30) (17 - 18)  SpO2: 96% (05-14-25 @ 07:30) (96% - 96%)      05-14-25 @ 07:01  -  05-14-25 @ 16:17  --------------------------------------------------------  IN: 712 mL / OUT: 0 mL / NET: 712 mL        GENERAL: AAOx3, no acute distress.  HEAD:  Atraumatic, Normocephalic  EYES: conjunctiva and sclera clear  NECK: Supple, no JVD or thyromegaly  CHEST/LUNG: Clear to auscultation bilaterally; No wheeze, rhonchi, or rales  HEART: Regular rate and rhythm; normal S1, S2, No murmurs.  ABDOMEN: Soft, nontender, nondistended; Bowel sounds present  NEUROLOGY: No asterixis or tremor.   SKIN: Intact, no jaundice     Data:                        9.0    8.48  )-----------( 340      ( 14 May 2025 05:42 )             27.7     Hgb trend:  9.0  05-14-25 @ 05:42  9.1  05-13-25 @ 04:30  8.9  05-12-25 @ 04:22        05-14    133[L]  |  97[L]  |  10  ----------------------------<  123[H]  4.6   |  22  |  1.1    Ca    8.8      14 May 2025 05:42  Mg     1.7     05-14    TPro  6.1  /  Alb  3.8  /  TBili  0.3  /  DBili  x   /  AST  16  /  ALT  <5  /  AlkPhos  88  05-14    Liver panel trend:  TBili 0.3   /   AST 16   /   ALT <5   /   AlkP 88   /   Tptn 6.1   /   Alb 3.8    /   DBili --      05-14  TBili 0.4   /   AST 18   /   ALT <5   /   AlkP 82   /   Tptn 6.1   /   Alb 3.9    /   DBili --      05-12  TBili 0.6   /   AST 13   /   ALT <5   /   AlkP 87   /   Tptn 6.5   /   Alb 4.3    /   DBili --      05-11  TBili <0.2   /   AST 15   /   ALT 7   /   AlkP 78   /   Tptn 6.5   /   Alb 3.9    /   DBili --      05-10             Radiology:      
Patient is a 79y old  Female who presents with a chief complaint of Dyspnea on exertion, Chest pain (11 May 2025 02:29)    Patient was seen and examined.  No further episodes of bleeding  Denies chest pain , sob   All systems reviewed .    PAST MEDICAL & SURGICAL HISTORY:  Atrial fibrillation, unspecified type  HTN (hypertension)  High cholesterol  Diabetes mellitus  S/P ablation of atrial fibrillation  Gastric ulcer  Dieulafoy lesion of stomach  CAD (coronary artery disease)  H/O CHF  H/O Parkinson's disease  History of back surgery  Coronary stent patent    Allergies  Milk (Diarrhea)  doxycycline (Other)  adhesives (Pruritus; Rash)  latex (Rash)    Intolerances    Home Medications:  apixaban 5 mg oral tablet: 1 tab(s) orally every 12 hours (10 May 2025 15:05)  atorvastatin 40 mg oral tablet: 1 orally once a day (10 May 2025 15:05)  carbidopa-levodopa 25 mg-100 mg oral tablet: 1 tab(s) orally 2 times a day (10 May 2025 15:05)  Entresto 97 mg-103 mg oral tablet: 1 orally 2 times a day (10 May 2025 15:05)  ergocalciferol 50,000 intl units (1.25 mg) oral capsule: 1 cap(s) orally once a week (10 May 2025 15:05)  hydrocortisone 2.5% topical ointment: Apply topically to affected area 3 times a day Apply sparingly to the affected irritated areas of the chest where the telemetry pads were, 2-3 times a day for 4 days as needed. Do not occlude. (10 May 2025 15:05)  Lantus 100 units/mL subcutaneous solution: 20 unit(s) subcutaneous once a day (10 May 2025 15:05)  Lasix 20 mg oral tablet: 1 tab(s) orally once a day as needed for  pedal edema (10 May 2025 15:05)  metFORMIN 1000 mg oral tablet: 1 tab(s) orally 2 times a day (10 May 2025 15:05)  metoprolol tartrate 50 mg oral tablet: 1 orally 2 times a day (10 May 2025 15:05)  Ozempic (0.25 mg or 0.5 mg dose) 2 mg/1.5 mL subcutaneous solution: once a week on Tuesdays (10 May 2025 15:05)    MEDICATIONS  (STANDING):  atorvastatin 40 milliGRAM(s) Oral at bedtime  carbidopa/levodopa  25/100 1 Tablet(s) Oral <User Schedule>  chlorhexidine 2% Cloths 1 Application(s) Topical <User Schedule>  dextrose 5%. 1000 milliLiter(s) (100 mL/Hr) IV Continuous <Continuous>  dextrose 5%. 1000 milliLiter(s) (50 mL/Hr) IV Continuous <Continuous>  dextrose 50% Injectable 25 Gram(s) IV Push once  dextrose 50% Injectable 12.5 Gram(s) IV Push once  dextrose 50% Injectable 25 Gram(s) IV Push once  furosemide    Tablet 40 milliGRAM(s) Oral daily  glucagon  Injectable 1 milliGRAM(s) IntraMuscular once  insulin glargine Injectable (LANTUS) 5 Unit(s) SubCutaneous at bedtime  insulin lispro (ADMELOG) corrective regimen sliding scale   SubCutaneous at bedtime  lactated ringers. 1000 milliLiter(s) (60 mL/Hr) IV Continuous <Continuous>  metoprolol tartrate 50 milliGRAM(s) Oral two times a day  NIFEdipine XL 30 milliGRAM(s) Oral daily  pantoprazole  Injectable 40 milliGRAM(s) IV Push every 12 hours  sacubitril 24 mG/valsartan 26 mG 1 Tablet(s) Oral two times a day  sodium bicarbonate 650 milliGRAM(s) Oral three times a day    MEDICATIONS  (PRN):  acetaminophen     Tablet .. 650 milliGRAM(s) Oral every 6 hours PRN Temp greater or equal to 38C (100.4F), Mild Pain (1 - 3)  aluminum hydroxide/magnesium hydroxide/simethicone Suspension 30 milliLiter(s) Oral every 4 hours PRN Dyspepsia  dextrose Oral Gel 15 Gram(s) Oral once PRN Blood Glucose LESS THAN 70 milliGRAM(s)/deciliter  melatonin 3 milliGRAM(s) Oral at bedtime PRN Insomnia  ondansetron Injectable 4 milliGRAM(s) IV Push every 8 hours PRN Nausea and/or Vomiting    T(C): 36.8 (05-13-25 @ 13:21), Max: 36.9 (05-12-25 @ 15:43)  HR: 90 (05-13-25 @ 13:21) (70 - 93)  BP: 130/84 (05-13-25 @ 13:21) (119/58 - 188/72)  RR: 18 (05-13-25 @ 13:21) (18 - 18)  SpO2: 100% (05-13-25 @ 13:21) (96% - 100%)    O/E:  Awake, alert, not in distress.  HEENT: atraumatic, EOMI.  Chest: decreased breath sounds at bases  CVS: SIS2 +, irregular  no murmur.  P/A: Soft, BS+  CNS: awake, alert  Ext: no edema feet.  All systems reviewed positive findings as above.                          8.9[L]  7.08  )-----------( 211      ( 12 May 2025 04:22 )             26.8[L]                        8.8[L]  7.39  )-----------( 332      ( 11 May 2025 05:56 )             26.4[L]    05-12    137  |  99  |  10  ----------------------------<  99  4.4   |  20  |  1.0  05-11    130[L]  |  95[L]  |  12  ----------------------------<  94  4.4   |  18  |  0.9    Ca    9.3      12 May 2025 04:22  Ca    9.2      11 May 2025 05:56  Ca    9.1      11 May 2025 01:30  Mg     1.8     05-12    TPro  6.1  /  Alb  3.9  /  TBili  0.4  /  DBili  x   /  AST  18  /  ALT  <5  /  AlkPhos  82  05-12  TPro  6.5  /  Alb  4.3  /  TBili  0.6  /  DBili  x   /  AST  13  /  ALT  <5  /  AlkPhos  87  05-11  
SUBJECTIVE/OVERNIGHT EVENTS  Today is hospital day 1d. This morning patient was seen and examined at bedside, resting comfortably in bed. No acute or major events overnight.    MEDICATIONS  STANDING MEDICATIONS  atorvastatin 40 milliGRAM(s) Oral at bedtime  carbidopa/levodopa  25/100 1 Tablet(s) Oral <User Schedule>  dextrose 5%. 1000 milliLiter(s) IV Continuous <Continuous>  dextrose 5%. 1000 milliLiter(s) IV Continuous <Continuous>  dextrose 50% Injectable 25 Gram(s) IV Push once  dextrose 50% Injectable 12.5 Gram(s) IV Push once  dextrose 50% Injectable 25 Gram(s) IV Push once  glucagon  Injectable 1 milliGRAM(s) IntraMuscular once  insulin glargine Injectable (LANTUS) 10 Unit(s) SubCutaneous at bedtime  insulin lispro (ADMELOG) corrective regimen sliding scale   SubCutaneous three times a day before meals  insulin lispro (ADMELOG) corrective regimen sliding scale   SubCutaneous at bedtime  metoprolol tartrate 50 milliGRAM(s) Oral two times a day  NIFEdipine XL 30 milliGRAM(s) Oral daily  pantoprazole  Injectable 40 milliGRAM(s) IV Push every 12 hours  sacubitril 24 mG/valsartan 26 mG 1 Tablet(s) Oral two times a day    PRN MEDICATIONS  acetaminophen     Tablet .. 650 milliGRAM(s) Oral every 6 hours PRN  aluminum hydroxide/magnesium hydroxide/simethicone Suspension 30 milliLiter(s) Oral every 4 hours PRN  dextrose Oral Gel 15 Gram(s) Oral once PRN  furosemide    Tablet 20 milliGRAM(s) Oral daily PRN  melatonin 3 milliGRAM(s) Oral at bedtime PRN  ondansetron Injectable 4 milliGRAM(s) IV Push every 8 hours PRN    VITALS  T(F): 97.6 (05-11-25 @ 00:54), Max: 98.1 (05-10-25 @ 10:51)  HR: 72 (05-11-25 @ 00:54) (68 - 95)  BP: 126/76 (05-11-25 @ 00:54) (126/76 - 153/75)  RR: 18 (05-11-25 @ 00:54) (18 - 18)  SpO2: 95% (05-11-25 @ 00:54) (91% - 100%)  POCT Blood Glucose.: 191 mg/dL (05-10-25 @ 22:30)  POCT Blood Glucose.: 162 mg/dL (05-10-25 @ 21:16)  POCT Blood Glucose.: 245 mg/dL (05-10-25 @ 17:19)      LABS             8.3    6.70  )-----------( 283      ( 05-11-25 @ 01:30 )             24.0     125  |  92  |  14  -------------------------<  166   05-11-25 @ 01:30  4.5  |  20  |  0.8    Ca      9.1     05-11-25 @ 01:30    TPro  6.5  /  Alb  3.9  /  TBili  <0.2  /  DBili  x   /  AST  15  /  ALT  7   /  AlkPhos  78  /  GGT  x     05-10-25 @ 11:30    PT/INR - ( 05-10-25 @ 11:40 )   PT: 16.40 sec[H];   INR: 1.38 ratio[H]  PTT - ( 05-10-25 @ 11:40 )  PTT:34.5 sec    Troponin T, High Sensitivity Result: 18 ng/L (05-11-25 @ 01:30)  Pro-Brain Natriuretic Peptide: 1700 pg/mL (05-10-25 @ 11:30)  Troponin T, High Sensitivity Result: 17 ng/L (05-10-25 @ 11:30)    Urinalysis Basic - ( 11 May 2025 01:30 )    Color: x / Appearance: x / SG: x / pH: x  Gluc: 166 mg/dL / Ketone: x  / Bili: x / Urobili: x   Blood: x / Protein: x / Nitrite: x   Leuk Esterase: x / RBC: x / WBC x   Sq Epi: x / Non Sq Epi: x / Bacteria: x          IMAGING

## 2025-05-14 NOTE — PROGRESS NOTE ADULT - REASON FOR ADMISSION
Dyspnea on exertion, Chest pain

## 2025-05-15 ENCOUNTER — TRANSCRIPTION ENCOUNTER (OUTPATIENT)
Age: 80
End: 2025-05-15

## 2025-05-15 LAB — SURGICAL PATHOLOGY STUDY: SIGNIFICANT CHANGE UP

## 2025-05-15 NOTE — CDI QUERY NOTE - NSCDIOTHERTXTBX_GEN_ALL_CORE_HH
Clinical documentation and/or evidence in the medical record indicates that this patient has CHF.  In order to ensure accurate coding and accuracy of the clinical record, the documentation in this patient’s medical record requires additional clarification.      Please include more specific documentation as to the type of CHF in your progress note and/or discharge summary.    Specify Acuity:  • Acute on chronic HFpEF was treated and evaluated  • Acute on chronic HFrEF was treated and evaluated  • Chronic HFrEF was treated and evaluated  • Chronic HFpEF was treated and evaluated	  • Other (specify)    Supporting documentation and/or clinical evidence:     5/10 H&P Adult-GOC- Resident/Attending: ...  presents to the ED increased dyspnea on exertion and chest pain on exertion ... Acute blood loss anemia Suspected secondary to Upper GI bleed ... CHF - Trop 17; EKG shows afib with HR 90's, no ST elevations noted; bnp 1.7k  - CXR appears congested- on exam, no crackles, no edema, saturating well on room air- last tte in 2020- repeat TTE- one time 40 IV Lasix- lower entresto to 24mg dose- strict ins/outs,  AM CXR    5/12 Consult Note Adult-Cardiology Attending: ... HFpEf with chronic Afib    5/14 Progress Note Adult-Internal Medicine: ... Chronic systolic CHF    Lab findings: 5/10 BNP-1700    Chest xray: 5/10- Increased diffuse opacities with right effusion. No pneumothorax                   5/11- Stable bilateral opacities. No definite pneumothorax.                   5/12-Mild CHF.    Per MAR: Lasix 40mg IV push. Administered 5/10                 Lasix 40mg, oral, daily. Administered 5/11-5/14    Echo: 5/11-Summary:   1. Left ventricular ejection fraction, by visual estimation, is 55 to   60%.   2. Normal global left ventricular systolic function.   3. Severely enlarged left atrium.   4. Severely enlarged right atrium.   5. Normal left ventricular internal cavity size.   6. Normal right ventricular size and function.   7. Mild mitral annular calcification.   8. Trace mitral valve regurgitation.   9. Mild tricuspid regurgitation.  10. Mild aortic regurgitation.  11. Estimated pulmonary artery systolic pressure is 56.2 mmHg assuming a   right atrial pressure of 15 mmHg, which is consistent with moderate   pulmonary hypertension.    Thank you,  Annabelle Guerrier RN [facility] CCDS  [phone]    For reference, please see the [facility] Provider CHF Clinical Paper located on the [facility] Intranet - Clinical documentation improvement resources.

## 2025-05-28 DIAGNOSIS — Z79.4 LONG TERM (CURRENT) USE OF INSULIN: ICD-10-CM

## 2025-05-28 DIAGNOSIS — K44.9 DIAPHRAGMATIC HERNIA WITHOUT OBSTRUCTION OR GANGRENE: ICD-10-CM

## 2025-05-28 DIAGNOSIS — K29.60 OTHER GASTRITIS WITHOUT BLEEDING: ICD-10-CM

## 2025-05-28 DIAGNOSIS — Z91.040 LATEX ALLERGY STATUS: ICD-10-CM

## 2025-05-28 DIAGNOSIS — Z95.5 PRESENCE OF CORONARY ANGIOPLASTY IMPLANT AND GRAFT: ICD-10-CM

## 2025-05-28 DIAGNOSIS — E78.00 PURE HYPERCHOLESTEROLEMIA, UNSPECIFIED: ICD-10-CM

## 2025-05-28 DIAGNOSIS — K64.4 RESIDUAL HEMORRHOIDAL SKIN TAGS: ICD-10-CM

## 2025-05-28 DIAGNOSIS — E87.1 HYPO-OSMOLALITY AND HYPONATREMIA: ICD-10-CM

## 2025-05-28 DIAGNOSIS — I25.10 ATHEROSCLEROTIC HEART DISEASE OF NATIVE CORONARY ARTERY WITHOUT ANGINA PECTORIS: ICD-10-CM

## 2025-05-28 DIAGNOSIS — Z87.11 PERSONAL HISTORY OF PEPTIC ULCER DISEASE: ICD-10-CM

## 2025-05-28 DIAGNOSIS — K64.8 OTHER HEMORRHOIDS: ICD-10-CM

## 2025-05-28 DIAGNOSIS — Z79.01 LONG TERM (CURRENT) USE OF ANTICOAGULANTS: ICD-10-CM

## 2025-05-28 DIAGNOSIS — E87.20 ACIDOSIS, UNSPECIFIED: ICD-10-CM

## 2025-05-28 DIAGNOSIS — Z66 DO NOT RESUSCITATE: ICD-10-CM

## 2025-05-28 DIAGNOSIS — G20.A1 PARKINSON'S DISEASE WITHOUT DYSKINESIA, WITHOUT MENTION OF FLUCTUATIONS: ICD-10-CM

## 2025-05-28 DIAGNOSIS — E83.42 HYPOMAGNESEMIA: ICD-10-CM

## 2025-05-28 DIAGNOSIS — E87.5 HYPERKALEMIA: ICD-10-CM

## 2025-05-28 DIAGNOSIS — Z79.85 LONG-TERM (CURRENT) USE OF INJECTABLE NON-INSULIN ANTIDIABETIC DRUGS: ICD-10-CM

## 2025-05-28 DIAGNOSIS — I50.32 CHRONIC DIASTOLIC (CONGESTIVE) HEART FAILURE: ICD-10-CM

## 2025-05-28 DIAGNOSIS — Z88.1 ALLERGY STATUS TO OTHER ANTIBIOTIC AGENTS: ICD-10-CM

## 2025-05-28 DIAGNOSIS — D62 ACUTE POSTHEMORRHAGIC ANEMIA: ICD-10-CM

## 2025-05-28 DIAGNOSIS — Z79.84 LONG TERM (CURRENT) USE OF ORAL HYPOGLYCEMIC DRUGS: ICD-10-CM

## 2025-05-28 DIAGNOSIS — Z91.011 ALLERGY TO MILK PRODUCTS: ICD-10-CM

## 2025-05-28 DIAGNOSIS — I11.0 HYPERTENSIVE HEART DISEASE WITH HEART FAILURE: ICD-10-CM

## 2025-05-28 DIAGNOSIS — E11.9 TYPE 2 DIABETES MELLITUS WITHOUT COMPLICATIONS: ICD-10-CM

## 2025-05-28 DIAGNOSIS — K92.1 MELENA: ICD-10-CM

## 2025-05-28 DIAGNOSIS — I48.20 CHRONIC ATRIAL FIBRILLATION, UNSPECIFIED: ICD-10-CM

## 2025-06-03 PROBLEM — I25.10 ATHEROSCLEROTIC HEART DISEASE OF NATIVE CORONARY ARTERY WITHOUT ANGINA PECTORIS: Chronic | Status: ACTIVE | Noted: 2025-05-10

## 2025-06-03 PROBLEM — K31.82 DIEULAFOY LESION (HEMORRHAGIC) OF STOMACH AND DUODENUM: Chronic | Status: ACTIVE | Noted: 2025-05-10

## 2025-06-03 PROBLEM — Z86.69 PERSONAL HISTORY OF OTHER DISEASES OF THE NERVOUS SYSTEM AND SENSE ORGANS: Chronic | Status: ACTIVE | Noted: 2025-05-10

## 2025-06-03 PROBLEM — K25.9 GASTRIC ULCER, UNSPECIFIED AS ACUTE OR CHRONIC, WITHOUT HEMORRHAGE OR PERFORATION: Chronic | Status: ACTIVE | Noted: 2025-05-10

## 2025-06-03 PROBLEM — Z86.79 PERSONAL HISTORY OF OTHER DISEASES OF THE CIRCULATORY SYSTEM: Chronic | Status: ACTIVE | Noted: 2025-05-10

## 2025-06-13 ENCOUNTER — NON-APPOINTMENT (OUTPATIENT)
Age: 80
End: 2025-06-13

## 2025-06-13 ENCOUNTER — APPOINTMENT (OUTPATIENT)
Dept: ELECTROPHYSIOLOGY | Facility: CLINIC | Age: 80
End: 2025-06-13

## 2025-06-13 VITALS
WEIGHT: 178 LBS | BODY MASS INDEX: 29.66 KG/M2 | HEART RATE: 78 BPM | DIASTOLIC BLOOD PRESSURE: 70 MMHG | HEIGHT: 65 IN | SYSTOLIC BLOOD PRESSURE: 112 MMHG

## 2025-06-13 PROCEDURE — G2211 COMPLEX E/M VISIT ADD ON: CPT

## 2025-06-13 PROCEDURE — 93000 ELECTROCARDIOGRAM COMPLETE: CPT

## 2025-06-13 PROCEDURE — 99215 OFFICE O/P EST HI 40 MIN: CPT | Mod: 57

## 2025-06-13 RX ORDER — METOPROLOL TARTRATE 50 MG/1
50 TABLET ORAL TWICE DAILY
Refills: 0 | Status: ACTIVE | COMMUNITY

## 2025-06-13 RX ORDER — ATORVASTATIN CALCIUM 40 MG/1
40 TABLET, FILM COATED ORAL DAILY
Refills: 0 | Status: ACTIVE | COMMUNITY

## 2025-06-13 RX ORDER — AMLODIPINE BESYLATE 5 MG/1
5 TABLET ORAL DAILY
Refills: 0 | Status: ACTIVE | COMMUNITY

## 2025-06-13 RX ORDER — APIXABAN 5 MG/1
5 TABLET, FILM COATED ORAL TWICE DAILY
Refills: 0 | Status: ACTIVE | COMMUNITY

## 2025-07-03 ENCOUNTER — APPOINTMENT (OUTPATIENT)
Dept: ELECTROPHYSIOLOGY | Facility: CLINIC | Age: 80
End: 2025-07-03

## 2025-07-15 ENCOUNTER — APPOINTMENT (OUTPATIENT)
Dept: PAIN MANAGEMENT | Facility: CLINIC | Age: 80
End: 2025-07-15
Payer: MEDICARE

## 2025-07-15 PROBLEM — M70.62 GREATER TROCHANTERIC BURSITIS OF LEFT HIP: Status: ACTIVE | Noted: 2025-07-15

## 2025-07-15 PROCEDURE — 99204 OFFICE O/P NEW MOD 45 MIN: CPT | Mod: 25

## 2025-07-15 PROCEDURE — 20610 DRAIN/INJ JOINT/BURSA W/O US: CPT | Mod: LT

## 2025-07-15 RX ORDER — METHOCARBAMOL 500 MG/1
500 TABLET, FILM COATED ORAL
Qty: 30 | Refills: 1 | Status: ACTIVE | COMMUNITY
Start: 2025-07-15 | End: 1900-01-01

## 2025-07-17 ENCOUNTER — EMERGENCY (EMERGENCY)
Facility: HOSPITAL | Age: 80
LOS: 0 days | Discharge: HOME | End: 2025-07-17
Attending: EMERGENCY MEDICINE
Payer: MEDICARE

## 2025-07-17 VITALS
OXYGEN SATURATION: 100 % | HEART RATE: 82 BPM | RESPIRATION RATE: 18 BRPM | SYSTOLIC BLOOD PRESSURE: 144 MMHG | DIASTOLIC BLOOD PRESSURE: 82 MMHG | TEMPERATURE: 97 F

## 2025-07-17 VITALS
HEIGHT: 65 IN | RESPIRATION RATE: 20 BRPM | HEART RATE: 88 BPM | OXYGEN SATURATION: 100 % | TEMPERATURE: 98 F | WEIGHT: 179.9 LBS | DIASTOLIC BLOOD PRESSURE: 79 MMHG | SYSTOLIC BLOOD PRESSURE: 117 MMHG

## 2025-07-17 DIAGNOSIS — Z95.5 PRESENCE OF CORONARY ANGIOPLASTY IMPLANT AND GRAFT: Chronic | ICD-10-CM

## 2025-07-17 DIAGNOSIS — Z98.890 OTHER SPECIFIED POSTPROCEDURAL STATES: Chronic | ICD-10-CM

## 2025-07-17 LAB
ALBUMIN SERPL ELPH-MCNC: 4.3 G/DL — SIGNIFICANT CHANGE UP (ref 3.5–5.2)
ALP SERPL-CCNC: 78 U/L — SIGNIFICANT CHANGE UP (ref 30–115)
ALT FLD-CCNC: 10 U/L — SIGNIFICANT CHANGE UP (ref 0–41)
ANION GAP SERPL CALC-SCNC: 5 MMOL/L — LOW (ref 7–14)
APTT BLD: 33.7 SEC — SIGNIFICANT CHANGE UP (ref 27–39.2)
AST SERPL-CCNC: 25 U/L — SIGNIFICANT CHANGE UP (ref 0–41)
BASOPHILS # BLD AUTO: 0.11 K/UL — SIGNIFICANT CHANGE UP (ref 0–0.2)
BASOPHILS NFR BLD AUTO: 0.9 % — SIGNIFICANT CHANGE UP (ref 0–1)
BILIRUB SERPL-MCNC: <0.2 MG/DL — SIGNIFICANT CHANGE UP (ref 0.2–1.2)
BUN SERPL-MCNC: 28 MG/DL — HIGH (ref 10–20)
CALCIUM SERPL-MCNC: 9 MG/DL — SIGNIFICANT CHANGE UP (ref 8.4–10.5)
CHLORIDE SERPL-SCNC: 101 MMOL/L — SIGNIFICANT CHANGE UP (ref 98–110)
CO2 SERPL-SCNC: 15 MMOL/L — LOW (ref 17–32)
CREAT SERPL-MCNC: 1.2 MG/DL — SIGNIFICANT CHANGE UP (ref 0.7–1.5)
EGFR: 46 ML/MIN/1.73M2 — LOW
EGFR: 46 ML/MIN/1.73M2 — LOW
EOSINOPHIL # BLD AUTO: 0.11 K/UL — SIGNIFICANT CHANGE UP (ref 0–0.7)
EOSINOPHIL NFR BLD AUTO: 0.9 % — SIGNIFICANT CHANGE UP (ref 0–8)
GLUCOSE SERPL-MCNC: 90 MG/DL — SIGNIFICANT CHANGE UP (ref 70–99)
HCT VFR BLD CALC: 21.3 % — LOW (ref 37–47)
HGB BLD-MCNC: 6.7 G/DL — CRITICAL LOW (ref 12–16)
INR BLD: 1.25 RATIO — SIGNIFICANT CHANGE UP (ref 0.65–1.3)
LIDOCAIN IGE QN: 31 U/L — SIGNIFICANT CHANGE UP (ref 7–60)
LYMPHOCYTES # BLD AUTO: 18.2 % — LOW (ref 20.5–51.1)
LYMPHOCYTES # BLD AUTO: 2.15 K/UL — SIGNIFICANT CHANGE UP (ref 1.2–3.4)
MCHC RBC-ENTMCNC: 27.6 PG — SIGNIFICANT CHANGE UP (ref 27–31)
MCHC RBC-ENTMCNC: 31.5 G/DL — LOW (ref 32–37)
MCV RBC AUTO: 87.7 FL — SIGNIFICANT CHANGE UP (ref 81–99)
MONOCYTES # BLD AUTO: 0.97 K/UL — HIGH (ref 0.1–0.6)
MONOCYTES NFR BLD AUTO: 8.2 % — SIGNIFICANT CHANGE UP (ref 1.7–9.3)
NEUTROPHILS # BLD AUTO: 8.39 K/UL — HIGH (ref 1.4–6.5)
NEUTROPHILS NFR BLD AUTO: 70.9 % — SIGNIFICANT CHANGE UP (ref 42.2–75.2)
PLATELET # BLD AUTO: 326 K/UL — SIGNIFICANT CHANGE UP (ref 130–400)
PMV BLD: 9.6 FL — SIGNIFICANT CHANGE UP (ref 7.4–10.4)
POTASSIUM SERPL-MCNC: 4.4 MMOL/L — SIGNIFICANT CHANGE UP (ref 3.5–5)
POTASSIUM SERPL-SCNC: 4.4 MMOL/L — SIGNIFICANT CHANGE UP (ref 3.5–5)
PROT SERPL-MCNC: 6.7 G/DL — SIGNIFICANT CHANGE UP (ref 6–8)
PROTHROM AB SERPL-ACNC: 14.8 SEC — HIGH (ref 9.95–12.87)
RBC # BLD: 2.43 M/UL — LOW (ref 4.2–5.4)
RBC # FLD: 16.4 % — HIGH (ref 11.5–14.5)
SODIUM SERPL-SCNC: 121 MMOL/L — LOW (ref 135–146)
WBC # BLD: 11.83 K/UL — HIGH (ref 4.8–10.8)
WBC # FLD AUTO: 11.83 K/UL — HIGH (ref 4.8–10.8)

## 2025-07-17 PROCEDURE — 99284 EMERGENCY DEPT VISIT MOD MDM: CPT

## 2025-07-17 PROCEDURE — 96374 THER/PROPH/DIAG INJ IV PUSH: CPT

## 2025-07-17 PROCEDURE — 36430 TRANSFUSION BLD/BLD COMPNT: CPT

## 2025-07-17 PROCEDURE — 86923 COMPATIBILITY TEST ELECTRIC: CPT

## 2025-07-17 PROCEDURE — 85730 THROMBOPLASTIN TIME PARTIAL: CPT

## 2025-07-17 PROCEDURE — 99285 EMERGENCY DEPT VISIT HI MDM: CPT | Mod: 25

## 2025-07-17 PROCEDURE — 86901 BLOOD TYPING SEROLOGIC RH(D): CPT

## 2025-07-17 PROCEDURE — 85025 COMPLETE CBC W/AUTO DIFF WBC: CPT

## 2025-07-17 PROCEDURE — 86900 BLOOD TYPING SEROLOGIC ABO: CPT

## 2025-07-17 PROCEDURE — 83690 ASSAY OF LIPASE: CPT

## 2025-07-17 PROCEDURE — 80053 COMPREHEN METABOLIC PANEL: CPT

## 2025-07-17 PROCEDURE — 86850 RBC ANTIBODY SCREEN: CPT

## 2025-07-17 PROCEDURE — P9016: CPT

## 2025-07-17 PROCEDURE — 36415 COLL VENOUS BLD VENIPUNCTURE: CPT

## 2025-07-17 PROCEDURE — 85610 PROTHROMBIN TIME: CPT

## 2025-07-17 RX ORDER — CEFTRIAXONE 500 MG/1
1000 INJECTION, POWDER, FOR SOLUTION INTRAMUSCULAR; INTRAVENOUS ONCE
Refills: 0 | Status: COMPLETED | OUTPATIENT
Start: 2025-07-17 | End: 2025-07-17

## 2025-07-17 RX ORDER — ACETAMINOPHEN 500 MG/5ML
975 LIQUID (ML) ORAL ONCE
Refills: 0 | Status: COMPLETED | OUTPATIENT
Start: 2025-07-17 | End: 2025-07-17

## 2025-07-17 RX ORDER — OCTREOTIDE ACETATE 500 UG/ML
50 INJECTION, SOLUTION INTRAVENOUS; SUBCUTANEOUS
Qty: 500 | Refills: 0 | Status: DISCONTINUED | OUTPATIENT
Start: 2025-07-17 | End: 2025-07-17

## 2025-07-17 RX ADMIN — CEFTRIAXONE 100 MILLIGRAM(S): 500 INJECTION, POWDER, FOR SOLUTION INTRAMUSCULAR; INTRAVENOUS at 15:50

## 2025-07-17 RX ADMIN — Medication 10 MG/HR: at 15:06

## 2025-07-17 RX ADMIN — Medication 975 MILLIGRAM(S): at 22:03

## 2025-07-17 NOTE — ED PROVIDER NOTE - OBJECTIVE STATEMENT
79y F with PMHX of Hungarian-speaking Female with a PMH s/f iron deficiency anemia secondary to dieulafoy lesion gastric ulcer (s/p endo clip 3/6/25) and atrial fibrillation (on Eliquis) status post ablation x2 , CHF, CAD status post 4 stents, hypertension, hyperlipidemia, diabetes and Parkinson disease who presents to the ED due to low Hgb 6.8 at outpt office. She presents with her daughter at bedside who states they were at [facility] on June 14 where she had capsule endoscopy that found multiple angioectasias and AVM. Daughter states she's had rectal bleeding , intermittently for the past 6 months, changes between melena and bright red blood. She has had 4 transfusions in total. Daughter states she will have watchmen procedure in November.  they are going to her GI tomorrow for follow up. Pt denies fevers, chills, nausea, vomiting, diarrhea.

## 2025-07-17 NOTE — ED PROVIDER NOTE - CHIEF COMPLAINT
comfortable appearance/family/SO at bedside
no change observed/family/SO at bedside
The patient is a 79y Female complaining of abnormal lab result.

## 2025-07-17 NOTE — ED ADULT NURSE NOTE - NSFALLDEVICES_ED_ALL_ED
Pt here for C 3 D 1 tecentriq . Arrives Ambulating independently, accompanied by Self and Family member       Oral medications included in this regimen:  no    Patient confirms comprehension of cancer treatment schedule:  yes    Pregnancy screening:  Not applicable    Modifications in dose or schedule:  No    Medications appearance and physical integrity checked by RN.  Chemotherapy IV pump settings verified by 2 RNs:  yes     Frequency of blood return and site check throughout administration: Prior to administration and At completion of therapy     Infusion/treatment outcome:  patient tolerated treatment without incident    Education Record    Learner:  Patient and Family Member  Barriers / Limitations:  None  Method:  Discussion  Education / instructions given:    Outcome:  Shows understanding    Discharged Home, Ambulating independently, accompanied by:Self and Family member    Patient/family verbalized understanding of future appointments: by printed AVS
None

## 2025-07-17 NOTE — ED PROVIDER NOTE - PHYSICAL EXAMINATION
VITAL SIGNS: I have reviewed nursing notes and confirm.  CONSTITUTIONAL: well-appearing, non-toxic, NAD  SKIN: Warm dry, normal skin turgor  HEAD: NCAT  EYES: EOMI, PERRLA, no scleral icterus  ENT: Moist mucous membranes, normal pharynx with no erythema or exudates  NECK: Supple; non tender.  CARD: RRR,  RESP: clear to ausculation b/l.    ABD: soft, + BS, non-tender, non-distended, no rebound or guarding. No CVA tenderness  EXT: no bony tenderness, no pedal edema, no calf tenderness  NEURO: normal motor. normal sensory.   PSYCH: Cooperative, appropriate.  JODIE: melena, dark stool

## 2025-07-17 NOTE — ED PROVIDER NOTE - PATIENT PORTAL LINK FT
You can access the FollowMyHealth Patient Portal offered by [facility] by registering at the following website: [email or web address]. By joining [employer]’s FollowMyHealth portal, you will also be able to view your health information using other applications (apps) compatible with our system.

## 2025-07-17 NOTE — ED PROVIDER NOTE - CARE PROVIDER_API CALL
Koko Bhagat)  Interventional Cardiology  [address] [place] [id number]  Phone: [phone]  Fax: [phone]  Follow Up Time: 7-10 Days

## 2025-07-17 NOTE — ED PROVIDER NOTE - NSFOLLOWUPINSTRUCTIONS_ED_ALL_ED_FT
Anemia    Anemia is a condition in which the concentration of red blood cells or hemoglobin in the blood is below normal. Hemoglobin is a substance in red blood cells that carries oxygen to the tissues of the body. Anemia results in not enough oxygen reaching these tissues which can cause symptoms such as weakness, dizziness/lightheadedness, shortness of breath, chest pain, paleness, or nausea.    SEEK IMMEDIATE MEDICAL CARE IF YOU HAVE THE FOLLOWING SYMPTOMS: extreme weakness/chest pain/shortness of breath, black or bloody stools, vomiting blood, fainting, fever, or any signs of dehydration.      Blood Transfusion    WHAT YOU NEED TO KNOW:    A blood transfusion is used to give you blood through an IV. You may get only part of the blood, such as red blood cells, platelets, or plasma. The blood may be from you and stored for you to use later. The blood may instead be from another person. Donated blood is tested for HIV, hepatitis, syphilis, West Nile virus, and other diseases.    DISCHARGE INSTRUCTIONS:    Call 911 for any of the following:     You have a skin rash, hives, swelling, or itching.       You have trouble breathing, shortness of breath, wheezing, or coughing.      Your throat tightens or your lips or tongue swell.      You have difficulty swallowing or speaking.    Seek care immediately if:     You develop a high fever and chills.       You are dizzy, lightheaded, confused, or feel like you are going to faint.      You have nausea, diarrhea, or abdominal cramps, or you are vomiting.      You urinate little or not at all.      You develop headaches or double vision.      Your skin or the whites of your eyes look yellow.      You see pinpoint purple spots or purple patches on your body.       You have a seizure.     Contact your healthcare provider if:     You feel tired and weak within 10 days of your transfusion.      You have questions or concerns about blood transfusions.    Medicines:     Antihistamines may help stop mild itching or a rash.      Epinephrine is emergency medicine used to stop anaphylaxis. You may be given epinephrine if you are at risk for anaphylaxis. Your healthcare provider will teach you how to use it.      Take your medicine as directed. Contact your healthcare provider if you think your medicine is not helping or if you have side effects. Tell him or her if you are allergic to any medicine. Keep a list of the medicines, vitamins, and herbs you take. Include the amounts, and when and why you take them. Bring the list or the pill bottles to follow-up visits. Carry your medicine list with you in case of an emergency.    Apply ice to decrease pain and swelling. Use an ice pack, or put ice in a plastic bag and wrap a towel around it. Apply the ice pack or wrapped bag to your transfusion site for 20 minutes each hour or as directed.     Follow up with your healthcare provider as directed: Write down your questions so you remember to ask them during your visits.       © Copyright [employer] 2019 All illustrations and images included in CareNotes are the copyrighted property of [employer].A[employer]., Inc[employer] or [employer].

## 2025-07-17 NOTE — ED PROVIDER NOTE - PROGRESS NOTE DETAILS
ffearns:  received signout from Dr. Flores - pt in ed for blood transfusion; s/p recent workup at [facility] with capsule endoscopy with avm/angioectasias; plan transfuse 2 units of blood; pt has appt to see Gi in morning; Received signout from Dr. Louise.  Patient presents with low hemoglobin.  Has no known GI AVM that they are unable to intervene on.  2 units of blood ordered.  Consent obtained.  Family has outpatient GI clinic tomorrow.  Would prefer to take him home after the transfusions.  Patient remains hemodynamically stable. transfusion completed without any complications; daughter at bedside requesting d/c home to fu with Gi tomorrow as scheduled;

## 2025-07-17 NOTE — ED ADULT NURSE NOTE - NSFALLHARMRISKINTERV_ED_ALL_ED

## 2025-07-17 NOTE — ED PROVIDER NOTE - CLINICAL SUMMARY MEDICAL DECISION MAKING FREE TEXT BOX
Received sign out from Dr. Louise. Patient presents with rectal bleeding and low hgb level. Multiple similar episodes in the past. Has a known AVM that they are unable to intervene on. Follows with GI in [facility] and has a follow up apt with them tomorrow. Labs done. Hgb of 6, 2 units of blood ordered. Consent obtained. Shared decision making with patient and family about staying for further work up vs outpatient management with her GI. States that they would like to take her home after the transfusion. Has a gi apt tomorrow. Also has upcoming apt with hematology. Patient remains hemodynamically stable. Results discussed. Patient discharged with pmd and GI follow up. Strict return precautions discussed.

## 2025-07-22 ENCOUNTER — APPOINTMENT (OUTPATIENT)
Dept: PAIN MANAGEMENT | Facility: CLINIC | Age: 80
End: 2025-07-22

## 2025-07-23 ENCOUNTER — APPOINTMENT (OUTPATIENT)
Dept: PAIN MANAGEMENT | Facility: CLINIC | Age: 80
End: 2025-07-23

## 2025-07-30 ENCOUNTER — INPATIENT (INPATIENT)
Facility: HOSPITAL | Age: 80
LOS: 2 days | Discharge: HOME HEALTH | DRG: 64 | End: 2025-08-02
Attending: STUDENT IN AN ORGANIZED HEALTH CARE EDUCATION/TRAINING PROGRAM | Admitting: STUDENT IN AN ORGANIZED HEALTH CARE EDUCATION/TRAINING PROGRAM
Payer: MEDICARE

## 2025-07-30 VITALS
TEMPERATURE: 98 F | HEIGHT: 65 IN | HEART RATE: 94 BPM | SYSTOLIC BLOOD PRESSURE: 132 MMHG | DIASTOLIC BLOOD PRESSURE: 86 MMHG | OXYGEN SATURATION: 99 % | RESPIRATION RATE: 18 BRPM

## 2025-07-30 DIAGNOSIS — Z98.890 OTHER SPECIFIED POSTPROCEDURAL STATES: Chronic | ICD-10-CM

## 2025-07-30 DIAGNOSIS — I63.9 CEREBRAL INFARCTION, UNSPECIFIED: ICD-10-CM

## 2025-07-30 DIAGNOSIS — Z95.5 PRESENCE OF CORONARY ANGIOPLASTY IMPLANT AND GRAFT: Chronic | ICD-10-CM

## 2025-07-30 LAB
ALBUMIN SERPL ELPH-MCNC: 4.4 G/DL — SIGNIFICANT CHANGE UP (ref 3.5–5.2)
ALP SERPL-CCNC: 91 U/L — SIGNIFICANT CHANGE UP (ref 30–115)
ALT FLD-CCNC: 8 U/L — SIGNIFICANT CHANGE UP (ref 0–41)
ANION GAP SERPL CALC-SCNC: 16 MMOL/L — HIGH (ref 7–14)
APTT BLD: 33.4 SEC — SIGNIFICANT CHANGE UP (ref 27–39.2)
AST SERPL-CCNC: 16 U/L — SIGNIFICANT CHANGE UP (ref 0–41)
BASOPHILS # BLD AUTO: 0.07 K/UL — SIGNIFICANT CHANGE UP (ref 0–0.2)
BASOPHILS NFR BLD AUTO: 0.8 % — SIGNIFICANT CHANGE UP (ref 0–1)
BILIRUB SERPL-MCNC: 0.4 MG/DL — SIGNIFICANT CHANGE UP (ref 0.2–1.2)
BUN SERPL-MCNC: 13 MG/DL — SIGNIFICANT CHANGE UP (ref 10–20)
CALCIUM SERPL-MCNC: 9.5 MG/DL — SIGNIFICANT CHANGE UP (ref 8.4–10.5)
CHLORIDE SERPL-SCNC: 96 MMOL/L — LOW (ref 98–110)
CO2 SERPL-SCNC: 19 MMOL/L — SIGNIFICANT CHANGE UP (ref 17–32)
CREAT SERPL-MCNC: 1.1 MG/DL — SIGNIFICANT CHANGE UP (ref 0.7–1.5)
EGFR: 51 ML/MIN/1.73M2 — LOW
EGFR: 51 ML/MIN/1.73M2 — LOW
EOSINOPHIL # BLD AUTO: 0.21 K/UL — SIGNIFICANT CHANGE UP (ref 0–0.7)
EOSINOPHIL NFR BLD AUTO: 2.3 % — SIGNIFICANT CHANGE UP (ref 0–8)
GLUCOSE SERPL-MCNC: 115 MG/DL — HIGH (ref 70–99)
HCT VFR BLD CALC: 27.6 % — LOW (ref 37–47)
HGB BLD-MCNC: 9.1 G/DL — LOW (ref 12–16)
IMM GRANULOCYTES NFR BLD AUTO: 0.1 % — SIGNIFICANT CHANGE UP (ref 0.1–0.3)
INR BLD: 1.16 RATIO — SIGNIFICANT CHANGE UP (ref 0.65–1.3)
LYMPHOCYTES # BLD AUTO: 2.36 K/UL — SIGNIFICANT CHANGE UP (ref 1.2–3.4)
LYMPHOCYTES # BLD AUTO: 26.4 % — SIGNIFICANT CHANGE UP (ref 20.5–51.1)
MCHC RBC-ENTMCNC: 27.5 PG — SIGNIFICANT CHANGE UP (ref 27–31)
MCHC RBC-ENTMCNC: 33 G/DL — SIGNIFICANT CHANGE UP (ref 32–37)
MCV RBC AUTO: 83.4 FL — SIGNIFICANT CHANGE UP (ref 81–99)
MONOCYTES # BLD AUTO: 0.95 K/UL — HIGH (ref 0.1–0.6)
MONOCYTES NFR BLD AUTO: 10.6 % — HIGH (ref 1.7–9.3)
NEUTROPHILS # BLD AUTO: 5.35 K/UL — SIGNIFICANT CHANGE UP (ref 1.4–6.5)
NEUTROPHILS NFR BLD AUTO: 59.8 % — SIGNIFICANT CHANGE UP (ref 42.2–75.2)
NRBC BLD AUTO-RTO: 0 /100 WBCS — SIGNIFICANT CHANGE UP (ref 0–0)
PLATELET # BLD AUTO: 367 K/UL — SIGNIFICANT CHANGE UP (ref 130–400)
PMV BLD: 9.1 FL — SIGNIFICANT CHANGE UP (ref 7.4–10.4)
POTASSIUM SERPL-MCNC: 4.7 MMOL/L — SIGNIFICANT CHANGE UP (ref 3.5–5)
POTASSIUM SERPL-SCNC: 4.7 MMOL/L — SIGNIFICANT CHANGE UP (ref 3.5–5)
PROT SERPL-MCNC: 6.9 G/DL — SIGNIFICANT CHANGE UP (ref 6–8)
PROTHROM AB SERPL-ACNC: 13.7 SEC — HIGH (ref 9.95–12.87)
RBC # BLD: 3.31 M/UL — LOW (ref 4.2–5.4)
RBC # FLD: 16 % — HIGH (ref 11.5–14.5)
SODIUM SERPL-SCNC: 131 MMOL/L — LOW (ref 135–146)
TROPONIN T, HIGH SENSITIVITY RESULT: 43 NG/L — HIGH (ref 6–13)
TROPONIN T, HIGH SENSITIVITY RESULT: 46 NG/L — HIGH (ref 6–13)
TROPONIN T, HIGH SENSITIVITY RESULT: 50 NG/L — HIGH (ref 6–13)
WBC # BLD: 8.95 K/UL — SIGNIFICANT CHANGE UP (ref 4.8–10.8)
WBC # FLD AUTO: 8.95 K/UL — SIGNIFICANT CHANGE UP (ref 4.8–10.8)

## 2025-07-30 PROCEDURE — 97167 OT EVAL HIGH COMPLEX 60 MIN: CPT | Mod: GO

## 2025-07-30 PROCEDURE — 82962 GLUCOSE BLOOD TEST: CPT

## 2025-07-30 PROCEDURE — 80053 COMPREHEN METABOLIC PANEL: CPT

## 2025-07-30 PROCEDURE — 84484 ASSAY OF TROPONIN QUANT: CPT

## 2025-07-30 PROCEDURE — 83036 HEMOGLOBIN GLYCOSYLATED A1C: CPT

## 2025-07-30 PROCEDURE — 70496 CT ANGIOGRAPHY HEAD: CPT | Mod: 26

## 2025-07-30 PROCEDURE — 99285 EMERGENCY DEPT VISIT HI MDM: CPT | Mod: FS

## 2025-07-30 PROCEDURE — 83735 ASSAY OF MAGNESIUM: CPT

## 2025-07-30 PROCEDURE — 71045 X-RAY EXAM CHEST 1 VIEW: CPT

## 2025-07-30 PROCEDURE — 92610 EVALUATE SWALLOWING FUNCTION: CPT | Mod: GN

## 2025-07-30 PROCEDURE — 85025 COMPLETE CBC W/AUTO DIFF WBC: CPT

## 2025-07-30 PROCEDURE — 93005 ELECTROCARDIOGRAM TRACING: CPT

## 2025-07-30 PROCEDURE — 84100 ASSAY OF PHOSPHORUS: CPT

## 2025-07-30 PROCEDURE — 70551 MRI BRAIN STEM W/O DYE: CPT

## 2025-07-30 PROCEDURE — 92523 SPEECH SOUND LANG COMPREHEN: CPT | Mod: GN

## 2025-07-30 PROCEDURE — 70498 CT ANGIOGRAPHY NECK: CPT | Mod: 26

## 2025-07-30 PROCEDURE — 97110 THERAPEUTIC EXERCISES: CPT | Mod: CQ,GP

## 2025-07-30 PROCEDURE — 97530 THERAPEUTIC ACTIVITIES: CPT | Mod: CQ,GP

## 2025-07-30 PROCEDURE — 74018 RADEX ABDOMEN 1 VIEW: CPT

## 2025-07-30 PROCEDURE — 97162 PT EVAL MOD COMPLEX 30 MIN: CPT | Mod: GP

## 2025-07-30 PROCEDURE — 93306 TTE W/DOPPLER COMPLETE: CPT

## 2025-07-30 PROCEDURE — 84443 ASSAY THYROID STIM HORMONE: CPT

## 2025-07-30 PROCEDURE — 36415 COLL VENOUS BLD VENIPUNCTURE: CPT

## 2025-07-30 PROCEDURE — 70450 CT HEAD/BRAIN W/O DYE: CPT | Mod: 26,XU

## 2025-07-30 PROCEDURE — 0042T: CPT

## 2025-07-30 PROCEDURE — 71045 X-RAY EXAM CHEST 1 VIEW: CPT | Mod: 26

## 2025-07-30 PROCEDURE — 80061 LIPID PANEL: CPT

## 2025-07-30 PROCEDURE — 80048 BASIC METABOLIC PNL TOTAL CA: CPT

## 2025-07-30 PROCEDURE — 97116 GAIT TRAINING THERAPY: CPT | Mod: CQ,GP

## 2025-07-30 RX ORDER — SODIUM CHLORIDE 9 G/1000ML
1000 INJECTION, SOLUTION INTRAVENOUS ONCE
Refills: 0 | Status: COMPLETED | OUTPATIENT
Start: 2025-07-30 | End: 2025-07-30

## 2025-07-30 RX ORDER — SODIUM CHLORIDE 9 G/1000ML
1000 INJECTION, SOLUTION INTRAVENOUS
Refills: 0 | Status: DISCONTINUED | OUTPATIENT
Start: 2025-07-30 | End: 2025-08-02

## 2025-07-30 RX ORDER — DEXTROSE 50 % IN WATER 50 %
25 SYRINGE (ML) INTRAVENOUS ONCE
Refills: 0 | Status: DISCONTINUED | OUTPATIENT
Start: 2025-07-30 | End: 2025-08-02

## 2025-07-30 RX ORDER — INSULIN GLARGINE-YFGN 100 [IU]/ML
20 INJECTION, SOLUTION SUBCUTANEOUS EVERY MORNING
Refills: 0 | Status: DISCONTINUED | OUTPATIENT
Start: 2025-07-31 | End: 2025-08-02

## 2025-07-30 RX ORDER — SERTRALINE 100 MG/1
25 TABLET, FILM COATED ORAL DAILY
Refills: 0 | Status: DISCONTINUED | OUTPATIENT
Start: 2025-07-30 | End: 2025-08-02

## 2025-07-30 RX ORDER — ATORVASTATIN CALCIUM 80 MG/1
80 TABLET, FILM COATED ORAL AT BEDTIME
Refills: 0 | Status: DISCONTINUED | OUTPATIENT
Start: 2025-07-30 | End: 2025-08-02

## 2025-07-30 RX ORDER — DEXTROSE 50 % IN WATER 50 %
15 SYRINGE (ML) INTRAVENOUS ONCE
Refills: 0 | Status: DISCONTINUED | OUTPATIENT
Start: 2025-07-30 | End: 2025-08-02

## 2025-07-30 RX ORDER — GLUCAGON 3 MG/1
1 POWDER NASAL ONCE
Refills: 0 | Status: DISCONTINUED | OUTPATIENT
Start: 2025-07-30 | End: 2025-08-02

## 2025-07-30 RX ORDER — ENOXAPARIN SODIUM 100 MG/ML
40 INJECTION SUBCUTANEOUS EVERY 24 HOURS
Refills: 0 | Status: DISCONTINUED | OUTPATIENT
Start: 2025-07-30 | End: 2025-07-31

## 2025-07-30 RX ORDER — ASPIRIN 325 MG
325 TABLET ORAL ONCE
Refills: 0 | Status: COMPLETED | OUTPATIENT
Start: 2025-07-30 | End: 2025-07-30

## 2025-07-30 RX ORDER — DEXTROSE 50 % IN WATER 50 %
12.5 SYRINGE (ML) INTRAVENOUS ONCE
Refills: 0 | Status: DISCONTINUED | OUTPATIENT
Start: 2025-07-30 | End: 2025-08-02

## 2025-07-30 RX ORDER — CARBIDOPA/LEVODOPA 25MG-100MG
1 TABLET ORAL
Refills: 0 | Status: DISCONTINUED | OUTPATIENT
Start: 2025-07-30 | End: 2025-08-02

## 2025-07-30 RX ORDER — INSULIN LISPRO 100 U/ML
INJECTION, SOLUTION INTRAVENOUS; SUBCUTANEOUS
Refills: 0 | Status: DISCONTINUED | OUTPATIENT
Start: 2025-07-30 | End: 2025-08-02

## 2025-07-30 RX ADMIN — Medication 325 MILLIGRAM(S): at 16:57

## 2025-07-30 RX ADMIN — ATORVASTATIN CALCIUM 80 MILLIGRAM(S): 80 TABLET, FILM COATED ORAL at 21:19

## 2025-07-30 RX ADMIN — SODIUM CHLORIDE 1000 MILLILITER(S): 9 INJECTION, SOLUTION INTRAVENOUS at 16:42

## 2025-07-30 RX ADMIN — SODIUM CHLORIDE 1000 MILLILITER(S): 9 INJECTION, SOLUTION INTRAVENOUS at 14:40

## 2025-07-31 ENCOUNTER — TRANSCRIPTION ENCOUNTER (OUTPATIENT)
Age: 80
End: 2025-07-31

## 2025-07-31 ENCOUNTER — RESULT REVIEW (OUTPATIENT)
Age: 80
End: 2025-07-31

## 2025-07-31 PROBLEM — Z86.69 PERSONAL HISTORY OF OTHER DISEASES OF THE NERVOUS SYSTEM AND SENSE ORGANS: Chronic | Status: INACTIVE | Noted: 2025-05-10 | Resolved: 2025-07-30

## 2025-07-31 PROBLEM — Z98.890 OTHER SPECIFIED POSTPROCEDURAL STATES: Chronic | Status: INACTIVE | Noted: 2020-09-09 | Resolved: 2025-07-30

## 2025-07-31 PROBLEM — Z86.79 PERSONAL HISTORY OF OTHER DISEASES OF THE CIRCULATORY SYSTEM: Chronic | Status: INACTIVE | Noted: 2025-05-10 | Resolved: 2025-07-30

## 2025-07-31 LAB
A1C WITH ESTIMATED AVERAGE GLUCOSE RESULT: 6.9 % — HIGH (ref 4–5.6)
A1C WITH ESTIMATED AVERAGE GLUCOSE RESULT: 7.1 % — HIGH (ref 4–5.6)
ANION GAP SERPL CALC-SCNC: 15 MMOL/L — HIGH (ref 7–14)
BASOPHILS # BLD AUTO: 0.07 K/UL — SIGNIFICANT CHANGE UP (ref 0–0.2)
BASOPHILS NFR BLD AUTO: 0.9 % — SIGNIFICANT CHANGE UP (ref 0–1)
BUN SERPL-MCNC: 9 MG/DL — LOW (ref 10–20)
CALCIUM SERPL-MCNC: 9.5 MG/DL — SIGNIFICANT CHANGE UP (ref 8.4–10.5)
CHLORIDE SERPL-SCNC: 103 MMOL/L — SIGNIFICANT CHANGE UP (ref 98–110)
CHOLEST SERPL-MCNC: 127 MG/DL — SIGNIFICANT CHANGE UP
CO2 SERPL-SCNC: 21 MMOL/L — SIGNIFICANT CHANGE UP (ref 17–32)
CREAT SERPL-MCNC: 1 MG/DL — SIGNIFICANT CHANGE UP (ref 0.7–1.5)
EGFR: 58 ML/MIN/1.73M2 — LOW
EGFR: 58 ML/MIN/1.73M2 — LOW
EOSINOPHIL # BLD AUTO: 0.21 K/UL — SIGNIFICANT CHANGE UP (ref 0–0.7)
EOSINOPHIL NFR BLD AUTO: 2.7 % — SIGNIFICANT CHANGE UP (ref 0–8)
ESTIMATED AVERAGE GLUCOSE: 151 MG/DL — HIGH (ref 68–114)
ESTIMATED AVERAGE GLUCOSE: 157 MG/DL — HIGH (ref 68–114)
GLUCOSE BLDC GLUCOMTR-MCNC: 146 MG/DL — HIGH (ref 70–99)
GLUCOSE BLDC GLUCOMTR-MCNC: 147 MG/DL — HIGH (ref 70–99)
GLUCOSE BLDC GLUCOMTR-MCNC: 225 MG/DL — HIGH (ref 70–99)
GLUCOSE BLDC GLUCOMTR-MCNC: 240 MG/DL — HIGH (ref 70–99)
GLUCOSE SERPL-MCNC: 129 MG/DL — HIGH (ref 70–99)
HCT VFR BLD CALC: 27.9 % — LOW (ref 37–47)
HDLC SERPL-MCNC: 54 MG/DL — SIGNIFICANT CHANGE UP
HGB BLD-MCNC: 8.9 G/DL — LOW (ref 12–16)
IMM GRANULOCYTES NFR BLD AUTO: 0.1 % — SIGNIFICANT CHANGE UP (ref 0.1–0.3)
LDLC SERPL-MCNC: 55 MG/DL — SIGNIFICANT CHANGE UP
LIPID PNL WITH DIRECT LDL SERPL: 55 MG/DL — SIGNIFICANT CHANGE UP
LYMPHOCYTES # BLD AUTO: 1.8 K/UL — SIGNIFICANT CHANGE UP (ref 1.2–3.4)
LYMPHOCYTES # BLD AUTO: 23 % — SIGNIFICANT CHANGE UP (ref 20.5–51.1)
MCHC RBC-ENTMCNC: 27.3 PG — SIGNIFICANT CHANGE UP (ref 27–31)
MCHC RBC-ENTMCNC: 31.9 G/DL — LOW (ref 32–37)
MCV RBC AUTO: 85.6 FL — SIGNIFICANT CHANGE UP (ref 81–99)
MONOCYTES # BLD AUTO: 0.92 K/UL — HIGH (ref 0.1–0.6)
MONOCYTES NFR BLD AUTO: 11.7 % — HIGH (ref 1.7–9.3)
NEUTROPHILS # BLD AUTO: 4.83 K/UL — SIGNIFICANT CHANGE UP (ref 1.4–6.5)
NEUTROPHILS NFR BLD AUTO: 61.6 % — SIGNIFICANT CHANGE UP (ref 42.2–75.2)
NONHDLC SERPL-MCNC: 73 MG/DL — SIGNIFICANT CHANGE UP
NRBC BLD AUTO-RTO: 0 /100 WBCS — SIGNIFICANT CHANGE UP (ref 0–0)
PLATELET # BLD AUTO: 355 K/UL — SIGNIFICANT CHANGE UP (ref 130–400)
PMV BLD: 9.6 FL — SIGNIFICANT CHANGE UP (ref 7.4–10.4)
POTASSIUM SERPL-MCNC: 5 MMOL/L — SIGNIFICANT CHANGE UP (ref 3.5–5)
POTASSIUM SERPL-SCNC: 5 MMOL/L — SIGNIFICANT CHANGE UP (ref 3.5–5)
RBC # BLD: 3.26 M/UL — LOW (ref 4.2–5.4)
RBC # FLD: 16.4 % — HIGH (ref 11.5–14.5)
SODIUM SERPL-SCNC: 139 MMOL/L — SIGNIFICANT CHANGE UP (ref 135–146)
TRIGL SERPL-MCNC: 98 MG/DL — SIGNIFICANT CHANGE UP
TSH SERPL-MCNC: 2.64 UIU/ML — SIGNIFICANT CHANGE UP (ref 0.27–4.2)
WBC # BLD: 7.84 K/UL — SIGNIFICANT CHANGE UP (ref 4.8–10.8)
WBC # FLD AUTO: 7.84 K/UL — SIGNIFICANT CHANGE UP (ref 4.8–10.8)

## 2025-07-31 PROCEDURE — 99223 1ST HOSP IP/OBS HIGH 75: CPT

## 2025-07-31 PROCEDURE — 93306 TTE W/DOPPLER COMPLETE: CPT | Mod: 26

## 2025-07-31 PROCEDURE — 99497 ADVNCD CARE PLAN 30 MIN: CPT | Mod: 25

## 2025-07-31 RX ORDER — ACETAMINOPHEN 500 MG/5ML
650 LIQUID (ML) ORAL ONCE
Refills: 0 | Status: COMPLETED | OUTPATIENT
Start: 2025-07-31 | End: 2025-07-31

## 2025-07-31 RX ORDER — APIXABAN 2.5 MG/1
5 TABLET, FILM COATED ORAL EVERY 12 HOURS
Refills: 0 | Status: DISCONTINUED | OUTPATIENT
Start: 2025-07-31 | End: 2025-08-02

## 2025-07-31 RX ORDER — METOCLOPRAMIDE HCL 10 MG
10 TABLET ORAL ONCE
Refills: 0 | Status: COMPLETED | OUTPATIENT
Start: 2025-07-31 | End: 2025-07-31

## 2025-07-31 RX ORDER — ACETAMINOPHEN 500 MG/5ML
650 LIQUID (ML) ORAL EVERY 6 HOURS
Refills: 0 | Status: DISCONTINUED | OUTPATIENT
Start: 2025-07-31 | End: 2025-08-02

## 2025-07-31 RX ADMIN — Medication 650 MILLIGRAM(S): at 16:15

## 2025-07-31 RX ADMIN — SERTRALINE 25 MILLIGRAM(S): 100 TABLET, FILM COATED ORAL at 11:25

## 2025-07-31 RX ADMIN — Medication 10 MILLIGRAM(S): at 23:39

## 2025-07-31 RX ADMIN — INSULIN GLARGINE-YFGN 20 UNIT(S): 100 INJECTION, SOLUTION SUBCUTANEOUS at 09:03

## 2025-07-31 RX ADMIN — Medication 1 TABLET(S): at 22:44

## 2025-07-31 RX ADMIN — Medication 650 MILLIGRAM(S): at 22:43

## 2025-07-31 RX ADMIN — Medication 650 MILLIGRAM(S): at 23:43

## 2025-07-31 RX ADMIN — INSULIN LISPRO 4: 100 INJECTION, SOLUTION INTRAVENOUS; SUBCUTANEOUS at 12:04

## 2025-07-31 RX ADMIN — Medication 1 TABLET(S): at 09:03

## 2025-07-31 RX ADMIN — INSULIN LISPRO 4: 100 INJECTION, SOLUTION INTRAVENOUS; SUBCUTANEOUS at 22:52

## 2025-07-31 RX ADMIN — ATORVASTATIN CALCIUM 80 MILLIGRAM(S): 80 TABLET, FILM COATED ORAL at 22:43

## 2025-07-31 RX ADMIN — Medication 650 MILLIGRAM(S): at 00:17

## 2025-07-31 RX ADMIN — APIXABAN 5 MILLIGRAM(S): 2.5 TABLET, FILM COATED ORAL at 16:15

## 2025-07-31 RX ADMIN — Medication 1 TABLET(S): at 00:17

## 2025-07-31 RX ADMIN — Medication 40 MILLIGRAM(S): at 05:13

## 2025-07-31 RX ADMIN — Medication 650 MILLIGRAM(S): at 00:32

## 2025-08-01 LAB
ALBUMIN SERPL ELPH-MCNC: 4.1 G/DL — SIGNIFICANT CHANGE UP (ref 3.5–5.2)
ALP SERPL-CCNC: 97 U/L — SIGNIFICANT CHANGE UP (ref 30–115)
ALT FLD-CCNC: 6 U/L — SIGNIFICANT CHANGE UP (ref 0–41)
ANION GAP SERPL CALC-SCNC: 17 MMOL/L — HIGH (ref 7–14)
AST SERPL-CCNC: 19 U/L — SIGNIFICANT CHANGE UP (ref 0–41)
BASOPHILS # BLD AUTO: 0.07 K/UL — SIGNIFICANT CHANGE UP (ref 0–0.2)
BASOPHILS NFR BLD AUTO: 0.8 % — SIGNIFICANT CHANGE UP (ref 0–1)
BILIRUB SERPL-MCNC: 0.5 MG/DL — SIGNIFICANT CHANGE UP (ref 0.2–1.2)
BUN SERPL-MCNC: 10 MG/DL — SIGNIFICANT CHANGE UP (ref 10–20)
CALCIUM SERPL-MCNC: 9 MG/DL — SIGNIFICANT CHANGE UP (ref 8.4–10.5)
CHLORIDE SERPL-SCNC: 101 MMOL/L — SIGNIFICANT CHANGE UP (ref 98–110)
CO2 SERPL-SCNC: 17 MMOL/L — SIGNIFICANT CHANGE UP (ref 17–32)
CREAT SERPL-MCNC: 1.1 MG/DL — SIGNIFICANT CHANGE UP (ref 0.7–1.5)
EGFR: 51 ML/MIN/1.73M2 — LOW
EGFR: 51 ML/MIN/1.73M2 — LOW
EOSINOPHIL # BLD AUTO: 0.14 K/UL — SIGNIFICANT CHANGE UP (ref 0–0.7)
EOSINOPHIL NFR BLD AUTO: 1.6 % — SIGNIFICANT CHANGE UP (ref 0–8)
GLUCOSE BLDC GLUCOMTR-MCNC: 133 MG/DL — HIGH (ref 70–99)
GLUCOSE BLDC GLUCOMTR-MCNC: 168 MG/DL — HIGH (ref 70–99)
GLUCOSE BLDC GLUCOMTR-MCNC: 188 MG/DL — HIGH (ref 70–99)
GLUCOSE BLDC GLUCOMTR-MCNC: 215 MG/DL — HIGH (ref 70–99)
GLUCOSE BLDC GLUCOMTR-MCNC: 230 MG/DL — HIGH (ref 70–99)
GLUCOSE SERPL-MCNC: 131 MG/DL — HIGH (ref 70–99)
HCT VFR BLD CALC: 27.7 % — LOW (ref 37–47)
HGB BLD-MCNC: 8.8 G/DL — LOW (ref 12–16)
IMM GRANULOCYTES NFR BLD AUTO: 0.2 % — SIGNIFICANT CHANGE UP (ref 0.1–0.3)
LYMPHOCYTES # BLD AUTO: 1.11 K/UL — LOW (ref 1.2–3.4)
LYMPHOCYTES # BLD AUTO: 12.3 % — LOW (ref 20.5–51.1)
MAGNESIUM SERPL-MCNC: 1.6 MG/DL — LOW (ref 1.8–2.4)
MCHC RBC-ENTMCNC: 27.1 PG — SIGNIFICANT CHANGE UP (ref 27–31)
MCHC RBC-ENTMCNC: 31.8 G/DL — LOW (ref 32–37)
MCV RBC AUTO: 85.2 FL — SIGNIFICANT CHANGE UP (ref 81–99)
MONOCYTES # BLD AUTO: 1.07 K/UL — HIGH (ref 0.1–0.6)
MONOCYTES NFR BLD AUTO: 11.9 % — HIGH (ref 1.7–9.3)
NEUTROPHILS # BLD AUTO: 6.58 K/UL — HIGH (ref 1.4–6.5)
NEUTROPHILS NFR BLD AUTO: 73.2 % — SIGNIFICANT CHANGE UP (ref 42.2–75.2)
NRBC BLD AUTO-RTO: 0 /100 WBCS — SIGNIFICANT CHANGE UP (ref 0–0)
PHOSPHATE SERPL-MCNC: 4 MG/DL — SIGNIFICANT CHANGE UP (ref 2.1–4.9)
PLATELET # BLD AUTO: 351 K/UL — SIGNIFICANT CHANGE UP (ref 130–400)
PMV BLD: 9.4 FL — SIGNIFICANT CHANGE UP (ref 7.4–10.4)
POTASSIUM SERPL-MCNC: 4.3 MMOL/L — SIGNIFICANT CHANGE UP (ref 3.5–5)
POTASSIUM SERPL-SCNC: 4.3 MMOL/L — SIGNIFICANT CHANGE UP (ref 3.5–5)
PROT SERPL-MCNC: 6.5 G/DL — SIGNIFICANT CHANGE UP (ref 6–8)
RBC # BLD: 3.25 M/UL — LOW (ref 4.2–5.4)
RBC # FLD: 16.5 % — HIGH (ref 11.5–14.5)
SODIUM SERPL-SCNC: 135 MMOL/L — SIGNIFICANT CHANGE UP (ref 135–146)
TROPONIN T, HIGH SENSITIVITY RESULT: 68 NG/L — CRITICAL HIGH (ref 6–13)
TROPONIN T, HIGH SENSITIVITY RESULT: 77 NG/L — CRITICAL HIGH (ref 6–13)
WBC # BLD: 8.99 K/UL — SIGNIFICANT CHANGE UP (ref 4.8–10.8)
WBC # FLD AUTO: 8.99 K/UL — SIGNIFICANT CHANGE UP (ref 4.8–10.8)

## 2025-08-01 PROCEDURE — 93010 ELECTROCARDIOGRAM REPORT: CPT

## 2025-08-01 PROCEDURE — 71045 X-RAY EXAM CHEST 1 VIEW: CPT | Mod: 26

## 2025-08-01 PROCEDURE — 93010 ELECTROCARDIOGRAM REPORT: CPT | Mod: 77

## 2025-08-01 PROCEDURE — 99233 SBSQ HOSP IP/OBS HIGH 50: CPT

## 2025-08-01 PROCEDURE — 74018 RADEX ABDOMEN 1 VIEW: CPT | Mod: 26

## 2025-08-01 PROCEDURE — 70551 MRI BRAIN STEM W/O DYE: CPT | Mod: 26

## 2025-08-01 RX ORDER — SENNA 187 MG
2 TABLET ORAL AT BEDTIME
Refills: 0 | Status: DISCONTINUED | OUTPATIENT
Start: 2025-08-01 | End: 2025-08-02

## 2025-08-01 RX ORDER — SACUBITRIL AND VALSARTAN 6; 6 MG/1; MG/1
1 PELLET ORAL
Refills: 0 | Status: DISCONTINUED | OUTPATIENT
Start: 2025-08-01 | End: 2025-08-01

## 2025-08-01 RX ORDER — POLYETHYLENE GLYCOL 3350 17 G/17G
17 POWDER, FOR SOLUTION ORAL DAILY
Refills: 0 | Status: DISCONTINUED | OUTPATIENT
Start: 2025-08-01 | End: 2025-08-02

## 2025-08-01 RX ORDER — NIFEDIPINE 30 MG
30 TABLET, EXTENDED RELEASE 24 HR ORAL DAILY
Refills: 0 | Status: DISCONTINUED | OUTPATIENT
Start: 2025-08-01 | End: 2025-08-01

## 2025-08-01 RX ORDER — FUROSEMIDE 10 MG/ML
40 INJECTION INTRAMUSCULAR; INTRAVENOUS ONCE
Refills: 0 | Status: COMPLETED | OUTPATIENT
Start: 2025-08-01 | End: 2025-08-01

## 2025-08-01 RX ORDER — FUROSEMIDE 10 MG/ML
40 INJECTION INTRAMUSCULAR; INTRAVENOUS ONCE
Refills: 0 | Status: DISCONTINUED | OUTPATIENT
Start: 2025-08-01 | End: 2025-08-01

## 2025-08-01 RX ORDER — FUROSEMIDE 10 MG/ML
40 INJECTION INTRAMUSCULAR; INTRAVENOUS DAILY
Refills: 0 | Status: DISCONTINUED | OUTPATIENT
Start: 2025-08-01 | End: 2025-08-01

## 2025-08-01 RX ORDER — MAGNESIUM SULFATE 500 MG/ML
2 SYRINGE (ML) INJECTION ONCE
Refills: 0 | Status: COMPLETED | OUTPATIENT
Start: 2025-08-01 | End: 2025-08-01

## 2025-08-01 RX ORDER — SACUBITRIL AND VALSARTAN 6; 6 MG/1; MG/1
1 PELLET ORAL
Refills: 0 | Status: DISCONTINUED | OUTPATIENT
Start: 2025-08-01 | End: 2025-08-02

## 2025-08-01 RX ORDER — LABETALOL HYDROCHLORIDE 200 MG/1
5 TABLET, FILM COATED ORAL ONCE
Refills: 0 | Status: COMPLETED | OUTPATIENT
Start: 2025-08-01 | End: 2025-08-01

## 2025-08-01 RX ORDER — METOPROLOL SUCCINATE 50 MG/1
50 TABLET, EXTENDED RELEASE ORAL
Refills: 0 | Status: DISCONTINUED | OUTPATIENT
Start: 2025-08-01 | End: 2025-08-02

## 2025-08-01 RX ORDER — METOPROLOL SUCCINATE 50 MG/1
50 TABLET, EXTENDED RELEASE ORAL
Refills: 0 | Status: DISCONTINUED | OUTPATIENT
Start: 2025-08-01 | End: 2025-08-01

## 2025-08-01 RX ADMIN — Medication 40 MILLIGRAM(S): at 06:05

## 2025-08-01 RX ADMIN — METOPROLOL SUCCINATE 50 MILLIGRAM(S): 50 TABLET, EXTENDED RELEASE ORAL at 08:39

## 2025-08-01 RX ADMIN — SERTRALINE 25 MILLIGRAM(S): 100 TABLET, FILM COATED ORAL at 12:00

## 2025-08-01 RX ADMIN — Medication 25 GRAM(S): at 17:33

## 2025-08-01 RX ADMIN — Medication 25 GRAM(S): at 23:38

## 2025-08-01 RX ADMIN — INSULIN LISPRO 4: 100 INJECTION, SOLUTION INTRAVENOUS; SUBCUTANEOUS at 12:29

## 2025-08-01 RX ADMIN — SACUBITRIL AND VALSARTAN 1 TABLET(S): 6; 6 PELLET ORAL at 17:30

## 2025-08-01 RX ADMIN — INSULIN LISPRO 2: 100 INJECTION, SOLUTION INTRAVENOUS; SUBCUTANEOUS at 06:05

## 2025-08-01 RX ADMIN — APIXABAN 5 MILLIGRAM(S): 2.5 TABLET, FILM COATED ORAL at 17:28

## 2025-08-01 RX ADMIN — METOPROLOL SUCCINATE 50 MILLIGRAM(S): 50 TABLET, EXTENDED RELEASE ORAL at 20:09

## 2025-08-01 RX ADMIN — Medication 1 TABLET(S): at 08:53

## 2025-08-01 RX ADMIN — APIXABAN 5 MILLIGRAM(S): 2.5 TABLET, FILM COATED ORAL at 05:08

## 2025-08-01 RX ADMIN — FUROSEMIDE 40 MILLIGRAM(S): 10 INJECTION INTRAMUSCULAR; INTRAVENOUS at 20:09

## 2025-08-01 RX ADMIN — LABETALOL HYDROCHLORIDE 5 MILLIGRAM(S): 200 TABLET, FILM COATED ORAL at 10:05

## 2025-08-01 RX ADMIN — Medication 1 TABLET(S): at 20:09

## 2025-08-01 RX ADMIN — ATORVASTATIN CALCIUM 80 MILLIGRAM(S): 80 TABLET, FILM COATED ORAL at 21:16

## 2025-08-01 RX ADMIN — Medication 650 MILLIGRAM(S): at 08:56

## 2025-08-01 RX ADMIN — Medication 650 MILLIGRAM(S): at 10:05

## 2025-08-01 RX ADMIN — Medication 1 APPLICATION(S): at 08:51

## 2025-08-01 RX ADMIN — INSULIN LISPRO 2: 100 INJECTION, SOLUTION INTRAVENOUS; SUBCUTANEOUS at 17:27

## 2025-08-02 ENCOUNTER — TRANSCRIPTION ENCOUNTER (OUTPATIENT)
Age: 80
End: 2025-08-02

## 2025-08-02 VITALS
OXYGEN SATURATION: 97 % | TEMPERATURE: 98 F | SYSTOLIC BLOOD PRESSURE: 114 MMHG | RESPIRATION RATE: 18 BRPM | DIASTOLIC BLOOD PRESSURE: 60 MMHG | HEART RATE: 72 BPM

## 2025-08-02 LAB
ALBUMIN SERPL ELPH-MCNC: 3.8 G/DL — SIGNIFICANT CHANGE UP (ref 3.5–5.2)
ALP SERPL-CCNC: 85 U/L — SIGNIFICANT CHANGE UP (ref 30–115)
ALT FLD-CCNC: <5 U/L — SIGNIFICANT CHANGE UP (ref 0–41)
ANION GAP SERPL CALC-SCNC: 16 MMOL/L — HIGH (ref 7–14)
AST SERPL-CCNC: 17 U/L — SIGNIFICANT CHANGE UP (ref 0–41)
BASOPHILS # BLD AUTO: 0.05 K/UL — SIGNIFICANT CHANGE UP (ref 0–0.2)
BASOPHILS NFR BLD AUTO: 0.7 % — SIGNIFICANT CHANGE UP (ref 0–1)
BILIRUB SERPL-MCNC: 0.4 MG/DL — SIGNIFICANT CHANGE UP (ref 0.2–1.2)
BUN SERPL-MCNC: 11 MG/DL — SIGNIFICANT CHANGE UP (ref 10–20)
CALCIUM SERPL-MCNC: 8.7 MG/DL — SIGNIFICANT CHANGE UP (ref 8.4–10.5)
CHLORIDE SERPL-SCNC: 95 MMOL/L — LOW (ref 98–110)
CO2 SERPL-SCNC: 18 MMOL/L — SIGNIFICANT CHANGE UP (ref 17–32)
CREAT SERPL-MCNC: 1.2 MG/DL — SIGNIFICANT CHANGE UP (ref 0.7–1.5)
EGFR: 46 ML/MIN/1.73M2 — LOW
EGFR: 46 ML/MIN/1.73M2 — LOW
EOSINOPHIL # BLD AUTO: 0.24 K/UL — SIGNIFICANT CHANGE UP (ref 0–0.7)
EOSINOPHIL NFR BLD AUTO: 3.4 % — SIGNIFICANT CHANGE UP (ref 0–8)
GLUCOSE BLDC GLUCOMTR-MCNC: 176 MG/DL — HIGH (ref 70–99)
GLUCOSE BLDC GLUCOMTR-MCNC: 264 MG/DL — HIGH (ref 70–99)
GLUCOSE SERPL-MCNC: 172 MG/DL — HIGH (ref 70–99)
HCT VFR BLD CALC: 26.3 % — LOW (ref 37–47)
HGB BLD-MCNC: 8.5 G/DL — LOW (ref 12–16)
IMM GRANULOCYTES NFR BLD AUTO: 0.4 % — HIGH (ref 0.1–0.3)
LYMPHOCYTES # BLD AUTO: 1.3 K/UL — SIGNIFICANT CHANGE UP (ref 1.2–3.4)
LYMPHOCYTES # BLD AUTO: 18.6 % — LOW (ref 20.5–51.1)
MAGNESIUM SERPL-MCNC: 2.1 MG/DL — SIGNIFICANT CHANGE UP (ref 1.8–2.4)
MCHC RBC-ENTMCNC: 27.5 PG — SIGNIFICANT CHANGE UP (ref 27–31)
MCHC RBC-ENTMCNC: 32.3 G/DL — SIGNIFICANT CHANGE UP (ref 32–37)
MCV RBC AUTO: 85.1 FL — SIGNIFICANT CHANGE UP (ref 81–99)
MONOCYTES # BLD AUTO: 0.9 K/UL — HIGH (ref 0.1–0.6)
MONOCYTES NFR BLD AUTO: 12.9 % — HIGH (ref 1.7–9.3)
NEUTROPHILS # BLD AUTO: 4.47 K/UL — SIGNIFICANT CHANGE UP (ref 1.4–6.5)
NEUTROPHILS NFR BLD AUTO: 64 % — SIGNIFICANT CHANGE UP (ref 42.2–75.2)
NRBC BLD AUTO-RTO: 0 /100 WBCS — SIGNIFICANT CHANGE UP (ref 0–0)
PLATELET # BLD AUTO: 311 K/UL — SIGNIFICANT CHANGE UP (ref 130–400)
PMV BLD: 9.4 FL — SIGNIFICANT CHANGE UP (ref 7.4–10.4)
POTASSIUM SERPL-MCNC: 4.2 MMOL/L — SIGNIFICANT CHANGE UP (ref 3.5–5)
POTASSIUM SERPL-SCNC: 4.2 MMOL/L — SIGNIFICANT CHANGE UP (ref 3.5–5)
PROT SERPL-MCNC: 6.1 G/DL — SIGNIFICANT CHANGE UP (ref 6–8)
RBC # BLD: 3.09 M/UL — LOW (ref 4.2–5.4)
RBC # FLD: 16.6 % — HIGH (ref 11.5–14.5)
SODIUM SERPL-SCNC: 129 MMOL/L — LOW (ref 135–146)
TROPONIN T, HIGH SENSITIVITY RESULT: 71 NG/L — CRITICAL HIGH (ref 6–13)
WBC # BLD: 6.99 K/UL — SIGNIFICANT CHANGE UP (ref 4.8–10.8)
WBC # FLD AUTO: 6.99 K/UL — SIGNIFICANT CHANGE UP (ref 4.8–10.8)

## 2025-08-02 PROCEDURE — 99239 HOSP IP/OBS DSCHRG MGMT >30: CPT

## 2025-08-02 PROCEDURE — 99222 1ST HOSP IP/OBS MODERATE 55: CPT

## 2025-08-02 RX ADMIN — Medication 1 APPLICATION(S): at 05:58

## 2025-08-02 RX ADMIN — Medication 40 MILLIGRAM(S): at 07:27

## 2025-08-02 RX ADMIN — Medication 650 MILLIGRAM(S): at 07:49

## 2025-08-02 RX ADMIN — APIXABAN 5 MILLIGRAM(S): 2.5 TABLET, FILM COATED ORAL at 05:55

## 2025-08-02 RX ADMIN — Medication 1 TABLET(S): at 07:27

## 2025-08-02 RX ADMIN — INSULIN LISPRO 6: 100 INJECTION, SOLUTION INTRAVENOUS; SUBCUTANEOUS at 11:04

## 2025-08-02 RX ADMIN — METOPROLOL SUCCINATE 50 MILLIGRAM(S): 50 TABLET, EXTENDED RELEASE ORAL at 07:28

## 2025-08-02 RX ADMIN — INSULIN GLARGINE-YFGN 20 UNIT(S): 100 INJECTION, SOLUTION SUBCUTANEOUS at 07:26

## 2025-08-02 RX ADMIN — INSULIN LISPRO 2: 100 INJECTION, SOLUTION INTRAVENOUS; SUBCUTANEOUS at 07:27

## 2025-08-02 RX ADMIN — SACUBITRIL AND VALSARTAN 1 TABLET(S): 6; 6 PELLET ORAL at 05:56

## 2025-08-03 RX ORDER — METOPROLOL SUCCINATE 50 MG/1
1 TABLET, EXTENDED RELEASE ORAL
Qty: 30 | Refills: 0
Start: 2025-08-03 | End: 2025-09-01

## 2025-08-04 ENCOUNTER — APPOINTMENT (OUTPATIENT)
Dept: ORTHOPEDIC SURGERY | Facility: CLINIC | Age: 80
End: 2025-08-04

## 2025-08-05 ENCOUNTER — APPOINTMENT (OUTPATIENT)
Dept: PAIN MANAGEMENT | Facility: CLINIC | Age: 80
End: 2025-08-05

## 2025-08-07 RX ORDER — FUROSEMIDE 10 MG/ML
1 INJECTION INTRAMUSCULAR; INTRAVENOUS
Qty: 30 | Refills: 0
Start: 2025-08-07 | End: 2025-09-05

## 2025-08-15 DIAGNOSIS — N18.30 CHRONIC KIDNEY DISEASE, STAGE 3 UNSPECIFIED: ICD-10-CM

## 2025-08-15 DIAGNOSIS — Z95.5 PRESENCE OF CORONARY ANGIOPLASTY IMPLANT AND GRAFT: ICD-10-CM

## 2025-08-15 DIAGNOSIS — G20.A1 PARKINSON'S DISEASE WITHOUT DYSKINESIA, WITHOUT MENTION OF FLUCTUATIONS: ICD-10-CM

## 2025-08-15 DIAGNOSIS — F32.A DEPRESSION, UNSPECIFIED: ICD-10-CM

## 2025-08-15 DIAGNOSIS — Z91.011 ALLERGY TO MILK PRODUCTS: ICD-10-CM

## 2025-08-15 DIAGNOSIS — D50.0 IRON DEFICIENCY ANEMIA SECONDARY TO BLOOD LOSS (CHRONIC): ICD-10-CM

## 2025-08-15 DIAGNOSIS — D84.81 IMMUNODEFICIENCY DUE TO CONDITIONS CLASSIFIED ELSEWHERE: ICD-10-CM

## 2025-08-15 DIAGNOSIS — I25.10 ATHEROSCLEROTIC HEART DISEASE OF NATIVE CORONARY ARTERY WITHOUT ANGINA PECTORIS: ICD-10-CM

## 2025-08-15 DIAGNOSIS — E83.42 HYPOMAGNESEMIA: ICD-10-CM

## 2025-08-15 DIAGNOSIS — R47.01 APHASIA: ICD-10-CM

## 2025-08-15 DIAGNOSIS — R29.704 NIHSS SCORE 4: ICD-10-CM

## 2025-08-15 DIAGNOSIS — Z88.8 ALLERGY STATUS TO OTHER DRUGS, MEDICAMENTS AND BIOLOGICAL SUBSTANCES: ICD-10-CM

## 2025-08-15 DIAGNOSIS — Z88.1 ALLERGY STATUS TO OTHER ANTIBIOTIC AGENTS: ICD-10-CM

## 2025-08-15 DIAGNOSIS — E87.1 HYPO-OSMOLALITY AND HYPONATREMIA: ICD-10-CM

## 2025-08-15 DIAGNOSIS — I13.0 HYPERTENSIVE HEART AND CHRONIC KIDNEY DISEASE WITH HEART FAILURE AND STAGE 1 THROUGH STAGE 4 CHRONIC KIDNEY DISEASE, OR UNSPECIFIED CHRONIC KIDNEY DISEASE: ICD-10-CM

## 2025-08-15 DIAGNOSIS — E78.00 PURE HYPERCHOLESTEROLEMIA, UNSPECIFIED: ICD-10-CM

## 2025-08-15 DIAGNOSIS — Z91.040 LATEX ALLERGY STATUS: ICD-10-CM

## 2025-08-15 DIAGNOSIS — I50.23 ACUTE ON CHRONIC SYSTOLIC (CONGESTIVE) HEART FAILURE: ICD-10-CM

## 2025-08-15 DIAGNOSIS — Z66 DO NOT RESUSCITATE: ICD-10-CM

## 2025-08-15 DIAGNOSIS — I24.89 OTHER FORMS OF ACUTE ISCHEMIC HEART DISEASE: ICD-10-CM

## 2025-08-15 DIAGNOSIS — Z91.048 OTHER NONMEDICINAL SUBSTANCE ALLERGY STATUS: ICD-10-CM

## 2025-08-15 DIAGNOSIS — E11.22 TYPE 2 DIABETES MELLITUS WITH DIABETIC CHRONIC KIDNEY DISEASE: ICD-10-CM

## 2025-08-15 DIAGNOSIS — Z91.148 PATIENT'S OTHER NONCOMPLIANCE WITH MEDICATION REGIMEN FOR OTHER REASON: ICD-10-CM

## 2025-08-15 DIAGNOSIS — I27.20 PULMONARY HYPERTENSION, UNSPECIFIED: ICD-10-CM

## 2025-08-15 DIAGNOSIS — K59.00 CONSTIPATION, UNSPECIFIED: ICD-10-CM

## 2025-08-15 DIAGNOSIS — R27.0 ATAXIA, UNSPECIFIED: ICD-10-CM

## 2025-08-15 DIAGNOSIS — Z87.19 PERSONAL HISTORY OF OTHER DISEASES OF THE DIGESTIVE SYSTEM: ICD-10-CM

## 2025-08-15 DIAGNOSIS — I63.89 OTHER CEREBRAL INFARCTION: ICD-10-CM

## 2025-08-15 DIAGNOSIS — Z79.84 LONG TERM (CURRENT) USE OF ORAL HYPOGLYCEMIC DRUGS: ICD-10-CM

## 2025-08-15 DIAGNOSIS — Z79.01 LONG TERM (CURRENT) USE OF ANTICOAGULANTS: ICD-10-CM

## 2025-08-15 DIAGNOSIS — I48.20 CHRONIC ATRIAL FIBRILLATION, UNSPECIFIED: ICD-10-CM

## 2025-08-15 DIAGNOSIS — Z79.4 LONG TERM (CURRENT) USE OF INSULIN: ICD-10-CM

## 2025-09-05 ENCOUNTER — EMERGENCY (EMERGENCY)
Facility: HOSPITAL | Age: 80
LOS: 0 days | Discharge: LEFT BEFORE DISCHARGE | End: 2025-09-06
Attending: EMERGENCY MEDICINE
Payer: MEDICARE

## 2025-09-05 VITALS
DIASTOLIC BLOOD PRESSURE: 82 MMHG | HEIGHT: 64 IN | HEART RATE: 85 BPM | WEIGHT: 171.96 LBS | OXYGEN SATURATION: 98 % | RESPIRATION RATE: 18 BRPM | TEMPERATURE: 98 F | SYSTOLIC BLOOD PRESSURE: 137 MMHG

## 2025-09-05 DIAGNOSIS — Z91.040 LATEX ALLERGY STATUS: ICD-10-CM

## 2025-09-05 DIAGNOSIS — Z98.890 OTHER SPECIFIED POSTPROCEDURAL STATES: Chronic | ICD-10-CM

## 2025-09-05 DIAGNOSIS — Z88.1 ALLERGY STATUS TO OTHER ANTIBIOTIC AGENTS: ICD-10-CM

## 2025-09-05 DIAGNOSIS — Z91.09 OTHER ALLERGY STATUS, OTHER THAN TO DRUGS AND BIOLOGICAL SUBSTANCES: ICD-10-CM

## 2025-09-05 DIAGNOSIS — I48.91 UNSPECIFIED ATRIAL FIBRILLATION: ICD-10-CM

## 2025-09-05 DIAGNOSIS — Z95.5 PRESENCE OF CORONARY ANGIOPLASTY IMPLANT AND GRAFT: Chronic | ICD-10-CM

## 2025-09-05 DIAGNOSIS — R53.1 WEAKNESS: ICD-10-CM

## 2025-09-05 DIAGNOSIS — Z53.29 PROCEDURE AND TREATMENT NOT CARRIED OUT BECAUSE OF PATIENT'S DECISION FOR OTHER REASONS: ICD-10-CM

## 2025-09-05 DIAGNOSIS — R06.02 SHORTNESS OF BREATH: ICD-10-CM

## 2025-09-05 DIAGNOSIS — D64.9 ANEMIA, UNSPECIFIED: ICD-10-CM

## 2025-09-05 DIAGNOSIS — Z91.011 ALLERGY TO MILK PRODUCTS: ICD-10-CM

## 2025-09-05 DIAGNOSIS — Z79.01 LONG TERM (CURRENT) USE OF ANTICOAGULANTS: ICD-10-CM

## 2025-09-05 DIAGNOSIS — I50.9 HEART FAILURE, UNSPECIFIED: ICD-10-CM

## 2025-09-05 DIAGNOSIS — I11.0 HYPERTENSIVE HEART DISEASE WITH HEART FAILURE: ICD-10-CM

## 2025-09-05 LAB
ALBUMIN SERPL ELPH-MCNC: 4.5 G/DL — SIGNIFICANT CHANGE UP (ref 3.5–5.2)
ALP SERPL-CCNC: 95 U/L — SIGNIFICANT CHANGE UP (ref 30–115)
ALT FLD-CCNC: 7 U/L — SIGNIFICANT CHANGE UP (ref 0–41)
ANION GAP SERPL CALC-SCNC: 12 MMOL/L — SIGNIFICANT CHANGE UP (ref 7–14)
APTT BLD: 38.1 SEC — SIGNIFICANT CHANGE UP (ref 27–39.2)
AST SERPL-CCNC: 22 U/L — SIGNIFICANT CHANGE UP (ref 0–41)
BASOPHILS # BLD AUTO: 0 K/UL — SIGNIFICANT CHANGE UP (ref 0–0.2)
BASOPHILS NFR BLD AUTO: 0 % — SIGNIFICANT CHANGE UP (ref 0–1)
BILIRUB SERPL-MCNC: <0.2 MG/DL — SIGNIFICANT CHANGE UP (ref 0.2–1.2)
BUN SERPL-MCNC: 15 MG/DL — SIGNIFICANT CHANGE UP (ref 10–20)
CALCIUM SERPL-MCNC: 9.1 MG/DL — SIGNIFICANT CHANGE UP (ref 8.4–10.5)
CHLORIDE SERPL-SCNC: 99 MMOL/L — SIGNIFICANT CHANGE UP (ref 98–110)
CO2 SERPL-SCNC: 20 MMOL/L — SIGNIFICANT CHANGE UP (ref 17–32)
CREAT SERPL-MCNC: 1.1 MG/DL — SIGNIFICANT CHANGE UP (ref 0.7–1.5)
EGFR: 51 ML/MIN/1.73M2 — LOW
EGFR: 51 ML/MIN/1.73M2 — LOW
EOSINOPHIL # BLD AUTO: 0 K/UL — SIGNIFICANT CHANGE UP (ref 0–0.7)
EOSINOPHIL NFR BLD AUTO: 0 % — SIGNIFICANT CHANGE UP (ref 0–8)
GLUCOSE SERPL-MCNC: 169 MG/DL — HIGH (ref 70–99)
HCT VFR BLD CALC: 24.1 % — LOW (ref 37–47)
HGB BLD-MCNC: 7.3 G/DL — LOW (ref 12–16)
INR BLD: 1.3 RATIO — SIGNIFICANT CHANGE UP (ref 0.65–1.3)
LYMPHOCYTES # BLD AUTO: 0.75 K/UL — LOW (ref 1.2–3.4)
LYMPHOCYTES # BLD AUTO: 12.2 % — LOW (ref 20.5–51.1)
MCHC RBC-ENTMCNC: 28.5 PG — SIGNIFICANT CHANGE UP (ref 27–31)
MCHC RBC-ENTMCNC: 30.3 G/DL — LOW (ref 32–37)
MCV RBC AUTO: 94.1 FL — SIGNIFICANT CHANGE UP (ref 81–99)
MONOCYTES # BLD AUTO: 0.26 K/UL — SIGNIFICANT CHANGE UP (ref 0.1–0.6)
MONOCYTES NFR BLD AUTO: 4.3 % — SIGNIFICANT CHANGE UP (ref 1.7–9.3)
NEUTROPHILS # BLD AUTO: 5.09 K/UL — SIGNIFICANT CHANGE UP (ref 1.4–6.5)
NEUTROPHILS NFR BLD AUTO: 82.6 % — HIGH (ref 42.2–75.2)
PLATELET # BLD AUTO: 347 K/UL — SIGNIFICANT CHANGE UP (ref 130–400)
PMV BLD: 8.9 FL — SIGNIFICANT CHANGE UP (ref 7.4–10.4)
POTASSIUM SERPL-MCNC: 5.9 MMOL/L — HIGH (ref 3.5–5)
POTASSIUM SERPL-SCNC: 5.9 MMOL/L — HIGH (ref 3.5–5)
PROT SERPL-MCNC: 6.7 G/DL — SIGNIFICANT CHANGE UP (ref 6–8)
PROTHROM AB SERPL-ACNC: 15.4 SEC — HIGH (ref 9.95–12.87)
RBC # BLD: 2.56 M/UL — LOW (ref 4.2–5.4)
RBC # FLD: 22.4 % — HIGH (ref 11.5–14.5)
SODIUM SERPL-SCNC: 131 MMOL/L — LOW (ref 135–146)
WBC # BLD: 6.16 K/UL — SIGNIFICANT CHANGE UP (ref 4.8–10.8)
WBC # FLD AUTO: 6.16 K/UL — SIGNIFICANT CHANGE UP (ref 4.8–10.8)

## 2025-09-05 PROCEDURE — 36415 COLL VENOUS BLD VENIPUNCTURE: CPT

## 2025-09-05 PROCEDURE — 86923 COMPATIBILITY TEST ELECTRIC: CPT

## 2025-09-05 PROCEDURE — 99223 1ST HOSP IP/OBS HIGH 75: CPT | Mod: FS

## 2025-09-05 PROCEDURE — 36430 TRANSFUSION BLD/BLD COMPNT: CPT

## 2025-09-05 PROCEDURE — 86901 BLOOD TYPING SEROLOGIC RH(D): CPT

## 2025-09-05 PROCEDURE — 93005 ELECTROCARDIOGRAM TRACING: CPT

## 2025-09-05 PROCEDURE — 86850 RBC ANTIBODY SCREEN: CPT

## 2025-09-05 PROCEDURE — 85730 THROMBOPLASTIN TIME PARTIAL: CPT

## 2025-09-05 PROCEDURE — 99283 EMERGENCY DEPT VISIT LOW MDM: CPT | Mod: 25

## 2025-09-05 PROCEDURE — P9016: CPT

## 2025-09-05 PROCEDURE — 85610 PROTHROMBIN TIME: CPT

## 2025-09-05 PROCEDURE — 80053 COMPREHEN METABOLIC PANEL: CPT

## 2025-09-05 PROCEDURE — 93010 ELECTROCARDIOGRAM REPORT: CPT

## 2025-09-05 PROCEDURE — G0378: CPT

## 2025-09-05 PROCEDURE — 86900 BLOOD TYPING SEROLOGIC ABO: CPT

## 2025-09-05 PROCEDURE — 85025 COMPLETE CBC W/AUTO DIFF WBC: CPT

## 2025-09-05 RX ORDER — SODIUM ZIRCONIUM CYCLOSILICATE 5 G/5G
10 POWDER, FOR SUSPENSION ORAL ONCE
Refills: 0 | Status: COMPLETED | OUTPATIENT
Start: 2025-09-05 | End: 2025-09-05

## 2025-09-05 RX ADMIN — SODIUM ZIRCONIUM CYCLOSILICATE 10 GRAM(S): 5 POWDER, FOR SUSPENSION ORAL at 22:47

## 2025-09-06 VITALS
OXYGEN SATURATION: 97 % | SYSTOLIC BLOOD PRESSURE: 148 MMHG | RESPIRATION RATE: 18 BRPM | HEART RATE: 80 BPM | DIASTOLIC BLOOD PRESSURE: 87 MMHG | TEMPERATURE: 98 F

## 2025-09-06 PROBLEM — G20.A1 PARKINSON'S DISEASE WITHOUT DYSKINESIA, WITHOUT MENTION OF FLUCTUATIONS: Chronic | Status: ACTIVE | Noted: 2025-07-30

## 2025-09-06 PROBLEM — I50.9 HEART FAILURE, UNSPECIFIED: Chronic | Status: ACTIVE | Noted: 2025-07-30

## 2025-09-06 PROCEDURE — 99239 HOSP IP/OBS DSCHRG MGMT >30: CPT

## 2025-09-25 RX ORDER — EMPAGLIFLOZIN 25 MG/1
1 TABLET, FILM COATED ORAL
Qty: 90 | Refills: 3
Start: 2025-09-25 | End: 2026-09-19